# Patient Record
Sex: FEMALE | Race: WHITE | NOT HISPANIC OR LATINO | Employment: FULL TIME | URBAN - METROPOLITAN AREA
[De-identification: names, ages, dates, MRNs, and addresses within clinical notes are randomized per-mention and may not be internally consistent; named-entity substitution may affect disease eponyms.]

---

## 2017-02-01 ENCOUNTER — OFFICE VISIT (OUTPATIENT)
Dept: PSYCHOLOGY | Facility: CLINIC | Age: 37
End: 2017-02-01

## 2017-02-01 ENCOUNTER — ALLIED HEALTH/NURSE VISIT (OUTPATIENT)
Dept: FAMILY MEDICINE | Facility: CLINIC | Age: 37
End: 2017-02-01

## 2017-02-01 DIAGNOSIS — F43.22 ADJUSTMENT DISORDER WITH ANXIETY: Primary | ICD-10-CM

## 2017-02-01 DIAGNOSIS — Z00.00 PREVENTATIVE HEALTH CARE: Primary | ICD-10-CM

## 2017-02-01 ASSESSMENT — ANXIETY QUESTIONNAIRES
7. FEELING AFRAID AS IF SOMETHING AWFUL MIGHT HAPPEN: SEVERAL DAYS
6. BECOMING EASILY ANNOYED OR IRRITABLE: SEVERAL DAYS
3. WORRYING TOO MUCH ABOUT DIFFERENT THINGS: MORE THAN HALF THE DAYS
GAD7 TOTAL SCORE: 10
IF YOU CHECKED OFF ANY PROBLEMS ON THIS QUESTIONNAIRE, HOW DIFFICULT HAVE THESE PROBLEMS MADE IT FOR YOU TO DO YOUR WORK, TAKE CARE OF THINGS AT HOME, OR GET ALONG WITH OTHER PEOPLE: NOT DIFFICULT AT ALL
2. NOT BEING ABLE TO STOP OR CONTROL WORRYING: MORE THAN HALF THE DAYS
1. FEELING NERVOUS, ANXIOUS, OR ON EDGE: MORE THAN HALF THE DAYS
5. BEING SO RESTLESS THAT IT IS HARD TO SIT STILL: SEVERAL DAYS

## 2017-02-01 ASSESSMENT — PATIENT HEALTH QUESTIONNAIRE - PHQ9: 5. POOR APPETITE OR OVEREATING: SEVERAL DAYS

## 2017-02-01 NOTE — NURSING NOTE
Jeanie French      1.  Has the patient received the information for the influenza vaccine? YES    2.  Does the patient have any of the following contraindications?     Allergy to eggs? No     Allergic reaction to previous influenza vaccines? No     Any other problems to previous influenza vaccines? No     Paralyzed by Guillain-Creighton syndrome? No     Currently pregnant? NO     Current moderate or severe illness? No    Vaccination given by Anila Presley CMA.  Recorded by Anila Presley

## 2017-02-01 NOTE — PROGRESS NOTES
"Behavioral Health Progress Note    Client Name: Jeanie French    Service Type:  Individual  Length of Visit: 60 minutes  Attendees: Jeanie presented alone    Identifying Information and Presenting Problem:    The patient is a 36 year old American female who is being seen for problematic symptoms of anxiety.    Treatment Objective(s) Addressed in This Session:  Manage anxiety related to multiple life stressors    Progress on / Status of Treatment Objective(s) / Homework:  Mild exacerbation due to situational stressors  Minimal progress on homework    PHQ-9 SCORE 11/23/2015 10/19/2016 2/1/2017   Total Score - - -   Total Score 7 3 5       JAMAR-7 SCORE 11/26/2014 10/19/2016 2/1/2017   Total Score 3 - -   Total Score - 5 10       Topics Discussed/Interventions Provided:  1.  Follow Up on Homework:  Minimal progress.  Jeanie relates this to being distracted by situational stressors described below.   * Professional goals: reports she did not follow up on buying hard drive as we had planned.  Asks to carry forward this goal.   * Social connection: reports she did not reach out to my friends Tatiana and Morena.  Asks to carry forward this goal.  2.  Updated Treatment Plan:  Jeanie reports mild exacerbation of anxious and depressive symptoms today.  Anxious symptoms continue to be primary.  This appears related to situational stressors at work and at home (see below).  Jeanie denies any safety concerns today.  3.  Family:  Jeanie reports concerns about her father's health since he obtained a TBI approximately one year ago.  \"He's going to die.\"  When asked about any immediate medical crises, Jeanie denied any immediate concerns, but noted that it has been challenging to engage her father as an active participant in his own care.  She reports that he has a poor diet and does not engaged in recommended physical activity.  He would like to resume driving, but she and her mother do not think it is safe for him to do so.  While he can be " "left on his own for periods of time, her mother is reluctant to leave him secondary to worry for his safety.  He has fallen recently at home while trying to carry a printer up stairs and this has exacerbated concern.  Jeanie appear angry at her father for his unwillingness to make changes to his lifestyle that would improve his health and decrease stress on her mother.  We discussed how some of these factors are consistent with his personality prior to the TBI and some behaviors may be exacerbated by the TBI (e.g., decreased impulse control contributing to over-eating sweets, decreased motivation and ability to carry out activity that requires persistence and sustained attention, etc.).  Discussed options to support the family in caring for him over time (as this is likely to be a long term challenge).  Resources discussed included outpatient services via [facility] (where he received inpatient care after the TBI), TBI support groups for the family (to get ideas/support from other families experiencing similar challenges), and possible pursuit of a TBI waiver (a  would likely be helpful).  Jeanie expressed appreciation for these thoughts and ideas and expressed confidence in her ability to follow up on these independently.  We also discussed respecting her own limits and that fact that none of us can \"make\" anyone do anything (even for their own good).  Encouraged focus on self-care and persistent efforts to engage her father and mother in a more successful fashion.  4.  Work:  Jeanie endorses stress related to [employer] project for the [employer].  Challenges have included disorganization on the part of her employers leading to frequent requests for changes and a prolonged period of time working on this project (which she had hoped to finish up by January).  Most recently, when she told her employers that she would be busy in the coming weeks and have difficulty working on the project, she was told " "that they could try to finish on their own.  This made her think they were \"firing\" her, which was worrisome.  We discussed other possible interpretations (e.g., their awareness that the project was extending beyond the initially discussed deadline, etc.).  Discussed her ultimate hopes/goals for the project (e.g., that it could lead to other contract work for the [employer] and recommendations to other possible employers) and what choices were available to her in light of these hopes/goals (e.g., email them that she could find time in her schedule to work on it, discuss the risks of handing the project over to a new person who was not familiar with it, etc.).  Asked Jeanie to reflect on whether she had the resources (e.g., time, energy, desire, etc.) to manage this if the project continued, and she agreed that she did.  Reflected on the challenges inherent in team work and how this had been a struggle for her at various junctures in her work life (colleagues who were late, didn't approach the problem in a thoughtful manner, etc.).  Provided empathy for her frustration in the face of those challenges and the opportunity to develop skills to mitigate the stressful impact of those unavoidable moments in team work.  Agreed we would continue to work on this at future visits.  5.  Politics: Discussed emotional impact of changes in [place] Politics in the past month.  Discussed strategies for managing this ongoing stressor (see pt instructions).    Assessment: The patient appeared to be active and engaged in today's session and was receptive to feedback.     Mental Status: Jeanie French appeared generally alert and oriented. Dress was casual and appropriate to the weather and occasion. Grooming and hygiene were good. Eye contact was good. Speech was of normal volume and rate and was clear, coherent, and relevant. Mood was mildly anxious with congruent affect.  Thought processes were relevant, logical and goal-directed. Thought content " was WNL with no evidence of psychotic or paranoid features. No evidence of SI/HI or self-harm, intent, or plans. Memory appeared grossly intact. Insight and judgment appeared adequate and patient exhibited good impulse control during the appointment.     Does the patient appear to be at imminent risk of harm to self/others at this time? No    The session was necessary to address symptoms of anxiety and interpersonal distress that have been interfering with patient's ability to function in her life.  Ongoing psychotherapy is necessary to enhance coping skills as well as provide support and structure for problem solving.    Diagnosis (DSM-5):    Adjustment Disorders  309.24 (F43.22) With anxiety    Plan:  1. Follow up in 2 months or sooner if needed.  2. Work on goals as noted in patient instructions.  3. Utilize crisis resources as needed.    NOTE: Treatment plan updated today.  Next treatment plan update due on 5/1/17.  Diagnostic assessment update due 10/19/17.

## 2017-02-01 NOTE — MR AVS SNAPSHOT
After Visit Summary   2/1/2017    Jeanie French    MRN: [id number]           Patient Information     Date Of Birth          1980        Visit Information        Provider Department      2/1/2017 9:00 AM Jody Rankin, PHD [facility]        Care Instructions    Treatment Plan    Client's Name: Jeanie French  YOB: 1980    Today's Date: 2/1/17   Date Diagnostic Update Due: 10/19/17    DSM-V Diagnoses:   Adjustment Disorders  309.24 (F43.22) With anxiety    Psychosocial / Contextual Factors: Increased work related stress.  Stress related to Dad's ongoing health concerns.  Stress related to recent changes in national politics.    WHODAS 2.0 TOTAL SCORES 10/19/2016   Total Score 14     PC-PTSD:  0/4  CAGE AID:  0/4  PHQ-9 SCORE 11/23/2015 10/19/2016 2/1/2017   Total Score - - -   Total Score 7 3 5     JAMAR-7 SCORE 11/26/2014 10/19/2016 2/1/2017   Total Score 3 - -   Total Score - 5 10       Collaboration: PCP, Dr. Sohail Granados  Referral:  None  Anticipated treatment duration: unknown  Agreed upon meeting frequency: as needed or within the next two months    Long Term Treatment Goal(s) related to diagnosis / functional impairment(s)  Goal 1: Jeanie will increase her skill and confidence in managing work related stress.    I will know I've met my goal when work related distress if fleeting and quickly resolved.      Steps we will take to achieve your goal:    Jeanie will email [employer] Relations to clarify plans for current project.  Jeanie will schedule routine breaks from her computer throughout the day and either stretch or get up and walk around during those times.  Status: New Goal.    Intervention(s)  Therapist will provide support, psychoeducation and homework assignments as needed.    Goal 2: Jeanie will increase her skill and confidence in managing stress related to the current political climate.    I will know I've met my goal when political related distress if fleeting and quickly  "resolved.      Steps we will take to achieve your goal:    Limit facebook checking to 3 times per day.  Consider limiting how many Trump stories you dive into - just skim the headlines.  Limit reading stories in greater depth to three times during the day.  Replace some of the political investigation to look at design ideas, craft stuff, etc to balance out your experience.  Strive for the \"Sweet spot\" where you are engaged, but not overwhelmed.  Status: New Goal.    Intervention(s)  Therapist will provide support, psychoeducation and homework assignments as needed.    Goal 3: Jeanie will increase her skill and confidence in managing stress related to Dad's health problems    I will know I've met my goal when family related distress if fleeting and quickly resolved.      Steps we will take to achieve your goal:    Consider reaching out to [facility] regarding outpatient resources for your family.  Consider asking about whether Dad would qualify for a TBI waiver - if so he might be able to get a care coordinator to help with access and organizations of services.  Consider looking metro mobility.  Recognize that you can't \"make\" anyone do anything, but you can try different strategies to increase their engagement.    Look into support groups for families who have experienced TBI.  Status: New Goal.    Intervention(s)  Therapist will provide support, psychoeducation and homework assignments as needed.    A reminder of our goals/homework from last time:     Values:  \"It is important to me to...\"    Invest in meaningful relationships.  Self-compassion and self-care.  Grow and develop as a professional.  Grow and learn new things on a personal level.  Be financially responsibility.  Good housekeeping.    One value I will focus on for the next month will be \"growing and developing as a professional.\"  Action step I will take towards this value:  Buy a hard drive.    Another value I will focus on will be \"investing in " "meaningful relationships.\"  Action step I will take towards this value will be to reach out to my friends Tatiana and Morena.      If you need additional support and care during times that your therapist or PCP are not available, here are some additional resources for you:    Crisis Lines:    [facility] Adult Crisis:  [phone]  Crisis Connection:  [phone]    You can also consider going to the [facility] at the following address.  Walk ins are welcome:    [address] [place] [address]   [phone] (for 24 hour crisis consultation)    Monday - Friday 8:00am - 7:00pm  Saturday:  11:00am - 3:00pm  Sunday and Holidays Closed    If you feel at risk of immediate harm, go directly to the Emergency Department.    Client has reviewed and agreed to the above plan.    Jody Rankin  February 1, 2017      ______________________________    ________  Patient Signature       Date    ______________________________    ________  Provider Signature       Date                Follow-ups after your visit        Who to contact     Please call your clinic at [phone] to:    Ask questions about your health    Make or cancel appointments    Discuss your medicines    Learn about your test results    Speak to your doctor   If you have compliments or concerns about an experience at your clinic, or if you wish to file a complaint, please contact [facility] Patient Relations at [phone] or email us at [email or web address]         Additional Information About Your Visit        [employer] Information     [employer] is an electronic gateway that provides easy, online access to your medical records. With [employer], you can request a clinic appointment, read your test results, renew a prescription or communicate with your care team.     To sign up for [employer] visit the website at [email or web address]   You will be asked to enter the access code " listed below, as well as some personal information. Please follow the directions to create your username and password.     Your access code is: [email or web address]  Expires: 2017 10:06 AM     Your access code will  in 90 days. If you need help or a new code, please contact your [facility] or call [phone] for assistance.        Care EveryWhere ID     This is your Care EveryWhere ID. This could be used by other organizations to access your [place] medical records  [id number]         Blood Pressure from Last 3 Encounters:   16 106/69   12/11/15 108/67   11/23/15 104/66    Weight from Last 3 Encounters:   16 171 lb 6.4 oz (77.747 kg)   12/11/15 174 lb 3.2 oz (79.017 kg)   11/23/15 174 lb 14.4 oz (79.334 kg)              Today, you had the following     No orders found for display       Primary Care Provider Office Phone # Fax #    Sohail Se Granados  [phone]       [address] [address]        Thank you!     Thank you for choosing [facility]  for your care. Our goal is always to provide you with excellent care. Hearing back from our patients is one way we can continue to improve our services. Please take a few minutes to complete the written survey that you may receive in the mail after your visit with us. Thank you!             Your Updated Medication List - Protect others around you: Learn how to safely use, store and throw away your medicines at www.disposemymeds.org.          This list is accurate as of: 17 10:06 AM.  Always use your most recent med list.                   Brand Name Dispense Instructions for use    albuterol 108 (90 BASE) MCG/ACT Inhaler    PROAIR HFA/PROVENTIL HFA/VENTOLIN HFA    1 Inhaler    Inhale 2 puffs into the lungs every 6 hours as needed for shortness of breath / dyspnea or wheezing .Use 15 minutes prior to exercise.       busPIRone 15 MG tablet    BUSPAR    180 tablet     Take 2 tablets (30 mg) by mouth 2 times daily       fluticasone 50 MCG/ACT spray    FLONASE    16 g    Spray 1-2 sprays into both nostrils daily       ibuprofen 200 MG tablet    ADVIL/MOTRIN    100 tablet    Take 1 tablet (200 mg) by mouth every 4 hours as needed for mild pain       MULTIVITAMINS PO      Take 1 tablet by mouth daily.       psyllium 58.6 % Powd    KLS NATURAL PSYLLIUM FIBER    1 Bottle    Use as directed       vitamin D 2000 UNITS tablet     100 tablet    Take 2,000 Units by mouth daily

## 2017-02-01 NOTE — PATIENT INSTRUCTIONS
Treatment Plan    Client's Name: Jeanie French  YOB: 1980    Today's Date: 2/1/17   Date Diagnostic Update Due: 10/19/17    DSM-V Diagnoses:   Adjustment Disorders  309.24 (F43.22) With anxiety    Psychosocial / Contextual Factors: Increased work related stress.  Stress related to Dad's ongoing health concerns.  Stress related to recent changes in national politics.    WHODAS 2.0 TOTAL SCORES 10/19/2016   Total Score 14     PC-PTSD:  0/4  CAGE AID:  0/4  PHQ-9 SCORE 11/23/2015 10/19/2016 2/1/2017   Total Score - - -   Total Score 7 3 5     JAAMR-7 SCORE 11/26/2014 10/19/2016 2/1/2017   Total Score 3 - -   Total Score - 5 10       Collaboration: PCP, Dr. Sohail Granados  Referral:  None  Anticipated treatment duration: unknown  Agreed upon meeting frequency: as needed or within the next two months    Long Term Treatment Goal(s) related to diagnosis / functional impairment(s)  Goal 1: Jeanie will increase her skill and confidence in managing work related stress.    I will know I've met my goal when work related distress if fleeting and quickly resolved.      Steps we will take to achieve your goal:    Jeanie will email [employer] Relations to clarify plans for current project.  Jeanie will schedule routine breaks from her computer throughout the day and either stretch or get up and walk around during those times.  Status: New Goal.    Intervention(s)  Therapist will provide support, psychoeducation and homework assignments as needed.    Goal 2: Jeanie will increase her skill and confidence in managing stress related to the current political climate.    I will know I've met my goal when political related distress if fleeting and quickly resolved.      Steps we will take to achieve your goal:    Limit facebook checking to 3 times per day.  Consider limiting how many Trump stories you dive into - just skim the headlines.  Limit reading stories in greater depth to three times during the day.  Replace some of the political  "investigation to look at design ideas, craft stuff, etc to balance out your experience.  Strive for the \"Sweet spot\" where you are engaged, but not overwhelmed.  Status: New Goal.    Intervention(s)  Therapist will provide support, psychoeducation and homework assignments as needed.    Goal 3: Jeanie will increase her skill and confidence in managing stress related to Dad's health problems    I will know I've met my goal when family related distress if fleeting and quickly resolved.      Steps we will take to achieve your goal:    Consider reaching out to [facility] regarding outpatient resources for your family.  Consider asking about whether Dad would qualify for a TBI waiver - if so he might be able to get a care coordinator to help with access and organizations of services.  Consider looking metro mobility.  Recognize that you can't \"make\" anyone do anything, but you can try different strategies to increase their engagement.    Look into support groups for families who have experienced TBI.  Status: New Goal.    Intervention(s)  Therapist will provide support, psychoeducation and homework assignments as needed.    A reminder of our goals/homework from last time:     Values:  \"It is important to me to...\"    Invest in meaningful relationships.  Self-compassion and self-care.  Grow and develop as a professional.  Grow and learn new things on a personal level.  Be financially responsibility.  Good housekeeping.    One value I will focus on for the next month will be \"growing and developing as a professional.\"  Action step I will take towards this value:  Buy a hard drive.    Another value I will focus on will be \"investing in meaningful relationships.\"  Action step I will take towards this value will be to reach out to my friends Tatiana and Morena.      If you need additional support and care during times that your therapist or PCP are not available, here are some additional resources for you:    Crisis " Lines:    [facility] Adult Crisis:  [phone]  Crisis Connection:  [phone]    You can also consider going to the [facility] at the following address.  Walk ins are welcome:    [address] [place] [address]   [phone] (for 24 hour crisis consultation)    Monday - Friday 8:00am - 7:00pm  Saturday:  11:00am - 3:00pm  Sunday and Holidays Closed    If you feel at risk of immediate harm, go directly to the Emergency Department.    Client has reviewed and agreed to the above plan.    Jody Rankin  February 1, 2017      ______________________________    ________  Patient Signature       Date    ______________________________    ________  Provider Signature       Date

## 2017-02-02 ASSESSMENT — PATIENT HEALTH QUESTIONNAIRE - PHQ9: SUM OF ALL RESPONSES TO PHQ QUESTIONS 1-9: 5

## 2017-02-02 ASSESSMENT — ANXIETY QUESTIONNAIRES: GAD7 TOTAL SCORE: 10

## 2017-11-10 ENCOUNTER — TRANSFERRED RECORDS (OUTPATIENT)
Dept: HEALTH INFORMATION MANAGEMENT | Facility: CLINIC | Age: 37
End: 2017-11-10

## 2017-11-14 ENCOUNTER — TRANSFERRED RECORDS (OUTPATIENT)
Dept: HEALTH INFORMATION MANAGEMENT | Facility: CLINIC | Age: 37
End: 2017-11-14

## 2019-05-17 ENCOUNTER — OFFICE VISIT (OUTPATIENT)
Dept: FAMILY MEDICINE | Facility: CLINIC | Age: 39
End: 2019-05-17
Payer: COMMERCIAL

## 2019-05-17 VITALS
DIASTOLIC BLOOD PRESSURE: 77 MMHG | BODY MASS INDEX: 25.45 KG/M2 | TEMPERATURE: 98.3 F | WEIGHT: 177.8 LBS | HEIGHT: 70 IN | RESPIRATION RATE: 16 BRPM | SYSTOLIC BLOOD PRESSURE: 110 MMHG | HEART RATE: 76 BPM

## 2019-05-17 DIAGNOSIS — K20.0 EOSINOPHILIC ESOPHAGITIS: Primary | ICD-10-CM

## 2019-05-17 DIAGNOSIS — K58.0 IRRITABLE BOWEL SYNDROME WITH DIARRHEA: ICD-10-CM

## 2019-05-17 LAB — MAGNESIUM SERPL-MCNC: 1.9 MG/DL (ref 1.8–2.6)

## 2019-05-17 RX ORDER — OMEPRAZOLE 40 MG/1
40 CAPSULE, DELAYED RELEASE ORAL DAILY
Qty: 90 CAPSULE | Refills: 3 | Status: SHIPPED | OUTPATIENT
Start: 2019-05-17 | End: 2019-10-22

## 2019-05-17 ASSESSMENT — MIFFLIN-ST. JEOR: SCORE: 1543.81

## 2019-05-17 NOTE — PROGRESS NOTES
ASSESSMENT AND PLAN      Jeanie was seen today for medication request.    Diagnoses and all orders for this visit:    Eosinophilic esophagitis: Patient has a diagnosis of eosinophilic esophagitis, well controlled with omeprazole.  Will refill patient's omeprazole at this time.  We will follow-up on serum magnesium given chronic use of PPIs.  -     omeprazole (PRILOSEC) 40 MG DR capsule; Take 1 capsule (40 mg) by mouth daily  -     Magnesium ([facility])    Irritable bowel syndrome with diarrhea: Patient with significant chronic history of IBS, diarrheal type.  Patient has tried multiple interventions in the past not noting much improvement with anything except for decreasing fatty foods.  Patient had tried a FODMAP diet in the past, but it was likely not long enough and duration.  Did recommend patient try it for at least 6 weeks if able to tolerate that.  Did recommend small portions with increased frequency of eating throughout the day.  Patient was given information FODMAP diet.  Okay to take Imodium as needed for symptoms.  Encourage patient to create a food diary    AMBROSIO obtained from [facility].    Follow-up as needed.    Xiang Jennings MD PGY3  [facility]                SUBJECTIVE       Jeanie French is a 39 year old  female with a PMH significant for:     Patient Active Problem List   Diagnosis     Cancer of tongue (H)     Health Care Home     Tenosynovitis of the right index finger     Wheezing     Irritable bowel syndrome (IBS)     RUQ abdominal mass     Exercise-induced asthma     Sinusitis, chronic     Allergic rhinitis, unspecified allergic rhinitis type     Hypovitaminosis D     Adjustment disorder with anxiety     She presents with need for prescription refill. Went to [facility] about 1 year ago. She was diagnosed with eosinophilic esophagitis. She has been taking omeprazole. She was on 40mg daily, but continued to have some symptoms when trying to drop down to 20mg.     Typical  "symptoms include heart burn, \"food getting stuck when eating dense foods like potatoes or meat.\" When taking omeprazole, those symptoms do improve.     Patient has had IBS symptoms, including  Bloating, cramping, gurgling, diarrhea, for 20 years. This has been getting worse for about a month. She has tried FODMAP diet in the past, but it did not help. She feels like symptoms are worse with dairy, but \"I feel like there is no rhyme or reason.\"  She does note that she had improvement with decreasing fatty and greasy foods.  Patient also notes that her symptoms are slightly improved with Imodium.  She has had a normal colonoscopy through [facility] in the past 10 years per patient.     PMH, Medications and Allergies were reviewed and updated as needed.          OBJECTIVE     Vitals:    05/17/19 0903   BP: 110/77   Pulse: 76   Resp: 16   Temp: 98.3  F (36.8  C)   Weight: 80.6 kg (177 lb 12.8 oz)   Height: 1.765 m (5' 9.5\")     Body mass index is 25.88 kg/m .    General: Well-appearing, no acute distress, answering questions appropriately.  CV: S1, S2, regular rate and rhythm.  No murmurs noted.  Respiratory: Clear to auscultation bilaterally.  No wheezes or rhonchi.  Abdomen: Soft, nondistended, nontender, bowel sounds positive.  No results found for this or any previous visit (from the past 24 hour(s)).          "

## 2019-05-17 NOTE — PROGRESS NOTES
Preceptor Attestation:   Patient seen, evaluated and discussed with the resident. I have verified the content of the note, which accurately reflects my assessment of the patient and the plan of care.   Supervising Physician:  Misael Melton MD

## 2019-05-17 NOTE — LETTER
May 22, 2019      Jeanie French  [address] [place] [address]        Dear Jeanie,  The results of your magnesium are back and it is within the normal range. There is no need to supplement at this time. If you have any questions, feel free to call the clinic.   Please see below for your test results.    Resulted Orders   Magnesium ([facility])   Result Value Ref Range    Magnesium 1.9 1.8 - 2.6 mg/dL    Narrative    Test performed by:  [facility]  [address] [place] [address]       If you have any questions, please call the clinic to make an appointment.    Sincerely,    Xiang Jennings MD

## 2019-05-17 NOTE — PATIENT INSTRUCTIONS
-Try FODMAP diet out, trial for up to 6 weeks, If this works, then slowly introduce foods back in one by one.  -Try more frequent small meals throughout the day  -OK to take imodium as needed for diarrhea  -Start taking fiber supplements again  -Avoid dairy if possible  -Follow-up for complete physical exam.

## 2019-05-20 NOTE — RESULT ENCOUNTER NOTE
Chad Ware,    The results of your magnesium are back and it is within the normal range. There is no need to supplement at this time. If you have any questions, feel free to call the clinic.    Thanks,    Xiang Jennings MD PGY3  [facility]

## 2019-10-15 ENCOUNTER — OFFICE VISIT (OUTPATIENT)
Dept: FAMILY MEDICINE | Facility: CLINIC | Age: 39
End: 2019-10-15
Payer: COMMERCIAL

## 2019-10-15 DIAGNOSIS — F41.1 GENERALIZED ANXIETY DISORDER: Primary | ICD-10-CM

## 2019-10-15 NOTE — Clinical Note
YONI, as patient had seen you previously.  She doesn't feel like she needs therapy now--just being proactive before things get worse.

## 2019-10-16 VITALS
RESPIRATION RATE: 20 BRPM | DIASTOLIC BLOOD PRESSURE: 79 MMHG | SYSTOLIC BLOOD PRESSURE: 115 MMHG | WEIGHT: 163 LBS | OXYGEN SATURATION: 96 % | BODY MASS INDEX: 23.73 KG/M2 | HEART RATE: 87 BPM | TEMPERATURE: 98.2 F

## 2019-10-16 ASSESSMENT — PATIENT HEALTH QUESTIONNAIRE - PHQ9: SUM OF ALL RESPONSES TO PHQ QUESTIONS 1-9: 6

## 2019-10-16 NOTE — PROGRESS NOTES
Nursing Notes:   Oh Rolon, [facility]  10/16/2019  8:05 AM  Signed  Pt declined flu    SUBJECTIVE  Jeanie French is a 39 year old female with past medical history significant for    Patient Active Problem List   Diagnosis     Cancer of tongue (H)     Health Care Home     Generalized anxiety disorder     Tenosynovitis of the right index finger     Wheezing     Irritable bowel syndrome (IBS)     RUQ abdominal mass     Exercise-induced asthma     Sinusitis, chronic     Allergic rhinitis, unspecified allergic rhinitis type     Hypovitaminosis D     Adjustment disorder with anxiety     Others present at the visit:  None    Presents for   Chief Complaint   Patient presents with     Anxiety       38 yo female, new patient to me presenting with anxiety.  Has been a problem all her life, but better over the last few years.  A couple of weeks ago, things got worse.  She has had a couple of stressors recently--1)  A couple of large expensive house projects that are becoming more necessary to complete, 2)  Challenges with poor management at work--threatened loss of funding, and difficulty moving around tasks and work.  3)  Challenges in a romantic relationship where right now it is unclear with how things stand.  Also has been stressed about planning time to train for a 10mile race--hard to fit in.  Sleep has suffered some, and she is waking up a couple of times during the night. Feels tired during the day.  Not going to bed until after 11:00 often due to texting or watching TV.  She has been able to make some lists for home things, and moved forward on them, which feels good.  Anxiety got better for a little while, but has been worse again.      When she gets anxious she experiences shortness of breath, difficulty taking a deep breath, and feels like she s got a weight on her chest.  She can feel nauseous and sick to her stomach.   This can be worse acutely, but she doesn t feel like she gets panic attacks--more like it s a  chronic feeling that s always present.  Today, she rates her anxiety at about 4/5, with up to 7/8 recently with stressors.  She has seen Dr. Rankin for therapy in the past but its been a while and she s not sure that she needs it or has time for it right now.  Was on Buspar, 30mg BID in the past and this helped.  Was also on Klonopin, 0.5mg BID and this did not help.  Does not recall taking and as needed medication in the past.  She is interested in re-starting medications today.  She is feeling more anxious, but things are not bad yet.  She wants to make sure to stay on top of things because in the past this has spiraled into worsening depression and anxiety and she wants to avoid that.      OBJECTIVE:  Vitals: /79   Pulse 87   Temp 98.2  F (36.8  C) (Oral)   Resp 20   Wt 73.9 kg (163 lb)   LMP 09/21/2019   SpO2 96%   Breastfeeding? No   BMI 23.73 kg/m    BMI= Body mass index is 23.73 kg/m .  Vitals:  Vitals are reviewed and are within the normal range  Gen:  Alert, pleasant, no acute distress  Psyche:  Patient appears anxious, but thought process is clear, logical.      PHQ-9 SCORE 10/19/2016 2/1/2017 10/16/2019   PHQ-9 Total Score - - -   PHQ-9 Total Score 3 5 6     JAMAR-7 SCORE 11/26/2014 10/19/2016 2/1/2017   Total Score 3 - -   Total Score - 5 10       ASSESSMENT AND PLAN:      Jeanie was seen today for anxiety.    Diagnoses and all orders for this visit:    Generalized anxiety disorder  -     busPIRone (BUSPAR) 10 MG tablet; Take 1 tablet (10 mg) by mouth 2 times daily  -     hydrOXYzine (VISTARIL) 25 MG capsule; Take 1 capsule (25 mg) by mouth 3 times daily as needed for anxiety or other (sleep)    39 you Female with hx of anxiety and depression presenting with recurrence of anxiety symptoms.  She has a number of good tools from previous that she is currently using.  Provided reminders of her skills and that she has been using them appropriately.  We will also restart medications:    1) Buspar  10mg BID.  Can titrate up later as needed.   2) Hydroxyzine, 12.5-25mg TID PRN for acute anxiety or sleep.  Discussed potential for sedation.  3) She will work on the following lifestyle change:  Try to get to bed earlier:  a. 9:30 Prep for bed and the next day  b. 10:00 Turn off the TV, and read/relax  c. 10:30 lights out.    4) Make running a fun and not pressure thing  5) Continue to work her way through the home to do list.    6) Let us know if things are worsening      Follow up in 4 weeks.        Anayeli Warner MD

## 2019-10-17 RX ORDER — HYDROXYZINE PAMOATE 25 MG/1
25 CAPSULE ORAL 3 TIMES DAILY PRN
Qty: 60 CAPSULE | Refills: 1 | Status: SHIPPED | OUTPATIENT
Start: 2019-10-17 | End: 2019-11-12

## 2019-10-17 RX ORDER — BUSPIRONE HYDROCHLORIDE 10 MG/1
10 TABLET ORAL 2 TIMES DAILY
Qty: 180 TABLET | Refills: 0 | Status: SHIPPED | OUTPATIENT
Start: 2019-10-17 | End: 2019-11-12

## 2019-11-12 ENCOUNTER — OFFICE VISIT (OUTPATIENT)
Dept: FAMILY MEDICINE | Facility: CLINIC | Age: 39
End: 2019-11-12
Payer: COMMERCIAL

## 2019-11-12 VITALS
RESPIRATION RATE: 16 BRPM | OXYGEN SATURATION: 99 % | DIASTOLIC BLOOD PRESSURE: 77 MMHG | SYSTOLIC BLOOD PRESSURE: 127 MMHG | WEIGHT: 167.2 LBS | BODY MASS INDEX: 24.34 KG/M2 | TEMPERATURE: 98.1 F | HEART RATE: 69 BPM

## 2019-11-12 DIAGNOSIS — Z23 NEED FOR PROPHYLACTIC VACCINATION AND INOCULATION AGAINST INFLUENZA: ICD-10-CM

## 2019-11-12 DIAGNOSIS — F41.1 GENERALIZED ANXIETY DISORDER: Primary | ICD-10-CM

## 2019-11-12 DIAGNOSIS — J45.20 MILD INTERMITTENT ASTHMA WITHOUT COMPLICATION: ICD-10-CM

## 2019-11-12 RX ORDER — ALBUTEROL SULFATE 90 UG/1
2 AEROSOL, METERED RESPIRATORY (INHALATION) EVERY 6 HOURS PRN
Qty: 1 INHALER | Refills: 1 | Status: SHIPPED | OUTPATIENT
Start: 2019-11-12

## 2019-11-12 RX ORDER — BUSPIRONE HYDROCHLORIDE 10 MG/1
TABLET ORAL
Qty: 150 TABLET | Refills: 1 | Status: SHIPPED | OUTPATIENT
Start: 2019-11-12 | End: 2019-12-18

## 2019-11-12 RX ORDER — OMEPRAZOLE 40 MG/1
CAPSULE, DELAYED RELEASE ORAL
Refills: 3 | COMMUNITY
Start: 2019-05-17 | End: 2020-07-07

## 2019-11-12 RX ORDER — ALBUTEROL SULFATE 90 UG/1
2 AEROSOL, METERED RESPIRATORY (INHALATION)
COMMUNITY
Start: 2016-08-29 | End: 2019-11-12

## 2019-11-12 RX ORDER — HYDROXYZINE PAMOATE 25 MG/1
25 CAPSULE ORAL 3 TIMES DAILY PRN
Qty: 60 CAPSULE | Refills: 1 | Status: SHIPPED | OUTPATIENT
Start: 2019-11-12 | End: 2020-02-03

## 2019-11-12 ASSESSMENT — ANXIETY QUESTIONNAIRES
6. BECOMING EASILY ANNOYED OR IRRITABLE: MORE THAN HALF THE DAYS
GAD7 TOTAL SCORE: 16
3. WORRYING TOO MUCH ABOUT DIFFERENT THINGS: NEARLY EVERY DAY
5. BEING SO RESTLESS THAT IT IS HARD TO SIT STILL: NOT AT ALL
7. FEELING AFRAID AS IF SOMETHING AWFUL MIGHT HAPPEN: NEARLY EVERY DAY
1. FEELING NERVOUS, ANXIOUS, OR ON EDGE: NEARLY EVERY DAY
2. NOT BEING ABLE TO STOP OR CONTROL WORRYING: NEARLY EVERY DAY
IF YOU CHECKED OFF ANY PROBLEMS ON THIS QUESTIONNAIRE, HOW DIFFICULT HAVE THESE PROBLEMS MADE IT FOR YOU TO DO YOUR WORK, TAKE CARE OF THINGS AT HOME, OR GET ALONG WITH OTHER PEOPLE: EXTREMELY DIFFICULT

## 2019-11-12 ASSESSMENT — PATIENT HEALTH QUESTIONNAIRE - PHQ9
SUM OF ALL RESPONSES TO PHQ QUESTIONS 1-9: 10
5. POOR APPETITE OR OVEREATING: MORE THAN HALF THE DAYS

## 2019-11-12 NOTE — PATIENT INSTRUCTIONS
We should call about mental health referral to get you schedule.      Increase buspar to 2 tabs 2x per day for 2 week, then 3 tabs 3x per day for 2 weeks.      Follow up in 1 month.        My Asthma Action Plan  Name: Jeanie French  YOB: 1980  Date: 11/12/2019   My doctor: Anayeli Warner   My clinic:   [place] [address]  [phone]    My Asthma Severity: Intermittent / Exercise Induced Avoid your asthma triggers: exercise or sports and cold air      GREEN ZONE   Good Control    I feel good    No cough or wheeze    Can work, sleep and play without asthma symptoms       Take your asthma control medicine every day.  Take the medications listed below daily.    Albuterol    1. If exercise triggers your asthma, take your rescue medication (2 puffs of albuterol, Ventolin/Pro-Air) 15 minutes before exercise or sports, and during exercise if you have asthma symptoms.  2. Spacer to use with inhaler: If you have a spacer, make sure to use it with your inhaler.              YELLOW ZONE Getting Worse  I have ANY of these:    I do not feel good    Cough or wheeze    Chest feels tight    Wake up at night   1. Keep taking your Green Zone medications.  2. Start taking your rescue medicine (1-2 puffs of albuterol - Ventolin/Pro-Air) every 4-6 hours as needed.  3. If symptoms are not controlled with above, can take 2 puffs every 20 minutes for up to 1 hour, then continue every 4 hours if needed.   4. If you do not return to the Green Zone in 12-24 hours or you get worse, call the clinic.         RED ZONE Medical Alert - Get Help  I have ANY of these:    I feel awful    Medicine is not helping    Breathing getting harder    Trouble walking or talking    Nose opens wide to breathe       1. Take your rescue medicine NOW (6-8 puffs of albuterol - Ventolin/Pro-Air) for every 20 minutes for up to 1 hour.  2. Call your doctor NOW.  3. If you are still in the Red Zone after 20 minutes and  you have not reached your doctor:    Take your rescue medicine again (6-8 puffs of albuterol - Ventolin/Pro-Air) and    Call 911 or go to the emergency room right away    See your regular doctor within 1 weeks of an Emergency Room or Urgent Care visit for follow-up treatment.        This Asthma Action Plan provides authorization for the administration of medication described in the AAP.  YES    Electronically signed by: Lupe Mcnamara CMA    Annual Reminders:  Meet with Asthma Educator,  Flu Shot in the Fall, Pneumonia Shot  Pharmacy:    [facility] [place], [place] - [address] [address] [place], [place] - [address] [place] [address] - [address] [facility] [phone]    11/13/19    MENTAL HEALTH REFERRAL    Mental Health referral routed to behavioral health team for recommendations. See Documentation Only encounter for more information.    Marcela Lewis

## 2019-11-13 ENCOUNTER — DOCUMENTATION ONLY (OUTPATIENT)
Dept: PSYCHOLOGY | Facility: CLINIC | Age: 39
End: 2019-11-13

## 2019-11-13 ASSESSMENT — ASTHMA QUESTIONNAIRES: ACT_TOTALSCORE: 25

## 2019-11-13 ASSESSMENT — ANXIETY QUESTIONNAIRES: GAD7 TOTAL SCORE: 16

## 2019-11-13 NOTE — PROGRESS NOTES
Behavioral Health Team,    Patient is being referred for mental health services by their provider, Dr. Warner.  Please advise if we are able to see patient for in house treatment or if a community option would be best.    Thank you,    Marcela Lewis  11/13/2019

## 2019-11-13 NOTE — PROGRESS NOTES
There are no exam notes on file for this visit.    SUBJECTIVE  Jeanie French is a 39 year old female with past medical history significant for    Patient Active Problem List   Diagnosis     Cancer of tongue (H)     Health Care Home     Generalized anxiety disorder     Tenosynovitis of the right index finger     Wheezing     Irritable bowel syndrome (IBS)     RUQ abdominal mass     Exercise-induced asthma     Sinusitis, chronic     Allergic rhinitis, unspecified allergic rhinitis type     Hypovitaminosis D     Adjustment disorder with anxiety     Others present at the visit:  None    Presents for   Chief Complaint   Patient presents with     Recheck Medication     Pt is here to follow up on new depression medications.  She feels like her new medications are not working enough.     Imm/Inj     Pt will take her flu shot today but would like to further discuss her PCV-13.     Medication Reconciliation     Complete.      Imm/Inj     Flu Shot     Patient presents for follow-up on anxiety and depression medications.  She reports that things are somewhat better, but she is hoping for more improvement.  She found the hydroxyzine effective, but was taking it nearly 3 times a day every day for the first couple of weeks.  Does take it occasionally for sleep.  After taking the BuSpar for couple of weeks, this did seem to help more, but she is still feeling somewhat on edge.  More anxious and irritable.  Still having some difficulty with focusing and slowing things down.    She has been able to get some of her house projects done before the winter started.  Still having a lot of instability at work, with new committees being formed, and on sure what the next step would be.  Does not find work very motivating right now.  Has tried to make some sleep changes but this has been somewhat challenging to do consistently.  She is interested in pursuing therapy with Dr. Rankin again, as it was helpful in the past, and she still is not  where she is like to be.    We also discussed her asthma.  She had exercise-induced asthma as a child and now gets episodes of intermittent wheezing for which he is in its an inhaler couple of times a year.  No regular inhaler use.  She does not think she is gotten a new inhaler in a year.  He is to spacer previously, but to get out to clean it and it no longer works the same way.  Asthma action plan was completed with Lupe prior to the visit.    OBJECTIVE:  Vitals: /77 (BP Location: Left arm, Patient Position: Sitting, Cuff Size: Adult Regular)   Pulse 69   Temp 98.1  F (36.7  C) (Oral)   Resp 16   Wt 75.8 kg (167 lb 3.2 oz)   LMP 10/11/2019 (Within Days)   SpO2 99%   Breastfeeding No   BMI 24.34 kg/m    BMI= Body mass index is 24.34 kg/m .  Objective:    Vitals:  Vitals are reviewed and are within the normal range  Gen:  Alert, pleasant, mildly anxious.  Thoughtful, NAD.    Psych: Mood and affect are somewhat anxious.  Thought processes logical.  Good insight.    PHQ-9 SCORE 2/1/2017 10/16/2019 11/12/2019   PHQ-9 Total Score - - -   PHQ-9 Total Score 5 6 10     JAMAR-7 SCORE 10/19/2016 2/1/2017 11/12/2019   Total Score - - -   Total Score 5 10 16     ACT and AAP were completed      ASSESSMENT AND PLAN:      Jeanie was seen today for recheck medication, imm/inj, medication reconciliation and imm/inj.      Diagnoses and all orders for this visit:    Generalized anxiety disorder.  Symptoms may be slightly improved, but minimal.  She had been on a much higher dose of BuSpar in the past with increased effectiveness, so we will titrate up.  We will do 20 mg twice daily for 2 weeks and then 30 mg twice daily for 2 weeks.  This was her previous stable dose.  Refilled the Vistaril as this has been helpful.  Mental health referral placed, she is benefited from this in the past.  She would like to return and see Dr. Rankin if there are openings available.  -     busPIRone (BUSPAR) 10 MG tablet; Take 2 tabs  twice daily for 2 weeks, then 3 tabs twice daily after that.  -     hydrOXYzine (VISTARIL) 25 MG capsule; Take 1 capsule (25 mg) by mouth 3 times daily as needed for anxiety or other (sleep)  -     MENTAL HEALTH REFERRAL  -    Mild intermittent asthma without complication  Need for prophylactic vaccination and inoculation against influenza  History of mild intermittent asthma.  Refilled her albuterol inhaler as she has not gotten a new one in a year as well as a spacer.  Flu shot and pneumococcal vaccine done today.  -     Fluzone quad, multidose 0.5ml, 6+ months [44645]  -     albuterol (PROAIR HFA/PROVENTIL HFA/VENTOLIN HFA) 108 (90 Base) MCG/ACT inhaler; Inhale 2 puffs into the lungs every 6 hours as needed for shortness of breath / dyspnea or wheezing  -     order for DME; Equipment being ordered: aerochamber spacer.  Qty:  1  -     Pneumococcal vaccine 13 valent PCV13 IM (Prevnar) [02919]        Patient Instructions     We should call about mental health referral to get you schedule.      Increase buspar to 2 tabs 2x per day for 2 week, then 3 tabs 3x per day for 2 weeks.      Follow up in 1 month.        My Asthma Action Plan  Name: Jeanie French  YOB: 1980  Date: 11/12/2019   My doctor: Anayeli Warner   My clinic:   [place] [address]  [phone]    My Asthma Severity: Intermittent / Exercise Induced Avoid your asthma triggers: exercise or sports and cold air      GREEN ZONE   Good Control    I feel good    No cough or wheeze    Can work, sleep and play without asthma symptoms       Take your asthma control medicine every day.  Take the medications listed below daily.    Albuterol    1. If exercise triggers your asthma, take your rescue medication (2 puffs of albuterol, Ventolin/Pro-Air) 15 minutes before exercise or sports, and during exercise if you have asthma symptoms.  2. Spacer to use with inhaler: If you have a spacer, make sure to use it with your  inhaler.              YELLOW ZONE Getting Worse  I have ANY of these:    I do not feel good    Cough or wheeze    Chest feels tight    Wake up at night   1. Keep taking your Green Zone medications.  2. Start taking your rescue medicine (1-2 puffs of albuterol - Ventolin/Pro-Air) every 4-6 hours as needed.  3. If symptoms are not controlled with above, can take 2 puffs every 20 minutes for up to 1 hour, then continue every 4 hours if needed.   4. If you do not return to the Green Zone in 12-24 hours or you get worse, call the clinic.         RED ZONE Medical Alert - Get Help  I have ANY of these:    I feel awful    Medicine is not helping    Breathing getting harder    Trouble walking or talking    Nose opens wide to breathe       1. Take your rescue medicine NOW (6-8 puffs of albuterol - Ventolin/Pro-Air) for every 20 minutes for up to 1 hour.  2. Call your doctor NOW.  3. If you are still in the Red Zone after 20 minutes and you have not reached your doctor:    Take your rescue medicine again (6-8 puffs of albuterol - Ventolin/Pro-Air) and    Call 911 or go to the emergency room right away    See your regular doctor within 1 weeks of an Emergency Room or Urgent Care visit for follow-up treatment.        This Asthma Action Plan provides authorization for the administration of medication described in the AAP.  YES    Electronically signed by: Lupe Mcnamara CMA    Annual Reminders:  Meet with Asthma Educator,  Flu Shot in the Fall, Pneumonia Shot  Pharmacy:    [facility] - [place], [place] - [address] [facility] [address] [place] [address] - [address] [facility] [phone]      Follow up in 1 month for recheck anxiety and depression.      Anayeli Warner MD

## 2019-11-14 NOTE — PROGRESS NOTES
Review of Dr. Warner's order and note indicates that this is a referral for psychotherapy to address anxiety.  I have worked with this patient in the past and would be happy to see her back but do not have any openings prior to December at this point in time.  If she is Ok with that wait, can schedule with me at that time.  If this is too long to wait, we can offer visit with Dr. Caba who appears to have some limited openings for MH visits at this time.  Will ask Bekah to reach out to her to discuss these options and arrange follow up based on her preferences.    Let me know if you have questions or would like additional follow up from me.  Thanks!      Jody Rankin, Ph.D.,     Disclaimer  The above treatment recommendations are based on consultation with the patient's primary care provider and a review of relevant information in EPIC.? I have not personally examined the patient.? All recommendations should be implemented with considerations of the patient's relevant prior history and current clinical status.  Please contact me with any questions about the care of this patient.

## 2019-11-14 NOTE — PROGRESS NOTES
SW reached out to patient to discuss MH referral. She is ambivalent about the wait but does indicate that she would like to resume with a therapist she's met with before. SARAH offered patient about with  and she accepted. She is scheduled for Monday, December 9th at 10:00am with  at [facility].     Patient is wondering if there are cancellations if she could be seen sooner. SW indicated that may be a possibility but could not confirm that. Agreed to pass the message along to .     RICHAR Schneider

## 2019-11-19 NOTE — PROGRESS NOTES
Reviewing my schedule this week, I could fit her in tomorrow, Wednesday November 20 at 2:30.  Will ask the  to reach out to her today to offer this slot. If that won't work, I don't see any other options prior to 12/9/19 currently but will keep looking to see if anything opens up.    Jody Rankin, PhD, LP

## 2019-11-20 NOTE — PROGRESS NOTES
Placed outreach call to Jeanie to see if she would be able to meet this afternoon, but unfortunately she has a meeting which conflicted with the time.  Agreed I would keep looking for other opportunities if anything should open up in my schedule sooner than 12/9 and would alert her if this were the case.  She expressed appreciation for that and for the call today.  Denied any acute/urgent needs.    Jody Rankin, PhD, LP

## 2019-12-09 ENCOUNTER — OFFICE VISIT (OUTPATIENT)
Dept: PSYCHOLOGY | Facility: CLINIC | Age: 39
End: 2019-12-09
Payer: COMMERCIAL

## 2019-12-09 DIAGNOSIS — F43.22 ADJUSTMENT DISORDER WITH ANXIETY: Primary | ICD-10-CM

## 2019-12-09 ASSESSMENT — ANXIETY QUESTIONNAIRES
IF YOU CHECKED OFF ANY PROBLEMS ON THIS QUESTIONNAIRE, HOW DIFFICULT HAVE THESE PROBLEMS MADE IT FOR YOU TO DO YOUR WORK, TAKE CARE OF THINGS AT HOME, OR GET ALONG WITH OTHER PEOPLE: SOMEWHAT DIFFICULT
GAD7 TOTAL SCORE: 14
1. FEELING NERVOUS, ANXIOUS, OR ON EDGE: MORE THAN HALF THE DAYS
7. FEELING AFRAID AS IF SOMETHING AWFUL MIGHT HAPPEN: SEVERAL DAYS
5. BEING SO RESTLESS THAT IT IS HARD TO SIT STILL: NOT AT ALL
3. WORRYING TOO MUCH ABOUT DIFFERENT THINGS: NEARLY EVERY DAY
6. BECOMING EASILY ANNOYED OR IRRITABLE: NEARLY EVERY DAY
2. NOT BEING ABLE TO STOP OR CONTROL WORRYING: NEARLY EVERY DAY

## 2019-12-09 ASSESSMENT — PATIENT HEALTH QUESTIONNAIRE - PHQ9
5. POOR APPETITE OR OVEREATING: MORE THAN HALF THE DAYS
SUM OF ALL RESPONSES TO PHQ QUESTIONS 1-9: 6

## 2019-12-09 NOTE — PROGRESS NOTES
Behavioral Health Progress Note    Client Name: Jeanie French    Service Type:  Individual  Length of Visit: 60 minutes  Attendees: Jeanie presented alone    Identifying Information and Presenting Problem:    The patient is a 39 year old American female who is being seen for problematic symptoms of anxiety.  Jeanie is familiar to me from past work together, but she has not been seen since 2/1/17.  Re-establishing care today.    Treatment Objective(s) Addressed in This Session:  Manage anxiety related to multiple life stressors    Progress on / Status of Treatment Objective(s) / Homework:  New goals established today.    PHQ-9 SCORE 10/16/2019 11/12/2019 12/9/2019   PHQ-9 Total Score - - -   PHQ-9 Total Score 6 10 6       JAMAR-7 SCORE 2/1/2017 11/12/2019 12/9/2019   Total Score - - -   Total Score 10 16 14       Topics Discussed/Interventions Provided:    Current predicament:  Jeanie returns to care today after an increase in both anxious and depressive symptoms which started roughly in late summer/early fall 2019.  Jeanie identifies that these symptoms are directly related to an increase in life stressors both at work and in her personal life.  She is hoping to re-establish care to address these concerns.    Work stress:  Jeanie has been at the same company for roughly 6 years.  While she is not entirely satisfied with her work life, it has provided financial stability and she has good relationships with her co-workers.  That being said, the company is facing financial hardship and she is now questioning the stability of her position and future there.  She has started looking at other job possibilities but experiences a good deal of anxiety about the uncertainties of her work future.    Relationship stress:  Jeanie started a relationship in the spring of 2019.  There are a number of complicating factors in this relationship and she is quite anxious about the future of this relationship.  She cares deeply about the person she  "is dating, but is uncertain of his level of commitment.  Jeanie has set a deadline for certain decisions to be made in the relationship by January and is anxious about how this will unfold.  She would like to help to better manage her anxiety about this relationship and thinking through the future of this relationship.     Mood:  Mood symptoms have improved since November which she attributes to medication prescribed by Dr. Warner.  Still some depressed mood and associated fatigue, but less than in the past.  At her worst, was experiencing suicidal ideation without intent or plan in late October/early November, but this has subsided and has not been present for the past month.  No non-suicidal self injury thoughts, intent or action (this has been a concern in the past.  Was impaired at work secondary to mood disturbance and anxiety for about a week at it's worst (enough for co-workers to notice and offer support).    Anxiety:  Anxiety appears to be Jeanie's most persistent concern.  Endorses significant struggles with worry.  Describes worrying all the time about many things (relationship, work, etc.).  Did experience what she thought was a panic attack at the end of October:  Hyperventilating, high distress for a few minutes.  Sought out support from friend, Justa, which was helpful.  Denies any additional episodes of panic since that time.  Thinks medication adjustments made by Dr. Warner has helped.    Family:  At our last encounter, we had been focused on the stress of her father experiencing a TBI and stressors associated with that.  Currently, Dad is stable and Jeanie appears to have reached some level of acceptance with the changes that he has experienced.  Can drive short distances.  Jeanie notes that \"he's not who he was, but he will be 80 soon.\"  Parents moved to [place] where siblings live which has solidified her pre-existing perception of being an outsider in the family.  While interactions with family " "are not always satisfying, she does stay routinely connected to them.  Brother's youngest daughter recently had surgery for scoliosis and she visited her at the hospital.  Other niece in 20s.  Jeanie notes that no one in the family outside her mother ever asks her about what is going on in her life or invites her to join activities which is hurtful to her.  Feeling like \"washing her hands\" of the family at times.  Reflected on how she has been clear and assertive with her romantic partner about her goals for the relationship and what she would like to see there, but does not demonstrate this with her family.  Jeanie accepts this feedback but states that she knows her family would not respond well to this feedback from her or requests for more attention.  This provider suggested this may be another area for us to spend some time reflecting upon in future visits if Jeanie is interested in that.     Assessment: The patient appeared to be active and engaged in today's session and was receptive to feedback.     Mental Status: Jeanie French appeared generally alert and oriented. Dress was casual and appropriate to the weather and occasion. Grooming and hygiene were good. Eye contact was good. Speech was of normal volume and rate and was clear, coherent, and relevant. Mood was anxious with congruent affect.  She was tearful at times.  Thought processes were relevant, logical and goal-directed. Thought content was WNL with no evidence of psychotic or paranoid features. No evidence of SI/HI or self-harm, intent, or plans. Memory appeared grossly intact. Insight and judgment appeared adequate and patient exhibited good impulse control during the appointment.     Does the patient appear to be at imminent risk of harm to self/others at this time? No    The session was necessary to address symptoms of anxiety and interpersonal distress that have been interfering with patient's ability to function in her life.  Ongoing psychotherapy " is necessary to enhance coping skills as well as provide support and structure for problem solving.    Diagnosis (DSM-5):    Adjustment Disorders  309.24 (F43.22) With anxiety    Plan:  1. Scheduled follow up with this provider on 1/8/20 and 1/29/20. Will cancel visit on 1/29/20 if no longer needed at that time.  2. Did let Jeanie know we will need to complete a diagnostic assessment and treatment plan at next visit and she did agree to this.  3. Utilize crisis resources as needed.  4. Follow up with Dr. Warner on 12/18/19.    NOTE: Diagnostic assessment and treatment plan update due at next visit.

## 2019-12-09 NOTE — Clinical Note
FYI - will be following up with you on 12/18/19.  Let me know if you have any questions prior to that time.  Thanks!  Jody

## 2019-12-09 NOTE — PATIENT INSTRUCTIONS
Schedule follow up with Dr. Warner around 12/12/19.    Schedule follow up with Dr. Rankin on 1/8/20 and 1/29/20 on your way out of clinic today.  Feel free to call in sooner if anything urgent arises.    Dr. Rankin will be out of the office from 12/24 - 12/27.

## 2019-12-10 ASSESSMENT — ANXIETY QUESTIONNAIRES: GAD7 TOTAL SCORE: 14

## 2019-12-18 ENCOUNTER — OFFICE VISIT (OUTPATIENT)
Dept: FAMILY MEDICINE | Facility: CLINIC | Age: 39
End: 2019-12-18
Payer: COMMERCIAL

## 2019-12-18 VITALS
DIASTOLIC BLOOD PRESSURE: 76 MMHG | TEMPERATURE: 98 F | SYSTOLIC BLOOD PRESSURE: 118 MMHG | BODY MASS INDEX: 23.96 KG/M2 | RESPIRATION RATE: 16 BRPM | HEIGHT: 70 IN | WEIGHT: 167.4 LBS | HEART RATE: 84 BPM

## 2019-12-18 DIAGNOSIS — F41.1 GENERALIZED ANXIETY DISORDER: ICD-10-CM

## 2019-12-18 RX ORDER — BUSPIRONE HYDROCHLORIDE 30 MG/1
TABLET ORAL
Qty: 180 TABLET | Refills: 1 | Status: SHIPPED | OUTPATIENT
Start: 2019-12-18 | End: 2020-07-06

## 2019-12-18 ASSESSMENT — ANXIETY QUESTIONNAIRES
3. WORRYING TOO MUCH ABOUT DIFFERENT THINGS: NEARLY EVERY DAY
GAD7 TOTAL SCORE: 11
1. FEELING NERVOUS, ANXIOUS, OR ON EDGE: SEVERAL DAYS
6. BECOMING EASILY ANNOYED OR IRRITABLE: NEARLY EVERY DAY
7. FEELING AFRAID AS IF SOMETHING AWFUL MIGHT HAPPEN: SEVERAL DAYS
IF YOU CHECKED OFF ANY PROBLEMS ON THIS QUESTIONNAIRE, HOW DIFFICULT HAVE THESE PROBLEMS MADE IT FOR YOU TO DO YOUR WORK, TAKE CARE OF THINGS AT HOME, OR GET ALONG WITH OTHER PEOPLE: SOMEWHAT DIFFICULT
5. BEING SO RESTLESS THAT IT IS HARD TO SIT STILL: NOT AT ALL
2. NOT BEING ABLE TO STOP OR CONTROL WORRYING: MORE THAN HALF THE DAYS

## 2019-12-18 ASSESSMENT — MIFFLIN-ST. JEOR: SCORE: 1496.63

## 2019-12-18 ASSESSMENT — PATIENT HEALTH QUESTIONNAIRE - PHQ9
5. POOR APPETITE OR OVEREATING: SEVERAL DAYS
SUM OF ALL RESPONSES TO PHQ QUESTIONS 1-9: 8

## 2019-12-18 NOTE — PROGRESS NOTES
There are no exam notes on file for this visit.    SUBJECTIVE  Jeanie French is a 39 year old female with past medical history significant for    Patient Active Problem List   Diagnosis     Cancer of tongue (H)     Health Care Home     Generalized anxiety disorder     Tenosynovitis of the right index finger     Wheezing     Irritable bowel syndrome (IBS)     RUQ abdominal mass     Exercise-induced asthma     Sinusitis, chronic     Allergic rhinitis, unspecified allergic rhinitis type     Hypovitaminosis D     Adjustment disorder with anxiety     Others present at the visit:  None    Presents for   Chief Complaint   Patient presents with     Recheck Medication     Followup on medications.  Didn't get inhaler, too expensice     Patient presents today for follow-up on mental health.  She reports that things are better in general.  She is taking 30 mg of the BuSpar twice a day, and it works fairly well.  She still having some trouble every once in a while and shares that she may have some personal things coming up in the next month that might provide additional stress for her.  She is not been taking any of the hydroxyzine, and would be interested in taking more of that if things are more difficult.    We also discussed trying a more long-term medication for anxiety and depression, such as SSRIs, SNRIs, Remeron, or trazodone.  She explains that she is tried all these previously.  They gave her weird sensations in her nerves and she felt uncomfortable.  Had a lot of side effects.  She is worried about trying a new medication because of these complications in the past.    She has been in to see Dr. Rankin and plans to see her again in January.  She also has an as needed visit scheduled at the end of the month.    She reports her breathing has been good.  She has not picked up the inhaler that we prescribed because it was quite expensive.  It sounds like maybe an alternative would be better covered for her.  She does  "have asthma but it is quite intermittent and is not bothering her currently.    OBJECTIVE:  Vitals: /76   Pulse 84   Temp 98  F (36.7  C)   Resp 16   Ht 1.765 m (5' 9.5\")   Wt 75.9 kg (167 lb 6.4 oz)   BMI 24.37 kg/m    BMI= Body mass index is 24.37 kg/m .  Objective:    Vitals:  Vitals are reviewed and are within the normal range  Gen:  Alert, pleasant, no acute distress  Psych: Mood and affect are mildly anxious.  She is engaged in the conversation  Good thought process is logical and clear    JAMAR-7 SCORE 11/12/2019 12/9/2019 12/18/2019   Total Score - - -   Total Score 16 14 11         ASSESSMENT AND PLAN:      Jeanie was seen today for recheck medication.  Has overall been doing better.  Congratulated her on her ability to identify symptoms and get an early.  We will continue with the 30 mg twice daily of the BuSpar.  She will use hydroxyzine as needed as this is been helpful in the past, and she is not using it now.  We also discussed that a small amount of Ativan would be okay if she hangs up with significant worsening anxiety.  She should call let us know if this is the case.  We will continue with regular psychotherapy with Dr. Rankin.    Diagnoses and all orders for this visit:    Generalized anxiety disorder  -     busPIRone (BUSPAR) 30 MG tablet; Take 2 tabs twice daily for 2 weeks, then 3 tabs twice daily after that.    Patient Instructions   Okay to keep taking the Hydroxyzine  Continue the buspar at 30mg per day,      Follow up in 2-3 months to recheck symptoms and medications.      Anayeli Warner MD      "

## 2019-12-18 NOTE — Clinical Note
Hi.  Patient can't seem to get albuterol inhaler--says she needs generic, but I thought I already sent in generic.  Can you help me look into this?  Thanks!

## 2019-12-19 ASSESSMENT — ANXIETY QUESTIONNAIRES: GAD7 TOTAL SCORE: 11

## 2019-12-20 ENCOUNTER — TELEPHONE (OUTPATIENT)
Dept: FAMILY MEDICINE | Facility: CLINIC | Age: 39
End: 2019-12-20

## 2019-12-20 NOTE — TELEPHONE ENCOUNTER
This is the correct dosage.  Patient should be taking 30mg twice daily.  Please call the pharmacy and let them know.      Anayeli Warner MD    Routed to FRANCE Andrade.

## 2019-12-20 NOTE — TELEPHONE ENCOUNTER
[facility] phone call message- medication clarification/question:    Full Medication Name: busPIRone    Dose: 30 MG    Question: is this the right dosage      Pharmacy confirmed as    [facility] - [place], [place] - [address] [facility] [id number] [place] [facility] - [place], [place] - [address] [place] [address] PHARMACY [place], [place] - [address] [place] [address] [address] [phone]: Yes    OK to leave a message on voice mail? Yes    Primary language: English      needed? No    Call taken on December 20, 2019 at 11:05 AM by Sohail Pearson

## 2019-12-23 NOTE — TELEPHONE ENCOUNTER
Called pharmacy gave them the right dose, they are filling it for 30 BID for 90 days with 1 refill. Any questions please advise.

## 2019-12-27 NOTE — TELEPHONE ENCOUNTER
Asked Pharm D to look into inhaler options.  Will discuss this with patient at her next follow up visit.  Not currently having any asthma symptoms at this time.       Called pharmacy.  Looks like her insurance prefers Ventolin and her out of pocket cost is $50.56. If she gets generic albuterol, it looks like she can use a GoodRx card and get one for $30 (at [facility], which is her pharmacy).     mary Warner MD

## 2020-01-08 ENCOUNTER — OFFICE VISIT (OUTPATIENT)
Dept: PSYCHOLOGY | Facility: CLINIC | Age: 40
End: 2020-01-08
Payer: COMMERCIAL

## 2020-01-08 DIAGNOSIS — F41.1 GENERALIZED ANXIETY DISORDER: Primary | ICD-10-CM

## 2020-01-08 ASSESSMENT — ANXIETY QUESTIONNAIRES
2. NOT BEING ABLE TO STOP OR CONTROL WORRYING: NEARLY EVERY DAY
6. BECOMING EASILY ANNOYED OR IRRITABLE: MORE THAN HALF THE DAYS
5. BEING SO RESTLESS THAT IT IS HARD TO SIT STILL: NOT AT ALL
3. WORRYING TOO MUCH ABOUT DIFFERENT THINGS: NEARLY EVERY DAY
1. FEELING NERVOUS, ANXIOUS, OR ON EDGE: NEARLY EVERY DAY
7. FEELING AFRAID AS IF SOMETHING AWFUL MIGHT HAPPEN: NOT AT ALL
GAD7 TOTAL SCORE: 12
IF YOU CHECKED OFF ANY PROBLEMS ON THIS QUESTIONNAIRE, HOW DIFFICULT HAVE THESE PROBLEMS MADE IT FOR YOU TO DO YOUR WORK, TAKE CARE OF THINGS AT HOME, OR GET ALONG WITH OTHER PEOPLE: SOMEWHAT DIFFICULT

## 2020-01-08 ASSESSMENT — PATIENT HEALTH QUESTIONNAIRE - PHQ9
5. POOR APPETITE OR OVEREATING: SEVERAL DAYS
SUM OF ALL RESPONSES TO PHQ QUESTIONS 1-9: 10

## 2020-01-08 NOTE — PATIENT INSTRUCTIONS
"Schedule visit in the third week of February on your way out today.    Treatment Plan    Client's Name: Jeanie French  YOB: 1980    Today's Date: 1/8/2020   Date Diagnostic Update Due: 1/8/2021    DSM-V Diagnoses:   Generalized Anxiety Disorder    Psychosocial / Contextual Factors:   Jeanie's mental health concerns have been continuing to affect her ability to function in her life and have been causing clinically significant distress.  Stressors in her interpersonal and work life contribute to difficulty.    Functioning:  Hard to stay focused at work.  Anxiety undermines sleep and contributes to fatigue.  This can make it hard to complete tasks at home as well as work.    PC-PTSD: 0/4  CAGE AID: 0/4    PHQ-9 SCORE 12/9/2019 12/18/2019 1/8/2020   PHQ-9 Total Score - - -   PHQ-9 Total Score 6 8 10     JAMAR-7 SCORE 12/9/2019 12/18/2019 1/8/2020   Total Score - - -   Total Score 14 11 12     Collaboration:   Primary care physician, Dr. Anayeli Warner    Referral:  None at this time    Anticipated treatment duration: Unknown  Agreed upon meeting frequency: every 3-4 weeks    Long Term Treatment Goal(s) related to diagnosis / functional impairment(s)    Goal 1: Jeanie will report decreased anxiety and increased confidence in managing life stressors.    Steps we will take to achieve your goal:    Jeanie will continue to take medications as prescribed by Dr. Warner.  Consider practice of \"square breathing technique\" once per day.  Continue to be aware of life choices to increase sense of agency in your life.  Attend psychotherapy as scheduled.    Intervention(s)  Therapist will provide support, psychoeducation and homework assignments as needed.    If you need additional support and care during times that your therapist or PCP are not available, here are some additional resources for you:    Crisis Lines:    [facility]:  [phone]  [facility]: [phone]    Crisis Text " Line: Text [place] to 607168. Free support at your fingertips 24/7  People who text [place] to [phone] will be connected with a counselor. Crisis Text Line is available 24 hours a day, seven days a week.    You can also consider going to the [facility] at the following address.  Walk ins are welcome:    [address] [place] [address], [place]   [phone] (for 24 hour crisis consultation)    Monday - Friday 8:00am - 7:00pm  Saturday:  11:00am - 3:00pm  Sunday and Holidays Closed    If you feel at risk of immediate harm, go directly to the Emergency Department.    Patient has reviewed and agreed to the above plan.    Jody Rankin, PhD      January 8, 2020      ______________________________    ________  Patient Signature       Date    ______________________________    ________  Provider Signature       Date

## 2020-01-08 NOTE — PROGRESS NOTES
Behavioral Health Diagnostic Assessment Update:    Meeting was: scheduled  Others present: none  Meeting lasted: 60 minutes  Client was: on time  Date of Initial Diagnostic Assessment: 7/21/2010    Assessment Summary: Diagnostic assessment update completed today. The patient is a 39 year old American female who was referred for mental health services for help with managing emotions tied to stressful social situation. Based on the patient's report of symptoms, she meets criteria for Generalized Anxiety Disorder.  Jeanie's mental health concerns continue to affect her ability to function in her life and have been causing clinically significant distress. Jeanie denies any concerning drug/alcohol use. She denies any current safety concerns.  Based on the patient's reported symptoms and impact on functioning, the plan is to resume regular psychotherapy at this time on top of medication management with Dr. Warner.    Diagnosis (DSM-5):    Generalized Anxiety Disorder    Recommendations & Plan:   1.  Treatment plan updated today.  Next treatment plan update due on 4/8/2020.  Next diagnostic update due 1/8/2021.  2.  Goals for therapy = decrease anxiety and increase confidence in managing life stressors.    Mental Health Screening Questionnaires:    PHQ-9 SCORE 12/9/2019 12/18/2019 1/8/2020   PHQ-9 Total Score - - -   PHQ-9 Total Score 6 8 10     JAMAR-7 SCORE 12/9/2019 12/18/2019 1/8/2020   Total Score - - -   Total Score 14 11 12     PTSD-PC = 0/4  CAGE AID:  0/4     Current Presenting Problems or Complaints (including patient perception of problem and external factors contributing to current dilemma):  Jeanie returned to care on 12/9/2019 after an increase in both anxious and depressive symptoms which started roughly in late summer/early fall 2019.  Jeanie identifies that these symptoms are directly related to an increase in life stressors both at work and in her personal life.  She continues to experience uncertainty in her  "primary relationship and while she hopes to get some clarity on this within the next week, she suspects that things may be difficult for some time.  The anticipation of these difficulties cause her significant stress which also appears to feed into other anxieties (e.g., money, job, etc.).  While Generalized Anxiety had improved a great deal with past treatment, this appears to have flared back up in the wake of these stressors and Jeanie now reports worry about a number of areas in her life which she has difficulty controlling.  This negatively impacts sleep and leaves her feeling quite fatigued and irritable at times.  She is hopeful that engagement in treatment will allow her a safe space to explore these thoughts and feelings, learn skills to manage them and provide opportunity for problem solving current stressors.    Review of Symptoms:  Depression: Jeanie endorses increased fatigue and sleep disruption (at times having trouble falling asleep, at other times sleeping a lot in the daytime), and poor appetite.  She reports minimal depressed mood and/or anhedonia.  These symptoms appear better explained by current worry/anxiety than a separate mood disorder at this time.  Marcy: none  Psychosis: none  Anxiety: Jeanie describes feeling anxious and on edge most of the time.  She worries about a number of different issues:  Money, career, relationships, \"everything.\"  She has difficulty controlling the worry and can be irritable at times secondary to worry.  She did report one instance of panic back in the fall, but denies any since that time.  Post Traumatic Stress Disorder: Denies any problematic symptoms related to past trauma.    Functioning: Hard to stay focused at work.  Anxiety contributes to fatigue.  This can make it hard to complete tasks at home and work.    Updated Life History/Circumstances: Jeanie continues to live in her own home.  She is in a dating relationship with significant uncertainty associated with " it and this is her primary stressor at this time.  There is also some tension in her family relationships and around the future of her employment.  Jeanie reports she has limited social support, but is able to talk with two of her friends about her relationship concerns and has found them to be supportive.  Worried others will  her.  Discussed tendency to  self and importance of coming to terms with and accepting her own decisions.  Jeanie was receptive to this feedback.    Current Substance Use: Occasional social drinking.  No concerning drug or alcohol use.    Updated Health History/Family Health History: No changes to family health history since last update.  See below for current health concerns.    Patient Active Problem List   Diagnosis     Cancer of tongue (H)     Health Care Home     Generalized anxiety disorder     Tenosynovitis of the right index finger     Wheezing     Irritable bowel syndrome (IBS)     RUQ abdominal mass     Exercise-induced asthma     Sinusitis, chronic     Allergic rhinitis, unspecified allergic rhinitis type     Hypovitaminosis D     Adjustment disorder with anxiety     Current Outpatient Medications   Medication     albuterol (PROAIR HFA/PROVENTIL HFA/VENTOLIN HFA) 108 (90 Base) MCG/ACT inhaler     busPIRone (BUSPAR) 30 MG tablet     hydrOXYzine (VISTARIL) 25 MG capsule     omeprazole (PRILOSEC) 40 MG DR capsule     No current facility-administered medications for this visit.      Cultural Factors: No changes since last update.  Denies any significant Sabianist or cultural practices/beliefs that would impact health care.    Mental Status Exam:    Appearance:  Well groomed and slightly anxious, tearful at times  Behavior/Relationship to Examiner/Demeanor:  Cooperative, Engaged and Pleasant  Build: thin to medium  Gait:  Normal  Psychomotor Activity: within normal limits  Speech rate:  Normal  Speech volume:  Normal  Speech coherence:  Normal  Speech spontaneity:  Normal  Mood  "(subjective report):  \"fine\"  Affect (objective appearance):  Appropriate/mood-congruent  Eye Contact: good  Thought Process (Associations):  Logical, Linear and Goal directed  Thought process (Rate):  Normal  Abnormal Perception:  None  Sensorium:  Alert and appeared fully oriented  Attention/Concentration:  Normal  Insight:  Fair  Judgment:  Fair    Suicide Assessment:    Recent suicidal thoughts: No  Past suicidal thoughts: Yes: did report passive suicidal ideation in the fall of 2019 related to relational stressors, but denied any intent or plan at that time.  Any attempts in the past: No  Any family/friends/loved ones die by suicide: No  Plan or considering various methods: No  Access to guns: No  Protective factors: no h/o suicide attempt, no plan or intent, describes a safety plan, h/o seeking help when needed, future oriented, none to minimal alcohol use  and stable housing  Verbal contract for safety: Yes    Non-Suicidal Self Injurious Behavior: No    Violence/Homicide Risk Assessment:     Problems with anger management: No  History of violence: No  History of significant damage to property: No  Threat made to harm or kill someone: No  Verbal contract for safety: N/A    Safety Plan:   Jeanie was provided with the phone number for emergency mental health services and was encouraged to call these local crisis numbers, 911, or visit a local emergency room if thoughts of suicide or homicide were to arise and/or if she were to be in acute distress. The patient agreed to utilize these services as indicated if the need were to arise. Jeanie denied current suicidal or homicidal ideation, intent, or plan, denied a history of suicide attempts or current self-harming behaviors. Based on these factors, Jeanie is considered to be sustainable as an outpatient at this time.     NOTE: Diagnostic update complete.    Primary Care PTSD Screen  In your life, have you ever had any experience that was so frightening, horrible or " "upsetting that, in the past month, you...    1. Have had nightmares about it or thought about it when you did not want to? No  2. Tried hard not to think about it or went out of your way to avoid situations that remind you of it? No  3. Were constantly on guard, watchful, or easily startled? No  4. Felt numb or detached from others, activities, or your surroundings? No    Current research suggests that the results of the PC-PTSD should be considered \"positive\" if a patient answers \"yes\" to any (3) items.    References    RICH Yates., ELIN Caballero., Kimerling, R., Jonnie RWONG., TENISHA YenS., SYED Sutherland., ANUEL Faust, SULLY Farmer., Frias, J.I. (2004). The primary care PTSD screen (PC-PTSD): development and operating characteristics. Primary Care Psychiatry, 9, 9-14.      "

## 2020-01-09 ASSESSMENT — ANXIETY QUESTIONNAIRES: GAD7 TOTAL SCORE: 12

## 2020-01-29 ENCOUNTER — OFFICE VISIT (OUTPATIENT)
Dept: PSYCHOLOGY | Facility: CLINIC | Age: 40
End: 2020-01-29
Payer: COMMERCIAL

## 2020-01-29 DIAGNOSIS — F41.1 GENERALIZED ANXIETY DISORDER: Primary | ICD-10-CM

## 2020-01-29 NOTE — PATIENT INSTRUCTIONS
"Schedule follow up with Dr. Warner between 2/18 - 3/18    Red flag thoughts:    \"He doesn't really want to be with me.\"  \"He doesn't really want to spend time with me.\"    How I want to think about things:    Give some space for uncertainty right now.  Beware of over-personalization.  Work on healthy relationship with self which will help with everything else.      "

## 2020-01-29 NOTE — PROGRESS NOTES
"Behavioral Health Progress Note    Client Name: Jeanie French    Service Type:  Individual  Length of Visit: 60 minutes  Attendees: Jeanie presented alone    Identifying Information and Presenting Problem:    The patient is a 39 year old American female who is being seen for problematic symptoms of anxiety.      Treatment Objective(s) Addressed in This Session:  Decrease anxiety via learning adaptive coping mechanisms.  Increase confidence in managing life stressors.    Progress on / Status of Treatment Objective(s) / Homework:  Maintaining stability  Gaining insight    PHQ-9 SCORE 12/9/2019 12/18/2019 1/8/2020   PHQ-9 Total Score - - -   PHQ-9 Total Score 6 8 10       JAMAR-7 SCORE 12/9/2019 12/18/2019 1/8/2020   Total Score - - -   Total Score 14 11 12       Topics Discussed/Interventions Provided:    Relational stressors: The bulk of our visit today is spent reflecting on state of relationship and how this is impacting Jeanie's mood and anxiety.  Jeanie had been awaiting an important decision from her partner which had significant implications for their relationship.  While her partner made a choice which improves their ability to be in relationship, Jeanie states she was able to feel relieved about this \"for about 5 minutes\" before being swamped with worry about the relationship.  She endorses difficulty sleeping and excessive rumination about how her partner feels about her.  These thoughts can spiral her anxiety and cause a high level of distress.  Provided empathy for this experience and also reflected on how she might learn from and cope with her response.  Discussed past history in relationships.  Jeanie has had one prior stable relationship which lasted 4 years and noted they spent \"too much time together.\"  In her current relationship, she worries they don't spend enough.  In both relationships, there appears to have been an underlying worry that she is \"bad at relationships\" or that \"something is wrong with me.\"  " "Provided empathy for this and discussed the importance of establishing and maintaining a healthy relationship with self to feel safe and confident in relationship with others.  Jeanie agreed that she has doubts about her innate lovableness or likability which likely tie, at least in part, to family of origin dynamics.  Agreed this will be important for us to work on going forward.  In addition, given that both she and her partner will be experiencing a significant transition period with his recent decision, we agreed it would be helpful to give the relationship a little space, decrease expectations for a short time period and focus on re-establishing self-care for the time being so that they can both come back together in a healthy manner.  Jeanie appeared receptive and somewhat relieved to receive this feedback and will discuss this with her partner.    Anxiety: Today, Jeanie reports her anxiety has been elevated.  She is thinking her medication \"is not working\" and would like to explore other options with Dr. Warner.  She is already at a max dose so this would require a med change or augmentation.  We also discussed the contextual elements contributing to her anxiety and the fact that it makes sense she is experiencing increased anxiety at this time.  Agreed we would see what we could do to address anxiety non-pharmacologically and then follow up with Dr. Warner on med options down the road.  She is due to follow up with Dr. Warner some time between 2/18 and 3/18, but this has not yet been scheduled.  Jeanie agrees to schedule this in the near future.    Sleep:  Jeanie reports she is currently getting 6-6.5 hours of sleep per night on week days.  This is often related to feeling overwhelmed by anxiety and texting with her partner when they are both in bed.  This pattern leaves Jeanie feeling exhausted.  She has multiple alarms on multiple devices but hits snooze multiple times before getting up.  Sleep is also disrupted " and she frequently wakes at 4 or 5 am too.  Generally able to fall back asleep.  Weekends she gets closer to 8-9 hours, but has trouble sleeping in.  Jeanie also reports problematic napping in front of TV and then needs to get up, take care of household responsibilities before she goes to bed which wakes her up.  We discussed a number of behavioral changes which could improve sleep including setting a limit on phones in the bedroom and late night texting.  Jeanie did not think she was ready to remove the phone from her bedroom altogether, but did agree setting a curfew on texting was a good idea.  Jeanie has also decided to hang out with her partner less after work to get home earlier so she can get tasks done rather than running around trying to do this late at night.  She will also try to do all household work of the evening right away rather than waiting until right before down.  Will evaluate how these changes are impacting sleep at our next visit.    Assessment: Jeanie appeared to be active and engaged in today's session and was receptive to feedback.     Mental Status: Jeanie French appeared generally alert and oriented. Dress was casual and appropriate to the weather and occasion. Grooming and hygiene were good. Eye contact was good. Speech was of normal volume and rate and was clear, coherent, and relevant. Mood was anxious with congruent affect.  She was tearful at times.  Thought processes were relevant, logical and goal-directed. Thought content was WNL with no evidence of psychotic or paranoid features. No evidence of SI/HI or self-harm, intent, or plans. Memory appeared grossly intact. Insight and judgment appeared adequate and patient exhibited good impulse control during the appointment.     Does the patient appear to be at imminent risk of harm to self/others at this time? No    The session was necessary to address symptoms of anxiety and interpersonal distress that have been interfering with patient's  ability to function in her life.  Ongoing psychotherapy is necessary to enhance coping skills as well as provide support and structure for problem solving.    Diagnosis (DSM-5):    Generalized Anxiety Disorder    Plan:  1. Follow up with this provider on 2/24/2020.  Did offer an earlier visit today, but Jeanie declined based on scheduling issues and perceived need.  Jeanie is aware she can reach out sooner if needed.  2. See pt instructions for homework and follow up from today.  3.  Schedule follow up with Dr. Warner between 2/18 - 3/18  4. Utilize crisis resources as needed.    Jody Rankin, PhD, LP    NOTE: Treatment plan update due 4/8/2020, Diagnostic Update due 1/8/2021.

## 2020-02-24 ENCOUNTER — OFFICE VISIT (OUTPATIENT)
Dept: PSYCHOLOGY | Facility: CLINIC | Age: 40
End: 2020-02-24
Payer: COMMERCIAL

## 2020-02-24 DIAGNOSIS — F41.1 GENERALIZED ANXIETY DISORDER: Primary | ICD-10-CM

## 2020-02-24 NOTE — PROGRESS NOTES
"Behavioral Health Progress Note    Client Name: Jeanie French    Service Type:  Individual  Length of Visit: 60 minutes  Attendees: Jeanie presented alone    Identifying Information and Presenting Problem:    The patient is a 39 year old American female who is being seen for problematic symptoms of anxiety.      Treatment Objective(s) Addressed in This Session:  Decrease anxiety via learning adaptive coping mechanisms.  Increase confidence in managing life stressors.    Progress on / Status of Treatment Objective(s) / Homework:  Maintaining stability  Gaining insight    PHQ-9 SCORE 12/9/2019 12/18/2019 1/8/2020   PHQ-9 Total Score - - -   PHQ-9 Total Score 6 8 10       JAMAR-7 SCORE 12/9/2019 12/18/2019 1/8/2020   Total Score - - -   Total Score 14 11 12       Topics Discussed/Interventions Provided:    Relational stressors: Not much progress in relationship with can be frustrating and trigger feelings of being overwhelmed and thoughts of giving up.  No control over timeline which is worrisome when no progress is being made.  Encouraged self-reflection over her values and goals.  Still believes benefits/promise of relationship outweighs risk.  Not willing to give up yet.  Discussed healthy role of anxiety and need to decide if current relationship presents actual threat.  If not, how can she down-regulate anxiety in the interim?  Established strategies for this (see pt instructions).  Given uncertaintly, agreed to periodically re-assess to see if assessment of danger/threat changes over time.    Health concern:  Recently experienced mouth sore.  Worried about cancer recurrence.  Assessment got \"messed up\" and didn't get the timely follow up she was hoping for.  Will follow up in 3-4 months.     Lack of motivation:  Reports continued frustration with ability to complete tasks outside of home due to limited time and energy.  Recently added Kamran to track and share progress with partner on tasks/projects.  Hopes this will " "\"gamefy\" things and make it more fun, increase accountability.  Allows her to ask/nag less but still keep track of progress.  We also discussed allowing herself to value activities she has chosen (e.g., celebrating birthday with mom) rather than over-focus on the cost of those choices (what she didn't get done due to this).  Finally, discussed some time savers (e.g., Roomba) to reserve time energy for most important goals/tasks.    Sleep:  Better abuot getting work done right away when she gets home which has been helpful.  Getting to bed at an earlier time.  Cut back a bit on texting but not 100%.      Time pressure to have baby.    Assessment: Jeanie appeared to be active and engaged in today's session and was receptive to feedback.     Mental Status: Jeanie French appeared generally alert and oriented. Dress was casual and appropriate to the weather and occasion. Grooming and hygiene were good. Eye contact was good. Speech was of normal volume and rate and was clear, coherent, and relevant. Mood was anxious with congruent affect.  She was tearful at times.  Thought processes were relevant, logical and goal-directed. Thought content was WNL with no evidence of psychotic or paranoid features. No evidence of SI/HI or self-harm, intent, or plans. Memory appeared grossly intact. Insight and judgment appeared adequate and patient exhibited good impulse control during the appointment.     Does the patient appear to be at imminent risk of harm to self/others at this time? No    The session was necessary to address symptoms of anxiety and interpersonal distress that have been interfering with patient's ability to function in her life.  Ongoing psychotherapy is necessary to enhance coping skills as well as provide support and structure for problem solving.    Diagnosis (DSM-5):    Generalized Anxiety Disorder    Plan:  1. Follow up with this provider on 3/18/2020.  2. See pt instructions for homework and follow up from " today.  3. Jeanie is due for follow up with Dr. Warner between 2/18 - 3/18.  This was not yet scheduled at the time of our visit.  She was encouraged to schedule this recommended follow up on her way out of clinic today.  4. Utilize crisis resources as needed.    Jody Rankin, PhD,     NOTE: Treatment plan update due 4/8/2020, Diagnostic Update due 1/8/2021.

## 2020-02-24 NOTE — PROGRESS NOTES
"Behavioral Health Progress Note    Client Name: Jeanie French    Service Type:  Individual  Length of Visit: 60 minutes  Attendees: Jeanie presented alone    Identifying Information and Presenting Problem:    The patient is a 39 year old American female who is being seen for problematic symptoms of anxiety.      Treatment Objective(s) Addressed in This Session:  Decrease anxiety via learning adaptive coping mechanisms.  Increase confidence in managing life stressors.    Progress on / Status of Treatment Objective(s) / Homework:  Maintaining stability  Gaining insight    PHQ-9 SCORE 12/9/2019 12/18/2019 1/8/2020   PHQ-9 Total Score - - -   PHQ-9 Total Score 6 8 10       JAMAR-7 SCORE 12/9/2019 12/18/2019 1/8/2020   Total Score - - -   Total Score 14 11 12       Topics Discussed/Interventions Provided:    Relational stressors: Not much progress in relationship which is frustrating and can trigger feelings of being overwhelmed and thoughts of giving up.  No control over timeline which is worrisome when no progress is being made.  Starts to question her value to partner and future of relationship.  Secrecy is a burden and decreases social support available.  Provided empathy for this but also encouraged self-reflection over her values and goals.  Still believes benefits/promise of relationship outweighs risk.  Not willing to give up yet.  Discussed healthy role of anxiety and need to decide if current relationship presents actual \"threat\".  She does not believe that it is.  Given this current assessment, we discussed how she can down-regulate anxiety (see pt instructions).  Given uncertainty, agreed to periodically re-assess to see if assessment of danger/threat changes over time.  Also discussed intense emotions related to desire to have a baby and perception that time is slipping away from her due to age (sister in law has started menopause, etc.).  Provided empathy for this and encouraged discussing fertility concerns " "with PCP to get better understanding there.  Also discussed the many options for becoming a parent even if fertility is a barrier in the future.  Will discuss further at a future visit.    Lack of motivation:  Reports continued frustration with ability to complete tasks outside of home due to limited time and energy.  Recently added Kamran to track and share progress with partner on tasks/projects.  Hopes this will \"gamefy\" things and make it more fun, increase accountability.  Allows her to ask/nag less but still keep track of his progress as well as her own.  We also discussed allowing herself to value activities she has chosen (e.g., celebrating birthday with mom) rather than over-focus on the cost of those choices (what she didn't get done due to this).  Finally, discussed some time savers (e.g., Roomba) to reserve time energy for most important goals/tasks.    Sleep:  Better about getting work done right away when she gets home which has been helpful.  Getting to bed at an earlier time.  Cut back a bit on texting but not 100%.  Feels like this is moving in the right direction.    Health concern:  Recently experienced mouth sore.  Worried about cancer recurrence.  Assessment got \"messed up\" and didn't get the timely follow up she was hoping for.  Provided empathy for this.   Friends and family have been supportive.  Will follow up in 3-4 months.     Assessment: Jeanie appeared to be active and engaged in today's session and was receptive to feedback.     Mental Status: Jeanie French appeared generally alert and oriented. Dress was casual and appropriate to the weather and occasion. Grooming and hygiene were good. Eye contact was good. Speech was of normal volume and rate and was clear, coherent, and relevant. Mood was anxious with congruent affect.  She was tearful at times.  Thought processes were relevant, logical and goal-directed. Thought content was WNL with no evidence of psychotic or paranoid features. No " evidence of SI/HI or self-harm, intent, or plans. Memory appeared grossly intact. Insight and judgment appeared adequate and patient exhibited good impulse control during the appointment.     Does the patient appear to be at imminent risk of harm to self/others at this time? No    The session was necessary to address symptoms of anxiety and interpersonal distress that have been interfering with patient's ability to function in her life.  Ongoing psychotherapy is necessary to enhance coping skills as well as provide support and structure for problem solving.    Diagnosis (DSM-5):    Generalized Anxiety Disorder    Plan:  1. Follow up with this provider on 3/18/2020.  2. See pt instructions for homework and follow up from today.  3. Jeanie is due for follow up with Dr. Warner between 2/18 - 3/18.  This was not yet scheduled at the time of our visit.  She was encouraged to schedule this recommended follow up on her way out of clinic today.  4. Utilize crisis resources as needed.    Jody Rankin, PhD, LP    NOTE: Treatment plan update due 4/8/2020, Diagnostic Update due 1/8/2021.

## 2020-02-24 NOTE — PATIENT INSTRUCTIONS
Anxiety is a healthy human emotion designed to alert us to danger.  When it goes off, we need to assess if it is acurately registering a problem or going off unnecessarily.  At this time, you have decided it is not a problem that needs attention so we will work on down-regulating your response when it goes off for at least the next month.  You can reassess after that time.    Down regulation activities would include the following:    Addressing thoughts:  Lack of progress doesn't mean he doesn't care about me.  This will take time.  I have verna in this relationship.    Addressing the body:  Tune into your body and notice if you are carrying stress as muscle tension.  Stretch  Take some deep breaths  Go for a walk

## 2020-03-17 ENCOUNTER — TELEPHONE (OUTPATIENT)
Dept: PSYCHOLOGY | Facility: CLINIC | Age: 40
End: 2020-03-17

## 2020-03-17 NOTE — TELEPHONE ENCOUNTER
Placed call to Jeanie to discuss possibility of converting our scheduled visit for tomorrow to telehealth visit.  Jeanie stated she would prefer to meet in person.  She denies being ill or having any concerning contacts with persons who have been ill or exposed to COVID per her knowledge.  Agreed that we would meet for face to face session tomorrow.    Jeanie also did ask about her visit with Dr. Warner this Friday. Discussed that she would likely be asked to reschedule or consult via phone if she were comfortable with that.  Jeanie noted that she would likely be comfortable just rescheduling that visit as it was not urgent.  She will manage this when she arrives at clinic tomorrow.    Jody Rankin, PhD, LP

## 2020-03-17 NOTE — PROGRESS NOTES
Behavioral Health Progress Note    Client Name: Jeanie French    Service Type:  Individual  Length of Visit: 60 minutes  Attendees: Jeanie presented alone    Identifying Information and Presenting Problem:    The patient is a 39 year old American female who is being seen for problematic symptoms of anxiety.      Treatment Objective(s) Addressed in This Session:  Decrease anxiety via learning adaptive coping mechanisms.  Increase confidence in managing life stressors.    Progress on / Status of Treatment Objective(s) / Homework:  Maintaining stability  Gaining insight    PHQ-9 SCORE 12/9/2019 12/18/2019 1/8/2020   PHQ-9 Total Score - - -   PHQ-9 Total Score 6 8 10       JAMAR-7 SCORE 12/9/2019 12/18/2019 1/8/2020   Total Score - - -   Total Score 14 11 12       Topics Discussed/Interventions Provided:    COVID-19:  Off work.  Receiving pay while at home.  Managing this relatively well.  Actually looking forward to time to work on other goals/projects.    Anxiety: Continues to be frustrated by lack of movement/commitment from her current partner.  Has episodes of panic related to this as she catastrophizes how her life would be if this relationship doesn't work out.  We discussed this in the context of giving away control over her life to another person which puts her in a vulnerable place.  She agreed that this was the case and that this significantly contributed to her anxiety.  Encouraged seeing her panic as her body's way of telling her she needed to take back control and do something different.  Encouraged focusing on what is most important to her and how she can move that forward regardless of his choices/actions.  Discussed the following values/goals:     Having a partner in life   Having a child of her own   Advancing her career (make a film, write a book, etc.) and finances    Reflected on what is and is not in her control in all of those areas.  Explored pros and cons and various options for becoming a mother  "at this stage of her life (e.g., freezing her eggs, becoming a /adopting an older child, etc.).  While she is still undecided about what her next step may be, she reported increased anxiety when she considered the active choices available to her in life.  Explored career vs. Financial goals and how these do not always line up.  Also reflected on her tendency to identify potential barriers to goals and truncate her exploration prematurely.  Reflected on this ability as both a strength and weakness and the importance of balancing this.  Jeanie shared a story of being identified as a \"yes, but\" kind of kid early in life and agreed this has been good and bad in her life.  Encouraged allowing herself to pursue ideas even if there was not guarantee they would yield the \"results\" she was hoping for.  She could still learn from these experiences.  She agreed this would be helpful.    Physical concern:  Jeanie reports continued struggles with abdominal problems.  She wonders if this is caused by IBS or EPI (Endocrine Pancreatic Insufficiency.   She did try strict FODMAP diet but did not find that this impacted her symptoms to a great degree.  We also reflected on the role of stress and anxiety in her symptoms and she agrees this is a large contributor.  Encouraged continued active management of stress but also follow up with Dr. Warner to rule out other medical contributors.    Assessment: Jeanie appeared to be active and engaged in today's session and was receptive to feedback.     Mental Status: Jeanie French appeared generally alert and oriented. Dress was casual and appropriate to the weather and occasion. Grooming and hygiene were good. Eye contact was good. Speech was of normal volume and rate and was clear, coherent, and relevant. Mood was anxious with congruent affect, but she was open to feedback and goal setting today.  Thought processes were relevant, logical and goal-directed. Thought content was WNL " with no evidence of psychotic or paranoid features. No evidence of SI/HI or self-harm, intent, or plans. Memory appeared grossly intact. Insight and judgment appeared adequate and patient exhibited good impulse control during the appointment.     Does the patient appear to be at imminent risk of harm to self/others at this time? No    The session was necessary to address symptoms of anxiety and interpersonal distress that have been interfering with patient's ability to function in her life.  Ongoing psychotherapy is necessary to enhance coping skills as well as provide support and structure for problem solving.    Diagnosis (DSM-5):    Generalized Anxiety Disorder    Plan:  1. Follow up with this provider on 4/15/2020.  Discussed the possibility that this visit may need to be over the phone depending upon the status of COVID-19 and she expressed understanding.  Will make that determination closer to the date of the visit.  2. See pt instructions for homework and follow up from today.  3. Jeanie will reschedule visit with Dr. Warner to a later date as she was less interested in a phone visit and felt that this was not an urgent/acute issue.  4. Utilize crisis resources as needed.    Jody Rankin, PhD,     NOTE: Treatment plan update due 4/8/2020, Diagnostic Update due 1/8/2021.

## 2020-03-17 NOTE — TELEPHONE ENCOUNTER
Message noted.  If patient is feeling well and depression and anxiety are well controlled, I agree that we can just defer this visit to later in the year.  Glad things are going well.      Anayeli Warner MD    Routed to Dr. Rankin and FRANCE Andrade for FYI.

## 2020-03-18 ENCOUNTER — OFFICE VISIT (OUTPATIENT)
Dept: PSYCHOLOGY | Facility: CLINIC | Age: 40
End: 2020-03-18
Payer: COMMERCIAL

## 2020-03-18 DIAGNOSIS — F41.1 GENERALIZED ANXIETY DISORDER: Primary | ICD-10-CM

## 2020-03-18 NOTE — PATIENT INSTRUCTIONS
"Liam Cornelius \"The Power of Vulnerability\" - audiobook    Remember if you experience panic that your body is trying to alert you to a problem.  Remember that you are in charge of your life and your future - no one else.  Your panic may be trying to tell you that you have handed control over to someone else and you need to take that back.    Remember that your greatest strength is also your greatest weakness.  You have a great problem finding brain, just don't let that stop you from fully exploring your ideas, hopes and aspirations.    Remember that there is value in following an idea to the end even if it doesn't ultimately work out.  You will learn lots on that journey.      "

## 2020-04-02 ENCOUNTER — TELEPHONE (OUTPATIENT)
Dept: FAMILY MEDICINE | Facility: CLINIC | Age: 40
End: 2020-04-02

## 2020-04-02 NOTE — TELEPHONE ENCOUNTER
Reached out to patient during COVID19 Clinic outreach. Reassured patient that [facility] is still open and has started implementing phone and video appointments to help patient remain safe at home.     Patient reports the following concerns: None    Per patient request, patient is scheduled for a visit to address their concerns on the following date: 4/15, already scheduled     Offered MyChart. Patient accepted.    Bruno Allan MD

## 2020-04-08 ENCOUNTER — TELEPHONE (OUTPATIENT)
Dept: FAMILY MEDICINE | Facility: CLINIC | Age: 40
End: 2020-04-08

## 2020-04-08 NOTE — TELEPHONE ENCOUNTER
"Spoke with patient about converting her visit with Dr. Rankin to a video visit. Patient is agreeable and have a computer at home to do so. I went over the follow instructions for the video visit:    Patient Instructions to conduct a Video Visit    From your computer:   The Chrome Browser is preferred   Firefox 70+, Safari 13, IE 11+ and IE Edge 44+ can also be used.    1. Open a window on your browser.  2. Check your camera and microphone before the visit.  Copy and past the link below and follow the directions.  You will need to check and allow permissions for both the camera and the microphone separately.    Patient steps to accept/open video on computer:  1. Open email you received  2. Click \"Join\" (New browser page will open to [employer] [facility] website)  3. Type your first name  4. Select \"Patient\"  5. Press \"Join\"      Thank you for considering this alternative visit type.  We are committed to providing the high quality care you have come to expect from us.    Marcela Lewis    "

## 2020-04-15 ENCOUNTER — VIRTUAL VISIT (OUTPATIENT)
Dept: PSYCHOLOGY | Facility: CLINIC | Age: 40
End: 2020-04-15
Payer: COMMERCIAL

## 2020-04-15 DIAGNOSIS — F41.1 GENERALIZED ANXIETY DISORDER: Primary | ICD-10-CM

## 2020-04-15 NOTE — PATIENT INSTRUCTIONS
Remember the importance of taking care of your basic needs.    Get up at the same time each morning.  Eat regularly planned meals at regular intervals.  Drink water.  Get in a little physical activity most days.  Set and keep a regular sleep schedule.    It will be easier to face some of the big challenges/choices if your basic needs are attended to as well.    Our next visit will be Wednesday, 9am on May 6    We were supposed to update your treatment plan below.  In general, I think we are working on the same goals.  Let me know if you have questions or want to make additional changes at our next visit.    Treatment Plan    Client's Name: Jeanie French  YOB: 1980    Today's Date: 1/8/2020   Date Diagnostic Update Due: 1/8/2021    DSM-V Diagnoses:   Generalized Anxiety Disorder    Psychosocial / Contextual Factors:   Rajeshs mental health concerns have been continuing to affect her ability to function in her life and have been causing clinically significant distress.  Stressors in her interpersonal and work life contribute to difficulty.    Functioning:  Hard to stay focused at work.  Anxiety undermines sleep and contributes to fatigue.  This can make it hard to complete tasks at home as well as work.    PC-PTSD: 0/4  CAGE AID: 0/4    PHQ-9 SCORE 12/9/2019 12/18/2019 1/8/2020   PHQ-9 Total Score - - -   PHQ-9 Total Score 6 8 10     JAMAR-7 SCORE 12/9/2019 12/18/2019 1/8/2020   Total Score - - -   Total Score 14 11 12     Collaboration:   Primary care physician, Dr. Anayeli Warner    Referral:  None at this time    Anticipated treatment duration: Unknown  Agreed upon meeting frequency: every 3-4 weeks    Long Term Treatment Goal(s) related to diagnosis / functional impairment(s)    Goal 1: Jeanie will report decreased anxiety and increased confidence in managing life stressors.    Steps we will take to achieve your goal:    Jeanie will continue to take medications as prescribed by Dr. Warner.  Consider  "practice of \"square breathing technique\" once per day.  Continue to be aware of life choices to increase sense of agency in your life.  Attend psychotherapy as scheduled.    Intervention(s)  Therapist will provide support, psychoeducation and homework assignments as needed.    If you need additional support and care during times that your therapist or PCP are not available, here are some additional resources for you:    Crisis Lines:    [facility] Adult Crisis:  [phone]  [facility] Adult Crisis: [phone]    Crisis Text Line: [place] to 614470. Free support at your fingertips 24/7  People who text [place] to [phone] will be connected with a counselor. Crisis Text Line is available 24 hours a day, seven days a week.    You can also consider going to the [facility] at the following address.  Walk ins are welcome:    [address] [place] [address]   [phone] (for 24 hour crisis consultation)    Monday - Friday 8:00am - 7:00pm  Saturday:  11:00am - 3:00pm  Sunday and Holidays Closed    If you feel at risk of immediate harm, go directly to the Emergency Department.    Patient has reviewed and agreed to the above plan.    Jody Rankin, PhD      January 8, 2020      ______________________________    ________  Patient Signature       Date    ______________________________    ________  Provider Signature       Date      "

## 2020-04-15 NOTE — PROGRESS NOTES
Telemedicine Visit: The patient's condition can be safely assessed and treated via synchronous audio and visual telemedicine encounter.      Reason for Telemedicine Visit: COVID-19 related restrictions    Originating Site (Patient Location): Patient's home    Distant Site (Provider Location): [facility]: [facility]    Consent:  The patient/guardian has verbally consented to: the potential risks and benefits of telemedicine (video visit) versus in person care; bill my insurance or make self-payment for services provided; and responsibility for payment of non-covered services.     Mode of Communication:  Video Conference via [employer]    As the provider I attest to compliance with applicable laws and regulations related to telemedicine.    Start of call: 9:01am  End of call: 10:02am    Emergency contact: Mago Kirkpatrick [phone]  Closest Emergency Room: [facility] Progress Note    Client Name: Jeanie French    Service Type:  Individual  Length of Visit: 61 minutes  Attendees: Joined with patient permission by Dr. Danna Cuellar, Family Medicine Resident on rotation    Identifying Information and Presenting Problem:    The patient is a 40 year old American female who is being seen for problematic symptoms of anxiety.      Treatment Objective(s) Addressed in This Session:  Decrease anxiety via learning adaptive coping mechanisms.  Increase confidence in managing life stressors.    Progress on / Status of Treatment Objective(s) / Homework:  Maintaining stability  Gaining insight    PHQ-9 SCORE 12/9/2019 12/18/2019 1/8/2020   PHQ-9 Total Score - - -   PHQ-9 Total Score 6 8 10       JAMAR-7 SCORE 12/9/2019 12/18/2019 1/8/2020   Total Score - - -   Total Score 14 11 12       Topics Discussed/Interventions Provided:    Work related stress:  Very stressed.  Alternating days at work with coworker so going in every other day.  Not too stressed about going out, but boss  "is micromanaging which is not going well.  \"The Boomers\" are not adjusting well to current environment in Jeanie's opinion.  She was so stressed at work last week she had to go home early one day.  All staff meetings on Monday which feel largely \"useless\" and interfere with other task completion.  Feels she has so many meetings she can't get anything done.  Provided empathy for the stress of this transition and reflected that co-workers are likely experiencing this stress well which can contribute to communication difficulties/other disruptions in workplace.  Reflected on what she could do to ease stress at work.  Jeanie states she will make proposal to change how work is structured to give greater control/structure to work life.  Feels relatively confident boss will be receptive to this feedback.  Congratulated her on identifying next step.      Relationship stress:  Partner has not moved forward on their relationship.  Current pandemic is another barrier, but this does not feel like the true hold up to her.  On the days she is not working he comes over to the house for half the day.  Feels guilty about this due to expectations for social distancing, but she really wants to see him.  At the same time, she does seem to always enjoy their visits as she is unhappy with the lack of progress on their relationship.  Also, she is trying to be productive at home and his visits disrupt this.  Felt good about productivity at home initially (e.g., thorough cleaning of fridge, etc.), but hasn't been able to maintain.  When she reflects on uncertainty in their relationship this triggers panic (heart racing, tearfulness, etc.).  Wants someone to tell her what to do.  Also feels like career decisions are tied to relationship as this is her one time to see him and she wonders what might change if they didn't work together any longer.  Provided empathy for distress and redirected Jeanie to reflect on her current values and what was in " "her control.  At this time, she feels that cost of ending relationship would be greater than tolerating uncertainty.  She does worry about opportunity cost (seeing other people) but when pressed on this acknowledges that she would not be seeking other dating relationships at this time regardless.  Provided empathy for difficulty of decision and continued to encourage periodic reflection on cost/benefit status of staying in relationship vs. Ending relationship.  Did reflect that when she talks about relationship, it is largely painful and she does agree that this has been the case for some time.  Will continue to reflect on this.  Let her know it was Ok not to make a decision at this time due to multiple stressors in her life right now.  Will continue to work on this moving forward.    Physical health/self-care:  Does have exercise induced asthma which makes her worry a bit about risk of COVID.  Feeling sick with headache/queasiness on the phone today.  Hasn't eaten yet.  Encouraged her to take advantage of the fact that she is hope and to go get a snack which she did.  This helped her to feel better.  Acknowledges likely role of stress and forgetting basic self care.  Routine is off - sleeping late, eating late, etc.  \"I'm not a regimented person.\"  Sleep is poor - going to bed later, needing to take naps during the day due to fatigue which may be contributing to pattern.  Last week took at least 2 hour naps several days per week.  Waking up in the middle of the night.  Discussed return to regular eating, sleeping patterns to improve overall well-being and increase internal resources for managing life stressors at this time.  Jeanie was receptive to that feedback and voiced confidence in her ability to get back on track with this.    Assessment: Jeanie appeared to be active and engaged in today's session and was receptive to feedback.     Mental Status: Jeanie French appeared generally alert and oriented. Dress was " casual and appropriate to the weather and occasion. Grooming and hygiene were good. Eye contact was good. Speech was of normal volume and rate and was clear, coherent, and relevant. Mood was anxious with congruent affect.  She was frequently tearful.  Jeanie was also open to feedback and goal setting today.  Thought processes were relevant, logical and goal-directed. Thought content was WNL with no evidence of psychotic or paranoid features. No evidence of SI/HI or self-harm, intent, or plans. Memory appeared grossly intact. Insight and judgment appeared adequate and patient exhibited good impulse control during the appointment.     Does the patient appear to be at imminent risk of harm to self/others at this time? No    The session was necessary to address symptoms of anxiety and interpersonal distress that have been interfering with patient's ability to function in her life.  Ongoing psychotherapy is necessary to enhance coping skills as well as provide support and structure for problem solving.    Diagnosis (DSM-5):    Generalized Anxiety Disorder    Plan:  1. Follow up with this provider for video visit on Wednesday, May 6 at 9am.  This appt will need to be created so did route message to  to create this appt today.  2. See pt instructions for homework and follow up from today.    Jody Rankin, PhD, LP    The Treatment Plan dated 1/8/2020 was reviewed with the patient at today s visit.  The Treatment Plan remains current based on the patient s status and progress to date.  Next treatment plan update due 7/15/2020, Diagnostic Update due 1/8/2021.

## 2020-05-06 ENCOUNTER — VIRTUAL VISIT (OUTPATIENT)
Dept: PSYCHOLOGY | Facility: CLINIC | Age: 40
End: 2020-05-06
Payer: COMMERCIAL

## 2020-05-06 DIAGNOSIS — F41.1 GENERALIZED ANXIETY DISORDER: Primary | ICD-10-CM

## 2020-05-06 NOTE — PROGRESS NOTES
Telemedicine Visit: The patient's condition can be safely assessed and treated via synchronous audio and visual telemedicine encounter.      Reason for Telemedicine Visit: COVID-19 related restrictions    Originating Site (Patient Location): Patient's home    Distant Site (Provider Location): [facility]    Consent:  The patient/guardian has verbally consented to: the potential risks and benefits of telemedicine (video visit) versus in person care; bill my insurance or make self-payment for services provided; and responsibility for payment of non-covered services.     Mode of Communication:  Video Conference via [employer]    As the provider I attest to compliance with applicable laws and regulations related to telemedicine.    Start of call: 9:00am  End of call: 10:00am    Emergency contact: Mago Kirkpatrick [phone]  Closest Emergency Room: [facility] Progress Note    Client Name: Jeanie French    Service Type:  Individual  Length of Visit: 60 minutes  Attendees: Jeanie attended alone    Identifying Information and Presenting Problem:    The patient is a 40 year old American female who is being seen for problematic symptoms of anxiety.      Treatment Objective(s) Addressed in This Session:  Decrease anxiety via learning adaptive coping mechanisms.  Increase confidence in managing life stressors.    Progress on / Status of Treatment Objective(s) / Homework:  Maintaining stability  Gaining insight    PHQ-9 SCORE 12/9/2019 12/18/2019 1/8/2020   PHQ-9 Total Score - - -   PHQ-9 Total Score 6 8 10       JAMAR-7 SCORE 12/9/2019 12/18/2019 1/8/2020   Total Score - - -   Total Score 14 11 12       Topics Discussed/Interventions Provided:    Mood: Overall, mood has improved since our last visit. Mood can still be up and down.  Feeling good today but may not last.  Investigated factors in improved mood which included some changes at work, improved nutrition and  "having made some decisions in her relationship (see below for greater detail).  Encouraged savoring good days and recognizing that life will continue to have some ups and downs.  It is valuable to reflect on factors that can contribute to improvements as this can point us towards modifiable factors.    Physical health/self-care:  Initially when asked about self-care, Jeanie stated \"It's about the same.\"  She's a night owl so hard to force herself to go to bed.  Dreads the next day and wants to put this off. Does take anti-anxiety med at bedtime to see if this helps.  Decided not to re-review sleep hygiene as she is aware of what she can modify but is just not motivated to do so at this time.  Agreed we would return to this if she were to be more motivated to work on this in the future.  She does think she has become more mindful of meals and this has been helpful.    Work: Talked with boss about work stress and this has helped.  Boss has given her more responsibility to compensate for underfunctioning colleague ().  While this will increase her work load, she anticipates that things may go more smoothly going forward.  This did not come with more money or title change, but hopes this will come in the future.  This change did decrease her frustration which was helpful.  Has increased sense of control.  She will be in charge of organizing tasks which she likes.  Partnering with creative person she likes and respects.  They compliment each other.  Congratulated her for being assertive about concern which led to this change.  Still continuing with onerous meetings.  Hoping this will decrease in the coming weeks.  Hoping others will be the voice for this change as she is worried about political ramifications of her voicing this worry.  Agreed it was Ok to pick her battles at this time.    Relationship stress: My challenge to her last visit that she sounded unhappy in relationship most of the time made her reflect " "on this more and she has decided that benefits truly due outweigh the negative which has helped her to be more at peace with current choice.  Partner is still not making progress on moving relationship forward.  \"He wants someone to do it for him.\"  She continues to encourage him to take some \"baby steps.\"  She has set timeline for fall to have moved forward or she will walk away from relationship.  Felt good setting limit but also increases anxiety that she might need to follow through and walk away which she doesn't want to do.  Still has periodic worry that she's \"being played.\"  Today, she would like to discuss how to manage this situation going forward.  Not sure if she should continue to nag him vs. Be more hands off.  Worried nagging will be off putting to him vs. afraid if she doesn't bring it up at all nothing will happen.  Encouraged her to discuss with partner so they can come to shared decision about how often to check in on this.  She thought perhaps once per week might be helpful as he has told her it helps him to keep things moving forward when they check in on this.  Jeanie does have compassion for stressors of parenting during pandemic and how this adds to challenge.  She is also feeling bad about role in relationship.  Worries about what this means about her and her future with her partner.  Empathized with mixed feelings about the situation and options for coping (e.g., end relationship, forgive self/partner, set aside for now, etc.).  Acknowledged human imperfection and how this is playing out.  Offered praise for self-honesty that actions may be harmful to others and difficulty reconciling this in self.  Agreed there are no easy answers here and she will need to continue to reassess her values, thoughts and actions as things progress.    Assessment: Jeanie appeared to be active and engaged in today's session and was receptive to feedback.     Mental Status: Jeanie French appeared generally alert " and oriented. Dress was casual and appropriate to the weather and occasion. Grooming and hygiene were good. Eye contact was good. Speech was of normal volume and rate and was clear, coherent, and relevant. Mood was more euthymic at today's visit, but she was periodically anxious and sad.  Tearful at times.  Jeanie was also open to feedback and goal setting today.  Thought processes were relevant, logical and goal-directed. Thought content was WNL with no evidence of psychotic or paranoid features. No evidence of SI/HI or self-harm, intent, or plans. Memory appeared grossly intact. Insight and judgment appeared adequate and patient exhibited good impulse control during the appointment.     Does the patient appear to be at imminent risk of harm to self/others at this time? No    The session was necessary to address symptoms of anxiety and interpersonal distress that have been interfering with patient's ability to function in her life.  Ongoing psychotherapy is necessary to enhance coping skills as well as provide support and structure for problem solving.    Diagnosis (DSM-5):    Generalized Anxiety Disorder    Plan:  1. Follow up with this provider for video visit on May 29.  2. See pt instructions for homework and follow up from today.    Jody Rankin, PhD, LP    Next treatment plan update due 7/15/2020, Diagnostic Update due 1/8/2021.

## 2020-05-06 NOTE — PATIENT INSTRUCTIONS
"Talk with your partner about how often to check in around progress of relationship.  This can be a shared decision.    Consider eliciting your partner's perspective on next steps rather than telling him what to do next to see if you might get a different response.  For example,    \"What do you think the next step should be?\"  \"When do you think you could achieve that?\"  \"What makes this important to you?\"  \"What do you think is getting in the way? Is there anything that could make this easier for you?\"    Also, think about checking out the [employer] Podcast.  They are doing a series on coping with the current pandemic including at least one episode on the impact on relationships.    Looking forward to continuing this conversation on May 29.  Please don't hesitate to reach out sooner if needed!  "

## 2020-05-29 ENCOUNTER — VIRTUAL VISIT (OUTPATIENT)
Dept: PSYCHOLOGY | Facility: CLINIC | Age: 40
End: 2020-05-29
Payer: COMMERCIAL

## 2020-05-29 DIAGNOSIS — F41.1 GENERALIZED ANXIETY DISORDER: Primary | ICD-10-CM
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
## 2020-05-29 NOTE — PROGRESS NOTES
Telemedicine Visit: The patient's condition can be safely assessed and treated via synchronous audio and visual telemedicine encounter.      Reason for Telemedicine Visit: COVID-19 related restrictions    Originating Site (Patient Location): Patient's home    Distant Site (Provider Location): [facility]: [facility]    Consent:  The patient/guardian has verbally consented to: the potential risks and benefits of telemedicine (video visit) versus in person care; bill my insurance or make self-payment for services provided; and responsibility for payment of non-covered services.     Mode of Communication:  Video Conference via [employer].  Lost connection mid way through visit and reconnected via [employer].    As the provider I attest to compliance with applicable laws and regulations related to telemedicine.    Start of call: 9:00am  End of call: 10:00am    Emergency contact: Mago Kirkpatrick [phone]  Closest Emergency Room: [facility] Progress Note    Client Name: Jeanie French    Service Type:  Individual  Length of Visit: 60 minutes  Attendees: Jeanie attended alone    Identifying Information and Presenting Problem:    The patient is a 40 year old American female who is being seen for problematic symptoms of anxiety.      Treatment Objective(s) Addressed in This Session:  Decrease anxiety via learning adaptive coping mechanisms.  Increase confidence in managing life stressors.    Progress on / Status of Treatment Objective(s) / Homework:  Maintaining stability  Gaining insight    PHQ-9 SCORE 12/9/2019 12/18/2019 1/8/2020   PHQ-9 Total Score - - -   PHQ-9 Total Score 6 8 10       JAMAR-7 SCORE 12/9/2019 12/18/2019 1/8/2020   Total Score - - -   Total Score 14 11 12       Topics Discussed/Interventions Provided:    Response to riots:  [place] [address]  Up very late last night with worry about riots, but this hasn't really reached her  "neighborhood.  Normalized and provided empathy for distress.    Relationship concerns:  The bulk of our visit today was devoted to discussion of ongoing relationship distress.  Reports she \"freaked out\" about relationship problems recently.  Initially set deadline of Labor Day for relationship decision but now feels she can't wait this long.  Partner has expressed some ambivalence about making change which increased her level of doubt.  In addition, wondering about how changes at work will impact relationship (see below).  Will likely reduce time they are able to spend together.  Wondering if seeing him less would be helpful.  Would he miss her?  Would this clarify things.  Feels stuck.  Talked about moving forward.  Reflected on options including dialing back intimacy.  She plans to talk with him about this.  Would help her to resolve some of her internal discomfort with the state of their relationship.  Also worried about his ability to talk with her about emotions.  Has encouraged him to seek therapy to learn more skills in this area.  Congratulated her for speaking up and asking for she wants/needs in the relationship.  The decision to change is up to him.    Work stress:  Work is another stressor.  Has been taking over work from another coworker.  \"It's not going that well.\"  Not a lot of support from leadership or long term planning happening.  Used to plan one week at a time, but now making plans daily.  Also struggles working with one of her coworkers with some apparent mental health/cognitive challenges.  Jeanie has taken over more  role but this individual's lack of competence makes this challenging.  Considering talking to boss about how to address this problem.  Thinks boss would be receptive to this conversation.  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 Would consider FMLA but doesn't " really want to do that.  Discussed options of talking with boss about more gradual return to the office and continuing to work from home at times.  Encouraged asking for adapted schedule:  Could also consider move to part time work.    Feeling overwhelmed:  Feels overwhelmed by the number and scope of current problems (pandemic, riot, relationship, work, cat is sick, things are breaking in home, etc.).  Normalized and provided empathy given unusually high number of stressors in the community at this time.    Assessment: Jeanie appeared to be active and engaged in today's session and was receptive to feedback.     Mental Status: Jeanie French appeared generally alert and oriented. Dress was casual and appropriate to the weather and occasion. Grooming and hygiene were good. Eye contact was good. Speech was of normal volume and rate and was clear, coherent, and relevant. Mood was sad and anxious at today's visit.  Tearful at times.  Jeanie was also open to feedback and goal setting today.  Thought processes were relevant, logical and goal-directed. Thought content was WNL with no evidence of psychotic or paranoid features. No evidence of SI/HI or self-harm, intent, or plans. Memory appeared grossly intact. Insight and judgment appeared adequate and patient exhibited good impulse control during the appointment.     Does the patient appear to be at imminent risk of harm to self/others at this time? No    The session was necessary to address symptoms of anxiety and interpersonal distress that have been interfering with patient's ability to function in her life.  Ongoing psychotherapy is necessary to enhance coping skills as well as provide support and structure for problem solving.    Diagnosis (DSM-5):    Generalized Anxiety Disorder    Plan:  1. Follow up with this provider for video visit on Tuesday, June 23 at 8:30am.  Will route message to  to ask them to create this visit.  2. See pt instructions for  homework and follow up from today.  Can access via [employer].    Jody Rankin, PhD, LP    Next treatment plan update due 7/15/2020, Diagnostic Update due 1/8/2021.

## 2020-06-23 ENCOUNTER — VIRTUAL VISIT (OUTPATIENT)
Dept: PSYCHOLOGY | Facility: CLINIC | Age: 40
End: 2020-06-23
Payer: COMMERCIAL

## 2020-06-23 DIAGNOSIS — F41.1 GENERALIZED ANXIETY DISORDER: Primary | ICD-10-CM

## 2020-06-23 NOTE — PROGRESS NOTES
"Telemedicine Visit: The patient's condition can be safely assessed and treated via synchronous audio and visual telemedicine encounter.      Reason for Telemedicine Visit: COVID-19 related restrictions    Originating Site (Patient Location): Patient's home    Distant Site (Provider Location): [facility]: [facility]    Consent:  The patient/guardian has verbally consented to: the potential risks and benefits of telemedicine (video visit) versus in person care; bill my insurance or make self-payment for services provided; and responsibility for payment of non-covered services.     Mode of Communication:  Video Conference via [employer].    As the provider I attest to compliance with applicable laws and regulations related to telemedicine.    Start of call: 8:31am  End of call: 9:31am    Emergency contact: Mago Kirkpatrick [phone]  Closest Emergency Room: [facility] Progress Note    Client Name: Jeanie French    Service Type:  Individual  Length of Visit: 60 minutes  Attendees: Jeanie attended alone    Identifying Information and Presenting Problem:    The patient is a 40 year old American female who is being seen for problematic symptoms of anxiety.      Treatment Objective(s) Addressed in This Session:  Decrease anxiety via learning adaptive coping mechanisms.  Increase confidence in managing life stressors.    Progress on / Status of Treatment Objective(s) / Homework:  Maintaining stability  Gaining insight    PHQ-9 SCORE 12/9/2019 12/18/2019 1/8/2020   PHQ-9 Total Score - - -   PHQ-9 Total Score 6 8 10       JAMAR-7 SCORE 12/9/2019 12/18/2019 1/8/2020   Total Score - - -   Total Score 14 11 12       Topics Discussed/Interventions Provided:    Anxiety:  \"I've been pretty stressed out the past few weeks.\"  Multiple factors are at play (see below).    Work stress:  This is her biggest source of stress right now.  Boss wants her on site at work.  Can't do " "everything she needs to do from home.  This idea is very stressful.  Reflected on what she likes/doesn't like about working from home and how there will be some losses associated with return to the workplace.  Reflected on what is and is not in her control and how to manage response to situation rather than being reactive or focusing on things outside of her control.  Often focuses on the behavior of others and how things \"should\" be.  Provided empathy for this experience and normalized this pattern but also reflected on how this might be unhelpful or feed her stress/anxiety.  Encouraged greater acceptance of things beyond her control and pulling her attention back to what she can do in any given situation to reduce anxiety and ground/empower her to manage it.  We also reflected on pattern of worry that supervisors will be critical of her despite lack of evidence for this and general impression that leadership has \"benign neglect\" style with others that would likely apply to her as well.  Jeanie appeared receptive to this feedback.    Relationship concerns:  Ready to end things more than once since our last visit.  Describes episodes of panic about this.  Recently, however, she has seen things making some progress and this is feeding some hope that things can improve.  Less stressed about this today.      Pandemic:  Being at home so much is starting to wear on her.  Didn't go out all that much before, but even given that this is becoming increasingly difficult.  Takes so much effort to plan outings, etc.  Provided empathy and normalized experience.    Family:  Didn't do anything for Father's Day.  Mom had colonoscopy scheduled for this week so didn't feel up for social planning.  Hasn't seen sister and parents since April.  Hasn't seen brother since February.  Brother in law got laid off a week or so ago.  Not sure what will happen there.  Discussed ways to connect digitally or outside/distance, but it is not clear to " "me that Jeanie is ready to take action steps at this time to increase connection.  She will think about this.    Home life:  Still feels like she is not \"getting enough done.\"  Continues to exhibit pattern of creating long \"to do\" list and getting frustrated with herself that she is not getting through it quickly enough.  Tends to discount action steps she has taken (e.g., calling the  - waiting for call back).  Encouraged breaking down long list into knight priorities and focusing on one at a time.  Give self credit for action steps taken.    Assessment: Jeanie appeared to be active and engaged in today's session and was receptive to feedback.     Mental Status: Jeanie French appeared generally alert and oriented. Dress was casual and appropriate to the weather and occasion. Grooming and hygiene were good. Eye contact was good. Speech was of normal volume and rate and was clear, coherent, and relevant. Mood was anxious at today's visit.  Tearful at times.  Jeanie was also open to feedback and goal setting today.  Thought processes were relevant, logical and goal-directed. Thought content was WNL with no evidence of psychotic or paranoid features. No evidence of SI/HI or self-harm, intent, or plans. Memory appeared grossly intact. Insight and judgment appeared adequate and patient exhibited good impulse control during the appointment.     Does the patient appear to be at imminent risk of harm to self/others at this time? No    The session was necessary to address symptoms of anxiety and interpersonal distress that have been interfering with patient's ability to function in her life.  Ongoing psychotherapy is necessary to enhance coping skills as well as provide support and structure for problem solving.    Diagnosis (DSM-5):    Generalized Anxiety Disorder    Plan:  1. Follow up with this provider for video visit in July.  Submitted request via [employer].  Confirmed with the  that they should receive " message on this and that they would reach out to her to schedule.  2. See pt instructions for homework and follow up from today.  Can access via [employer].    Jody Rankin, PhD, LP    Next treatment plan update due 7/15/2020, Diagnostic Update due 1/8/2021.

## 2020-06-23 NOTE — PATIENT INSTRUCTIONS
Sometimes you take on too much at once and this can feel overwhelming and counter productive.  Break things into smaller chunks and try to manage one at a time starting with what is easiest or what is most important.  For example, stop worrying about all the different house projects and just focus on one.    When overwhelmed, ask yourself whose behavior you are worried about.  If it's not you, this might be a sign to pull it back and focus on what is in your control and what you can do in any given situation.    Schedule two visits in July.

## 2020-07-22 ENCOUNTER — VIRTUAL VISIT (OUTPATIENT)
Dept: PSYCHOLOGY | Facility: CLINIC | Age: 40
End: 2020-07-22
Payer: COMMERCIAL

## 2020-07-22 DIAGNOSIS — F41.1 GENERALIZED ANXIETY DISORDER: Primary | ICD-10-CM

## 2020-07-22 ASSESSMENT — ANXIETY QUESTIONNAIRES
IF YOU CHECKED OFF ANY PROBLEMS ON THIS QUESTIONNAIRE, HOW DIFFICULT HAVE THESE PROBLEMS MADE IT FOR YOU TO DO YOUR WORK, TAKE CARE OF THINGS AT HOME, OR GET ALONG WITH OTHER PEOPLE: VERY DIFFICULT
7. FEELING AFRAID AS IF SOMETHING AWFUL MIGHT HAPPEN: NOT AT ALL
6. BECOMING EASILY ANNOYED OR IRRITABLE: NEARLY EVERY DAY
5. BEING SO RESTLESS THAT IT IS HARD TO SIT STILL: NOT AT ALL
1. FEELING NERVOUS, ANXIOUS, OR ON EDGE: NEARLY EVERY DAY
2. NOT BEING ABLE TO STOP OR CONTROL WORRYING: NEARLY EVERY DAY
GAD7 TOTAL SCORE: 12
3. WORRYING TOO MUCH ABOUT DIFFERENT THINGS: NEARLY EVERY DAY

## 2020-07-22 ASSESSMENT — PATIENT HEALTH QUESTIONNAIRE - PHQ9
5. POOR APPETITE OR OVEREATING: NOT AT ALL
SUM OF ALL RESPONSES TO PHQ QUESTIONS 1-9: 13

## 2020-07-22 NOTE — PATIENT INSTRUCTIONS
"Good to see you today Jeanie.  Let's celebrate how well you are doing given everything that has been going on in your life.  There are still things for us to work on, but there are strengths as well!  Schedule your vacation ASAP!  Don't fill your vacation days with a huge \"to do list.\"  Make time for fun and relaxation.  Think about what you need to feel restored (e.g., good nutrition, time out of doors, connection with others).  Call your sister.  Review treatment plan below and let me know if we need to make any changes.    Treatment Plan    Client's Name: Jeanie French  YOB: 1980    Today's Date: 7/22/2020   Date Diagnostic Update Due: 1/8/2021    DSM-V Diagnoses:   Generalized Anxiety Disorder    Psychosocial / Contextual Factors:   Rajeshs mental health concerns have been continuing to affect her ability to function in her life and have been causing clinically significant distress.  Stressors in her interpersonal and work life contribute to difficulty. The context of the current pandemic and recent social unrest are also sources of stress.  Some additional medical concerns may also be contributing at this time.    Functioning:  Hard to stay focused at work.  Anxiety undermines sleep and contributes to fatigue.  This can make it hard to complete tasks at home as well as work.    PC-PTSD: 0/4  CAGE AID: 0/4    PHQ-9 SCORE 12/18/2019 1/8/2020 7/22/2020   PHQ-9 Total Score - - -   PHQ-9 Total Score 8 10 13     JAMAR-7 SCORE 12/18/2019 1/8/2020 7/22/2020   Total Score - - -   Total Score 11 12 12     Collaboration:   Primary care physician, Dr. Anayeli Warner    Referral:  None at this time    Anticipated treatment duration: Unknown  Agreed upon meeting frequency: every 3-4 weeks    Long Term Treatment Goal(s) related to diagnosis / functional impairment(s)    Goal 1: Jeanie will report decreased anxiety and increased confidence in managing life stressors.    Steps we will take to achieve your " "goal:    Jeanie will continue to take medications as prescribed by Dr. Warner.  Consider practice of \"square breathing technique\" once per day.  Continue to be aware of life choices to increase sense of agency in your life.  Communicate your needs clearly to others.  Attend psychotherapy as scheduled.    Intervention(s)  Therapist will provide support, psychoeducation and homework assignments as needed.    If you need additional support and care during times that your therapist or PCP are not available, here are some additional resources for you:    Crisis Lines:    [facility] Adult Crisis:  [phone]  [facility] Adult Crisis: [phone]    Crisis Text Line: Text [place] to 839940. Free support at your fingertips 24/7  People who text [place] to 793444 will be connected with a counselor. Crisis Text Line is available 24 hours a day, seven days a week.    You can also consider going to the [facility] at the following address.  Walk ins are welcome:    [address] [place] [address]   [phone] (for 24 hour crisis consultation)    Monday - Friday 8:00am - 7:00pm  Saturday:  11:00am - 3:00pm  Sunday and Holidays Closed    If you feel at risk of immediate harm, go directly to the Emergency Department.    Patient has reviewed and agreed to the above plan.    Jody Rankin, PhD      July 22, 2020      Reviewed via video visit with patient  ______________________________    ________  Patient Signature       Date    ______________________________    ________  Provider Signature       Date      "

## 2020-07-22 NOTE — Clinical Note
Jeanie will be seeing you Friday.  Would like to discuss cramping in her legs and the fact that mom was recently diagnosed with breast cancer at your visit.  Has never had a mammogram and is wondering what she should do now that she has this family history.  See my note for additional detail and let me know if you have questions!  Thanks!  Jody

## 2020-07-22 NOTE — PROGRESS NOTES
"Telemedicine Visit: The patient's condition can be safely assessed and treated via synchronous audio and visual telemedicine encounter.      Reason for Telemedicine Visit: COVID-19 related restrictions    Originating Site (Patient Location): Patient's home    Distant Site (Provider Location): [facility]: [facility]    Consent:  The patient/guardian has verbally consented to: the potential risks and benefits of telemedicine (video visit) versus in person care; bill my insurance or make self-payment for services provided; and responsibility for payment of non-covered services.     Mode of Communication:  Video Conference via [employer]    As the provider I attest to compliance with applicable laws and regulations related to telemedicine.    Start of call: 10:01am  End of call: 11:01am    Emergency contact: Mago Kirkpatrick [phone]  Closest Emergency Room: [facility] Progress Note    Client Name: Jeanie French    Service Type:  Individual  Length of Visit: 60 minutes  Attendees: Jeanie attended alone    Identifying Information and Presenting Problem:    The patient is a 40 year old American female who is being seen for problematic symptoms of anxiety.      Treatment Objective(s) Addressed in This Session:  Decrease anxiety via learning adaptive coping mechanisms.  Increase confidence in managing life stressors.    Progress on / Status of Treatment Objective(s) / Homework:  Maintaining stability  Gaining insight    PHQ-9 SCORE 12/18/2019 1/8/2020 7/22/2020   PHQ-9 Total Score - - -   PHQ-9 Total Score 8 10 13       JAMAR-7 SCORE 12/18/2019 1/8/2020 7/22/2020   Total Score - - -   Total Score 11 12 12       Topics Discussed/Interventions Provided:    Updated Treatment Plan:   Reports mood has been \"Ok.\"  \"Mostly managing\" but fluctuates with stressors.  Review of MH measures indicates that mood has worsened a bit in the past 6 months.  This is not entirely surprising " "given number of stressors (e.g., pandemic, mom with breast cancer, relationship and work stress).   Troubles falling asleep, waking up, sleeping too much on weekends.  Jeanie believes that some leg cramping is also impacting sleep, fatigue and overall wellbeing so not entirely clear if this is all attributed to depressed mood.  Will be seeing Dr. Warner for this later this week and will discuss to a greater degree.  Anxiety is relatively stable.  Jeanie believes this was higher earlier in the pandemic but has come back down.  Reflected on her strength and resiliency.    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  Never issued COVID19 plan for work which is concerning.  Not sure what is supposed to happen if anyone gets sick.   Perceives some people as \"milking\" the situation while she is working hard which is frustrating.  At the same time, did think it would be more stressful and exhausting.  \"It's actually been nice to have the routine\" but might be difficult to maintain over time.Trying to work from home on Thursday afternoons to break things up.  Co-worker will be out for 2 weeks which will add to her burden.  This also limits when she can take vacation.  Discussed need for planned time off and encouraged her to think about how she would use this time to restore well-being (see pt instructions).     Relationship concerns:  Still touch and go.  Starts to ruminate on worrisome thoughts about relationship and this can cause panic/desire to end relationship.  This happened twice since our lat visit.  Once she talks it through with partner, anxiety resolves for a time.  Reviewed healthy role of anxiety to prompt us to assess risk.  Hard to know if her assessment of risk is correct which is challenging.   There are some signs of forward progress but some discouraging signs.  Had originally set Labor Day deadline, but not sure he will be able to do that.  She's been waiting for one year and feels she can't wait any " longer. Reflected on difficulty of decision making and provided empathy for dilemma.  Again, focused on setting realistic expectations and focusing on what is in her control.    Family:  Mom diagnosed with breast cancer.  Started chemotherapy.  This is understandably distressing for Jeanie.  While this diagnosis was made shortly after July 4, Jeanie has not yet discussed this concern with her siblings.  Encouraged her to consider reaching out to her sister.   She also has questions about how this may impact her own health risk. Encouraged her to discuss with Dr. Warner when they meet on Friday.  Has not had a mammogram and suspects she should schedule one with this additional history.    Assessment: Jeanie appeared to be active and engaged in today's session and was receptive to feedback.     Mental Status: Jeanie French appeared generally alert and oriented. Dress was casual and appropriate to the weather and occasion. Grooming and hygiene were good. Eye contact was good. Speech was of normal volume and rate and was clear, coherent, and relevant. Mood was at times anxious and sad at today's visit.  She was tearful at times.  Jeanie was also open to feedback and goal setting today.  Thought processes were relevant, logical and goal-directed. Thought content was WNL with no evidence of psychotic or paranoid features. No evidence of SI/HI or self-harm, intent, or plans. Memory appeared grossly intact. Insight and judgment appeared adequate and patient exhibited good impulse control during the appointment.     Does the patient appear to be at imminent risk of harm to self/others at this time? No    The session was necessary to address symptoms of anxiety and interpersonal distress that have been interfering with patient's ability to function in her life.  Ongoing psychotherapy is necessary to enhance coping skills as well as provide support and structure for problem solving.    Diagnosis (DSM-5):    Generalized Anxiety  Disorder    Plan:  1. Follow up with this provider for video visit on August 12.  Did inform Jeanie that I will be out of the office from 8/13 - 8/21.  Will make plans for needs which may arise during my absence at our next visit.  2. See pt instructions for updated treatment plan, homework and follow up from today.  Can access via [employer].  3. Jeanie will follow up with Dr. Warner on 7/24/2020.  Will route note from today to Dr. Warner so she can be aware of context and desire to discuss risk of breast cancer given family history.    Jody Rankin, PhD, LP    Updated Treatment Plan today.  Next treatment plan update due 10/22/2020, Diagnostic Update due 1/8/2021.

## 2020-07-23 ASSESSMENT — ANXIETY QUESTIONNAIRES: GAD7 TOTAL SCORE: 12

## 2020-07-24 ENCOUNTER — OFFICE VISIT (OUTPATIENT)
Dept: FAMILY MEDICINE | Facility: CLINIC | Age: 40
End: 2020-07-24
Payer: COMMERCIAL

## 2020-07-24 VITALS
WEIGHT: 178 LBS | HEIGHT: 70 IN | BODY MASS INDEX: 25.48 KG/M2 | DIASTOLIC BLOOD PRESSURE: 71 MMHG | HEART RATE: 72 BPM | TEMPERATURE: 98.6 F | RESPIRATION RATE: 16 BRPM | SYSTOLIC BLOOD PRESSURE: 121 MMHG | OXYGEN SATURATION: 97 %

## 2020-07-24 DIAGNOSIS — K58.0 IRRITABLE BOWEL SYNDROME WITH DIARRHEA: Primary | ICD-10-CM

## 2020-07-24 DIAGNOSIS — D50.0 IRON DEFICIENCY ANEMIA DUE TO CHRONIC BLOOD LOSS: ICD-10-CM

## 2020-07-24 DIAGNOSIS — G25.81 RESTLESS LEGS SYNDROME: ICD-10-CM

## 2020-07-24 DIAGNOSIS — F41.1 GENERALIZED ANXIETY DISORDER: ICD-10-CM

## 2020-07-24 LAB
% GRANULOCYTES: 56 %G (ref 40–75)
ANION GAP SERPL CALCULATED.3IONS-SCNC: 9 MMOL/L (ref 5–18)
BUN SERPL-MCNC: 13 MG/DL (ref 8–22)
CALCIUM SERPL-MCNC: 9.2 MG/DL (ref 8.5–10.5)
CHLORIDE SERPL-SCNC: 104 MMOL/L (ref 98–107)
CO2 SERPL-SCNC: 26 MMOL/L (ref 22–31)
CREAT SERPL-MCNC: 1.01 MG/DL (ref 0.6–1.1)
FERRITIN SERPL-MCNC: <2 NG/ML (ref 10–130)
GLUCOSE SERPL-MCNC: 103 MG/DL (ref 70–125)
GRANULOCYTES #: 3.1 K/UL (ref 1.6–8.3)
HCT VFR BLD AUTO: 33.1 % (ref 35–47)
HEMOGLOBIN: 10 G/DL (ref 11.7–15.7)
LYMPHOCYTES # BLD AUTO: 1.8 K/UL (ref 0.8–5.3)
LYMPHOCYTES NFR BLD AUTO: 33.6 %L (ref 20–48)
MAGNESIUM SERPL-MCNC: 2 MG/DL (ref 1.8–2.6)
MCH RBC QN AUTO: 25 PG (ref 26.5–35)
MCHC RBC AUTO-ENTMCNC: 30.2 G/DL (ref 32–36)
MCV RBC AUTO: 82.8 FL (ref 78–100)
MID #: 0.6 K/UL (ref 0–2.2)
MID %: 10.4 %M (ref 0–20)
PLATELET # BLD AUTO: 295 K/UL (ref 150–450)
POTASSIUM SERPL-SCNC: 3.8 MMOL/L (ref 3.5–5)
RBC # BLD AUTO: 4 M/UL (ref 3.8–5.2)
SODIUM SERPL-SCNC: 139 MMOL/L (ref 136–145)
TSH SERPL DL<=0.05 MIU/L-ACNC: 0.87 UIU/ML (ref 0.3–5)
WBC # BLD AUTO: 5.5 K/UL (ref 4–11)

## 2020-07-24 RX ORDER — DICYCLOMINE HYDROCHLORIDE 10 MG/1
10 CAPSULE ORAL
Qty: 120 CAPSULE | Refills: 1 | Status: SHIPPED | OUTPATIENT
Start: 2020-07-24 | End: 2020-10-09

## 2020-07-24 ASSESSMENT — MIFFLIN-ST. JEOR: SCORE: 1539.71

## 2020-07-24 NOTE — LETTER
July 28, 2020      Jeanie French  [address] [place] [address]        Dear ,    We are writing to inform you of your test results.    We are still waiting on your Vitamin D results, but your initial tests show that your hemoglobin and iron levels are quite low.  This can cause leg pain and aching, as well as making you feel tired.  I would like you to start on an iron pill daily (even if you are also taking a multi-vitamin with iron).  Take it with some vitamin C (like a cup of orange juice) to help absorb the iron.  I have sent a prescription to the pharmacy for ferrous gluconate, because this form of iron is a little gentler on the stomach.  We should check your iron and hemoglobin levels in 2 months to see if they are improving.  Please call the clinic at [phone] if you have any questions.     Resulted Orders   Thyroid [place] ([facility])   Result Value Ref Range    TSH 0.87 0.30 - 5.00 uIU/mL    Narrative    Test performed by:  [facility]  [address] [place] [address]   Magnesium ([facility])   Result Value Ref Range    Magnesium 2.0 1.8 - 2.6 mg/dL    Narrative    Test performed by:  [facility]  [address] [place] [address]   CBC with Diff Plt ([facility])   Result Value Ref Range    WBC 5.5 4.0 - 11.0 K/uL    Lymphocytes # 1.8 0.8 - 5.3 K/uL    % Lymphocytes 33.6 20.0 - 48.0 %L    Mid # 0.6 0.0 - 2.2 K/uL    Mid % 10.4 0.0 - 20.0 %M    GRANULOCYTES # 3.1 1.6 - 8.3 K/uL    % Granulocytes 56.0 40.0 - 75.0 %G    RBC 4.0 3.8 - 5.2 M/uL    Hemoglobin 10.0 (L) 11.7 - 15.7 g/dL    Hematocrit 33.1 (L) 35.0 - 47.0 %    MCV 82.8 78.0 - 100.0 fL    MCH 25.0 (L) 26.5 - 35.0    MCHC 30.2 (L) 32.0 - 36.0 g/dL    Platelets 295.0 150.0 - 450.0 K/uL   Ferritin ([facility])   Result Value Ref Range    Ferritin <2 (L) 10 - 130 ng/mL    Narrative    Test performed by:  [facility]  [address] [place] [address]   Basic Metabolic Profile ([facility])   Result  Value Ref Range    Sodium 139 136 - 145 mmol/L    Potassium 3.8 3.5 - 5.0 mmol/L    Chloride 104 98 - 107 mmol/L    CO2, Total 26 22 - 31 mmol/L    Anion Gap 9 5 - 18 mmol/L    Glucose 103 70 - 125 mg/dL    Calcium 9.2 8.5 - 10.5 mg/dL    Urea Nitrogen 13 8 - 22 mg/dL    Creatinine 1.01 0.60 - 1.10 mg/dL    GFR Estimate If Black >60 >60 mL/min/1.73m2    GFR Estimate 60 (L) >60 mL/min/1.73m2    Narrative    Test performed by:  [facility]  [address] [place] [address]  Fasting Glucose reference range is 70-99 mg/dL per  American Diabetes Association (ADA) guidelines.       If you have any questions or concerns, please call the clinic at the number listed above.       Sincerely,        Anayeli Warner MD

## 2020-07-24 NOTE — PATIENT INSTRUCTIONS
New medication:  Bentyl.  Take 1 pill 3-4x per day every day.   Let us know if things are helping.

## 2020-07-24 NOTE — PROGRESS NOTES
There are no exam notes on file for this visit.    SUBJECTIVE  Jeanie French is a 40 year old female with past medical history significant for    Patient Active Problem List   Diagnosis     Cancer of tongue (H)     Health Care Home     Generalized anxiety disorder     Tenosynovitis of the right index finger     Wheezing     Irritable bowel syndrome (IBS)     RUQ abdominal mass     Exercise-induced asthma     Sinusitis, chronic     Allergic rhinitis, unspecified allergic rhinitis type     Hypovitaminosis D     Adjustment disorder with anxiety     Others present at the visit:  None    Presents for   Chief Complaint   Patient presents with     Follow Up     follow up IBS, having problem and maybe vitamin deficiency     Screening     She already did PHQ9 and GAD7 with Dr. Rankin on this Wednesy      Patient presents today to discuss IBS symptoms.  She describes difficulty with abdominal pain, cramping, aching discomfort, and diarrhea that is been present for the last 20 years.  Describes having an extensive work-up 20 years ago where she saw GI, had multiple scopes, and many tests.  At that time she was diagnosed with IBS, and over the years has tried multiple interventions.  Has tried multiple different diets, and is found that she is quite sensitive to lactose.  Now only eats a small amount of butter and her cheese, and not any other dairy.  20 years ago other minimal medications, and she is not tried anything in particular for IBS except and Imodium as needed for diarrhea.  Finds her symptoms get worse during her menstrual period, and takes Imodium during that time which is been helpful.  In times of stress, her symptoms get worse, and they have been more bothersome over the last couple of months.    She reports a history as well of eosinophilic esophagitis, and medication for this.  No longer has GERD symptoms except occasionally, so is not on medication regularly.  Has seen some advertisements for pancreatic  "insufficiency and wonders if this could be a cause of her problems.  Weight has been stable.  Patient denies any significant change in symptoms over the last 20 years.     Also checked in today about her anxiety.  Continues to see Dr. Rankin.  Does have some stress at work as they are bringing people back, and she is not sure the plan is safe and appropriate.  Is dealing with this okay.  Feels like medications are helpful.  Wishes her anxiety was a little bit better but is leery of trying new medications currently and is maxed out on her current medications.  Just did a PHQ 9 2 days ago at visit with Dr. Rankin so declines this today.    She also describes difficulty with restless leg symptoms.  Says this runs in her family.  She notices that during the nighttime her legs feel achy, uncomfortable, and have difficulty relaxing when she is trying to sleep at night.  Has also noticed that her nails have become soft and very mobile.  Previously took a gummy multivitamin but has not taken 1 for a while.  Has also noticed that she gets more aching discomfort in her legs when she is trying to run outside.  They have been more bothersome and uncomfortable for her.  She reports a history of vitamin D deficiency.    OBJECTIVE:  Vitals: /71   Pulse 72   Temp 98.6  F (37  C) (Oral)   Resp 16   Ht 1.765 m (5' 9.5\")   Wt 80.7 kg (178 lb)   LMP 06/25/2020 (Approximate)   SpO2 97%   BMI 25.91 kg/m    BMI= Body mass index is 25.91 kg/m .  Vitals:  Vitals are reviewed and are within the normal range.  Weight is stable.  Gen:  Alert, pleasant, no acute distress  Psych: Mood and affect are normal.      ASSESSMENT AND PLAN:      Jeanie was seen today for follow up and screening.    Diagnoses and all orders for this visit:    Irritable bowel syndrome with diarrhea.  Long history of irritable bowel, diarrhea predominant.  Will try Bentyl for this.  Discussed increasing dose in 1 month if it is helpful but not strong enough.  " Discussed that there are other medication options as well.  Continue to closely monitor diet as well.  -     dicyclomine (BENTYL) 10 MG capsule; Take 1 capsule (10 mg) by mouth 4 times daily (before meals and nightly)  -     Basic Metabolic Profile ([facility])    Restless legs syndrome.  Will rule out potential vitamin deficiencies including iron deficiency with her restless leg symptoms.  Encouraged her to restart a multivitamin.  -     Vitamin D 25-Hydroxy ([facility])  -     Cancel: Basic Metabolic Panel ([place])  -     Thyroid [place] ([facility])  -     Magnesium ([facility])  -     CBC with Diff Plt ([facility])  -     Ferritin ([facility])  -     Basic Metabolic Profile ([facility])    Generalized anxiety disorder.  Overall stable.  Following regularly with psychology.  No change in medications today.    Patient Instructions   New medication:  Bentyl.  Take 1 pill 3-4x per day every day.   Let us know if things are helping.        Follow up in 2 months for recheck IBS symptoms.    Anayeli Warner MD

## 2020-07-24 NOTE — LETTER
July 28, 2020      Jeanie YEN Manolo  [address] [place] [address]        Dear ,    We are writing to inform you of your test results.    Your Vitamin D levels are also low.  A daily supplement would help with this.  For most people at your vitamin D level, 2000 Units per day of Vitamin D works well.  Most multi-vitamins have 400-600 Units, which would not be enough.  I hope taking the Vitamin D and Iron will help you feel better.  Please call the clinic at [phone] if you have any questions.       Resulted Orders   Vitamin D 25-Hydroxy ([facility])   Result Value Ref Range    Vitamin D,25-Hydroxy 26.0 (L) 30.0 - 80.0 ng/mL    Narrative    Test performed by:  [facility]  [address] [place] [address]  Deficiency <10.0 ng/mL  Insufficiency 10.0-29.9 ng/mL  Sufficiency 30.0-80.0 ng/mL  Toxicity (possible) >100.0 ng/mL   Thyroid [place] ([facility])   Result Value Ref Range    TSH 0.87 0.30 - 5.00 uIU/mL    Narrative    Test performed by:  [facility]  [address] [place] [address]   Magnesium ([facility])   Result Value Ref Range    Magnesium 2.0 1.8 - 2.6 mg/dL    Narrative    Test performed by:  [facility] [address] [place] [address]   CBC with Diff Plt ([facility])   Result Value Ref Range    WBC 5.5 4.0 - 11.0 K/uL    Lymphocytes # 1.8 0.8 - 5.3 K/uL    % Lymphocytes 33.6 20.0 - 48.0 %L    Mid # 0.6 0.0 - 2.2 K/uL    Mid % 10.4 0.0 - 20.0 %M    GRANULOCYTES # 3.1 1.6 - 8.3 K/uL    % Granulocytes 56.0 40.0 - 75.0 %G    RBC 4.0 3.8 - 5.2 M/uL    Hemoglobin 10.0 (L) 11.7 - 15.7 g/dL    Hematocrit 33.1 (L) 35.0 - 47.0 %    MCV 82.8 78.0 - 100.0 fL    MCH 25.0 (L) 26.5 - 35.0    MCHC 30.2 (L) 32.0 - 36.0 g/dL    Platelets 295.0 150.0 - 450.0 K/uL   Ferritin ([facility])   Result Value Ref Range    Ferritin <2 (L) 10 - 130 ng/mL    Narrative    Test performed by:   [facility]  [address] [place] [address]   Basic Metabolic Profile  ([facility])   Result Value Ref Range    Sodium 139 136 - 145 mmol/L    Potassium 3.8 3.5 - 5.0 mmol/L    Chloride 104 98 - 107 mmol/L    CO2, Total 26 22 - 31 mmol/L    Anion Gap 9 5 - 18 mmol/L    Glucose 103 70 - 125 mg/dL    Calcium 9.2 8.5 - 10.5 mg/dL    Urea Nitrogen 13 8 - 22 mg/dL    Creatinine 1.01 0.60 - 1.10 mg/dL    GFR Estimate If Black >60 >60 mL/min/1.73m2    GFR Estimate 60 (L) >60 mL/min/1.73m2    Narrative    Test performed by:  [facility]  [address] [place] [address]  Fasting Glucose reference range is 70-99 mg/dL per  American Diabetes Association (ADA) guidelines.       If you have any questions or concerns, please call the clinic at the number listed above.       Sincerely,        Anayeli Warner MD

## 2020-07-25 RX ORDER — FERROUS GLUCONATE 324(38)MG
324 TABLET ORAL
Qty: 100 TABLET | Refills: 0 | Status: SHIPPED | OUTPATIENT
Start: 2020-07-25 | End: 2020-09-17

## 2020-07-25 NOTE — RESULT ENCOUNTER NOTE
Jeanie French-    We are still waiting on your Vitamin D results, but your initial tests show that your hemoglobin and iron levels are quite low.  This can cause leg pain and aching, as well as making you feel tired.  I would like you to start on an iron pill daily (even if you are also taking a multi-vitamin with iron).  Take it with some vitamin C (like a cup of orange juice) to help absorb the iron.  I have sent a prescription to the pharmacy for ferrous gluconate, because this form of iron is a little gentler on the stomach.  We should check your iron and hemoglobin levels in 2 months to see if they are improving.  Please call the clinic at [phone] if you have any questions.      Anayeli Warner MD    Please send results to patient.

## 2020-07-25 NOTE — PROGRESS NOTES
Patient's hemoglobin and ferritin levels were low.  Will start ferrous gluconate, 1 pill daily.  Prescription sent.  Recheck in 2 months.      Anayeli Warner MD

## 2020-07-27 LAB — 25(OH)D3 SERPL-MCNC: 26 NG/ML (ref 30–80)

## 2020-07-27 NOTE — RESULT ENCOUNTER NOTE
Chad Ware-    Your Vitamin D levels are also low.  A daily supplement would help with this.  For most people at your vitamin D level, 2000 Units per day of Vitamin D works well.  Most multi-vitamins have 400-600 Units, which would not be enough.  I hope taking the Vitamin D and Iron will help you feel better.  Please call the clinic at [phone] if you have any questions.      Anayeli Warner MD    Please send results to patient.

## 2020-08-12 ENCOUNTER — VIRTUAL VISIT (OUTPATIENT)
Dept: PSYCHOLOGY | Facility: CLINIC | Age: 40
End: 2020-08-12
Payer: COMMERCIAL

## 2020-08-12 DIAGNOSIS — F41.1 GENERALIZED ANXIETY DISORDER: Primary | ICD-10-CM

## 2020-08-12 NOTE — PROGRESS NOTES
Telemedicine Visit: The patient's condition can be safely assessed and treated via synchronous audio and visual telemedicine encounter.      Reason for Telemedicine Visit: COVID-19 related restrictions    Originating Site (Patient Location): Patient's home    Distant Site (Provider Location): [facility]: [facility]    Consent:  The patient/guardian has verbally consented to: the potential risks and benefits of telemedicine (video visit) versus in person care; bill my insurance or make self-payment for services provided; and responsibility for payment of non-covered services.     Mode of Communication:  Video Conference via [employer]    As the provider I attest to compliance with applicable laws and regulations related to telemedicine.    Start of call: 9:03am  End of call: 10:03am    Emergency contact: Mago Kirkpatrick [phone]  Closest Emergency Room: [facility] Progress Note    Client Name: Jeanie French    Service Type:  Individual  Length of Visit: 60 minutes  Attendees: Jeanie attended alone    Identifying Information and Presenting Problem:    The patient is a 40 year old American female who is being seen for problematic symptoms of anxiety.      Treatment Objective(s) Addressed in This Session:  Decrease anxiety via learning adaptive coping mechanisms.  Increase confidence in managing life stressors.    Progress on / Status of Treatment Objective(s) / Homework:  Maintaining stability  Gaining insight    PHQ-9 SCORE 12/18/2019 1/8/2020 7/22/2020   PHQ-9 Total Score - - -   PHQ-9 Total Score 8 10 13       JAMAR-7 SCORE 12/18/2019 1/8/2020 7/22/2020   Total Score - - -   Total Score 11 12 12       Topics Discussed/Interventions Provided:    Medical update:     * Vitamin deficiency:  Recently diagnosed with low iron and vitamin D and started on a supplement.  Today, Jeanie reports she has been taking this supplement for the past month.  Makes a lot more sense why she  "was tired/having difficulty running.  Did run this morning (2 miles) and thinks things may be getting better.  Thinks she is supposed to come back in for labs 2 months after last visit (around 9/24).   * IBS:  Also started IBS med, but is not sure this is helping.  From my read of Dr Warner's note there is room to go up on this medication but Jeanie was not sure how to titrate.  Recommended she reach out for guidance on this to Dr. Warner via [employer].  Jeanie is hopeful that treatment can help with this as symptoms have interfered with work and social activities.    * Breast cancer risk:  Did not discuss concerns about breast cancer risk related to mom's recent diagnosis with Dr. Warner at their last visit.  Does still want to do this and is aware she is due for CPE.  Encouraged scheduling this in the near future.    Mood/anxiety update:  \"Things have been pretty cesario for me that past few days.\"  Things had been going well (with relationship), but now something is happening with partner's son (drug use) which is putting a pause on things.  Will be seeking assessment/counseling for this.  Partner's daughter is getting ready to go back to college in a few weeks so timeline for discussion of relationship with family feels tight and unlikely to occur soon.  Very stressful to still be in limbo again.  Very sad and disappointed.  Feels trapped.  Encouraged making space for emotions and practicing self-compassion.  Discussed recommendation for connection to family therapist to help address these complexities and facilitate these difficult conversations.  Jeanie is not sure partner is ready for this but will consider talking with him about this.  She also feels the stress of needing to make a decision about relationship.  Encouraged her to take this off the table right now as she absorbs this recent turn of events.  Focus on self-care in other areas (see below).  We can return to decision about relationship after the dust has " "settled a bit.    Social connection:  Jeanie is tearful when sharing that she feels she has \"no good things happening\" in her life.  \"Work is terrible, I can't see my mom or family.\"  Perceives partner as her only social contact. Discussed need for social connection and need to consider how to meet this need creatively in the current environment - regardless of status with romantic partner.  Has girlfriends, but doesn't feel that close to them.  \"I think they won't have time anyway.\"  One friend has young kids and is trying to work from home, etc.  Gently challenged this thinking and reflected that Jeanie often finds barriers to ideas before she has fully explored them.  Discussed risks/benefits of this pattern and devised strategy to test her thinking, broaden diversity of social support network (see pt instructions).      Family:  Checked in on how mom is doing.  Starting to feel the effects of chemo.  Needed to shave head and wear wig.  Still has not really talked to siblings about mom's condition.  Gently questioned this.  Not sure what to say or what to ask for.  Wishes family would help mom with grocer shopping or setting up with delivery service.  Mom has denied need for help, but Jeanie acknowledges she is not good at asking for help (family trait) and that she appreciates it when others proactively offer help.  In addition, parents' 50th wedding anniversary is coming up this fall, but can't have party due to COVID.  Not sure how to celebrate/support them at this time:  Maybe a video or photo montage, recreate family photo as adults?  Worried she doesn't have enough time to do something well at this point.  Co-created plan to reach out to siblings.  Discussed how to have this conversation and what to ask for.  Prepare self for imperfect response.  Encouraged preparation to celebrate anniversary in less than perfect way (e.g., \"Don't let the pursuit of perfect get in the way of the good enough.\")    Work related " stress:  Work has been hard as co-worker was out last week and this week.  Increased work load and complexity with co-worker out.  Feeling let down by others who said they would help.  Encouraged reflection on how her strengths (hard worker, responsible, invested in timeliness) can also be a source of vulnerability.  Encouraged her to focus on what is in her control and let go of the rest instead of carrying the emotional weight of the whole project for the entire team.  Jeanie agrees this makes sense and will try to shift her focus.    Vacation/meaningful activity:  Jeanie did follow up from our last conversation by taking time off on 8/21 but no additional time off planned.  Has a lot of guilt/stress about not completing various projects at home and wonders if I have a resource for her on how to get more organized/prioritize.  Reflected on the need to respect the limits of her time and energy.  Also encouraged paring down expectations to more realistic levels.  Will look for additional resource for her on this as well.  Encouraged taking more time off and making plans independent of partner's availability so she is not tying all of her well-being to relationship.      Assessment: Jeanie appeared to be active and engaged in today's session and was receptive to feedback.     Mental Status: Jeanie French appeared generally alert and oriented. Dress was casual and appropriate to the weather and occasion. Grooming and hygiene were good. Eye contact was good. Speech was of normal volume and rate and was clear, coherent, and relevant. Mood was anxious and sad at today's visit.  She was tearful at times.  Jeanie was also consolable, open to feedback and goal setting today.  Thought processes were relevant, logical and goal-directed. Thought content was WNL with no evidence of psychotic or paranoid features. No evidence of SI/HI or self-harm, intent, or plans. Memory appeared grossly intact. Insight and judgment appeared  adequate and patient exhibited good impulse control during the appointment.     Does the patient appear to be at imminent risk of harm to self/others at this time? No    The session was necessary to address symptoms of anxiety and interpersonal distress that have been interfering with patient's ability to function in her life.  Ongoing psychotherapy is necessary to enhance coping skills as well as provide support and structure for problem solving.    Diagnosis (DSM-5):    Generalized Anxiety Disorder    Plan:  1. Follow up with this provider for video visit on September 2.  Did remind Jeanie that I will be out of the office from 8/13 - 8/21.  Encouraged outreach to Dr. Warner or other members of the care team if any urgent needs should arise while I am away.  2. See pt instructions for homework and follow up from today.  Can access via [employer].  3. Workbook on how to get organized and prioritize?    Jody Rankin, PhD, LP    Next treatment plan update due 10/22/2020, Diagnostic Update due 1/8/2021.

## 2020-08-12 NOTE — PATIENT INSTRUCTIONS
"Email Magui Goldstein, Justa and Melly to see if any of them would be interested in a socially distant get together some time soon.    Reach out to mom to offer help with groceries, etc.      Reach out to siblings to see how you might partner to support mom in the coming weeks/months.  Discuss how to celebrate 50th Anniversary.  Make a lot of space for less than perfect responses/behavior from family and in your planning.  Don't let the pursuit of perfect get in the way of good enough.    With any of these efforts, I'd be pleased with a 20% return on investment rate.  Don't shoot for 100% here!    Send Dr. Warner a [employer] message to ask about timing to re-check labs (vitamin D and iron).  Also ask about whether you should go up on med for IBS.    Schedule complete physical in the near future.    Don't make decisions about your relationship right now.  Let the dust settle a bit.  Focus on self-care and investing in other relationships first.  Don't make your self-care activities dependent on your partner.    Take some more time off, but don't try to drink from a firehose.  Don't try to squeeze too much productivity in this time. Pare down your expectations to activities that are important to you and will give back to you in some manner.  Take little sips.      Just a reminder that I will be out of the office from 8/13 - 8/21.  Reach out to Dr. Warner or other members of the care team if any urgent needs should arise while I am away.    I did a brief online search for organizational workbooks and found a couple of ideas, but haven't read them myself so can't make a strong recommendation.  I actually think you might do best checking out a few from the library and browsing through them to see if any are a good fit for you before investing in purchasing one.  Here are a few I found that were recommended by others:    \"The Personal Organizing Workbook\" by Alem Amaro  \"The 7 Habits of Highly Effective People:  Powerful " "Lessons in Personal Change\" by Ramiro Moyer  \"Eat that Frog!  21 great ways to stop procrastinating and get more done in less time\" by Raheel Hsu  \"The 10 Natural Laws of successful time and life management\" by Tony Malik  \"the Willpower Instinct: How self-control works, why it matters, and what you can do to get more of it\" by Tatiana Bradshaw (I am familiar with this author and like some of her other work but have not read this particular book)  \"The Checklist Manifesto\" by Syed Carvajal (probably one of my favorite medical writers, but have also not read this book)  "

## 2020-08-14 DIAGNOSIS — K58.0 IRRITABLE BOWEL SYNDROME WITH DIARRHEA: ICD-10-CM

## 2020-08-14 RX ORDER — DICYCLOMINE HCL 20 MG
20 TABLET ORAL EVERY 6 HOURS
Qty: 120 TABLET | Refills: 1 | Status: SHIPPED | OUTPATIENT
Start: 2020-08-14 | End: 2020-09-08

## 2020-09-02 ENCOUNTER — VIRTUAL VISIT (OUTPATIENT)
Dept: PSYCHOLOGY | Facility: CLINIC | Age: 40
End: 2020-09-02
Payer: COMMERCIAL

## 2020-09-02 DIAGNOSIS — F41.1 GENERALIZED ANXIETY DISORDER: Primary | ICD-10-CM

## 2020-09-02 NOTE — PROGRESS NOTES
Telemedicine Visit: The patient's condition can be safely assessed and treated via synchronous audio and visual telemedicine encounter.      Reason for Telemedicine Visit: COVID-19 related restrictions    Originating Site (Patient Location): Patient's home    Distant Site (Provider Location): [facility]: [facility]    Consent:  The patient/guardian has verbally consented to: the potential risks and benefits of telemedicine (video visit) versus in person care; bill my insurance or make self-payment for services provided; and responsibility for payment of non-covered services.     Mode of Communication:  Video Conference via AmWell    As the provider I attest to compliance with applicable laws and regulations related to telemedicine.    Start of call: 10:02am  End of call: 11:00am    Emergency contact: Mago Kirkpatrick [phone]  Closest Emergency Room: [facility] Progress Note    Client Name: Jeanie French    Service Type:  Individual  Length of Visit: 58 minutes  Attendees: Jeanie attended alone    Identifying Information and Presenting Problem:    The patient is a 40 year old American female who is being seen for problematic symptoms of anxiety.      Treatment Objective(s) Addressed in This Session:  Decrease anxiety via learning adaptive coping mechanisms.  Increase confidence in managing life stressors.    Progress on / Status of Treatment Objective(s) / Homework:  Maintaining stability  Gaining insight    PHQ-9 SCORE 12/18/2019 1/8/2020 7/22/2020   PHQ-9 Total Score - - -   PHQ-9 Total Score 8 10 13       JAMAR-7 SCORE 12/18/2019 1/8/2020 7/22/2020   Total Score - - -   Total Score 11 12 12       Topics Discussed/Interventions Provided:    Family: Did call mom to offer help with groceries and mom accepted this offer.  She and mom visited with sister outside for a bit too.  This was really a positive experience.  Still nervous to be around mom (secondary to COVID  "worry).  Very careful.  Plan to have Labor Day gathering at brother's house.  This was the first family gathering since July 4.  Brother in law is still really struggling to find work which is stressful.  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   Feels stressed when she doesn't hear back from people.  \"I need to get this done!\"  Again, reflected on what is and what is not in her control and strategies for managing this stress.    Social outreach:  At our last visit, set the goal of reaching out to girlfriends in order to build broader social support network.  Today, Jeanie reports she texted her old roommate Kristen.  She was interested in a movie night.  Thought this could be a relatively low risk activity.  Has also been texting with friend Angelita.  Going for daily walks on her own and Jeanie thought it might be nice to join her for this.     Relationship:  Did talk with partner about original Labor Day deadline.  Feeling more accepting of inability to meet this goal.  Daughter has gone [place] to [place].  Not sure how this impacts communication with her but no new timeline identified (possibly before Gary).  Wonders if he is stalling to wait until both kids are out of the house (son is a senior).  He denies this.  Overall, more accepting and less distressed about this delay.  In addition, some of the things she was looking forward to are less available at this time regardless (e.g., family gatherings, etc.).  Also reflected that she had been investing in other social connections (as discussed at our last visit) and this may also decrease pressure on this relationship to meet all her needs for connection.  Congratulated her on these changes.    Work/Time off: \"Work has been a little crazy.\"  There have been lay offs of some staff.  This was not too surprising.  Relieved that she still has her job.  Met with boss to talk about how to divide remaining work given reduced work force.  Hopeful that " "her input was heard.  Morale has improved as these changes got rid of some who were not working very hard and created some validation that her hard work was recognized.      Perception of organization/productivity:  \"My day to day schedule is not working well for me.\"  Gets to work later than what she wants.  Feels overworked/always behind at work.  Can feel like there is not time for even going to the bathroom or basic self care at work.  Opts for walk with partner after work some days but then gets home later than she wants.  Feels guilty about not spending enough time with cats, running, laundry. Had also been doing exercise challenge which feels like a big time commitment.   No dinner until 9pm.  Exhausted.  Fell asleep on the couch.  Stressful because it feels like things are falling through the cracks.  Discussed how expectations for self appear out of whack with what she has time to do.  Focuses on shortfall rather than what she got done.  Reflected on strategies to address her thinking, set clear priorities (with realistic time expectations) and make plans that reflect these priorities.  The goal is to reach greater acceptance of her limits and greater appreciation of what she is able to accomplish.  Jeanie appeared receptive to this feedback.    Did provide some options for organizational workbooks in the last AVS but Jeanie did not see these and I agreed to resend.  Jeanie did write down a list of priorities recently and organized by categories (e.g., work, fun, personal, etc.).   into \"easy\" and \"hard.\"  Also tried to identify priorities. Used Marley for this.  Felt good about this but hasn't started working the list yet.  Feels this maybe stalled out and feeling stressed about this.  Discussed Sunday Meeting approach and scheduling time.    Discussed the fact that she will be taking next week off.  Encouraged by co-worker to do this.  No plans yet.  Elicited that she would like to feel refreshed and " well rested at the end of this time.  Would like to accomplish a few things.  Cannot think of a time when she met these goals.  Encouraged her to set realistic goals for this week (20% improvement in feeling rested/refreshed not 100%).  Reflected on activities that would help her achieve these goals.  Jeanie opted to choose tasks that are weighing her down (e.g., schedule dentist appt. (15-20 minutes), fill holes in trim and sand them (2 hours), no painting, oil change, sew masks (4?  4 hours) - would like to have 14. Currently has 7.  Read book (sequel to Handmaid's Tale - set aside an hour a day).  Encouraged scheduling these activities and also leaving some unscheduled time.  Jeanie appeared receptive to this feedback and goal setting.    Assessment: Jeanie appeared to be active and engaged in today's session and was receptive to feedback.     Mental Status: Jeanie French appeared generally alert and oriented. Dress was casual and appropriate to the weather and occasion. Grooming and hygiene were good. Eye contact was good. Speech was of normal volume and rate and was clear, coherent, and relevant. Mood was less anxious and sad at today's visit.  Jeanie open to feedback and goal setting today.  Thought processes were relevant, logical and goal-directed. Thought content was WNL with no evidence of psychotic or paranoid features. No evidence of SI/HI or self-harm, intent, or plans. Memory appeared grossly intact. Insight and judgment appeared adequate and patient exhibited good impulse control during the appointment.     Does the patient appear to be at imminent risk of harm to self/others at this time? No    The session was necessary to address symptoms of anxiety and interpersonal distress that have been interfering with patient's ability to function in her life.  Ongoing psychotherapy is necessary to enhance coping skills as well as provide support and structure for problem solving.    Diagnosis  (DSM-5):    Generalized Anxiety Disorder    Plan:  1. Jeanie was encouraged to schedule follow up with this provider for video visit in the next few weeks.  2. See pt instructions for homework and follow up from today.  Can access via [email or web address].  3. Schedule CPE with Dr. Timmy HERNÁNDEZ.    Jody Rankin, PhD, LP    Next treatment plan update due 10/22/2020, Diagnostic Update due 1/8/2021.

## 2020-09-02 NOTE — PATIENT INSTRUCTIONS
"Good to talk with you today!  Keep up the great work reaching out to friends and family and staying connected!    Don't forget to call back to schedule follow up with me in the coming weeks.  I know my schedule is getting pretty full for September, so if you have difficulty finding a timely appointment, let me know and I'll reach out to you to troubleshoot.  You can also schedule your complete physical with Dr. Warner when you call.    Here's a few reminders of some of the things we talked about today:    Think about scheduling a \"Sunday Meeting\" with yourself to review your priorities for the week, realistically assess how much time each task will take and find a time to actually put it on your calendar.  Accept that you won't have time to do everything you would like and make peace with that.  Savor the things you can accomplish and give yourself a pat on the back every now and then!    I did a brief online search for organizational workbooks and found a couple of ideas, but haven't read them myself so can't make a strong recommendation.  I actually think you might do best checking out a few from the library and browsing through them to see if any are a good fit for you before investing in purchasing one.  Here are a few I found that were recommended by others:    \"The Personal Organizing Workbook\" by Alem Amaro  \"The 7 Habits of Highly Effective People:  Powerful Lessons in Personal Change\" by Ramiro Moyer  \"Eat that Frog!  21 great ways to stop procrastinating and get more done in less time\" by Raheel Hsu  \"The 10 Natural Laws of successful time and life management\" by Tony Malik  \"the Willpower Instinct: How self-control works, why it matters, and what you can do to get more of it\" by Tatiana Bradshaw (I am familiar with this author and like some of her other work but have not read this particular book)  \"The Checklist Manifesto\" by Syed Carvajal (probably one of my favorite medical writers, but have also " not read this book)

## 2020-10-07 ENCOUNTER — VIRTUAL VISIT (OUTPATIENT)
Dept: PSYCHOLOGY | Facility: CLINIC | Age: 40
End: 2020-10-07
Payer: COMMERCIAL

## 2020-10-07 DIAGNOSIS — F41.1 GENERALIZED ANXIETY DISORDER: Primary | ICD-10-CM

## 2020-10-07 PROCEDURE — 90837 PSYTX W PT 60 MINUTES: CPT | Mod: GT | Performed by: PSYCHOLOGIST

## 2020-10-07 NOTE — PATIENT INSTRUCTIONS
Good talking with you today Jeanie!    Remember, healthy anxiety is there to alert us to danger so we can take steps to protect ourselves.  Healthy anxiety guides us towards action steps.  Less healthy anxiety causes us to ruminate about things beyond our control or makes problems bigger than they need to be.  It's import to evaluate our anxiety so we know which type we are dealing with.  When we identify less healthy anxiety, we want to work towards de-escalation strategies (healthier/more well balanced thoughts, calm our bodies with deep breathing or exercise, distract ourselves with pleasant or soothing activity, etc.).    Remember, your greatest strength is almost always your greatest weakness.  You have a great problem finding/problem solving brain.  This can trip you up at times.  Put your problem solving brain to good use by focusing it on your anxiety at times (rather than external situations and problems) and remembering that you have lots of ways to decrease your anxiety.    Today we identified a few things that you could be doing that would help you feel better:    1.  Take a few concrete steps to get ready to apply for a job should a job become available that interests you.  In the next two weeks, go through your hard drive and clean up past projects so you can decide which clips to use for a future job application.  Think about how to best fit this into your schedule given other demands on your time (evenings vs. Weekend).    2.  Make at least one plan that you can look forward to.  Reach out to friends to set up time to get together.  Consider one or more of the following activities:    [place] [employer]  [place]  [place]  [place]  [facility] [place]    I look forward to talking with you next time!

## 2020-10-07 NOTE — PROGRESS NOTES
"Telemedicine Visit: The patient's condition can be safely assessed and treated via synchronous audio and visual telemedicine encounter.      Reason for Telemedicine Visit: COVID-19 related restrictions    Originating Site (Patient Location): Patient's home    Distant Site (Provider Location): [facility]: [facility]    Consent:  The patient/guardian has verbally consented to: the potential risks and benefits of telemedicine (video visit) versus in person care; bill my insurance or make self-payment for services provided; and responsibility for payment of non-covered services.     Mode of Communication:  Video Conference via JANZZ initially, but switched to FitStar when this was not successful.    As the provider I attest to compliance with applicable laws and regulations related to telemedicine.    Start of call: 9:02am  End of call: 10:02am    Emergency contact: Mago Kirkpatrick [phone]  Closest Emergency Room: [facility] Progress Note    Client Name: Jeanie French    Service Type:  Individual  Length of Visit: 60 minutes  Attendees: Jeanie attended alone    Identifying Information and Presenting Problem:    The patient is a 40 year old American female who is being seen for problematic symptoms of anxiety.      Treatment Objective(s) Addressed in This Session:  Decrease anxiety via learning adaptive coping mechanisms.  Increase confidence in managing life stressors.    Progress on / Status of Treatment Objective(s) / Homework:  Maintaining stability  Gaining insight    PHQ-9 SCORE 12/18/2019 1/8/2020 7/22/2020   PHQ-9 Total Score - - -   PHQ-9 Total Score 8 10 13       JAMAR-7 SCORE 12/18/2019 1/8/2020 7/22/2020   Total Score - - -   Total Score 11 12 12       Topics Discussed/Interventions Provided:    Anxiety update:  Feeling \"very stressed out.\"  Not one specific stressor - multiple contributors but pandemic fatigue is a significant contributor.  \"I can't go " "on like this.\" \"I\"m so lazy.\"  I just don't want to do anything.  Kept up with laundry and dishes but not other cleaning.  Distressed that she has nothing to look forward to.  Spent time exploring this thought, providing psychoeducation on anxiety management and developing coping plan (see pt instructions)    Social update:  Reports vacation didn't go very well.  Had been hoping to see current partner but he couldn't take time off due to crisis with his son.  Did see her parents on Labor Day weekend and another night with her mom.  These were her only human contacts during that week.  Not motivated to do anything.  Didn't feel refreshed at all which was disappointing.    Problems within partner's family continues to escalate, but this led to developments that may actually move relationship forward, so Jeanie has mixed feelings about the situation.  Will wait and see.  Struggling to find time to be together and how to engage in meaningful/enjoyable activity.  Discussed strategies for this.     Has fallen away from reaching out to friends but agrees this would be a good idea to resume.  One friend moved further away so not possible to get together any more.  Discussed options for virtual connection.  Continue to encourage social connection outside of partner and family.  Jeanie agrees this is a good idea.    Family:  Mom has been getting chemo.  Will have lumpectomy when chemo is done and then radiation.  Radiation was a bit of a surprise.  Treatment will be longer than anyone thought which is disappointing.  Mom is having a hard time with concentration and memory.  Discussed this as likely side effect as chemo and Jeanie agrees.  Discussed option of family member joining her for visits (including via video call) and Jeanie is interested in looking into that.  We also discussed challenges associated with how family might celebrate the holidays.  Provided empathy and began discussion of various strategies for this (e.g., short " "outdoor gathering, virtual connection, etc.)    Work:  \"Getting worse.\"  Always feels behind/overworked.  Work feels unfairly distributed.  Tries to set limits when she can.  Hard to let go of what is beyond her control.  Hard not to feel failed projects are personal due to increased risk of job loss if company doesn't succeed.  Discussed options for coping (e.g., limit setting, action steps to address concerns, etc.).  Could look for other jobs, but these are hard to find right now.  It's also intimidating to think about starting a new job.  Could think about freelancing, but didn't really love that before.  Not right now.  Discussed other action steps she could take to decrease anxiety such as building up a cushion in her savings.  Does save $100/pay period and could increase this a bit.  Could increase contribution to [employer] as well.    Coping:  Discussed healthy/less healthy roles of anxiety.  Encouraged self-reflection and assessment of nature/value of anxiety.  Healthy anxiety guides us towards action steps.  Less healthy anxiety leads us towards de-escalation strategies. Jeanie agreed it was helpful to frame anxiety in this manner and was able to identify some action steps based on our conversation today. (e.g., Ask for greater clarity of role at work/boundaries.  Share concerns.  Then let it go.).  Jeanie reflects that co-worker, Thanh, encourages her not to be so stressed about it and let some things go.  She agrees this is good advice and she will try to do that.    Assessment: Jeanie appeared to be active and engaged in today's session and was receptive to feedback.     Mental Status: Jeanie French appeared generally alert and oriented. Dress was casual and appropriate to the weather and occasion. Grooming and hygiene were good. Eye contact was good. Speech was of normal volume and rate and was clear, coherent, and relevant. Mood was initially anxious and distressed, but she was open to feedback and appeared " less and distressed by the end of our visit today.  Thought processes were relevant, logical and goal-directed. Thought content was WNL with no evidence of psychotic or paranoid features. No evidence of SI/HI or self-harm, intent, or plans. Memory appeared grossly intact. Insight and judgment appeared adequate and patient exhibited good impulse control during the appointment.     Does the patient appear to be at imminent risk of harm to self/others at this time? No    The session was necessary to address symptoms of anxiety and interpersonal distress that have been interfering with patient's ability to function in her life.  Ongoing psychotherapy is necessary to enhance coping skills as well as provide support and structure for problem solving.    Diagnosis (DSM-5):    Generalized Anxiety Disorder    Plan:  1. Follow up with this provider on 11/11/2020.  Will plan to update treatment plan at that time.  2. See pt instructions for homework and follow up from today.  Can access via Addictive.  3. Follow up with Dr. Warner on 10/9/2020.    Jody Rankin, PhD, LP    Next treatment plan update due 10/22/2020, Diagnostic Update due 1/8/2021.

## 2020-10-08 DIAGNOSIS — D50.0 IRON DEFICIENCY ANEMIA DUE TO CHRONIC BLOOD LOSS: Primary | ICD-10-CM

## 2020-10-09 ENCOUNTER — OFFICE VISIT (OUTPATIENT)
Dept: FAMILY MEDICINE | Facility: CLINIC | Age: 40
End: 2020-10-09
Payer: COMMERCIAL

## 2020-10-09 VITALS
TEMPERATURE: 98.5 F | RESPIRATION RATE: 16 BRPM | BODY MASS INDEX: 26.46 KG/M2 | SYSTOLIC BLOOD PRESSURE: 120 MMHG | DIASTOLIC BLOOD PRESSURE: 77 MMHG | HEART RATE: 71 BPM | OXYGEN SATURATION: 96 % | WEIGHT: 181.8 LBS

## 2020-10-09 DIAGNOSIS — D50.0 IRON DEFICIENCY ANEMIA DUE TO CHRONIC BLOOD LOSS: ICD-10-CM

## 2020-10-09 DIAGNOSIS — F41.1 GENERALIZED ANXIETY DISORDER: ICD-10-CM

## 2020-10-09 DIAGNOSIS — K58.0 IRRITABLE BOWEL SYNDROME WITH DIARRHEA: Primary | ICD-10-CM

## 2020-10-09 DIAGNOSIS — R06.2 WHEEZING: ICD-10-CM

## 2020-10-09 DIAGNOSIS — N92.0 MENORRHAGIA WITH REGULAR CYCLE: ICD-10-CM

## 2020-10-09 LAB
FERRITIN SERPL-MCNC: 14 NG/ML (ref 10–130)
HEMOGLOBIN: 13 G/DL (ref 11.7–15.7)

## 2020-10-09 PROCEDURE — 36415 COLL VENOUS BLD VENIPUNCTURE: CPT | Performed by: STUDENT IN AN ORGANIZED HEALTH CARE EDUCATION/TRAINING PROGRAM

## 2020-10-09 PROCEDURE — 85018 HEMOGLOBIN: CPT | Performed by: STUDENT IN AN ORGANIZED HEALTH CARE EDUCATION/TRAINING PROGRAM

## 2020-10-09 PROCEDURE — 90732 PPSV23 VACC 2 YRS+ SUBQ/IM: CPT | Performed by: STUDENT IN AN ORGANIZED HEALTH CARE EDUCATION/TRAINING PROGRAM

## 2020-10-09 PROCEDURE — 99214 OFFICE O/P EST MOD 30 MIN: CPT | Mod: 25 | Performed by: STUDENT IN AN ORGANIZED HEALTH CARE EDUCATION/TRAINING PROGRAM

## 2020-10-09 PROCEDURE — 90471 IMMUNIZATION ADMIN: CPT | Performed by: STUDENT IN AN ORGANIZED HEALTH CARE EDUCATION/TRAINING PROGRAM

## 2020-10-09 RX ORDER — HYDROXYZINE PAMOATE 25 MG/1
CAPSULE ORAL
Qty: 180 CAPSULE | Refills: 5 | Status: SHIPPED | OUTPATIENT
Start: 2020-10-09 | End: 2021-04-20

## 2020-10-09 RX ORDER — BUSPIRONE HYDROCHLORIDE 30 MG/1
TABLET ORAL
Qty: 180 TABLET | Refills: 1 | Status: SHIPPED | OUTPATIENT
Start: 2020-10-09 | End: 2021-04-20

## 2020-10-09 ASSESSMENT — ASTHMA QUESTIONNAIRES
QUESTION_3 LAST FOUR WEEKS HOW OFTEN DID YOUR ASTHMA SYMPTOMS (WHEEZING, COUGHING, SHORTNESS OF BREATH, CHEST TIGHTNESS OR PAIN) WAKE YOU UP AT NIGHT OR EARLIER THAN USUAL IN THE MORNING: NOT AT ALL
ACT_TOTALSCORE: 24
QUESTION_2 LAST FOUR WEEKS HOW OFTEN HAVE YOU HAD SHORTNESS OF BREATH: ONCE OR TWICE A WEEK
QUESTION_5 LAST FOUR WEEKS HOW WOULD YOU RATE YOUR ASTHMA CONTROL: COMPLETELY CONTROLLED
QUESTION_1 LAST FOUR WEEKS HOW MUCH OF THE TIME DID YOUR ASTHMA KEEP YOU FROM GETTING AS MUCH DONE AT WORK, SCHOOL OR AT HOME: NONE OF THE TIME
QUESTION_4 LAST FOUR WEEKS HOW OFTEN HAVE YOU USED YOUR RESCUE INHALER OR NEBULIZER MEDICATION (SUCH AS ALBUTEROL): NOT AT ALL

## 2020-10-09 NOTE — PATIENT INSTRUCTIONS
Refill meds:      STOP the iron!    Increase the Hydroxyzine to 25mg 3x per day then 50mg at night.    Keep Buspar the same.     Working on prior authorization for the Eluxadoline.    Consider birthcontrol to help with the periods in the future.    Let us know if the little nodule on your finger is causing more pain and frustration and we can get you in to see someone.

## 2020-10-09 NOTE — RESULT ENCOUNTER NOTE
Discussed results with patient in clinic.  Hemoglobin much improved today.  Still awaiting ferritin level.      Anayeli Warner MD

## 2020-10-09 NOTE — LETTER
October 14, 2020      Jeanie French  [address] [place] [address]        Dear ,    We are writing to inform you of your test results.    Your ferritin is still low.  It's improved, but ideally we would want the level above 50.  Take a little break from the iron for a while, but then we should probably restart it again. Please call the clinic at [phone] if you have any questions.       Resulted Orders   Ferritin ([facility])   Result Value Ref Range    Ferritin 14 10 - 130 ng/mL    Narrative    Test performed by:  [facility]  [address] [place] [address]   Hemoglobin (HGB) ([facility])   Result Value Ref Range    Hemoglobin 13.0 11.7 - 15.7 g/dL       If you have any questions or concerns, please call the clinic at the number listed above.       Sincerely,        Anayeli Warner MD

## 2020-10-09 NOTE — RESULT ENCOUNTER NOTE
Jeanie-    Your ferritin is still low.  It's improved, but ideally we would want the level above 50.  Take a little break from the iron for a while, but then we should probably restart it again. Please call the clinic at [phone] if you have any questions.      Anayeli Warner MD    Please send results to patient.

## 2020-10-09 NOTE — PROGRESS NOTES
There are no exam notes on file for this visit.    SUBJECTIVE  Jeanie French is a 40 year old female with past medical history significant for    Patient Active Problem List   Diagnosis     Cancer of tongue (H)     Health Care Home     Generalized anxiety disorder     Tenosynovitis of the right index finger     Wheezing     Irritable bowel syndrome (IBS)     RUQ abdominal mass     Exercise-induced asthma     Sinusitis, chronic     Allergic rhinitis, unspecified allergic rhinitis type     Hypovitaminosis D     Adjustment disorder with anxiety     Menorrhagia with regular cycle     Others present at the visit:  None    Presents for   Chief Complaint   Patient presents with     Follow Up     Pt is here to follow up on medications.     Imm/Inj     Pt is UTD on Flu but will have her PPSV-23 today.     Medication Reconciliation     Complete.      Patient presents for follow-up on multiple issues.    Has noticed that her restless leg type symptoms have improved.  Has been taking the iron every other day.  Does notice that it makes her IBS symptoms worse and increase her diarrhea.  She has increased cramping and pain.    Continues to have heavy menses.  Last period was quite heavy, with cramping that was severe and lasted for 2 days, and heavy bleeding for the first 3 days.  She used 3 super plus tampons per day and even bled through overnight.  She is been told to take ibuprofen for periods in the past, but finds that it causes her indigestion so does not like the side effects of this.  We did discuss that oral contraception or other hormone treatments might be helpful for her heavy periods, and she has been on birth control before.  Did not have side effects from it.  Also discussed apple, IUD, Nexplanon could be options.  She is still wanting to have children someday, so further treatments like an ablation would not be an option.  She does endorse a history of blood clots in her sister, but this sounds like it was  external after a vein procedure.    Continues have significant anxiety.  Things are hard at work.  Things are hard at home.  Dealing with [place] is hard.  She has been taking hydroxyzine 3 times a day every day, as well as the BuSpar 30 mg twice daily, and still feels pretty anxious all the time.  She does feel more tired and sleepy during the day.  Is sleeping well at night.  Feels like she needs more support.  We agreed to increase the hydroxyzine to 25 mg 3 times a day and 50 mg at night to see if this helps.    Continues to have irritable bowel symptoms.  The Bentyl did not seem to help much at all, but the iron was also making things worse at the same time.  She has diarrhea, with episodes 3-4 times a day of loose stools.  Some rushing to the bathroom, but mostly it is just that they are very loose.  No nausea, no bleeding, no real abdominal pain.  She did notice significant dry mouth with taking the Bentyl.    She is also noticed a small nodule on the ring finger of her left hand.  Has had this before.  Went away on its own.  Is painful, when she hits it, and with grabbing things.  Wonders how invasive the procedure might be to have this removed.      OBJECTIVE:  Vitals: /77 (BP Location: Left arm, Patient Position: Sitting, Cuff Size: Adult Regular)   Pulse 71   Temp 98.5  F (36.9  C) (Oral)   Resp 16   Wt 82.5 kg (181 lb 12.8 oz)   SpO2 96%   BMI 26.46 kg/m    BMI= Body mass index is 26.46 kg/m .  Objective:    Vitals:  Vitals are reviewed and are within the normal range  Gen:  Alert, pleasant, no acute distress, appears moderately anxious.  Psych: Mood and affect are blunted, she does appear anxious and worried today.  Meeting with her therapist regularly and finds this helpful.      PHQ 12/18/2019 1/8/2020 7/22/2020   PHQ-9 Total Score 8 10 13   Q9: Thoughts of better off dead/self-harm past 2 weeks Not at all Not at all Not at all       Results for orders placed or performed in visit on  10/09/20   Hemoglobin (HGB) ([facility])     Status: None   Result Value Ref Range    Hemoglobin 13.0 11.7 - 15.7 g/dL           ASSESSMENT AND PLAN:      Jeanie was seen today for follow up, imm/inj and medication reconciliation.    Diagnoses and all orders for this visit:    Irritable bowel syndrome with diarrhea.  Still having significant symptoms.  We will stop the Bentyl.  Discussed options including cholestyramine, alosetron, and rifaximin.  She elects to go with the the [facility] he, and we will see if this helps.  It does require prior Auth, but I am willing to fill out this paperwork as needed.  -     eluxadoline (VIBERZI) 75 MG tablet; Take 1 tablet (75 mg) by mouth 2 times daily (with meals)    Iron deficiency anemia due to chronic blood loss.  Hemoglobin improving.  Waiting on ferritin.  Okay to stop iron for a while.  Discussed that she will probably need to continue this on and off for many years given the heavy menses she experiences.  -     Ferritin ([facility])  -     Hemoglobin (HGB) ([facility])    Generalized anxiety disorder.  We will increase her hydroxyzine dose at night to help with anxiety.  -     busPIRone HCl (BUSPAR) 30 MG tablet; TAKE 1 TABLET BY MOUTH TWICE A DAY  -     hydrOXYzine (VISTARIL) 25 MG capsule; Take 25mg TID and 50-100mg daily at night.  Qty for 1 month.    Wheezing.  Pneumonia shot given.  ACT was 24.  -     Pneumococcal vaccine 23 valent PPSV23  (Pneumovax) [66601]    Menorrhagia with regular cycle.  Discussed options for heavy cycles.  She would like to consider them going forward, but may be interested in birth control in future.        Patient Instructions   Refill meds:      STOP the iron!    Increase the Hydroxyzine to 25mg 3x per day then 50mg at night.    Keep Buspar the same.     Working on prior authorization for the Eluxadoline.    Consider birthcontrol to help with the periods in the future.    Let us know if the little nodule on your finger is causing more pain and  frustration and we can get you in to see someone.        She will follow-up in 6 weeks.    Anayeli Warner MD

## 2020-10-10 ASSESSMENT — ASTHMA QUESTIONNAIRES: ACT_TOTALSCORE: 24

## 2020-10-13 ENCOUNTER — TELEPHONE (OUTPATIENT)
Dept: FAMILY MEDICINE | Facility: CLINIC | Age: 40
End: 2020-10-13

## 2020-10-13 DIAGNOSIS — K58.0 IRRITABLE BOWEL SYNDROME WITH DIARRHEA: ICD-10-CM

## 2020-10-13 NOTE — TELEPHONE ENCOUNTER
Prior Authorization Retail Medication Request    Medication/Dose: Viberzi 75 mg Tablet  ICD code (if different than what is on RX):  K58.0  Previously Tried and Failed:  Bentyl, Loperimide, Stool softeners, Miralax  Rationale:  Pt has tried and failed several other medications.     Insurance Name:  [employer] OPEN ACCESS  Insurance ID:  [id number]

## 2020-10-14 NOTE — TELEPHONE ENCOUNTER
PA Initiation    Medication: Viberzi 75 MG Tablets  Insurance Company: [employer] - Phone [phone] Fax [phone]  Pharmacy Filling the Rx: [id number] [place], [place] - [address] [place] [address]  Filling Pharmacy Phone: [phone]  Filling Pharmacy Fax: [phone]  Start Date: 10/14/2020

## 2020-10-16 NOTE — TELEPHONE ENCOUNTER
PRIOR AUTHORIZATION DENIED    Medication: Viberzi 75 MG Tablets-DENIED    Denial Date: 10/15/2020    Denial Rational: Patient must have a history of trial & failure to the formulary alternative(s) or have a contraindication or intolerance to the formulary alternatives: amitriptyline          Appeal Information:

## 2020-10-19 RX ORDER — DICYCLOMINE HCL 20 MG
20 TABLET ORAL EVERY 6 HOURS
Qty: 1 TABLET | Refills: 0 | OUTPATIENT
Start: 2020-10-19

## 2020-10-19 RX ORDER — DICYCLOMINE HCL 20 MG
TABLET ORAL
COMMUNITY
Start: 2020-10-05 | End: 2020-12-11

## 2020-10-19 NOTE — TELEPHONE ENCOUNTER
Pt's insurance or pharmacy is requesting a 90 day supply for dicyclomine (BENTYL) 20 MG tablet.  Please send a new prescription to reflect this request.  Lupe Mcnamara, [facility]

## 2020-10-22 ENCOUNTER — MYC MEDICAL ADVICE (OUTPATIENT)
Dept: FAMILY MEDICINE | Facility: CLINIC | Age: 40
End: 2020-10-22

## 2020-10-22 DIAGNOSIS — K58.0 IRRITABLE BOWEL SYNDROME WITH DIARRHEA: Primary | ICD-10-CM

## 2020-10-23 RX ORDER — CHOLESTYRAMINE 4 G/9G
1 POWDER, FOR SUSPENSION ORAL 2 TIMES DAILY WITH MEALS
Qty: 60 PACKET | Refills: 1 | Status: SHIPPED | OUTPATIENT
Start: 2020-10-23 | End: 2020-11-16

## 2020-10-23 NOTE — TELEPHONE ENCOUNTER
Viberzi not covered.  Alternative of Cholestyramine sent.  Consider trying tricyclic per prior authorization team as trial before prescribing Viberzi in the future.     Anayeli Warner MD

## 2020-11-11 ENCOUNTER — VIRTUAL VISIT (OUTPATIENT)
Dept: PSYCHOLOGY | Facility: CLINIC | Age: 40
End: 2020-11-11
Payer: COMMERCIAL

## 2020-11-11 DIAGNOSIS — F41.1 GENERALIZED ANXIETY DISORDER: Primary | ICD-10-CM

## 2020-11-11 PROCEDURE — 90837 PSYTX W PT 60 MINUTES: CPT | Mod: GT | Performed by: PSYCHOLOGIST

## 2020-11-11 NOTE — PROGRESS NOTES
Telemedicine Visit: The patient's condition can be safely assessed and treated via synchronous audio and visual telemedicine encounter.      Reason for Telemedicine Visit: COVID-19 related restrictions    Originating Site (Patient Location): Patient's home    Distant Site (Provider Location): [facility]: [facility]    Consent:  The patient/guardian has verbally consented to: the potential risks and benefits of telemedicine (video visit) versus in person care; bill my insurance or make self-payment for services provided; and responsibility for payment of non-covered services.     Mode of Communication:  Video Conference via [employer]    As the provider I attest to compliance with applicable laws and regulations related to telemedicine.    Start of call: 9:03am  End of call: 10:02am    Emergency contact: Mago Kirkpatrick [phone]  Closest Emergency Room: [facility] Progress Note    Client Name: Jeanie French    Service Type:  Individual  Length of Visit: 59 minutes  Attendees: Jeanie and Dr. Argenis Del Castillo, Family Medicine Resident on rotation with Jeanie's permission    Identifying Information and Presenting Problem:    The patient is a 40 year old American female who is being seen for problematic symptoms of anxiety.      Treatment Objective(s) Addressed in This Session:  Decrease anxiety via learning adaptive coping mechanisms.  Increase confidence in managing life stressors.    Progress on / Status of Treatment Objective(s) / Homework:  Maintaining stability  Gaining insight    PHQ-9 SCORE 12/18/2019 1/8/2020 7/22/2020   PHQ-9 Total Score - - -   PHQ-9 Total Score 8 10 13       JAMAR-7 SCORE 12/18/2019 1/8/2020 7/22/2020   Total Score - - -   Total Score 11 12 12       Topics Discussed/Interventions Provided:    Relationship:  Jeanie reflected on some improvements in the relationship but also continued dissatisfaction.  While partner has made strides in relationship,  there are still logistical barriers to level of intimacy she would like with him.  While Jeanie understands this at some level, this also leaves her feeling isolated and alone at times. Provided empathy and problem solving around this situation including encouragement to attend to other relationships in her life right now.    Friendships:  Did not follow up on assignment at our last visit to reach out to friends.  Explored barriers including self-doubt and the tendency to assume that others are too busy or don't want to spend time with her.  Explored and gently challenged this thinking.  Discussed pros and cons of this pattern.  Reflected that this provides some protection from hurt/rejection but also contributes to sense of loneliness/isolation.  Jeanie reflects that connection to friends is important to her and is willing to set a goal around this today.    Work:  Workload continues to be excessive and is unhappy with decision making of leadership.  Was successful with employment related goals set at our last visit and has submitted application for a new job.  Felt good about this and feels prepared for further job applications even if this one does not turn out as hoped.  Congratulated Jeanie on this success and briefly discussed self-care/setting limits with work related stress/thoughts when not working.    Family/Holidays:  Mom will have last session of chemotherapy tomorrow.  Will start radiation treatment in January/February.  Parents are moving to new unit in their building this weekend and family is coming to help them.  Jeanie is understandably worried about COVID risk and we did spend some time discussing how to mitigate this.  Also discussed uncertainty around the Holidays this year and how Jeanie might approach this in a way to support her well-being.    Health:  Has been having headaches and going to see a chiropractor for this.  Believes headaches may be related to election stress.  In addition, has been having  some increased IBS symptoms.  Discussed the impact of stress on the gut.  Discussed value of practices known to mitigate stress (e.g., deep breathing, meditation, yoga, etc.).  Jeanie is already taking some steps to improve health.  Joined [employer] and has lost 5-6 lbs (had put on weight with pandemic) and feels good about this.  Identified strategies to continue to build on that positive first step.    Assessment: Jeanie appeared to be active and engaged in today's session and was receptive to feedback.     Mental Status: Jeanie French appeared generally alert and oriented. Dress was casual and appropriate to the weather and occasion. Grooming and hygiene were good. Eye contact was good. Speech was of normal volume and rate and was clear, coherent, and relevant. Mood was initially anxious and distressed, but this appeared to diminish over the course of our visit today.  Thought processes were relevant, logical and goal-directed. Thought content was WNL with no evidence of psychotic or paranoid features. No evidence of SI/HI or self-harm, intent, or plans. Memory appeared grossly intact. Insight and judgment appeared intact and patient exhibited good impulse control during the appointment.     Does the patient appear to be at imminent risk of harm to self/others at this time? No    The session was necessary to address symptoms of anxiety and interpersonal distress that have been interfering with patient's ability to function in her life.  Ongoing psychotherapy is necessary to enhance coping skills as well as provide support and structure for problem solving.    Diagnosis (DSM-5):    Generalized Anxiety Disorder    Plan:  1. Follow up with this provider on 12/7/2020.    2. See pt instructions for treatment plan update, homework and follow up from today.  Jeanie can access this via [employer].    Jody Rankin, PhD, LP    Next treatment plan update due 2/11/2021, Diagnostic Update due 1/8/2021.

## 2020-11-11 NOTE — PATIENT INSTRUCTIONS
Great to talk with you today Jeanei!  See below for some of the action steps we discussed during our visit.  I'll look forward to continuing the conversation with you in December!    1.  Reach out to Angelita about making plans.  Remember to be careful about personalizing it if she doesn't have time to get together right now.  You may need to be flexible with how this looks right now.  Be suspicious of thoughts that reflect self doubt and fully evaluate them before you decide they are true.    2.  Talk with your mom about strategies to mitigate COVID risk during move (e.g., masks, 6 feet distance, opening the windows, limiting the number of people in the space at any one time, etc.).    3.  Reflect on what opportunities Thanksgiving or [place] at home may present.  How could you use this time to relax and recharge?  What would be a fun, relaxing or enjoyable way to spend your time?  Some of the ideas we discussed today including arts and crafts projects, movies, and reading.    4.  Think about how to reduce your stress response.  Consider yoga or connecting virtually with the meditation center.    We ran out of time to update your treatment plan today, but I think our goals are largely the same.  See below and let me know if you want to make any changes next time we meet.  We can also update our measures of depression/anxiety at a future visit.  Let me know if you have any questions!    Treatment Plan    Client's Name: Jeanie French  YOB: 1980    Today's Date: 11/11/2020   Date Diagnostic Update Due: 1/8/2021    DSM-V Diagnoses:   Generalized Anxiety Disorder    Psychosocial / Contextual Factors:   Rajeshs mental health concerns have been continuing to affect her ability to function in her life and have been causing clinically significant distress.  Stressors in her interpersonal and work life contribute to difficulty. The context of the current pandemic and recent social unrest are also sources of  stress.      Functioning:  Jeanie reports impairment in memory and concentration which are likely secondary to anxiety/stress.  Jeanie also notes that stress is negatively impacting life satisfaction and physical health (e.g., headaches, GI symptoms, etc.).    PC-PTSD: 0/4  CAGE AID: 0/4    PHQ-9 SCORE 12/18/2019 1/8/2020 7/22/2020   PHQ-9 Total Score - - -   PHQ-9 Total Score 8 10 13     JAMAR-7 SCORE 12/18/2019 1/8/2020 7/22/2020   Total Score - - -   Total Score 11 12 12     Collaboration:   Primary care physician, Dr. Anayeli Warner    Referral:  None at this time    Anticipated treatment duration: Unknown  Agreed upon meeting frequency: 1-2 visits per month    Long Term Treatment Goal(s) related to diagnosis / functional impairment(s)    Goal 1: Jeanie will report decreased anxiety and increased confidence in managing life stressors.    Steps we will take to achieve your goal:    Jeanie will continue to take medications as prescribed by Dr. Warner.  Jeanie will practice stress mitigation strategies such as deep breathing, yoga or meditation.  Continue to be aware of life choices to increase sense of agency in your life.  Communicate your needs clearly to others.  Attend psychotherapy as scheduled.    Intervention(s)  Therapist will provide support, psychoeducation and homework assignments as needed.    If you need additional support and care during times that your therapist or PCP are not available, here are some additional resources for you:    Crisis Lines:    [facility] Adult Crisis:  [phone]  [facility] Adult Crisis: [phone]    Crisis Text Line: Text [place] to 421671. Free support at your fingertips 24/7  People who text [place] to [phone] will be connected with a counselor. Crisis Text Line is available 24 hours a day, seven days a week.    You can also consider going to the [facility] at the following address.  Walk ins are welcome:    [address] [place] [address],  [place]   [phone] (for 24 hour crisis consultation)    Monday - Friday 8:00am - 7:00pm  Saturday:  11:00am - 3:00pm  Sunday and Holidays Closed    If you feel at risk of immediate harm, go directly to the Emergency Department.    Patient has reviewed and agreed to the above plan.    Jody Rankin, PhD, LP      November 11, 2020        ______________________________    ________  Patient Signature       Date    ______________________________    ________  Provider Signature       Date

## 2020-11-19 ENCOUNTER — MYC MEDICAL ADVICE (OUTPATIENT)
Dept: FAMILY MEDICINE | Facility: CLINIC | Age: 40
End: 2020-11-19

## 2020-11-19 DIAGNOSIS — K58.0 IRRITABLE BOWEL SYNDROME WITH DIARRHEA: Primary | ICD-10-CM

## 2020-11-20 RX ORDER — AMITRIPTYLINE HYDROCHLORIDE 10 MG/1
10 TABLET ORAL AT BEDTIME
Qty: 30 TABLET | Refills: 1 | Status: SHIPPED | OUTPATIENT
Start: 2020-11-20 | End: 2020-12-14

## 2020-11-20 NOTE — TELEPHONE ENCOUNTER
Hi-    I'm not sure if my first message went through or not.  I think we should try amitriptyline (a tricyclic) because ultimately you will need to try it before we can go to the Viberzi, which I think is the best option for you.  I've sent a prescription to the pharmacy for a 10mg pill.  Take it for 2 weeks, then increase to 20mg.  We should check in by phone in 1 month.  Please let me know if you develop side effects or if you have further questions.      Dr. Warner

## 2020-12-01 ENCOUNTER — TELEPHONE (OUTPATIENT)
Dept: FAMILY MEDICINE | Facility: CLINIC | Age: 40
End: 2020-12-01

## 2020-12-01 DIAGNOSIS — K58.0 IRRITABLE BOWEL SYNDROME WITH DIARRHEA: ICD-10-CM

## 2020-12-01 NOTE — TELEPHONE ENCOUNTER
Pt's insurance or pharmacy is requesting a 90 day supply for cholestyramine (QUESTRAN) 4 g packet.  Please send a new prescription to reflect this request.  Lupe Mcnamara, [facility]

## 2020-12-06 ENCOUNTER — HEALTH MAINTENANCE LETTER (OUTPATIENT)
Age: 40
End: 2020-12-06

## 2020-12-07 ENCOUNTER — VIRTUAL VISIT (OUTPATIENT)
Dept: PSYCHOLOGY | Facility: CLINIC | Age: 40
End: 2020-12-07
Payer: COMMERCIAL

## 2020-12-07 DIAGNOSIS — F41.1 GENERALIZED ANXIETY DISORDER: Primary | ICD-10-CM

## 2020-12-07 PROCEDURE — 90837 PSYTX W PT 60 MINUTES: CPT | Mod: GT | Performed by: PSYCHOLOGIST

## 2020-12-07 NOTE — TELEPHONE ENCOUNTER
Pt's insurance or pharmacy is requesting a 90 day supply for cholestyramine (QUESTRAN) 4 g packet.  Please send a new prescription to reflect this request.  Lupe Mcnamara, [facility]

## 2020-12-07 NOTE — PATIENT INSTRUCTIONS
Reach out to Angelita about a way to connect virtually.    Reach out to family about finding a way to connect virtually at [place].  Keep thinking about how you want to take care of yourself given that the Holidays are looking so different this year.  Remember that you are not alone in that.  We can keep talking about a self-care plan for the Holidays at our next visit.    Talk about timeline with your partner.  Respect your limits and strive to accept what is and is not in your control.    Remember that my January calendar is now open for scheduling.  It may be a good idea to reach out to the  to schedule visits in January if you will be wanting these.  
room air

## 2020-12-07 NOTE — PROGRESS NOTES
Telemedicine Visit: The patient's condition can be safely assessed and treated via synchronous audio and visual telemedicine encounter.      Reason for Telemedicine Visit: COVID-19 related restrictions    Originating Site (Patient Location): Patient's home    Distant Site (Provider Location): [facility]: [facility]    Consent:  The patient/guardian has verbally consented to: the potential risks and benefits of telemedicine (video visit) versus in person care; bill my insurance or make self-payment for services provided; and responsibility for payment of non-covered services.     Mode of Communication:  Video Conference via [employer]    As the provider I attest to compliance with applicable laws and regulations related to telemedicine.    Start of call: 9:00am  End of call: 10:02am    Emergency contact: Mago Kirkpatrick [phone]  Closest Emergency Room: [facility] Progress Note    Client Name: Jeanie French    Service Type:  Individual  Length of Visit: 62 minutes  Attendees: Jeanie was alone    Identifying Information and Presenting Problem:    The patient is a 40 year old American female who is being seen for problematic symptoms of anxiety.      Treatment Objective(s) Addressed in This Session:  Decrease anxiety via learning adaptive coping mechanisms.  Increase confidence in managing life stressors.    Progress on / Status of Treatment Objective(s) / Homework:  Maintaining stability  Gaining insight    PHQ-9 SCORE 12/18/2019 1/8/2020 7/22/2020   PHQ-9 Total Score - - -   PHQ-9 Total Score 8 10 13       JAMAR-7 SCORE 12/18/2019 1/8/2020 7/22/2020   Total Score - - -   Total Score 11 12 12       Topics Discussed/Interventions Provided:    Occupational goals:  Has a job interview later this morning.  Nervous about this but thinks she has a good shot at it.  New job would potentially meet goals she has set for herself regarding working for an organization with greater  "stability and advancement potential.  Congratulated her on this and wished her well with the interview.  Encouraged her to reflect on the strengths and experience that she would bring to this position.    Relationship goals:  Things have been going OK.  Still struggling with pace of development of relationship.  Was ready to walk away a few weeks ago.  Alternates between confidence in direction of relationship (\"I think we will get  some day.\") and uncertainty about the future of this relationship.  Has been in limbo for the past 18 months.  Partner's priorities and hers do not always line up.  Feels time pressure to resolve this and worries about her parent's health and timeline for sharing this relationship with them.  Was able to articulate this to him and this seemed to help him understand and start to make progress again which resolved acute crisis of confidence from a few weeks ago.  Discussed ways to use assertive communication with partner around her goals and wishes for relationship as well as elicitation of his perspective so she can better understand his priorities/concerns and barriers.    Follow up on Homework from last visit:    Reach out to Angelita about making plans: Did not complete.  Barriers included not being sure what she would propose right now due to COVID.  Did discuss option of setting up video chat/coffee.  Does think this would still be helpful and agrees to reach out on that.    Did email friend Melly (who had sent her info on a job listing).  Encouraged continued efforts to connect with friends so that she is not trying to meet all her relationship needs in relationship with partner.    Talk with your mom about strategies to mitigate COVID risk during move: Parents re-thought moving plans and reached out to family on this to pull back to decrease exposure.  Jeanie did communicate with family but did not participate in move day herself.  Felt good about this change.    Reflect on what " "opportunities Thanksgiving or [place] at home may present: Jeanie reports that she suggested setting up video chat for Gil.  Brother in law insisted on using Face Time when she preferred Zoom.  Deferred to him but this was not well organized so this never happened.  This was disappointing but \"Ok\" as Thanksshaylaving is not her favorite holiday.  Didn't really celebrate in any fashion.  The weekend was Ok as she was able to see her partner.  Was able to relax/watch movies.  Not very productive but Ok with this.  Reflected that this was progress for her as she often sets high expectations for herself with time off and can struggle with perfectionism and disappointment.  This was not the case this year.    Not planning anything with family on [place].  This is hardest on her as others in the family live with others.  She is the only one who lives alone.  No gift exchange this year as sister's family is having financial hardship.  \"That really sucks\" because she really likes Felicia.  May not put up tree as she worries this could make her feel worse.  Hopes they will connect over video.  Likes playing [employer] game/gift exchange with family.  Being together.  Food.  Reflected on pros/cons of not celebrating at all vs. Trying to find a way to salvage the pieces she likes and has some control over.  Another complicating factor is that Mom will be having surgery on 12/22 which adds to stress.  Provided empathy for this.  Discussed mindful self compassion and difference between Self compassion vs. Self-pity.    Encouraged developing coping plan for Holidays given these losses/stresses.  Still debating about days off.  Does need to take at least one day off.  Does plan to coordinate with her partner on this.  May still take 1-2 more days off.  We will be meeting just prior to Felicia and will spend additional time discussing plans at that visit.    Assessment: Jeanie appeared to be active and engaged in today's session " and was receptive to feedback.     Mental Status: Jeanie French appeared generally alert and oriented. Dress was casual and appropriate to the weather and occasion. Grooming and hygiene were good. Eye contact was good. Speech was of normal volume and rate and was clear, coherent, and relevant. Mood was mildly depressed/anxious but less so than at our last visit.  Thought processes were relevant, logical and goal-directed. Thought content was WNL with no evidence of psychotic or paranoid features. No evidence of SI/HI or self-harm, intent, or plans. Memory appeared grossly intact. Insight and judgment appeared intact and patient exhibited good impulse control during the appointment.     Does the patient appear to be at imminent risk of harm to self/others at this time? No    The session was necessary to address symptoms of anxiety and interpersonal distress that have been interfering with patient's ability to function in her life.  Ongoing psychotherapy is necessary to enhance coping skills as well as provide support and structure for problem solving.    Diagnosis (DSM-5):    Generalized Anxiety Disorder    Plan:  1. Follow up with this provider on 12/23/2020.  Did let Jeanie know that my January schedule was open for scheduling at this time if she wanted to schedule two visits that month.  2. See pt instructions for homework and follow up from today.  Jeanie can access this via [employer].    Jody Rankin, PhD, LP    Next treatment plan update due 2/11/2021, Diagnostic Update due 1/8/2021.

## 2020-12-22 ENCOUNTER — VIRTUAL VISIT (OUTPATIENT)
Dept: FAMILY MEDICINE | Facility: CLINIC | Age: 40
End: 2020-12-22
Payer: COMMERCIAL

## 2020-12-22 DIAGNOSIS — K58.0 IRRITABLE BOWEL SYNDROME WITH DIARRHEA: Primary | ICD-10-CM

## 2020-12-22 DIAGNOSIS — N92.0 MENORRHAGIA WITH REGULAR CYCLE: ICD-10-CM

## 2020-12-22 DIAGNOSIS — F41.1 GENERALIZED ANXIETY DISORDER: ICD-10-CM

## 2020-12-22 DIAGNOSIS — D50.0 IRON DEFICIENCY ANEMIA DUE TO CHRONIC BLOOD LOSS: ICD-10-CM

## 2020-12-22 PROCEDURE — 99214 OFFICE O/P EST MOD 30 MIN: CPT | Mod: GT | Performed by: STUDENT IN AN ORGANIZED HEALTH CARE EDUCATION/TRAINING PROGRAM

## 2020-12-22 NOTE — PATIENT INSTRUCTIONS
New medication for IBS 1 pill twice daily called Viberzi.  We are working on the prior Auth right now.    Hold on iron or birth control.  We will see how the new medication works before adding these into the mix.    Dr. Warner will connect with Dr. Rankin to discuss next options for medication and support for anxiety and depression.    12/29/20    MENTAL HEALTH REFERRAL    Mental Health referral routed to behavioral health team for recommendations. See Documentation Only encounter for more information.    Marcela Lewis

## 2020-12-22 NOTE — PROGRESS NOTES
"Family Medicine Video Visit Note  Jeanie is being evaluated via a billable video visit.               Video Visit Consent     Patient was verbally read the following and verbal consent was obtained.  \"Video visits are billed at different rates depending on your insurance coverage. During this emergency period, for some insurers they may be billed the same as an in-person visit.  Please reach out to your insurance provider with any questions.  If during the course of the call the physician/provider feels a video visit is not appropriate, you will not be charged for this service.\"     (Name person giving consent:  Patient   Date verbal consent given:  12/22/2020  Time verbal consent given:  3:36 PM)    Patient would like the video invitation sent by: Text to cell phone: [phone]    Chief Complaint   Patient presents with     Recheck Medication     Pt would like to discuss her medication that she was recently put on.      Medication Reconciliation     Complete.             HPI     Video Start Time: 4:25 PM  Video End Time:  4:50 PM    Patient seen today via video visit for discussion of IBS symptoms, anemia, dry mouth, and anxiety.    Patient started taking amitriptyline for treatment of IBS.  Initial dosage was 10 mg.  Her diarrhea got a little bit better and stools were less loose, but she had to stop taking the medicine a few days ago because she was experiencing extreme dry mouth.  This was especially bad at night.  Even drinking extra fluids did not seem to help.  She is also tried taking cholestyramine.  This was also not helpful.  Actually made her diarrhea real worse at times.  Was very difficult to take the packets and adapt them into her regular work schedule.  Try taking the Bentyl, and this also gave her some dry mouth, but more noticeably just did not seem to help with her symptoms.  We titrated up to 20 mg 4 times a day on that dose since she still did not have any improvement.  Given that patient has " now failed all 3 of these medications, will try moving forward with for Viberzi.    She is also had some difficulty with dental pain.  She has a crown on a tooth in her upper right mouth that gets sensitive when she has some sinus problems.  She developed some pretty significant pain in that over this weekend, but it has been getting better since stopping the amitriptyline.  Pain was horrible.  Some hand histamine seem to help.  Since stopping the amitriptyline it has improved.    We reviewed her anemia symptoms.  She is not having any restless leg symptoms but she is having some lower leg cramps.  Her periods remain quite heavy.  This is bothersome.  Does feel like her sleep is somewhat affected by the leg cramps.  Is considering birth control, just to help with the periods, especially because the iron supplementation makes her abdominal symptoms and her IBS worse.    Anxiety and depression have also both been worse.  The BuSpar still helps but not enough.  She has been taking the hydroxyzine and is taking 2 at night, but tends to forget to take the noon dose.  Does think it helps some when she was taking it, and is now setting an alarm on her phone to try to remember.  We reviewed some of the medication she is tried in the past for her anxiety, and she was on a small dose of Prozac, that may have helped some with her depression.  Also took Klonopin to help with her anxiety and this was helpful.  She titrated off of both of these medications as the depression improved and did the anxiety.  She is noticing increased irritability and panic.  Describes situation going to the grocery store where she was afraid she was going to forget her coupon so she held it in her hand whole time and then forgot to use it when she got to the checkout.  Worrying about a lot of things.  Having difficulty concentrating.  Had applied and interviewed for a new job.  She was the first runner up.  Still struggles with work because it is not  "a great place to be right now.  She does have a meeting with Dr. Rankin tomorrow.  We discussed possibly doing a PCT visit with Dr. Warren to look at other medication options for her anxiety, and she would be open to doing this if Dr. Rankin is in agreement.    Current Outpatient Medications   Medication Sig Dispense Refill     albuterol (PROAIR HFA/PROVENTIL HFA/VENTOLIN HFA) 108 (90 Base) MCG/ACT inhaler Inhale 2 puffs into the lungs every 6 hours as needed for shortness of breath / dyspnea or wheezing 1 Inhaler 1     busPIRone HCl (BUSPAR) 30 MG tablet TAKE 1 TABLET BY MOUTH TWICE A  tablet 1     eluxadoline (VIBERZI) 75 MG tablet Take 1 tablet (75 mg) by mouth 2 times daily (with meals) 60 tablet 1     ferrous gluconate (FERGON) 324 (38 Fe) MG tablet TAKE 1 TABLET (324 MG) BY MOUTH DAILY (WITH BREAKFAST) 60 tablet 1     hydrOXYzine (VISTARIL) 25 MG capsule Take 25mg TID and 50-100mg daily at night.  Qty for 1 month. 180 capsule 5     omeprazole (PRILOSEC) 40 MG DR capsule TAKE 1 CAPSULE BY MOUTH EVERY DAY 90 capsule 3     Allergies   Allergen Reactions     Nkda [No Known Drug Allergies]             Physical Exam:     There were no vitals taken for this visit.  Estimated body mass index is 26.46 kg/m  as calculated from the following:    Height as of 7/24/20: 1.765 m (5' 9.5\").    Weight as of 10/9/20: 82.5 kg (181 lb 12.8 oz).    GENERAL: Healthy, alert and no distress  EYES: Eyes grossly normal to inspection.  No discharge or erythema, or obvious scleral/conjunctival abnormalities.  RESP: No audible wheeze, cough, or visible cyanosis.  No visible retractions or increased work of breathing.    SKIN: Visible skin clear. No significant rash, abnormal pigmentation or lesions.  NEURO: Cranial nerves grossly intact.  Mentation and speech appropriate for age.  PSYCH: Mentation appears normal, affect normal/bright, judgement and insight intact, normal speech and appearance well-groomed.    No new results for " review          Assessment and Plan     Jeanie was seen today for recheck medication and medication reconciliation.  Multiple problems discussed today.    Diagnoses and all orders for this visit:    Irritable bowel syndrome with diarrhea.  We have tried multiple first-line agents including Bentyl, cholestyramine, and amitriptyline for IBS.  She has tried FODMAP diet, and exercise as well.  The diarrhea and bloating seem to be the worst symptom.  Symptoms did get worse with taking iron.  Will complete prior authorization for Viberzi.  Will route this note to Lupe so she is on the look out for that prior authorization.  -     eluxadoline (VIBERZI) 75 MG tablet; Take 1 tablet (75 mg) by mouth 2 times daily (with meals)    Iron deficiency anemia due to chronic blood loss.    Menorrhagia with regular cycle  She is currently not taking iron and not on birth control and still having heavy periods.  Has not yet resumed restless leg symptoms but does have some cramping.  Would like to give the new medication a chance to work without interaction with iron or a new birth control pill.  We will put a hold on this.  She is open to trying birth control in order to avoid having to take the oral iron supplements.    Generalized anxiety disorder.  Symptoms have been worsening.  She is doing BuSpar and hydroxyzine.  Prozac may have been somewhat helpful in the past, but she is struggled with other SSRIs.  Still seeing therapy regularly.  Has been on Klonopin in the past, but I am reluctant to restart this.  Next appropriate step may be a PCT consult with Dr. Warren.  We will discuss this with Dr. Rankin.  No change in medications today.  She will set an alarm to remember to take the hydroxyzine in the middle the day.    Refilled medications that would be required in the next 3 months.     After Visit Information:  Patient chose to view AVS via [employer]      No follow-ups on file.    Video-Visit Details    Type of service:  Video  Visit    Video End Time (time video stopped): 4:50 PM    Originating Location (pt. Location): Home    Distant Location (provider location):  [facility]     Platform used for Video Visit: Alvarado Warner MD

## 2020-12-22 NOTE — Clinical Note
YONI for visit tomorrow.  I'm not feeling great on next steps for anxiety meds, so maybe a visit with Dr. Warren?

## 2020-12-23 ENCOUNTER — VIRTUAL VISIT (OUTPATIENT)
Dept: PSYCHOLOGY | Facility: CLINIC | Age: 40
End: 2020-12-23
Payer: COMMERCIAL

## 2020-12-23 DIAGNOSIS — F41.1 GENERALIZED ANXIETY DISORDER: Primary | ICD-10-CM

## 2020-12-23 PROCEDURE — 90837 PSYTX W PT 60 MINUTES: CPT | Mod: GT | Performed by: PSYCHOLOGIST

## 2020-12-23 NOTE — Clinical Note
Jeanie decided to hold off on scheduling with Dr. Warren at this time.  We agreed to check back in on this when we meet in January.  Will keep you posted!  Jody

## 2020-12-23 NOTE — PATIENT INSTRUCTIONS
Just a reminder that Dr. Rankin will be out of the office from 12/24 - 1/3.  Remember that you can reach out to the clinic or other members of your care team should urgent matters arise while I am away.  While I don't think you will need these, I will provide some crisis resources below just in case urgent concerns should arise while the clinic is closed.    I hope you have very happy holidays however you decide to celebrate this year.  Look forward to talking more with you in the New Year!    Crisis Lines:    [facility] Adult [facility]:  [phone]   COVID-19 UPDATE (3/20/2020): Still open and taking calls 24/7, limiting some in-person visits if patient has symptoms of COVID-19. If phone support alone is not sufficient and patient has symptoms, they will call 911 or call ambulance to go to patient.    [facility] Adult Crisis:  [phone]  COVID-19 UPDATE (3/20/2020): Still open and taking calls 24/7, no face to face assessments; give recommendations by phone. If phone support alone is not sufficient, they will call 911 or call ambulance to go to patient.     Crisis Text Line: Text [place] to [phone]. Free support at your fingertips 24/7  People who text [place] to [phone] will be connected with a counselor. Crisis Text Line is available 24 hours a day, seven days a week.    You can also consider going to the [facility]gent [facility] at the following address.  Walk ins are welcome:    [address] [place] [address]   [phone] (for 24-hour crisis consultation)    Monday - Friday 8:00am - 7:00pm  Saturday:  11:00am - 3:00pm  Sunday and Holidays Closed    COVID-19 UPDATE (3/20/2020): Hours are: Monday - Friday 7:30am - 5:00 pm  Weekends and holidays closed    If you feel at risk of immediate harm, go directly to the Emergency Department.

## 2020-12-23 NOTE — PROGRESS NOTES
Telemedicine Visit: The patient's condition can be safely assessed and treated via synchronous audio and visual telemedicine encounter.      Reason for Telemedicine Visit: COVID-19 related restrictions    Originating Site (Patient Location): Patient's home    Distant Site (Provider Location): [facility]: [facility]    Consent:  The patient/guardian has verbally consented to: the potential risks and benefits of telemedicine (video visit) versus in person care; bill my insurance or make self-payment for services provided; and responsibility for payment of non-covered services.     Mode of Communication:  Video Conference via [employer]    As the provider I attest to compliance with applicable laws and regulations related to telemedicine.    Start of call: 2:33pm  End of call: 3:30pm    Emergency contact: Mago Kirkpatrick [phone]  Closest Emergency Room: [facility] Progress Note    Client Name: Jeanie French    Service Type:  Individual  Length of Visit: 57 minutes  Attendees: Jeanie was alone    Identifying Information and Presenting Problem:    The patient is a 40 year old American female who is being seen for problematic symptoms of anxiety.      Treatment Objective(s) Addressed in This Session:  Decrease anxiety via learning adaptive coping mechanisms.  Increase confidence in managing life stressors.    Progress on / Status of Treatment Objective(s) / Homework:  Maintaining stability  Gaining insight    PHQ-9 SCORE 12/18/2019 1/8/2020 7/22/2020   PHQ-9 Total Score - - -   PHQ-9 Total Score 8 10 13       JAMAR-7 SCORE 12/18/2019 1/8/2020 7/22/2020   Total Score - - -   Total Score 11 12 12       Topics Discussed/Interventions Provided:    Occupational goals:  Jeanie was disappointed to learn that she did not get the job she had recently interviewed for.  She was the first runner up which was some consolation, but it took some time to adjust to this news as she had  "already been anticipating being able to leave her current job.  She continues to worry about the long term stability of her job and worries another round of layoffs could be coming over the summer.  She will continue looking for other opportunities and hopes that her positive interview could lead to future opportunities at the company where she recently interviewed. Provided empathy and reflected that she appeared to have a positive outlook on the situation and a good plan in place.    Relationship goals:  Jeanie experiences continued frustration on the slow pace of her relationship.  Discussed recent struggles in the relationship where she felt her partner did not fully understand or appreciate her needs.  Jeanie has been quite open and assertive about this and perceives that he is slowly starting to work on goals that they have discussed and is gaining an increased appreciation of her perspective.  Congratulated her for assertiveness and clear communication on her needs.    Jeanie also reflects that she has yet to reach out to friends to set up increased social contact despite one of her friends (Melly) reaching out to her to do just this.  It is somewhat unclear to both of us why this may be the case.  We speculate that she tends to focus on tasks that \"need to be done\" vs. Those that she \"wants to do.\"  Reflected on the importance of social engagement and activities that \"fill her up\" rather than deplete her.  She agrees this is important and intends to reach back out to Melly to set something up.    Family:  Mom had surgery this week and this went well which is a relief.  Will not be gathering at the Holidays but will try to set up a virtual meeting for them.  Disappointed she can't yet introduce partner to family and looking forward to a time when this may be possible.    Anxiety:  Feels anxiety has been increased recently.   Increased irritability.  Poor sleep.  Discussed option of consult with Dr. Warren but Jeanie is " "somewhat ambivalent about this as she has not always felt that meds have been helpful.  Discussed the impact of recent situational stressors on anxiety and the possibility that things could start to improve after the holidays when some of these may find greater resolution.  Agreed that Jeanie would schedule follow up with me in January so that we could reassess.  If things are not improving by that time, will consider scheduling with Dr. Warren in late January or early February.    Sleep:  Jeanie notes a pattern of being a \"night owl\" and staying up late to get more personal time.  She realizes in the end that this is counterproductive and is thinking about making some changes to her sleep habits.  Jeanie states that she will start getting in bed earlier (11:30, then 11, then 10:30) to start bumping her sleep schedule in a more manageable direction.  Jeanie has good knowledge of healthy sleep habits but has not felt sufficiently motivated to make these changes until now.  Will check back in on this at our next visit.    Coping:  Looking into meditation options.  Not as compelling to do this virtually, but will keep looking for good options.  Plans to work on an exercise plan after the New Year.  Will check back in with her on these goals at our next visit.    Assessment: Jeanie appeared to be active and engaged in today's session and was receptive to feedback.     Mental Status: Jeanie French appeared generally alert and oriented. Dress was casual and appropriate to the weather and occasion. Grooming and hygiene were good. Eye contact was good. Speech was of normal volume and rate and was clear, coherent, and relevant. Mood was mildly depressed/anxious.  Thought processes were relevant, logical and goal-directed. Thought content was WNL with no evidence of psychotic or paranoid features. No evidence of SI/HI or self-harm, intent, or plans. Memory appeared grossly intact. Insight and judgment appeared intact and patient " exhibited good impulse control during the appointment.     Does the patient appear to be at imminent risk of harm to self/others at this time? No    The session was necessary to address symptoms of anxiety and interpersonal distress that have been interfering with patient's ability to function in her life.  Ongoing psychotherapy is necessary to enhance coping skills as well as provide support and structure for problem solving.    Diagnosis (DSM-5):    Generalized Anxiety Disorder    Plan:  1. There is no follow up scheduled with this provider at this time.  Jeanie did agree to call the  to schedule visits in January at the conclusion of our call.  /did remind her that I will be out on vacation after today until 1/4.  Will plan to do outreach with her when I return from vacation if this is not yet scheduled.  2. See pt instructions for homework and follow up from today.  Jeanie can access this via [employer].  3. Will follow up with Jeanie at our next visit about whether or not a visit with Dr. Warren seems warranted at that time.    Jody Rankin, PhD, LP    Next treatment plan update due 2/11/2021, Diagnostic Update due 1/8/2021.

## 2020-12-28 ENCOUNTER — TELEPHONE (OUTPATIENT)
Dept: FAMILY MEDICINE | Facility: CLINIC | Age: 40
End: 2020-12-28

## 2020-12-29 ENCOUNTER — DOCUMENTATION ONLY (OUTPATIENT)
Dept: PSYCHOLOGY | Facility: CLINIC | Age: 40
End: 2020-12-29

## 2020-12-29 NOTE — PROGRESS NOTES
"Review of Dr. Warner's order and note indicates that this is a referral for in-house psychiatry consult for Generalized Anxiety Disorder. Patient has had several unsuccessful medication trials for anxiety. Per order: Currently on Buspar and Hydroxyzine.  Has tried Prozac in the past--but in general has not tolerated SSRIs.  Lots of hx of medication side effects. PCP is interested in medication recommendations to continue managing anxiety in-house. Patient is currently engaging in psychotherapy with Dr. Rankin. Brief review of records indicated no SI/HI/self harm related concerns at this time. Review of Dr. Warren's schedule indicates that she has availability as early at 1/4/21. Order will be routed to referral coordinator to contact patient for scheduling with Dr. Warren.    PHQ 12/18/2019 1/8/2020 7/22/2020   PHQ-9 Total Score 8 10 13   Q9: Thoughts of better off dead/self-harm past 2 weeks Not at all Not at all Not at all     JAMAR-7 SCORE 12/18/2019 1/8/2020 7/22/2020   Total Score - - -   Total Score 11 12 12     If patient will be seen by in-house  provider, will ask our referral team to screen for telehealth barriers at time of follow up so that we can identify who may benefit from use of telehealth hub at [place].  Help for patients who will be scheduled in the community, but need telehealth support will be managed on a case by case basis.    Do you have access to a computer with a webcam or smartphone?   Do you have access to the internet?   Do you have a safe and private space for this conversation?     If the patient answers \"no\" to any of these questions, referral team will engage patient in conversation about whether patient would like to access services via [facility] telehealth hub. This would ensure access while mitigating risk by limiting time in face to face contact with provider (safter if under 15 minutes of exposure in close space, etc.).  If so, this will need to be coordinated and " documented on [place] calendar.    Let me know if you have questions or would like additional follow up from me.  Thanks!      Jody Rankin, Ph.D.,     Disclaimer  The above treatment recommendations are based on consultation with the patient's primary care provider and a review of relevant information in EPIC.? I have not personally examined the patient.? All recommendations should be implemented with considerations of the patient's relevant prior history and current clinical status.  Please contact me with any questions about the care of this patient.

## 2020-12-29 NOTE — PROGRESS NOTES
Behavioral Health Team,    Patient is being referred for mental health services by their provider, Dr. Warner.  Please advise if we are able to see patient for in house treatment or if a community option would be best.    Thank you,    Marcela Lewis  12/29/2020

## 2020-12-29 NOTE — TELEPHONE ENCOUNTER
Yes.  We would like to go forward with this prior authorization.     Anayeli Warner MD    Routed to FRANCE Andrade

## 2020-12-30 ENCOUNTER — TELEPHONE (OUTPATIENT)
Dept: FAMILY MEDICINE | Facility: CLINIC | Age: 40
End: 2020-12-30

## 2020-12-30 NOTE — TELEPHONE ENCOUNTER
Prior Authorization Retail Medication Request    Medication/Dose:  Viberzi 75 mg Tablet  ICD code (if different than what is on RX):  K 58.0 Irritable Bowel Syndrome with Diarrhea  Previously Tried and Failed:  Amitriptyline, Bentyl, Cholestyramine, Miralax, Loperamide  Rationale:  Pt has not had clinical improvement with any of the above treatments, or has had significant side effects to Bentyl and Amitriptyline.    Insurance Name:  [employer]  Insurance ID:  [id number]

## 2020-12-30 NOTE — TELEPHONE ENCOUNTER
Central Prior Authorization Team   Phone: [phone]      PA Initiation    Medication: Viberzi-Initiated  Insurance Company: [employer] - Phone [phone] Fax [phone]  Pharmacy Filling the Rx: [id number] [place] [address]  Filling Pharmacy Phone: [phone]  Filling Pharmacy Fax:    Start Date: 12/30/2020

## 2020-12-30 NOTE — TELEPHONE ENCOUNTER
Prior Authorization Approval    Authorization Effective Date: 11/30/2020  Authorization Expiration Date: 1/29/2021  Medication: Viberzi-APPROVED  Approved Dose/Quantity:   Reference #:     Insurance Company: [employer] - Phone [phone] Fax [phone]  Expected CoPay:       CoPay Card Available:      Foundation Assistance Needed:    Which Pharmacy is filling the prescription (Not needed for infusion/clinic administered): [facility] [id number] [place] [address]  Pharmacy Notified: Yes  Patient Notified: No    Pharmacy will notify patient when medication is ready.

## 2021-01-07 NOTE — PROGRESS NOTES
01/07/21- I spoke with Jeanie about scheduling with Dr. Warren. She said at this time should would like to wait. She states her and Dr. Rankin spoke about this at their last visit together. She wasn't doing the best job taking one of her meds consistently and is now doing so since the last visit. She would like to continue this right now instead of possible adding another medication. I encouraged Jeanie to call back if she changes her mind.     Marcela Lewis

## 2021-02-10 ENCOUNTER — VIRTUAL VISIT (OUTPATIENT)
Dept: PSYCHOLOGY | Facility: CLINIC | Age: 41
End: 2021-02-10
Payer: COMMERCIAL

## 2021-02-10 DIAGNOSIS — F41.1 GENERALIZED ANXIETY DISORDER: Primary | ICD-10-CM

## 2021-02-10 DIAGNOSIS — F33.1 MAJOR DEPRESSIVE DISORDER, RECURRENT, MODERATE (H): ICD-10-CM

## 2021-02-10 PROCEDURE — 90791 PSYCH DIAGNOSTIC EVALUATION: CPT | Mod: GT | Performed by: PSYCHOLOGIST

## 2021-02-10 ASSESSMENT — ANXIETY QUESTIONNAIRES
5. BEING SO RESTLESS THAT IT IS HARD TO SIT STILL: NOT AT ALL
3. WORRYING TOO MUCH ABOUT DIFFERENT THINGS: NEARLY EVERY DAY
GAD7 TOTAL SCORE: 13
2. NOT BEING ABLE TO STOP OR CONTROL WORRYING: NEARLY EVERY DAY
1. FEELING NERVOUS, ANXIOUS, OR ON EDGE: MORE THAN HALF THE DAYS
IF YOU CHECKED OFF ANY PROBLEMS ON THIS QUESTIONNAIRE, HOW DIFFICULT HAVE THESE PROBLEMS MADE IT FOR YOU TO DO YOUR WORK, TAKE CARE OF THINGS AT HOME, OR GET ALONG WITH OTHER PEOPLE: VERY DIFFICULT
7. FEELING AFRAID AS IF SOMETHING AWFUL MIGHT HAPPEN: SEVERAL DAYS
6. BECOMING EASILY ANNOYED OR IRRITABLE: NEARLY EVERY DAY

## 2021-02-10 ASSESSMENT — PATIENT HEALTH QUESTIONNAIRE - PHQ9
5. POOR APPETITE OR OVEREATING: SEVERAL DAYS
SUM OF ALL RESPONSES TO PHQ QUESTIONS 1-9: 15

## 2021-02-10 NOTE — PROGRESS NOTES
Telemedicine Visit: The patient's condition can be safely assessed and treated via synchronous audio and visual telemedicine encounter.      Reason for Telemedicine Visit: COVID-19 related restrictions    Originating Site (Patient Location): Patient's home    Distant Site (Provider Location): [facility]: [facility]    Consent:  The patient/guardian has verbally consented to: the potential risks and benefits of telemedicine (video visit) versus in person care; bill my insurance or make self-payment for services provided; and responsibility for payment of non-covered services.     Mode of Communication:  First tried Video Conference via [employer], then reached out with [employer]    As the provider I attest to compliance with applicable laws and regulations related to telemedicine.    First call: 9:01am - unable to reach via [employer]  Start of call:  9:04am - able to connect via [employer]  End of call: 10:01am    Emergency contact: aMgo Kirkpatrick [phone]  Closest Emergency Room: [facility] Diagnostic Assessment Update:    Meeting was: scheduled  Others present: none  Meeting lasted: 57 minutes  Client was: on time  Date of Initial Diagnostic Assessment: 7/21/2010    Assessment Summary: Diagnostic assessment update completed today. The patient is a 40 year old American female who was referred for mental health services by Dr. Warner for help with managing emotions tied to stressful social situation. Based on the Ms. French's report of symptoms, she continues to meet criteria for Generalized Anxiety Disorder and I am adding a diagnosis of Major Depressive Disorder, Recurrent, Moderate today.  Jeanie's mental health concerns continue to affect her ability to function in her life and have been causing clinically significant distress. Jeanie denies any concerning drug/alcohol use. She denies any current safety concerns.  Based on the patient's reported symptoms and impact on  "functioning, the plan is to continue regular psychotherapy at this time and schedule additional follow up with Dr. Warner to discuss optimizing medication.  Will also arrange for psychiatric consultation with Dr. Warren in mid-March.    Diagnosis (DSM-5):    Generalized Anxiety Disorder  Major Depressive Disorder, recurrent, moderate    Recommendations & Plan:   1.  Treatment plan updated today.  Shared via [employer].  Next treatment plan update due on 5/10/21.  Next diagnostic update due 2/10/22.  2.  Goals for therapy = decrease depression, anxiety and increase confidence in managing life stressors.    Mental Health Screening Questionnaires:    PHQ-9 SCORE 1/8/2020 7/22/2020 2/10/2021   PHQ-9 Total Score - - -   PHQ-9 Total Score 10 13 15     JAMAR-7 SCORE 1/8/2020 7/22/2020 2/10/2021   Total Score - - -   Total Score 12 12 13     PTSD-PC = 0/4  CAGE AID:  0/4     Current Presenting Problems or Complaints (including patient perception of problem and external factors contributing to current dilemma): Jeanie reports continued difficulty managing anxiety and increased depression in the past month.  She reports she has had some \"pretty low points\" in the past month.  Frustrated with job.  Very draining, no future.  Fearful more layoffs are coming.  Overworked and feels unsupported by leadership.  Feeling burned out.  Co-worker will be out with [place] which will increase her work load further in the next two weeks.  Has been looking for work but not seeing great options.  Can be quite self critical about falling short of occupational goals.  Relationship also feels stalled out which is difficult for her.  Restrictions of the pandemic are wearing on her.  Experiencing long standing symptoms of IBS.  Frustrated this has been going on for 20 years despite persistent effort with treatment. Working to get connected to specialist but not optimistic this will help based on past disappointments.    While depression had been in " remission (last recurrence around 2011-13), depressive symptoms have been gradually increasing and Jeanie does appear to meet criteria for major depressive episode at this time.  Not currently on antidepressant.  Had taken Prozac in the past with some benefit and would be willing to discuss resuming this medication with Dr. Warner.  Had discussed psychiatric consult at [facility] in the past, but Jeanie had wanted to hold off on that.  More open to PCT consult at this time.  Will try to arrange for mid-March.    Sleep:  At our last visit, Jeanie set a goal of getting in bed earlier (11:30, then 11, then 10:30) to start bumping her sleep schedule in a more manageable direction.  Today, she reports she didn't do this.  Doesn't want to go to work in the morning so doesn't want to go to bed.  Midnight to 7-7:30am.  Sets alarm for 6:30am but hits snooze a lot before then.  Usually sleep is relatively solid, but at times can be fragmented.  Hard to get out of bed in the morning.    Nutrition/activity/weight:  Had been following [employer] and doing well with this, but has been overeating recently and less active and this is worrisome to her.  Lost 19-20lbs with [employer] but subscription is expiring so she is wondering if she will be able to maintain progress.  She had also planned to work on an exercise plan after the New Year.  Today, she reports she did list this as a goal for 2021.  Did identify a HIIT work out she would like to do.   Listed SMART goals for this.  Has gotten a treadmill for walking - needs new battery.   Plan is to walk in the evenings once this is fixed.  Plannng running program when weather cooperates.  Congratulated her for planning steps.  Jeanie was initially reluctant to accept this praise and was reminded of the importance of acknowledging and appreciating small successes on the way to her long term goals.    Coping:  At our last visit, Jeanie stated her intention to looking into meditation options.  Today, she  reports she has not done this.  When asked about barriers, Jeanie notes that weekends feel so short and she can't get everything done that she wants to do.  Daily upkeep also takes a lot of time.  Hard to remember things.  Made list of 2021 goals and has been trying to follow up on these.  Overall, this has been helpful as she can see herself making progress on these.    Discussed the value of being intentional with time and at peace with choices.  Thinks she may be better to plan her days out more thoughtfully and we did set a goal around that today.       Review of Symptoms:  Depression: Jeanie endorses chronically depressed mood, anhedonia, sleep difficulty, fatigue, difficulty with memory and concentration and some overeating.  Jeanie had previously achieved sustained remission from depressive symptoms but these have been steadily worsening over the past several months and appear to represent a new major depressive episode.  Marcy: none  Psychosis: none  Anxiety: Jeanie describes feeling anxious and on edge, having difficulty controlling worry, worrying about a number of different issues in her life including money, career, and relationships.  She endorses frequent irritability and some difficulty relaxing.  Post Traumatic Stress Disorder: Denies any problematic symptoms related to past trauma.    Functioning: Jeanie endorses significant difficulty with memory and concentration which impairs task completion which adds to her frustration.  Anxiety likely contributes to fatigue and other medical concerns (e.g., IBS).      Updated Life History/Circumstances: Jeanie continues to live in her own home.  She is in a dating relationship with significant uncertainty associated with it and this is her primary stressor at this time.  She also experiences job related stress and family stress (mom diagnosed with breast cancer this year).      Current Substance Use: Occasional social drinking.  No concerning drug or alcohol  "use.    Updated Health History/Family Health History:  Mom has been diagnosed and treated for breast cancer this year.  No other updates to family or personal health history.  See below for current health concerns.    Patient Active Problem List   Diagnosis     Cancer of tongue (H)     Health Care Home     Generalized anxiety disorder     Tenosynovitis of the right index finger     Wheezing     Irritable bowel syndrome (IBS)     RUQ abdominal mass     Exercise-induced asthma     Sinusitis, chronic     Allergic rhinitis, unspecified allergic rhinitis type     Hypovitaminosis D     Adjustment disorder with anxiety     Menorrhagia with regular cycle     Current Outpatient Medications   Medication     albuterol (PROAIR HFA/PROVENTIL HFA/VENTOLIN HFA) 108 (90 Base) MCG/ACT inhaler     busPIRone HCl (BUSPAR) 30 MG tablet     eluxadoline (VIBERZI) 75 MG tablet     ferrous gluconate (FERGON) 324 (38 Fe) MG tablet     hydrOXYzine (VISTARIL) 25 MG capsule     omeprazole (PRILOSEC) 40 MG DR capsule     No current facility-administered medications for this visit.      Cultural Factors: No changes since last update.  Denies any significant Mormonism or cultural practices/beliefs that would impact health care.    Mental Status Exam:    Appearance:  Well groomed and moderately depressed/anxious  Behavior/Relationship to Examiner/Demeanor:  Cooperative, Engaged and Pleasant  Build: thin to medium  Gait:  Normal  Psychomotor Activity: within normal limits  Speech rate:  Normal  Speech volume:  Normal  Speech coherence:  Normal  Speech spontaneity:  Normal  Mood (subjective report):  \"Ok, not great.\"  Affect (objective appearance):  Appropriate/mood-congruent  Eye Contact: good  Thought Process (Associations):  Logical, Linear and Goal directed  Thought process (Rate):  Normal  Abnormal Perception:  None  Sensorium:  Alert and appeared fully oriented  Attention/Concentration:  Normal  Insight:  Good  Judgment:  Good    Suicide " Assessment:    Recent suicidal thoughts: No  Past suicidal thoughts: Yes: did report passive suicidal ideation in the fall of 2019 related to relational stressors, but denied any intent or plan at that time.  Any attempts in the past: No  Any family/friends/loved ones die by suicide: No  Plan or considering various methods: No  Access to guns: No  Protective factors: no h/o suicide attempt, no plan or intent, describes a safety plan, h/o seeking help when needed, future oriented, none to minimal alcohol use  and stable housing  Verbal contract for safety: Yes    Non-Suicidal Self Injurious Behavior: No    Violence/Homicide Risk Assessment:     Problems with anger management: No  History of violence: No  History of significant damage to property: No  Threat made to harm or kill someone: No  Verbal contract for safety: N/A    Safety Plan:   Jeanie was provided with the phone number for emergency mental health services and was encouraged to call these local crisis numbers, 911, or visit a local emergency room if thoughts of suicide or homicide were to arise and/or if she were to be in acute distress. The patient agreed to utilize these services as indicated if the need were to arise. Jeanie denied current suicidal or homicidal ideation, intent, or plan, denied a history of suicide attempts or current self-harming behaviors. Based on these factors, Jeanie is considered to be sustainable as an outpatient at this time.     NOTE: Diagnostic update complete.    Primary Care PTSD Screen  In your life, have you ever had any experience that was so frightening, horrible or upsetting that, in the past month, you...    1. Have had nightmares about it or thought about it when you did not want to? No  2. Tried hard not to think about it or went out of your way to avoid situations that remind you of it? No  3. Were constantly on guard, watchful, or easily startled? No  4. Felt numb or detached from others, activities, or your  "surroundings? No    Current research suggests that the results of the PC-PTSD should be considered \"positive\" if a patient answers \"yes\" to any (3) items.    References    ANUEL Yates, PATT Caballero, Kimerling, R., CHANCE Cristina, REGAN Yen., LUCRECIA Sutherland, ANUEL Faust, SULLY Farmer., Frias, J.I. (2004). The primary care PTSD screen (PC-PTSD): development and operating characteristics. Primary Care Psychiatry, 9, 9-14.        "

## 2021-02-10 NOTE — Clinical Note
YONI - Jody Medrano - can you reach out to [facility] to set up visit with Dr. Warren some time in March?  Let me know if you have questions.  Thanks!  Jody

## 2021-02-10 NOTE — PATIENT INSTRUCTIONS
Schedule a visit with Dr. Rankin on 2/26 and 1-2 visits with Dr. Rankin in March.  Schedule with Dr. Timmy HERNÁNDEZ to discuss possibly restarting antidepressant medication.  Our referral coordinator will reach out to you about scheduling consultation with our psychiatric consultant, Dr. Piedad Warren.    See below for updated treatment plan today.  Let me know if you would like me to make any changes at our next visit.    Treatment Plan    Client's Name: Jeanie French  YOB: 1980    Today's Date: 2/10/21   Date Diagnostic Update Due: 2/10/22    DSM-V Diagnoses:   Generalized Anxiety Disorder  Major Depressive Disorder, recurrent, moderate    Psychosocial / Contextual Factors: Stressors in her interpersonal and work life contribute to difficulty. The context of the current pandemic is an additional stressor.     Functioning:  Jeanie's mental health concerns have been continuing to affect her ability to function in her life and have been causing clinically significant distress.  Jeanie reports impairment in memory and concentration which are likely secondary to anxiety/stress.  Jeanie also notes that stress is negatively impacting life satisfaction and physical health (e.g., headaches, GI symptoms, etc.).    PC-PTSD: 0/4  CAGE AID: 0/4    PHQ 1/8/2020 7/22/2020 2/10/2021   PHQ-9 Total Score 10 13 15   Q9: Thoughts of better off dead/self-harm past 2 weeks Not at all Not at all Not at all     JAMAR-7 SCORE 1/8/2020 7/22/2020 2/10/2021   Total Score - - -   Total Score 12 12 13     Collaboration:   Primary care physician, Dr. Anayeli Warner  Psychiatry consultant, Dr. Piedad Warren - referral coordinator will reach out to you to set this up ASAP.    Referral:  Psychiatry consultation    Anticipated treatment duration: Unknown  Agreed upon meeting frequency: 1-2 visits per month - recommend increasing frequency of visits if symptoms do not improve    Long Term Treatment Goal(s) related to diagnosis /  functional impairment(s)    Goal 1: Jeanie will report decreased anxiety and increased confidence in managing life stressors.    Steps we will take to achieve your goal:    Jeanie will continue to take medications as prescribed by Dr. Warner.  Jeanie will practice stress mitigation strategies such as deep breathing, yoga or meditation.  Continue to be aware of life choices to increase sense of agency in your life.   Try planning out your weekend time for a few weeks.  Just notice how this goes with out judgement.  Include some unstructured blocks of time in the schedule.   Communicate your needs clearly to others.  Attend psychotherapy as scheduled.    Intervention(s)  Therapist will provide support, psychoeducation and homework assignments as needed.    Goal 2: Jeanie will report decreased depression.    Steps we will take to achieve your goal:    Jeanie will schedule with Dr. Warner to discuss possibly adding antidepressant medication.  Schedule PCT visit with Dr. Warren for mid-March.  Attend psychotherapy as scheduled.    Intervention(s)  Therapist will provide support, psychoeducation and homework assignments as needed.    If you need additional support and care during times that your therapist or PCP are not available, here are some additional resources for you:    Crisis Lines:    [facility] Adult Crisis:  [phone]  [facility] Adult Crisis: [phone]    Crisis Text Line: Text [place] to 801678. Free support at your fingertips 24/7  People who text [place] to 488747 will be connected with a counselor. Crisis Text Line is available 24 hours a day, seven days a week.    You can also consider going to the [facility] at the following address.  Walk ins are welcome:    [address] [place] [address]   [phone] (for 24 hour crisis consultation)    Monday - Friday 8:00am - 7:00pm  Saturday:  11:00am - 3:00pm  Sunday and Holidays Closed    If you feel at risk of immediate  harm, go directly to the Emergency Department.    Patient has reviewed and agreed to the above plan.    Jody Rankin, PhD, LP      February 10, 2021        ______________________________    ________  Patient Signature       Date    ______________________________    ________  Provider Signature       Date

## 2021-02-11 ASSESSMENT — ANXIETY QUESTIONNAIRES: GAD7 TOTAL SCORE: 13

## 2021-02-15 NOTE — PROGRESS NOTES
02/15/21 patient scheduled for Video Visit   Appointment: Monday March 22  Arrival Time: 8:00am  Provider: Dr. Warren

## 2021-02-20 ENCOUNTER — HEALTH MAINTENANCE LETTER (OUTPATIENT)
Age: 41
End: 2021-02-20

## 2021-02-26 ENCOUNTER — VIRTUAL VISIT (OUTPATIENT)
Dept: PSYCHOLOGY | Facility: CLINIC | Age: 41
End: 2021-02-26
Payer: COMMERCIAL

## 2021-02-26 DIAGNOSIS — F33.1 MAJOR DEPRESSIVE DISORDER, RECURRENT, MODERATE (H): ICD-10-CM

## 2021-02-26 DIAGNOSIS — F41.1 GENERALIZED ANXIETY DISORDER: Primary | ICD-10-CM

## 2021-02-26 PROCEDURE — 90837 PSYTX W PT 60 MINUTES: CPT | Mod: GT | Performed by: PSYCHOLOGIST

## 2021-02-26 NOTE — PATIENT INSTRUCTIONS
Good to talk with you today Jeanie.  Sorry things have been so stressful.  See below for a reminder of the things we talked about to help with this.      Talk to Dr. Warner next week about whether adjusting your medications next week may make sense in light of how you have been feeling.      At our last visit, we set the goal of trying to plan out weekend time for a few weeks including some unstructured blocks of time in the schedule.  This was hard to do with everything that has been going on.  This time let's us a concrete strategy to help you remember your goals.  Print out this AVS to make this more visible.  Set alarm on phone to review once per day.      Set realistic expectations for yourself and don't carry an unfair amount of emotional burden for the work.     Don't put your emotional well-being in the hands of others.  If someone is demonstrating that they are unreliable with their behavior, make other plans to take care of yourself.  Don't stay focused on wishing they would act otherwise.    When anxiety gets overwhelming see below for a plan of how to manage:    Red flags:  Tight chest  Tension in neck and shoulders  Feeling sick to my stomach    Assess:  Is there an actual threat here?  If the answer is no, engage in de-escalation strategies.    De-escalate:  Stop multitasking - give yourself permission to do one thing at a time.  Deep breathing    Think about using the following TIPP skills to help you manage intense emotion:    T: Temperature    Cooler temperatures decrease your heart rate (which is usually faster when we are emotionally overwhelmed). You can either splash your face with cold water, take a cold (but not too cold) shower, or if the weather outside is chilly you can go outside for a walk. Another idea is to take an ice cube and hold it in your hand or rub your face with it.    Higher temperatures increase your heart rate (which is usually lower when you feel depressed, sad, or anxious).  You can take a hot bath, nestle up in a blanket, go outside on a hot day, or drinking a warm tea.    Note, be smart about it.  Cold exposure can make your blood pressure drop and heat exposure can raise your blood pressure. If you have a medical condition where this could be a problem, skip this step or consult your physician.    I Intense Exercise  When you have a built-up energy as a result of experiencing overwhelming emotions, it can be a really good idea to spend this energy by doing a cardio work-out. It doesn't have to be anything fancy - you don't need special equipment or expensive membership in a gym. Simply get on your feet and do one of the following: go for a run around the block, do jumping jacks in your room, go outside and walk fast. You can also try jumping rope, dancing or lifting weights (if you already have them). Do this for 10-15 minutes but don't overdo it. When you spend that conserved energy you will feel more tired and your overwhelming emotions will become more balanced.    P: Paced Breathing  In order to reduce the physical manifestation of the overwhelming emotions you feel (for e.g. increased heart rate, flushed face, dry mouth, sweating etc.) it helps to try to control your breathing so that its rate will eventually decrease. Try the following technique: breathe in deeply through your nose (abdominal breathing) for four seconds and then breathe out through your mouth (for six seconds). Do this for 1-2 minutes.    P Progressive Muscle Relaxation  In order to relax the tense muscles in our body while we are experiencing extreme emotions, you can try progressive muscle relaxation. You can do this from a seated position. Start with the top of your body - become aware of your muscles and the upper back and deliberately tighten them for five seconds. Then let go - you should feel the region loosening up. Keep doing this with your arms, your abdominal and back muscles, your bottom muscles,  thighs and upper legs and calves. This is a great way for your body to let go of the excessive energy that has built up with the overwhelming emotions.    For information on the TIPP skill see this Video by [facility] clinical , Lili Schaeffer LCSW (7 minutes)    [email or web address]    Values identification:  Today we talked about the importance of knowing what is most important to you - especially when you are facing tough and/or competing priorities.  Think about what has brought you the most ivone/peace and write this down if this is not clear to you.

## 2021-02-26 NOTE — PROGRESS NOTES
Telemedicine Visit: The patient's condition can be safely assessed and treated via synchronous audio and visual telemedicine encounter.      Reason for Telemedicine Visit: COVID-19 related restrictions    Originating Site (Patient Location): Patient's home    Distant Site (Provider Location): [facility]: [facility]    Consent:  The patient/guardian has verbally consented to: the potential risks and benefits of telemedicine (video visit) versus in person care; bill my insurance or make self-payment for services provided; and responsibility for payment of non-covered services.     Mode of Communication:  Video Conference via AmWell    As the provider I attest to compliance with applicable laws and regulations related to telemedicine.    First call: 8:32am  Start of call:  8:32am  End of call: 9:31am    Emergency contact: Mago Kirkpatrick [phone]  Closest Emergency Room: [facility] Progress Note    Client Name: Jeanie French    Service Type:  Individual  Length of Visit: 59 minutes  Attendees: Jeanie was alone    Identifying Information and Presenting Problem:    The patient is a 40 year old American female who is being seen for problematic symptoms of anxiety.      Treatment Objective(s) Addressed in This Session:  Decrease anxiety via learning adaptive coping mechanisms.  Increase confidence in managing life stressors.  Decrease depression.    Progress on / Status of Treatment Objective(s) / Homework:  Worsening anxiety/depression    PHQ-9 SCORE 1/8/2020 7/22/2020 2/10/2021   PHQ-9 Total Score - - -   PHQ-9 Total Score 10 13 15       JAMAR-7 SCORE 1/8/2020 7/22/2020 2/10/2021   Total Score - - -   Total Score 12 12 13       Topics Discussed/Interventions Provided:    Anxiety/mood:  Having a lot of anxiety.  Most related to relationship and work.  Frustrated with continued stagnation on both fronts.  Anxiety/anger has been so difficult it is hard to work.  Hard to  focus. Memory feels impaired which seems likely related to anxiety.    Work:  Feeling anxious/overwhelmed at work.  Has taken on additional work since round of layoffs and now co-worker is on FMLA which increases her burden.  Frustrated that co-worker who is on leave says he will do something and then doesn't follow through.  This mirrors problem with partner (see below) and adds to her anxiety/frustration.  Has tried new plan to streamline workflow but this is not going well.  Projects are overscheduled and has tried to communicate this to boss without getting support for changes.  Worries clients will get angry with them when they don't follow through with scheduled projects but agrees this is actually unlikely.  Risks are likely low.  No one us upset with her and reflected that she appears to be taking on an unfair amount of emotional burden related to these tasks.  Encouraged awareness of worrisome thoughts, thought checking and de-escalation as needed.    Relationship:  Has empathy for challenges partner is facing and frustrated that more progress on their relationship is not being made.  Not knowing time frame is very anxiety provoking for her.  Discussed concerns about honesty in partner.  Would have made different decisions had she known it would be like this.  If not overtly dishonest, he has shown himself to not be reliable.  Feels reliable in other areas of their relationship but not one important issue so hard to know what to make of this.  Has strong work ethic which she respects.  Somewhat hopeful in that he has agreed to see therapist.  Hopes this will help him to work through things which are troubling him and help him get unstuck.  Hopes it may also improve their communication.     Reflected on what her values are with regard to relationship.  Elicited how she would feel if this relationship ended and Jeanie reflects that this would be highly disappointed/sad.  Continues to feel like the good  outweighs the bad and she wants to stick with this.    Anxiety provoking thought:  It's never going to end.    Strategy to manage that thought:  Remind myself that this thought is not true even if the timeline is unclear.  I know this will come to an end.    Barriers to challenging unhelpful thought:  Jeanie feels strongly that she needs a timeline for this relationship.  Reflected that based on past history she is highly unlikely to get this.  This is out of her control and continuing to focus on this sets her up for indefinite suffering.  Hurts her that he sees how stressed she is and doesn't change.  What does this mean:  He doesn't care?  He does care, but can't change?  He's thinks he can change but is not honest with himself on this?  Jeanie will continue to grapple with whether she can accept this uncertainty going forward.    Discussed healthy/less healthy roles of anxiety, importance of assessing if actual threat is present and if she deems this is not the case, engaging in de-escalation practices (see pt instructions).    Values identification:  Reflected that it can be helpful to identify core values when facing difficult decisions.  Jeanie has some difficulty engaging around this today and agreed to do some additional reflection on this in the weeks ahead (rewarding work, connection to others?).  Did include some ACT tools in the AVS today to assist in this effort.       Follow up on homework:  At our last visit, we set the goal of trying to plan out weekend time for a few weeks including some unstructured blocks of time in the schedule.  Today, Jeanie reports she forgot about that.  Developed strategy to help remember goals set in session today (see pt instructions).  With this plan in place Jeanie rated her confidence as 7/10 in her ability to carry out the plan.    Assessment: Jeanie appeared to be active and engaged in today's session and was receptive to feedback.     Mental Status: Jeanie French appeared  generally alert and oriented. Dress was casual and appropriate to the weather and occasion. Grooming and hygiene were good. Eye contact was good. Speech was of normal volume and rate and was clear, coherent, and relevant. Mood was depressed/anxious.  Thought processes were relevant, logical and goal-directed. Thought content was WNL with no evidence of psychotic or paranoid features. No evidence of SI/HI or self-harm, intent, or plans. Memory appeared grossly intact. Insight and judgment appeared intact and patient exhibited good impulse control during the appointment.     Does the patient appear to be at imminent risk of harm to self/others at this time? No    The session was necessary to address symptoms of anxiety and interpersonal distress that have been interfering with patient's ability to function in her life.  Ongoing psychotherapy is necessary to enhance coping skills as well as provide support and structure for problem solving.    Diagnosis (DSM-5):    Generalized Anxiety Disorder  Major Depressive Disorder, recurrent, moderate    Plan:  1. Follow up with this provider on 3/19/21.  2. Follow up with PCP, Dr. Warner on 3/2/21.  3. See psychiatric consultant, Dr. Warren on 3/22/21.  4. See pt instructions for homework and follow up from today.  Jeanie can access this via [employer].    Jody Rankin, PhD, LP    Next treatment plan update due on 5/10/21.  Next diagnostic update due 2/10/22.

## 2021-03-02 ENCOUNTER — VIRTUAL VISIT (OUTPATIENT)
Dept: FAMILY MEDICINE | Facility: CLINIC | Age: 41
End: 2021-03-02
Payer: COMMERCIAL

## 2021-03-02 DIAGNOSIS — K58.0 IRRITABLE BOWEL SYNDROME WITH DIARRHEA: ICD-10-CM

## 2021-03-02 DIAGNOSIS — M54.50 ACUTE BILATERAL LOW BACK PAIN WITHOUT SCIATICA: ICD-10-CM

## 2021-03-02 DIAGNOSIS — F33.2 SEVERE EPISODE OF RECURRENT MAJOR DEPRESSIVE DISORDER, WITHOUT PSYCHOTIC FEATURES (H): Primary | ICD-10-CM

## 2021-03-02 PROCEDURE — 99214 OFFICE O/P EST MOD 30 MIN: CPT | Mod: TEL | Performed by: STUDENT IN AN ORGANIZED HEALTH CARE EDUCATION/TRAINING PROGRAM

## 2021-03-02 RX ORDER — PREDNISONE 50 MG/1
50 TABLET ORAL DAILY
Qty: 5 TABLET | Refills: 0 | Status: SHIPPED | OUTPATIENT
Start: 2021-03-02 | End: 2021-04-20

## 2021-03-02 RX ORDER — CYCLOBENZAPRINE HCL 10 MG
10 TABLET ORAL 3 TIMES DAILY PRN
Qty: 30 TABLET | Refills: 0 | Status: SHIPPED | OUTPATIENT
Start: 2021-03-02 | End: 2021-04-20

## 2021-03-02 NOTE — PROGRESS NOTES
Jeanie is a 40 year old who is being evaluated via a billable telephone visit.      What phone number would you like to be contacted at? [phone]  How would you like to obtain your AVS? Christopher    Assessment and Plan:    Jeanie was seen today for recheck medication, back injury and medication reconciliation.  Multiple issues were discussed today.    Diagnoses and all orders for this visit:    Severe episode of recurrent major depressive disorder, without psychotic features (H).  Worsening depression.  She is interested in starting Prozac.  No side effects with this in the past even though she is not sure if it was particularly helpful.  Also has a visit scheduled coming up with the psychiatric team for a PCT visit.  -     FLUoxetine (PROZAC) 20 MG capsule; Take 1 capsule (20 mg) by mouth daily    Acute bilateral low back pain without sciatica.  Acute worsening low back pain without trauma or red flag symptoms.  Did well with Flexeril and prednisone in the past, so will try this again.  -     predniSONE (DELTASONE) 50 MG tablet; Take 1 tablet (50 mg) by mouth daily  -     cyclobenzaprine (FLEXERIL) 10 MG tablet; Take 1 tablet (10 mg) by mouth 3 times daily as needed for muscle spasms    Irritable bowel syndrome with diarrhea.   Continued difficulty with irritable bowel.  We have tried multiple medications, including Bentyl, amitriptyline, cholestyramine, and Viberzi without improvement.  She has tried FODMAP diet and increasing exercise as well in the past.  Has had work-up previously but not in the last few years.  Appreciate GI Recs.    -     GASTROENTEROLOGY ADULT REF CONSULT ONLY; Future    Anayeli Warner MD      Subjective   Jeanie is a 40 year old who presents for the following health issues:      Depression/Depression Medications: She has had worsening depression symptoms over the last few weeks.  Dr Rankin and patient discussed seeing the psychiatry resident and get that visit schedule.  Has tried many  medications in the past, including Celexa, Zoloft, venlafaxine, Wellbutrin, and Prozac.  May have had some improvement with starting Prozac, and this was discontinued as symptoms overall are much improved back in 2013.  She is interested in starting this again.   Has appointment with psych resident for 3/22.  Is not confident will be helpful, but is willing to give it a try.    Has had recurrent trouble with low back and SI joint pain..  Original problem started with a injury w/ weeding in the past.  Last episode of significant pain was back in 2019-- was vaccuming--at that time it was so bad that she needed help from her mom to go to urgent care.  They gave her steroids and a muscle relaxer.  This was very effective at improving the pain.    She experienced worsening pain in her back again this weekend, after lifting a chair during the day.  Later in the weekend, she was switching laundry, and pain again worsened.  This is the same as her previous back pain.. Same location, SI joint on the right.  Nothing shooting down the leg.  No weakness, no bowel or bladder symptoms.  No trauma.  Would like to try the steroids and muscle relaxer that she used at her last urgent care visit because it was so helpful.    She continues have difficulty with irritable bowel symptoms, including pain, gas, and diarrhea.  Discussed some recommendations for GI doctors for further evaluation.  She is interested in moving forward with this.    No recent restless leg symptoms.  Fatigue is been stable.  She is not taking iron currently.  Is taking some extra vitamin D, because of the winter weather.        Objective         Vitals:  No vitals were obtained today due to virtual visit.    Physical Exam   healthy, alert and no distress  PSYCH: Alert and oriented times 3; coherent speech, normal   rate and volume, able to articulate logical thoughts, able   to abstract reason, no tangential thoughts, no hallucinations   or delusions  Her affect  is normal  RESP: No cough, no audible wheezing, able to talk in full sentences  Remainder of exam unable to be completed due to telephone visits    PHQ 1/8/2020 7/22/2020 2/10/2021   PHQ-9 Total Score 10 13 15   Q9: Thoughts of better off dead/self-harm past 2 weeks Not at all Not at all Not at all     JAMAR-7 SCORE 1/8/2020 7/22/2020 2/10/2021   Total Score - - -   Total Score 12 12 13       Phone call duration: 16 minutes

## 2021-03-03 NOTE — PATIENT INSTRUCTIONS
03/03/21   GASTROENTEROLOGY REFERRAL  [facility]  Phone [phone]  Fax: [phone]    Online referral placed with [facility] who will contact patient to schedule.     Marcela Lewis

## 2021-03-19 ENCOUNTER — VIRTUAL VISIT (OUTPATIENT)
Dept: PSYCHOLOGY | Facility: CLINIC | Age: 41
End: 2021-03-19
Payer: COMMERCIAL

## 2021-03-19 DIAGNOSIS — F41.1 GENERALIZED ANXIETY DISORDER: Primary | ICD-10-CM

## 2021-03-19 DIAGNOSIS — F33.1 MAJOR DEPRESSIVE DISORDER, RECURRENT, MODERATE (H): ICD-10-CM

## 2021-03-19 PROCEDURE — 90837 PSYTX W PT 60 MINUTES: CPT | Mod: GT | Performed by: PSYCHOLOGIST

## 2021-03-19 NOTE — Clinical Note
Dr. Warren - this patient will be seeing you on Monday.  Let me know if you have any questions!    Dr. Warner - While she did not think the Prozac was helping as of yet, she did appear a bit improved to me today. Thanks for your help in her care.    Jody

## 2021-03-19 NOTE — PROGRESS NOTES
Telemedicine Visit: The patient's condition can be safely assessed and treated via synchronous audio and visual telemedicine encounter.      Reason for Telemedicine Visit: COVID-19 related restrictions    Originating Site (Patient Location): Patient's home    Distant Site (Provider Location): [facility]: [facility]    Consent:  The patient/guardian has verbally consented to: the potential risks and benefits of telemedicine (video visit) versus in person care; bill my insurance or make self-payment for services provided; and responsibility for payment of non-covered services.     Mode of Communication:  Video Conference via AmWell    As the provider I attest to compliance with applicable laws and regulations related to telemedicine.    Start of call:  9:00am  End of call: 9:57am    Emergency contact: Mago Kirkpatrick [phone]  Closest Emergency Room: [facility] Progress Note    Client Name: Jeanie French    Service Type:  Individual  Length of Visit: 57 minutes  Attendees: Jeanie was alone    Identifying Information and Presenting Problem:    The patient is a 41 year old American female who is being seen for problematic symptoms of anxiety.      Treatment Objective(s) Addressed in This Session:  Decrease anxiety via learning adaptive coping mechanisms.  Increase confidence in managing life stressors.  Decrease depression.    Progress on / Status of Treatment Objective(s) / Homework:  Mildly improved anxiety/depression    PHQ-9 SCORE 1/8/2020 7/22/2020 2/10/2021   PHQ-9 Total Score - - -   PHQ-9 Total Score 10 13 15       JAMAR-7 SCORE 1/8/2020 7/22/2020 2/10/2021   Total Score - - -   Total Score 12 12 13       Topics Discussed/Interventions Provided:    Wished Jeanie a Happy [birth year]ated Birthday.  Jeanie reports that she decided not see her family secondary to COVID/weather related concerns, but did make dinner and watched movie with partner which was nice.    Update:   "Feeling a bit better than at our last visit which Jeanie attributes to tying to keep worrisome thoughts out of her mind.  She is more successful doing this when she is not talking with her partner as their conversations can trigger these thoughts when it is clear that their priorities are not lining up  That being said, Jeanie also appears to be moving closer to acceptance on this and deciding that it is worth tolerating this and taking this risk due to the perceived benefit of the relationship.  Congratulated her for this progress.    Follow up on homework:    Assessing thoughts/de-escalating as needed: Has been doing this and making some strides here.  Could not identify specific de-escalation skills but appears to be \"riding the wave\" of anxiety and noting that this does come back down.  Trying to focus more on what she can control and work towards acceptance of those factors outside of her control.  This is still challenging but making strides here.    Values identification: Did not review materials in AVS from last visit. Not a top priority for review today.    Discussed medication options with Dr. Warner: Started Prozac at visit with Dr. Warner on 3/2/21.  No noticeable benefit yet.  Does notice hands have been shaking a bit but notes that she does not necessarily see this as a side effect as this could be due to anxiety.  Looked it up and not a common side effect.  Scheduled for psychiatric consultation with Dr. Warren on 3/22/21.    Work/Sleep:  Reports work is horrible.  Dreads going to work.  Very burned out.  Other jobs out there but not ideal.  Reflected on options for coping while she looks for other work.  Discussed options of increasing or decreasing investment.  Jeanie has been doing more of the latter recently but has not found that this has helped.  She also notes a related decrease in sleep in that she often stays up very late secondary to her dread of going to work the next day and recognizes this " as a problematic pattern.  We developed a plan to try to improve sleep in recognition that poor sleep makes work even more difficult (see pt instructions).    Weight related concerns:  Quit [employer] in February.  Had lost 15 lbs via this program and felt good about this.  Got down around 160lbs but felt a bit burned out on program and needed a break.  Disappointed that she has now gained back 6-7 lbs.  Developed plan to get back on track with good nutrition and physical activity (see pt instructions).    Assessment: Jeanie appeared to be active and engaged in today's session and was receptive to feedback.     Mental Status: Jeanie French appeared generally alert and oriented. Dress was casual and appropriate to the weather and occasion. Grooming and hygiene were good. Eye contact was good. Speech was of normal volume and rate and was clear, coherent, and relevant. Mood was mildly depressed but less overtly sad/anxious than at our last visit.  Thought processes were relevant, logical and goal-directed. Thought content was WNL with no evidence of psychotic or paranoid features. No evidence of SI/HI or self-harm, intent, or plans. Memory appeared grossly intact. Insight and judgment appeared intact and patient exhibited good impulse control during the appointment.     Does the patient appear to be at imminent risk of harm to self/others at this time? No    The session was necessary to address symptoms of anxiety and interpersonal distress that have been interfering with patient's ability to function in her life.  Ongoing psychotherapy is necessary to enhance coping skills as well as provide support and structure for problem solving.    Diagnosis (DSM-5):    Generalized Anxiety Disorder  Major Depressive Disorder, recurrent, moderate    Plan:  1. Follow up with this provider on 4/12/21.  Did make Jeanie aware that my May calendar was open and she reported that she would call to schedule May visit in the near future.  2. See  psychiatric consultant, Dr. Warren on 3/22/21.  3. See pt instructions for homework and follow up from today.  Jeanie can access this via avolution.    Jody Rankin, PhD, LP    Next treatment plan update due on 5/10/21.  Next diagnostic update due 2/10/22.

## 2021-03-19 NOTE — PATIENT INSTRUCTIONS
Good to talk with you today Jeanie.  I know there are still challenges in your life, but glad to hear that things have improved at least somewhat.    Today we talked about a plan to improve your sleep which included the followin.  List all the reasons sleep is important and review this list at scheduled bed time.  For example,    If I don't get enough sleep I will be exhausted at work tomorrow which will make things even harder.  Poor sleep makes my mood worse.  Poor sleep makes it harder to lose weight.    2.Contingency management.  Track the nights you are in bed at a set time (11pm) via habit tracker tyler of some sort.  Allow yourself to buy one book per month if you get 20 nights of getting in bed by 11pm within the month.    We also talked about some goals to get back on track with good nutrition and exercise.  1.  Use MyFitness Pal to track calories and activity.  2.  Pick an activity that feels attainable and that you enjoy as this will make it easier to maintain.  Think about increasing walks to the park.  If you add a little running too that's great, but you will get benefit from walking alone too.  3.  Remember to practice self-compassion and kindness if you don't attain all your goals right away or if you slip up every now and again.  We all do!  We are more likely to get back on the right track when we are kind to ourselves rather than judgmental.    Remember that the May schedule is open and that you are welcome to call clinic to schedule visits in May at any time.

## 2021-03-22 ENCOUNTER — VIRTUAL VISIT (OUTPATIENT)
Dept: PSYCHOLOGY | Facility: CLINIC | Age: 41
End: 2021-03-22
Payer: COMMERCIAL

## 2021-03-22 DIAGNOSIS — F33.1 MAJOR DEPRESSIVE DISORDER, RECURRENT, MODERATE (H): Primary | ICD-10-CM

## 2021-03-22 PROCEDURE — 90792 PSYCH DIAG EVAL W/MED SRVCS: CPT | Mod: HN | Performed by: STUDENT IN AN ORGANIZED HEALTH CARE EDUCATION/TRAINING PROGRAM

## 2021-03-22 NOTE — PROGRESS NOTES
Preceptor attestation:    I discussed the patient with the resident, and I spoke to the patient by video. I have verified the content of the note, which accurately reflects my assessment of the patient and the plan of care.  The patient was also discussed with Dr. Perez of psychiatry.    Supervising physician: Christy Bright MD  [facility]
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
## 2021-04-12 ENCOUNTER — VIRTUAL VISIT (OUTPATIENT)
Dept: PSYCHOLOGY | Facility: CLINIC | Age: 41
End: 2021-04-12
Payer: COMMERCIAL

## 2021-04-12 DIAGNOSIS — F41.1 GENERALIZED ANXIETY DISORDER: Primary | ICD-10-CM

## 2021-04-12 DIAGNOSIS — F33.1 MAJOR DEPRESSIVE DISORDER, RECURRENT, MODERATE (H): ICD-10-CM

## 2021-04-12 PROCEDURE — 90837 PSYTX W PT 60 MINUTES: CPT | Mod: GT | Performed by: PSYCHOLOGIST

## 2021-04-12 NOTE — PATIENT INSTRUCTIONS
Schedule follow up with Dr. Rankin in 3-4 weeks.    Schedule follow up with Dr. Timmy HERNÁNDEZ.    Sleep plan:  Be in bed by 10:30pm. Consider going to bed earlier if you find yourself nodding off on the couch.  Keep tracking this!  Revised contingency management:  Revised goal to 9 nights in bed by 10:30 in the next two weeks.  If successful you can buy a new book!    Fitness plan:  Make it fun - talk to Nitish about couch to 5K virtual run.  Plan to walk 30 minutes per day. This can be all at once or broken up over lunch and after work.    Nutrition plan:  Plan to bring your own lunch 3 times per week.  Reserve your best energy for the things that are most important to you right now like meal prep/planning.  Make a weekly or nightly breakfast plan.  Plan to execute this after your afternoon walk.    Reserve TV time until the other priorities have been met.    If you are interested in the future, see below for DBT referral options:     [facility]  [address]  [place] [address]  [phone]  Does not accept Medicare for DBT    [facility]   [address] [place], [place]    [phone]  **[place], [place], and [place] locations as well  Does not accept Medicare for DBT    [facility]  [address]  [place] [address] ([place] on [address] [place] [address])  [phone]  Does not accept Medicare for DBT    [facility] (Multiple locations available)  Does accept Medicare at some locations. If you have Medicare, make sure to ask about availability of covered care at various locations.   [address]  [address]  [place] [address]  [phone]     [address]   [place] [address]  [phone]    [address]   [place] [address]  [phone]    [address]   [place] [address]  [phone]    [address] [address], [place]  [address]  [phone]    [facility]  [address] [place] [address] [address], [place] [id number] [phone]  Does not accept Medicare for DBT

## 2021-04-12 NOTE — PROGRESS NOTES
Telemedicine Visit: The patient's condition can be safely assessed and treated via synchronous audio and visual telemedicine encounter.      Reason for Telemedicine Visit: COVID-19 related restrictions    Originating Site (Patient Location): Patient's home    Distant Site (Provider Location): [facility]: [facility]    Consent:  The patient/guardian has verbally consented to: the potential risks and benefits of telemedicine (video visit) versus in person care; bill my insurance or make self-payment for services provided; and responsibility for payment of non-covered services.     Mode of Communication:  Video Conference via ESILLAGE    As the provider I attest to compliance with applicable laws and regulations related to telemedicine.    Start of call:  9:00am  End of call: 9:58am    Emergency contact: Mago Kirkpatrick [phone]  Closest Emergency Room: [facility] Progress Note    Client Name: Jeanie French    Service Type:  Individual  Length of Visit: 58 minutes  Attendees: Jeanie was alone    Identifying Information and Presenting Problem:    The patient is a 41 year old American female who is being seen for problematic symptoms of anxiety.    Treatment Objective(s) Addressed in This Session:  Decrease anxiety via learning adaptive coping mechanisms.  Increase confidence in managing life stressors.  Decrease depression.    Progress on / Status of Treatment Objective(s) / Homework:  Mildly improved anxiety/depression    PHQ-9 SCORE 1/8/2020 7/22/2020 2/10/2021   PHQ-9 Total Score - - -   PHQ-9 Total Score 10 13 15       JAMAR-7 SCORE 1/8/2020 7/22/2020 2/10/2021   Total Score - - -   Total Score 12 12 13       Topics Discussed/Interventions Provided:    Update on mood/anxiety:  Reports things have been a bit better with mood and anxiety.  Not 100% sure on why this would be.  Still in waiting mode with relationship but some slow, gradual progress.  Partner's son will  be graduating HS which is a relief/lifts burden.  House is still not listed.  Feels slow but getting closer.  A few minor improvements at work too (see below).  A few moments of panic but these pass more quickly and are less frequent than in the past.  Jeanie reflects that it is possible that Prozac is helping but not 100% sure about that.    Psychiatry consult:  Saw Dr. Warren on 3/22/21.   Didn't find this very helpful.  Didn't know appt would be that long - took two hours.  Provider did read file as she didn't want to have preconceived notions which was frustrating for Jeanie who felt it would have been nice if she had reviewed chart for past medications which had already been tried.      Treatment recommendations from Dr. Warren included the following:   - Increase bedtime hydroxyzine to 75 mg, decrease back to 50 mg if excessively sedated in the morning:  Jeanie reports she did not make this change as she wanted to give Prozac more time to work.  Still taking 50 at bedtime.   - Recommend further fluoxetine titration if tolerated, per patient preference given several past medication trials. If she has side effects or has no response, may cross-titrate to sertraline or escitalopram:  Jeanie states she will make appt with Dr. Warner to see about increasing Prozac.     - Other treatment recommendations included possible referral to DBT for structured help with emotional regulation: Jeanie states she is not opposed to trying this but not sure she wants to commit more time to therapy right now.  Provided options for this in AVS today if she would like to explore this in the future.    Work:  Has improved slightly.  She and coworker proposed changing workflow in a way that has improved workload somewhat.  Working to communicate with boss about workload and technology support (e.g., storage) issues.  Is starting to get more acknowledgement and support for these concerns from leadership.  Still a bit skeptical about how this  "will look going forward, but trying to acknowledge progress.  Congratulated her for being able to see and acknowledge incremental change.    Follow up on homework:    Follow up on homework assigned to improve your sleep:    1.  List all the reasons sleep is important and review this list at scheduled bed time: Did not do this, but was cognizant and motivated to make these changes.   2.Contingency management:  Track the nights you are in bed at a set time (11pm) via habit tracker tyler of some sort.  Allow yourself to buy one book per month if you get 20 nights of getting in bed by 11pm within the month: Had planned to go to bed at 11pm most nights starting on April 1, but has been falling asleep on the couch instead and then waking up and needing to go to bed (12:30am - 2am).  Falling asleep on the couch between 10-11pm.  Fragmented sleep is not as restful.  Discussed options to improve sleep plan including possibility that she may need to go to bed earlier to avoid fragmented sleep (see pt instructions).    Follow up on homework assigned to get back on track with good nutrition and exercise:    1.  Use MyFitness Pal to track calories and activity: Not doing this.  Has thought about it many times but not acted.  Disappointed she has gained back 10lbs in one month.  Feels badly about this.  Bought new jeans that are now too tight.  \"My diet has been terrible.\"  Eating lost of sweets.  Not many vegetables.  Not motivated to cook.  Could buy premade salads but worried she will just let it rot.  Thinks she would benefit from more meal planning.  Lunches out also interfere with eating better. Does better when she brings her own lunch. Developed plan to improve nutrition (see pt instructions).   2.  Pick an activity that feels attainable and that you enjoy as this will make it easier to maintain (e.g., running, walks, etc.): Has gone running twice but it was really hard.  Had not run since last fall.  Hard to fit exercise " into schedule.  Discussed strategies for scheduling this.  Spends time with partner after work - discussed asking partner if he would be willing to partner with her on this but he is not a big fan of walks.  Running is more of a commitment as she needs to change, gets sweaty, etc.  Does get facebook posts about virtual runs which could be fun.  Mom has finished radiation, still doing some infusion treatments for her cancer.  Wanted to run half marathon after this was done and Jeanie is considering approaching her on this in the future.  Developed activity plan (see pt instructions).     Other life goals:  Frustrated that there is no time for fitting in other projects that are important to her.  Reflected that the goals we set today focus on maximizing her health and well being which is the foundation for increasing energy which could better allow her to address other goals.  Encouraged self care as first step towards more diverse array of long term goals in the future.    Assessment: Jeanie appeared to be active and engaged in today's session and was receptive to feedback.     Mental Status: Jeanie French appeared generally alert and oriented. Dress was casual and appropriate to the weather and occasion. Grooming and hygiene were good. Eye contact was good. Speech was of normal volume and rate and was clear, coherent, and relevant. Mood was more euthymic than at our last visit.  Thought processes were relevant, logical and goal-directed. Thought content was WNL with no evidence of psychotic or paranoid features. No evidence of SI/HI or self-harm, intent, or plans. Memory appeared grossly intact. Insight and judgment appeared intact and patient exhibited good impulse control during the appointment.     Does the patient appear to be at imminent risk of harm to self/others at this time? No    The session was necessary to address symptoms of anxiety and interpersonal distress that have been interfering with patient's  ability to function in her life.  Ongoing psychotherapy is necessary to enhance coping skills as well as provide support and structure for problem solving.    Diagnosis (DSM-5):    Generalized Anxiety Disorder  Major Depressive Disorder, recurrent, moderate    Plan:  1. Follow up with this provider on in 3-4 weeks.  Will future task myself to have  reach out on this if not scheduled in 1-2 weeks.  2. See pt instructions for homework and follow up from today.  Jeanie can access this via [employer].  3. Schedule with Dr. Warner after visit today.  Will check on status of this appointment in a few weeks as well.    Jody Rankin, PhD, LP    Next treatment plan update due on 5/10/21.  Next diagnostic update due 2/10/22.

## 2021-04-20 ENCOUNTER — OFFICE VISIT (OUTPATIENT)
Dept: FAMILY MEDICINE | Facility: CLINIC | Age: 41
End: 2021-04-20
Payer: COMMERCIAL

## 2021-04-20 VITALS
SYSTOLIC BLOOD PRESSURE: 122 MMHG | BODY MASS INDEX: 25.62 KG/M2 | DIASTOLIC BLOOD PRESSURE: 77 MMHG | RESPIRATION RATE: 16 BRPM | TEMPERATURE: 97.9 F | HEART RATE: 74 BPM | WEIGHT: 176 LBS | OXYGEN SATURATION: 97 %

## 2021-04-20 DIAGNOSIS — F41.1 GENERALIZED ANXIETY DISORDER: ICD-10-CM

## 2021-04-20 DIAGNOSIS — F33.2 SEVERE EPISODE OF RECURRENT MAJOR DEPRESSIVE DISORDER, WITHOUT PSYCHOTIC FEATURES (H): ICD-10-CM

## 2021-04-20 DIAGNOSIS — D64.9 ANEMIA, UNSPECIFIED TYPE: Primary | ICD-10-CM

## 2021-04-20 LAB
FERRITIN SERPL-MCNC: 5 NG/ML (ref 10–130)
HEMOGLOBIN: 11.6 G/DL (ref 11.7–15.7)

## 2021-04-20 PROCEDURE — 36415 COLL VENOUS BLD VENIPUNCTURE: CPT | Performed by: STUDENT IN AN ORGANIZED HEALTH CARE EDUCATION/TRAINING PROGRAM

## 2021-04-20 PROCEDURE — 99214 OFFICE O/P EST MOD 30 MIN: CPT | Performed by: STUDENT IN AN ORGANIZED HEALTH CARE EDUCATION/TRAINING PROGRAM

## 2021-04-20 PROCEDURE — 85018 HEMOGLOBIN: CPT | Performed by: STUDENT IN AN ORGANIZED HEALTH CARE EDUCATION/TRAINING PROGRAM

## 2021-04-20 RX ORDER — BUSPIRONE HYDROCHLORIDE 30 MG/1
TABLET ORAL
Qty: 180 TABLET | Refills: 1 | Status: SHIPPED | OUTPATIENT
Start: 2021-04-20 | End: 2021-12-30

## 2021-04-20 RX ORDER — HYDROXYZINE PAMOATE 25 MG/1
CAPSULE ORAL
Qty: 180 CAPSULE | Refills: 5 | Status: SHIPPED | OUTPATIENT
Start: 2021-04-20 | End: 2021-07-01

## 2021-04-20 RX ORDER — FLUOXETINE 40 MG/1
40 CAPSULE ORAL DAILY
Qty: 90 CAPSULE | Refills: 0 | Status: SHIPPED | OUTPATIENT
Start: 2021-04-20 | End: 2021-07-12

## 2021-04-20 ASSESSMENT — PATIENT HEALTH QUESTIONNAIRE - PHQ9
5. POOR APPETITE OR OVEREATING: SEVERAL DAYS
SUM OF ALL RESPONSES TO PHQ QUESTIONS 1-9: 11

## 2021-04-20 ASSESSMENT — ANXIETY QUESTIONNAIRES
1. FEELING NERVOUS, ANXIOUS, OR ON EDGE: NEARLY EVERY DAY
6. BECOMING EASILY ANNOYED OR IRRITABLE: NEARLY EVERY DAY
IF YOU CHECKED OFF ANY PROBLEMS ON THIS QUESTIONNAIRE, HOW DIFFICULT HAVE THESE PROBLEMS MADE IT FOR YOU TO DO YOUR WORK, TAKE CARE OF THINGS AT HOME, OR GET ALONG WITH OTHER PEOPLE: VERY DIFFICULT
2. NOT BEING ABLE TO STOP OR CONTROL WORRYING: NEARLY EVERY DAY
3. WORRYING TOO MUCH ABOUT DIFFERENT THINGS: NEARLY EVERY DAY
7. FEELING AFRAID AS IF SOMETHING AWFUL MIGHT HAPPEN: NOT AT ALL
5. BEING SO RESTLESS THAT IT IS HARD TO SIT STILL: NOT AT ALL
GAD7 TOTAL SCORE: 13

## 2021-04-20 NOTE — PROGRESS NOTES
There are no exam notes on file for this visit.    SUBJECTIVE  Jeanie French is a 41 year old female with past medical history significant for    Patient Active Problem List   Diagnosis     Cancer of tongue (H)     Health Care Home     Generalized anxiety disorder     Tenosynovitis of the right index finger     Wheezing     Irritable bowel syndrome (IBS)     Exercise-induced asthma     Sinusitis, chronic     Allergic rhinitis, unspecified allergic rhinitis type     Hypovitaminosis D     Adjustment disorder with anxiety     Menorrhagia with regular cycle     Major depressive disorder with single episode     Others present at the visit:  None    Presents for   Chief Complaint   Patient presents with     Follow Up     Pt is here for follow up.     Medication Reconciliation     Complete.      Patient presents for follow-up today on multiple issues.    Mood.  She continues to have difficulty with anxiety.  Does feel like the depression is somewhat better.  Sleep has been a challenge, and she is sharing that she needs a lot of sleep, but continues to stay up too late in the ninth, because she does not want to have to get up in the morning to go to work.  Has been feeling pretty tired.  She feels like she can live and function with the anxiety, but not the depression.  Feels like the anxiety will get better were when she is feeling depressed she feels more hopeless.  She would like to increase Prozac today.  Is not sure if the medications been helpful but has noticed some improvement in her symptoms, and has not had any side effects.  Is hopeful that this might be helpful.  She shared that she talked with the psych consult team about switching from hydroxyzine to propranolol, but was worried about doing this because of the hydroxyzine has actually been helpful for the anxiety.  We did talk some about adjusting timing of the hydroxyzine to help better with sleep and she is interested in trying this to.    GI symptoms  and diarrhea.  She is scheduled to see the GI specialist on April 30.  She noticed that her symptoms have gotten worse since she changed her diet the end of February.  Prior to this she was doing numerous and was paying a lot of attention to eating, getting more fruits and vegetables and such.  Needed a break, and the diarrhea has been worse since then.  Never really went away.  She has been taking some Imodium and this seems to help.  She also notices it improves when she increases her fiber.  Still very bothersome for her.  She is starting to wonder if there might be more than just IBS, and is hoping to be able to talk about this more with the GI specialist at upcoming appointment.    Anemia.  Her menstrual cycles remain heavy.  Not having restless leg symptoms currently.  Not feeling lightheaded or dizzy.  Has not been taking any iron.  Iron tends to mess up her bowels.  She has been feeling tired, but shares that there are multiple things that could be contributing to this.    Back pain.  This is feeling better.  She still has some stiffness but it is improving.  She is seeing the chiropractor once per month.  She does feel like the medications she received last time including the steroids were helpful.  She did have some increased hand shaking following the course of prednisone.  She shares that her chiropractor thinks she may need further work-up if things are not improving, including testing like an MRI.  She is been noted to have some disc troubles in the past.  She not taking anything for her back right now, but sure she has been taking some Excedrin to help with some tooth and sinus pain.  This overall is stable and improving.  She does not feel like she needs any support in that area right now.    OBJECTIVE:  Vitals: /77 (BP Location: Left arm, Patient Position: Sitting, Cuff Size: Adult Regular)   Pulse 74   Temp 97.9  F (36.6  C) (Oral)   Resp 16   Wt 79.8 kg (176 lb)   LMP 04/08/2021 (Exact  Date)   SpO2 97%   Breastfeeding No   BMI 25.62 kg/m    BMI= Body mass index is 25.62 kg/m .   Vitals:  Vitals are reviewed and are within the normal range  Gen:  Alert, pleasant, no acute distress  Psych: Mood and affect are somewhat flat.  Insight is good.  Thought processes logical.      PHQ 7/22/2020 2/10/2021 4/20/2021   PHQ-9 Total Score 13 15 11   Q9: Thoughts of better off dead/self-harm past 2 weeks Not at all Not at all Not at all     JAMAR-7 SCORE 7/22/2020 2/10/2021 4/20/2021   Total Score - - -   Total Score 12 13 13     Results for orders placed or performed in visit on 04/20/21   Hemoglobin (HGB) ([facility])     Status: Abnormal   Result Value Ref Range    Hemoglobin 11.6 (L) 11.7 - 15.7 g/dL         ASSESSMENT AND PLAN:      Jeanie was seen today for follow up and medication reconciliation.    Diagnoses and all orders for this visit:    Anemia, unspecified type.  Hemoglobin is low, I suspect her ferritin will be as well.  Will avoid iron for now as she is asymptomatic, and this causes worsening GI symptoms.  Discussed follow-up for Pap and further evaluation including discussion of birth control in the future.  -     Hemoglobin (HGB) ([facility])  -     Ferritin ([facility])    Severe episode of recurrent major depressive disorder, without psychotic features (H).    Generalized anxiety disorder  We will increase her Prozac from 20-40.  Continue with the BuSpar and hydroxyzine.  Discussed using hydroxyzine about an hour before she is anticipating or wanting to go to bed to help with sleep.  Continue with psychotherapy.  She is also getting conversations with her psychologist about DBT, and is considering this in the future.  -     FLUoxetine (PROZAC) 40 MG capsule; Take 1 capsule (40 mg) by mouth daily  -     busPIRone HCl (BUSPAR) 30 MG tablet; TAKE 1 TABLET BY MOUTH TWICE A DAY  -     hydrOXYzine (VISTARIL) 25 MG capsule; Take 25mg TID and 50-100mg daily at night.  Qty for 1 month.    Back pain: Overall  improving.  No red flag symptoms.  Continue with chiropractor.  Encouraged core strength to help with stability.    Irritable bowel syndrome.  No changes today.  Has GI consult coming up on April 30.  Will readdress after that visit.    Patient Instructions   1)  Increase the Prozac to 40mg per day  Refill your other medications  Continue with Dr. Rankin    2)  For anemia, will check levels today  Hold off on Iron supplementation for now.      3)  For Stomach  Keep appointment coming up with the GI specialists    4)  For back  Continue with the chiropracter  Work on overall core strength.           Follow up in 1 month for recheck mood and adjust medications as needed.      Anayeli Warner MD

## 2021-04-20 NOTE — LETTER
April 21, 2021      Jeanie French  [address] [place] [address]        Dear ,    We are writing to inform you of your test results.    Here is a copy of your lab results.  Your hemoglobin and ferritin are heading downward again.  The current level is okay, so you do not need to start the iron pills again right now, but we'll have to keep an eye on things.  Please call the clinic at [phone] if you have any questions.      Resulted Orders   Hemoglobin (HGB) ([facility])   Result Value Ref Range    Hemoglobin 11.6 (L) 11.7 - 15.7 g/dL   Ferritin ([facility])   Result Value Ref Range    Ferritin 5 (L) 10 - 130 ng/mL    Narrative    Test performed by:  [facility]  [address] [place] [address]       If you have any questions or concerns, please call the clinic at the number listed above.       Sincerely,      Anayeli Warner MD

## 2021-04-20 NOTE — PATIENT INSTRUCTIONS
1)  Increase the Prozac to 40mg per day  Refill your other medications  Continue with Dr. Rankin    2)  For anemia, will check levels today  Hold off on Iron supplementation for now.      3)  For Stomach  Keep appointment coming up with the GI specialists    4)  For back  Continue with the chiropracter  Work on overall core strength.

## 2021-04-21 ASSESSMENT — ANXIETY QUESTIONNAIRES: GAD7 TOTAL SCORE: 13

## 2021-04-21 ASSESSMENT — ASTHMA QUESTIONNAIRES: ACT_TOTALSCORE: 24

## 2021-04-21 NOTE — RESULT ENCOUNTER NOTE
Jeanie French-    Here is a copy of your lab results.  Your hemoglobin and ferritin are heading downward again.  The current level is okay, so you do not need to start the iron pills again right now, but we'll have to keep an eye on things.  Please call the clinic at [phone] if you have any questions.      Anayeli Warner MD    Please send results to patient.

## 2021-04-30 ENCOUNTER — TRANSFERRED RECORDS (OUTPATIENT)
Dept: HEALTH INFORMATION MANAGEMENT | Facility: CLINIC | Age: 41
End: 2021-04-30

## 2021-05-04 NOTE — PROGRESS NOTES
Telemedicine Visit: The patient's condition can be safely assessed and treated via synchronous audio and visual telemedicine encounter.      Reason for Telemedicine Visit: COVID-19 related restrictions    Originating Site (Patient Location): Patient's home    Distant Site (Provider Location): [facility]: [facility]    Consent:  The patient/guardian has verbally consented to: the potential risks and benefits of telemedicine (video visit) versus in person care; bill my insurance or make self-payment for services provided; and responsibility for payment of non-covered services.     Mode of Communication:  Reached out via Video Conference via PowWowHR but switched to Doximity when this was not working.    As the provider I attest to compliance with applicable laws and regulations related to telemedicine.    Start of call:  9:02am  End of call: 10:03am    Emergency contact: Mago Kirkpatrick [phone]  Closest Emergency Room: [facility] Progress Note    Client Name: Jeanie French    Service Type:  Individual  Length of Visit: 61 minutes  Attendees: Jeanie was alone    Identifying Information and Presenting Problem:  The patient is a 41 year old American female who is being seen for problematic symptoms of anxiety.      Treatment Objective(s) Addressed in This Session:  Decrease anxiety via learning adaptive coping mechanisms.  Increase confidence in managing life stressors.  Decrease depression.    Progress on / Status of Treatment Objective(s) / Homework:  Mildly improved anxiety/depression    PHQ-9 SCORE 7/22/2020 2/10/2021 4/20/2021   PHQ-9 Total Score - - -   PHQ-9 Total Score 13 15 11       JAMAR-7 SCORE 7/22/2020 2/10/2021 4/20/2021   Total Score - - -   Total Score 12 13 13       Topics Discussed/Interventions Provided:    GI concerns:  Saw GI doctor recently who wants to do colonoscopy and endoscopy next Tuesday. Also some blood work tomorrow.  Checking for  "parasites due to travel.  A bit nervous about what they may find.  Worried it could be Celiac.  Reflected on the pros and cons of a clean and tidy diagnosis but there is still much uncertainty.  Discussed managing anxiety by being aware of thoughts and trying to take one step at a time.  If feeling more anxious, ask self \"Am I two steps ahead?\" and try to return to current moment.    Fatigue:  Has been super tired.  Developed sleep plan at the last visit and Jeanie reports she has been going to bed quite a bit earlier than in the past (mostly before 11pm compared to 12-3am previously).  Despite increased sleep, still feeling extremely fatigued and often needs to nap after work.  Perceives that mood and anxiety are also improved so still confused about level of fatigue.  Wondering about potential role of anemia, thyroid and will continue to work with her doctor's to assess.       Fitness plan:  At our last visit, set goal to walk 30 minutes per day and talk with Nitish about signing up for 5K.  Jeanie reports this went poorly.  Walked 20 minutes a few days but not as often as she would have liked.  Nitish doesn't like walking and she did not talk with him about signing up for a run.  Reflected on what could be learned from this experience.  Nitish does like train photography and videography.  Goes out to do these shoots.  Reflected that she could go with him and walk while he shoots.  Also does better with scheduled activities so could consider signing up for a class.  She has also been more successful exercising on the way home as it is hard to go back out again once home.  Set goals based on this discussion (see pt instructions).    Nutrition plan:  Again, Jeanie notes that this has not been going very well.  Did bring lunch to work 2 days per week (our goal was 3) which is improved from the past.  Notes that dinner is more problematic as she is too tired to cook.  Did not do any meal planning as this feels overwhelming.  " "    Importance 7/10:  Wants to lose weight, may contribute to IBS symptoms   Confidence 4/10:  Has cooked in the past, has lots of recipes she would like to try  Factors associated with past success:  No pandemic stress, less stress/anxiety overall, got home earlier.  Reflected that she often spends time with Nitish after work running errands.    Problem solving:   Discussed idea of shared meals with Nitish but doesn't think Nitish could come to her house after work as he lives in [place] with son. She can't go to Nitish's house if his son is there.  Thinks she might be more successful with Meal Kits and will look into this.      Mood:  Jeanie shares feeling \"demoralized\" as she feels she is not getting stuff done in her life.  Further inquiry revealed that she is actually quite productive and did a lot of cleaning and household tasks last weekend in addition to working full time.  Encouraged giving herself credit for what she does accomplish.    Noticing the good:  Cleaning the whole house thoroughly.  Mowed the lawn.  Laundry.  Replacing batteries/fixing things.  Went to work.      While Jeanie acknowledges that this is helpful, she also expresses frustration that general maintenance of life takes up all her energy.  Nothing left for extra projects or goals.  Discussed budgeting time.  Identifying priorities.  Identifying resources which could lessen her load so she would have more time/energy for other things (e.g., robotic vacuum which could save her 2 hours per month, meal kits, etc.)      Relationship development:     Shared living:  Discussing Nitish moving in with her.  Now struggling to think about how to combine their belongings.  This is overwhelming.  Has not talked to him about her worries and I did encourage her to do so.  Would like to start fresh.  Discussed strategies for breaking this down into more manageable chunks.  Has never lived with a partner before.  Reflected that this is a big step and other " intermediate steps which might precede this (see below).   Introducing partner to family:  Nitish is finally open to being introduced to her family, something Jeanie has wanted for a long time.  She is surprised to find herself a bit anxious and reluctant.  Almost introduced him to parents last weekend but got nervous about this and didn't do it.  Now considering talking to her family about him over Mother's Day weekend and could introduce him after that.  Discussed strategies for managing her anxiety and talking with her family about her relationship.    Assessment: Jeanie appeared to be active and engaged in today's session and was receptive to feedback.     Mental Status: Jeanie French appeared generally alert and oriented. Dress was casual and appropriate to the weather and occasion. Grooming and hygiene were good. Eye contact was good. Speech was of normal volume and rate and was clear, coherent, and relevant. Mood continues to be more euthymic than in the past but still anxious.  Thought processes were relevant, logical and goal-directed. Thought content was WNL with no evidence of psychotic or paranoid features. No evidence of SI/HI or self-harm, intent, or plans. Memory appeared grossly intact. Insight and judgment appeared intact and patient exhibited good impulse control during the appointment.     Does the patient appear to be at imminent risk of harm to self/others at this time? No    The session was necessary to address symptoms of anxiety and interpersonal distress that have been interfering with patient's ability to function in her life.  Ongoing psychotherapy is necessary to enhance coping skills as well as provide support and structure for problem solving.    Diagnosis (DSM-5):    Generalized Anxiety Disorder  Major Depressive Disorder, recurrent, moderate    Plan:  1. Follow up with this provider in about one month.  Will future task myself to have  reach out on this if not scheduled in 1-2  weeks.  Will update treatment plan at that time.  2. See pt instructions for homework and follow up from today.  Jeanie can access this via NuLabel.    Jody Rankin, PhD, LP    Next treatment plan update due on 5/10/21.  Next diagnostic update due 2/10/22.

## 2021-05-05 ENCOUNTER — VIRTUAL VISIT (OUTPATIENT)
Dept: PSYCHOLOGY | Facility: CLINIC | Age: 41
End: 2021-05-05
Payer: COMMERCIAL

## 2021-05-05 DIAGNOSIS — F33.1 MAJOR DEPRESSIVE DISORDER, RECURRENT, MODERATE (H): ICD-10-CM

## 2021-05-05 DIAGNOSIS — F41.1 GENERALIZED ANXIETY DISORDER: Primary | ICD-10-CM

## 2021-05-05 PROCEDURE — 90837 PSYTX W PT 60 MINUTES: CPT | Mod: GT | Performed by: PSYCHOLOGIST

## 2021-05-05 NOTE — Clinical Note
"Hi team - I've been having problems connecting to patients for video visits for [facility] recently and wondered if others were having similar problems.  With this visit when I clicked to \"connect\" for video visit or \"start visit\" nothing happened.  This happened with another patient last week.  Any suggestions?  I was selwyn to use Doximity for the visit but would be nice to see if there was a fix for this problem with [facility].  Thanks in advance for your help!  Jody"

## 2021-05-05 NOTE — PATIENT INSTRUCTIONS
Great talking with you today Jeanie!  See below for some of the goals we discussed during our visit today:    1. Look into meal kit options.  2. See if there is an exercise class you want to take.  If you don't find a class you like, think about scheduling activity that you could do before getting home after work.  3. Give yourself credit for all you do.  4. Schedule follow up with Dr. Rankin in about one month.  We will update your treatment plan at that time.  Dr. Rankin will send you some forms to fill out prior to that visit either via TicketFire or in the mail.

## 2021-05-10 ENCOUNTER — TRANSFERRED RECORDS (OUTPATIENT)
Dept: HEALTH INFORMATION MANAGEMENT | Facility: CLINIC | Age: 41
End: 2021-05-10

## 2021-05-11 ENCOUNTER — TRANSFERRED RECORDS (OUTPATIENT)
Dept: HEALTH INFORMATION MANAGEMENT | Facility: CLINIC | Age: 41
End: 2021-05-11

## 2021-05-19 ENCOUNTER — TRANSFERRED RECORDS (OUTPATIENT)
Dept: HEALTH INFORMATION MANAGEMENT | Facility: CLINIC | Age: 41
End: 2021-05-19

## 2021-05-20 ENCOUNTER — TELEPHONE (OUTPATIENT)
Dept: FAMILY MEDICINE | Facility: CLINIC | Age: 41
End: 2021-05-20

## 2021-05-20 DIAGNOSIS — K58.0 IRRITABLE BOWEL SYNDROME WITH DIARRHEA: ICD-10-CM

## 2021-05-20 NOTE — TELEPHONE ENCOUNTER
[facility] phone call message- general phone call:    Reason for call: Pt recently had some procedures done and is feeling fatigued.  She is wondering about doing iron infusions via IV.  Please advise    Return call needed: Yes    OK to leave a message on voice mail? Yes    Primary language: English      needed? No    Call taken on May 20, 2021 at 10:49 AM by Kp Clifford

## 2021-05-20 NOTE — RESULT ENCOUNTER NOTE
Jeanie French-    I received the results from your EGD and colonoscopy via records.  I look forward to hearing more from Dr. Eason about her recommendations and ideas to improve the diarrhea.  Please call the clinic at [phone] if you have any questions.      Anayeli Warner MD    Please send results to patient.

## 2021-05-20 NOTE — LETTER
May 20, 2021      Jeanie FARSHAD French  [address] [place] [address]        Dear MsAnne MarieManolo,    We are writing to inform you of your test results.    I received the results from your EGD and colonoscopy via records.  I look forward to hearing more from Dr. Eason about her recommendations and ideas to improve the diarrhea.  Please call the clinic at [phone] if you have any questions.       No results found from the In Basket message.    If you have any questions or concerns, please call the clinic at the number listed above.       Sincerely,      Anayeli Warner MD

## 2021-05-21 ENCOUNTER — MEDICAL CORRESPONDENCE (OUTPATIENT)
Dept: HEALTH INFORMATION MANAGEMENT | Facility: CLINIC | Age: 41
End: 2021-05-21

## 2021-05-21 ENCOUNTER — RECORDS - HEALTHEAST (OUTPATIENT)
Dept: ADMINISTRATIVE | Facility: OTHER | Age: 41
End: 2021-05-21

## 2021-05-21 PROBLEM — D50.0 IRON DEFICIENCY ANEMIA DUE TO CHRONIC BLOOD LOSS: Status: ACTIVE | Noted: 2021-05-21

## 2021-05-21 NOTE — TELEPHONE ENCOUNTER
Paper orders for IV venofer signed and given to Ysabel to fax in to [facility].    Anayeli Warner MD    Routed to LINN Grady

## 2021-05-21 NOTE — TELEPHONE ENCOUNTER
Jeanie prefers to go to [facility]. Discussed that sometimes insurance does not cover this. She will reach out to me the end of next week if nobody has called her to schedule. ./LR

## 2021-05-26 ENCOUNTER — AMBULATORY - HEALTHEAST (OUTPATIENT)
Dept: PHARMACY | Facility: HOSPITAL | Age: 41
End: 2021-05-26

## 2021-05-26 DIAGNOSIS — D50.0 IRON DEFICIENCY ANEMIA SECONDARY TO BLOOD LOSS (CHRONIC): ICD-10-CM

## 2021-06-06 ENCOUNTER — RECORDS - HEALTHEAST (OUTPATIENT)
Dept: PHARMACY | Facility: HOSPITAL | Age: 41
End: 2021-06-06

## 2021-06-06 DIAGNOSIS — D50.0 IRON DEFICIENCY ANEMIA SECONDARY TO BLOOD LOSS (CHRONIC): ICD-10-CM

## 2021-06-07 ENCOUNTER — TELEPHONE (OUTPATIENT)
Dept: FAMILY MEDICINE | Facility: CLINIC | Age: 41
End: 2021-06-07

## 2021-06-07 NOTE — TELEPHONE ENCOUNTER
[facility] phone call message - order or referral request for patient:     Order or referral being requested: [facility] infusion center needs verbals orders to change the timing of her infusion appts from 3-4 days.     Return call to [phone]      Additional Comments:     OK to leave a message on voice mail? Yes    Primary language: English      needed? No    Call taken on June 7, 2021 at 12:38 PM by Marcela Lewis

## 2021-06-14 ENCOUNTER — INFUSION - HEALTHEAST (OUTPATIENT)
Dept: INFUSION THERAPY | Facility: HOSPITAL | Age: 41
End: 2021-06-14

## 2021-06-14 DIAGNOSIS — D50.0 IRON DEFICIENCY ANEMIA SECONDARY TO BLOOD LOSS (CHRONIC): ICD-10-CM

## 2021-06-14 ASSESSMENT — MIFFLIN-ST. JEOR: SCORE: 1542.65

## 2021-06-16 ENCOUNTER — OFFICE VISIT (OUTPATIENT)
Dept: FAMILY MEDICINE | Facility: CLINIC | Age: 41
End: 2021-06-16
Payer: COMMERCIAL

## 2021-06-16 VITALS
OXYGEN SATURATION: 98 % | RESPIRATION RATE: 16 BRPM | WEIGHT: 183 LBS | TEMPERATURE: 98.5 F | BODY MASS INDEX: 26.64 KG/M2 | HEART RATE: 72 BPM | DIASTOLIC BLOOD PRESSURE: 69 MMHG | SYSTOLIC BLOOD PRESSURE: 111 MMHG

## 2021-06-16 DIAGNOSIS — F33.2 SEVERE EPISODE OF RECURRENT MAJOR DEPRESSIVE DISORDER, WITHOUT PSYCHOTIC FEATURES (H): ICD-10-CM

## 2021-06-16 DIAGNOSIS — K52.831 COLLAGENOUS COLITIS: Primary | ICD-10-CM

## 2021-06-16 DIAGNOSIS — D50.0 IRON DEFICIENCY ANEMIA DUE TO CHRONIC BLOOD LOSS: ICD-10-CM

## 2021-06-16 DIAGNOSIS — K58.0 IRRITABLE BOWEL SYNDROME WITH DIARRHEA: ICD-10-CM

## 2021-06-16 DIAGNOSIS — K20.0 EOSINOPHILIC ESOPHAGITIS: ICD-10-CM

## 2021-06-16 DIAGNOSIS — F41.1 GENERALIZED ANXIETY DISORDER: ICD-10-CM

## 2021-06-16 PROCEDURE — 99214 OFFICE O/P EST MOD 30 MIN: CPT | Performed by: STUDENT IN AN ORGANIZED HEALTH CARE EDUCATION/TRAINING PROGRAM

## 2021-06-16 RX ORDER — BUDESONIDE 3 MG/1
CAPSULE, COATED PELLETS ORAL
COMMUNITY
Start: 2021-05-20

## 2021-06-16 NOTE — PROGRESS NOTES
Assessment & Plan     Collagenous colitis  Following with GI; diagnosed via colonoscopy on 5/18/21. Currently on budesonide taper with 3 mg/day x 2 weeks, 2 mg/day x 2 weeks, and 1 mg/day x 2 weeks - started on 5/22/21, end ~7/3/21. GI symptoms significantly improved on budesonide.  - Continue budesonide taper  - F/u with GI in person in September  - Patient can continue normal diet with dairy  - F/u with PCP in August for complete physical and to evaluate GI symptoms after finishing budesonide    Eosinophilic esophagitis  Following with GI; has history of eosinophilic esophagitis diagnosed at [facility] in ~2018 per chart review. Symptoms have been well-controlled on omeprazole 40 mg daily. Will decrease dose to 20 mg daily after budesonide taper and monitor symptoms.  - Continue current dose and decrease to 20 mg daily after finishing budesonide    Irritable bowel syndrome with diarrhea  Patient does feel that there is some component of irritable bowel.  Discussed that antidepressants like Prozac, help with this.  She will continue to watch diet, although I did encourage her to expand her diet further given this new diagnosis.    Generalized anxiety disorder  Improved control on 40 mg of Prozac.  We will call pharmacist to cancel the 20 mg descriptions.    Iron deficiency anemia due to chronic blood loss  She is starting her iron infusions currently.  We will have her follow-up 2 weeks after infusions are completed to recheck hemoglobin, ferritin and overall iron levels.  Discussed that this should improve levels for now, and that improved obstruction through her gut should help as well.  Okay to hold off starting oral contraception she is still planning hoping to get pregnant at some point in the near future.    F/u in 2 months for complete physical.    Yokasta Sparks, Medical Student Year 4    Preceptor Attestation:  I was present with the medical student who participated in the service and in the documentation of this  note. I have verified the history and personally performed the physical exam and medical decision making. I have verified the content of the note, which accurately reflects my assessment of the patient and the plan of care.   Supervising Physician:  Anayeli Warner MD.        [facility] CHRISTIANO Ware is a 41 year old who presents for the following health issues:    HPI     Mood: Prozac increased from 20 to 40 mg daily at our last visit in April. She feels like this has been really helpful for her mood and anxiety and she is now doing well. No SI/HI. She has a follow up visit scheduled with Dr. Rankin. She has been feeling fatigued and wondering if hydroxyzine is contributing. Is wondering if she could stop taking hydroxyzine regularly and see if that would improve her fatigue.    GI symptoms/diarrhea: She has been seeing GI and underwent workup including an EGD and colonoscopy on 5/18/21 that showed collagenous colitis in her colon. GI called her after and she was started on a budesonide taper on 5/22/21, with 3 mg/day x 2 weeks, 2 mg/day x 2 weeks, and 1 mg/day x 2 weeks. This has really helpful for her diarrhea symptoms - was previously having up to 5 episodes of diarrhea per day and recently has not had any episodes of diarrhea. Abdominal cramping and bloating have also improved. She has a follow up visit scheduled with GI in September.    For her eosinophilic esophagitis and dysphagia, GI told her to take omeprazole 40 mg daily while she's on the budesonide and then decrease her dose to 20 mg daily. She has not had any dysphagia symptoms recently and feels omeprazole is helpful. Was told that her EGD also showed eosinophilic esophagitis has resolved.    She is wondering what her diagnoses and symptoms will look like long term - is worried about her symptoms returning after stopping budesonide and reducing omeprazole dose. She additionally was told she could resume her normal diet  after starting budesonide, so she has been self-introducing dairy back into her diet without any issues so far. Is wondering if she could still continue eating a normal diet.    Anemia: Started receiving IV iron infusions - first infusion 2 days ago, and will have 5 total infusions over the next 2 weeks. She has been tolerating them well - no GI symptoms or other side effects.    Review of Systems   As above in HPI      Objective    /69 (BP Location: Left arm, Patient Position: Sitting, Cuff Size: Adult Regular)   Pulse 72   Temp 98.5  F (36.9  C) (Oral)   Resp 16   Wt 83 kg (183 lb)   LMP 06/10/2021 (Exact Date)   SpO2 98%   Breastfeeding No   BMI 26.64 kg/m    Body mass index is 26.64 kg/m .  Physical Exam   GENERAL: healthy, alert and no distress  RESP: lungs clear to auscultation - no rales, rhonchi or wheezes  CV: regular rate and rhythm, normal S1 S2, no S3 or S4, no murmur, click or rub, no peripheral edema and peripheral pulses strong    ----- Services Performed by a MEDICAL STUDENT in Presence of ATTENDING Physician-------

## 2021-06-16 NOTE — PATIENT INSTRUCTIONS
Okay to go back to regular diet including dairy.  LIsten to your body  Try Citrucel for fiber if needed.    CHeck labs for iron levels about 2 weeks after you finish the iron infusion, and then we'll talk and decide next steps.  Call LINN Grady Dr.'s nurse after you have finished your iron infusions, and she'll get you scheduled for that visit.     Dr. Warner will call Pharmacy about the prozac 20mg tabs to cancel them.  Keep taking the 40mg tabs.    Stop one of your daily during the day hydroxzine and see how you feel for 1 week, then try stopping the second daytime hydroxyzine dose.   Keep appointment with Dr. Rankin    Keep doing the Prozac and the Buspar as you have been doing.  Gradually increase exercise.      Follow up in August

## 2021-06-18 ENCOUNTER — INFUSION - HEALTHEAST (OUTPATIENT)
Dept: INFUSION THERAPY | Facility: HOSPITAL | Age: 41
End: 2021-06-18

## 2021-06-18 DIAGNOSIS — D50.0 IRON DEFICIENCY ANEMIA SECONDARY TO BLOOD LOSS (CHRONIC): ICD-10-CM

## 2021-06-26 NOTE — PROGRESS NOTES
Jeanie arrived A&Ox4 ambulatory and stable, confirm she is here for iron sucrose infusion and that she had no issues after her previous dose. PIV w ease ant L FA, excellent blood return, line easily flushed NS.  Jeanie was given iron sucrose 200mg IVP with NS @ 175mL/hr to rinse in; tolerated w/o sxs adverse Rxn, line flushed with NS and pt monitored. PIV dc'd and site covered with gauze/coban. Included in AVS today was a list of iron rich foods, pt states she is having a hard time finding iron rich food, other than spinach. Her only meat source is chicken and she admits even that is limited.   0950 Jeanie dc'd A&OX4 ambulatory and stable, will return Monday 6/21/21 for her next dose.

## 2021-06-26 NOTE — PROGRESS NOTES
Jeanie arrived ambulatory in a stable condition for 1st dose of  Iron sucrose. She verbalized she is doing okay except she is tired sometime. VSS. PIV placed on the left arm with good blood return. Iron sucrose given with NS running per order and  monitored for 15 minutes after, no ill effect noted. At the end, after flushing the line with NS, line was removed and site covered with guaze and coban. AVS printed and given to her. She was instructed on the side effect of Iron Sucrose and to call her provider with questions. She left ambulatory at about 10:30 in a stable condition and verbalized understanding of therapy plan and return to clinic.

## 2021-06-28 ENCOUNTER — INFUSION - HEALTHEAST (OUTPATIENT)
Dept: INFUSION THERAPY | Facility: HOSPITAL | Age: 41
End: 2021-06-28

## 2021-06-28 DIAGNOSIS — D50.0 IRON DEFICIENCY ANEMIA SECONDARY TO BLOOD LOSS (CHRONIC): ICD-10-CM

## 2021-06-28 ASSESSMENT — MIFFLIN-ST. JEOR: SCORE: 1529.04

## 2021-07-04 NOTE — PATIENT INSTRUCTIONS - HE
Patient Instructions by Kenyetta Velazquez RN at 2021  8:30 AM     Author: Kenyetta Velazquez RN Service: -- Author Type: Registered Nurse    Filed: 2021 10:08 AM Encounter Date: 2021 Status: Signed    : Kenyetta Velazquez RN (Registered Nurse)       Jeanie instructed on the side effect of Iron Venofer and to call her provider with questions.Patient Education     Venofer Injection 20 mg/mL  Uses  For anemia.  Instructions  This is an IV medicine. It is given through a sterile tube directly into the vein by a healthcare provider.  This medicine is given gradually through the IV line.  Read and make sure you understand the instructions for measuring your dose and using the syringe before using this medicine.  Always inspect the medicine before using.  The liquid should be clear and dark brown.  Do not use the medicine if it contains any particles or if it has changed color.  Keep this medicine at room temperature.  Protect medicine from light.  Speak with your nurse or pharmacist about how long the medicine can be stored safely at room temperature or in the refrigerator before it needs to be discarded.  Never use any medicine that has .  Discard any remaining medicine after your dose is given.  If you miss a dose, contact your doctor for instructions.  Please tell your doctor and pharmacist about all the medicines you take. Include both prescription and over-the-counter medicines. Also tell them about any vitamins, herbal medicines, or anything else you take for your health.  It is very important that you follow your doctor's instructions for all blood tests.  Cautions  Tell your doctor and pharmacist if you ever had an allergic reaction to a medicine. Symptoms of an allergic reaction can include trouble breathing, skin rash, itching, swelling, or severe dizziness.  Do not use the medication any more than instructed.  This medicine may cause dizziness or fainting, especially after exercising or in  hot weather. Be very careful when standing or sitting up quickly.  Tell the doctor or pharmacist if you are pregnant, planning to be pregnant, or breastfeeding.  Ask your pharmacist if this medicine can interact with any of your other medicines. Be sure to tell them about all the medicines you take.  Please tell all your doctors and dentists that you are on this medicine before they provide care.  Do not start or stop any other medicines without first speaking to your doctor or pharmacist.  Used needles and syringes should be thrown away properly in a medical waste container. Ask your doctor or pharmacist if you need help.  Do not share this medicine with anyone who has not been prescribed this medicine.  Side Effects  The following is a list of some common side effects from this medicine. Please speak with your doctor about what you should do if you experience these or other side effects.    constipation    diarrhea    dizziness    swelling of the legs, feet, and hands    muscle cramps    nausea    changes in taste or unpleasant taste    vomiting  Call your doctor or get medical help right away if you notice any of these more serious side effects:    chest pain    fainting    severe or persistent headache    fast or irregular heart beats    severe stomach or bowel pain    blurring or changes of vision  A few people may have an allergic reactions to this medicine. Symptoms can include difficulty breathing, skin rash, itching, swelling, or severe dizziness. If you notice any of these symptoms, seek medical help quickly.  Extra  Please speak with your doctor, nurse, or pharmacist if you have any questions about this medicine.  [email or web address]  IMPORTANT NOTE: This document tells you briefly how to take your medicine, but it does not tell you all there is to know about it.Your doctor or pharmacist may give you other documents about your medicine. Please talk to them if you have any  questions.Always follow their advice. There is a more complete description of this medicine available in English.Scan this code on your smartphone or tablet or use the web address below. You can also ask your pharmacist for a printout. If you have any questions, please ask your pharmacist.     2021 [employer].

## 2021-07-04 NOTE — PATIENT INSTRUCTIONS - HE
Patient Instructions by Justa Lyles RN at 6/18/2021  8:30 AM     Author: Justa Lyles RN Service: -- Author Type: Registered Nurse    Filed: 6/18/2021  9:44 AM Encounter Date: 6/18/2021 Status: Addendum    : Justa Lyles RN (Registered Nurse)    Related Notes: Original Note by Justa Lyles RN (Registered Nurse) filed at 6/18/2021  9:44 AM         Patient Education     Iron Supplements  Most people think of iron as a metal used to make pots, frying pans, and soup kettles. This same metal (or mineral) also plays a vital role in the body. Iron helps the blood cells carry oxygen. When you dont get enough iron, you may feel tired and lack energy. Over time, without enough iron, your body makes fewer red blood cells. This can cause a condition called iron-deficiency anemia. Since your body doesnt make iron, you must get it from foods or supplements.     Food sources of iron  Iron is found in a few types of foods. Good sources include:    Red meat, poultry, fish, eggs    Dried fruits (especially raisins, prunes, figs)    Legumes such as dried beans and lentils    Breads and cereals with iron added    Blackstrap molasses    Spinach    Foods cooked in cast-iron pans. This is especially true of acidic foods, such as tomatoes and skyla.   Why use a supplement?  Women often need additional iron because they lose blood during their menstrual period. But even grown men and boys may need a supplement at some point. You may need an iron supplement if any of the following is true for you:    I rarely eat foods that are high in iron (such as red meat, poultry, dried beans, and foods with iron added).    I am a vegetarian and I rarely eat legumes (dried beans, peas, lentils).    I have heavy menstrual periods.    I am pregnant or breastfeeding.    I have been diagnosed with iron-deficiency anemia.  If you take iron  Here are some tips to help you get the most from an iron supplement:    Take it  with vitamin C for better absorption.    Dont take an iron supplement with milk. The calcium in milk limits iron absorption.    Iron supplements can cause constipation. To prevent this, drink plenty of water, eat high-fiber foods, and exercise often. Also try an iron supplement with an added stool softener.    Eat a healthy diet to get all the nutrients your body needs.  Recommended iron intake  The amount of iron each person needs is different, and varies by age. Women who are pregnant or breastfeeding need extra iron. But taking more than the suggested amount is not always healthy. Your healthcare provider can help you choose the right amount of iron for you.   Date Last Reviewed: 6/1/2017 2000-2019 [employer]. [address] [place] [address], [place] [address]. All rights reserved. This information is not intended as a substitute for professional medical care. Always follow your healthcare professional's instructions.

## 2021-07-06 VITALS
RESPIRATION RATE: 18 BRPM | DIASTOLIC BLOOD PRESSURE: 67 MMHG | OXYGEN SATURATION: 100 % | TEMPERATURE: 98.2 F | SYSTOLIC BLOOD PRESSURE: 114 MMHG | HEART RATE: 61 BPM

## 2021-07-06 VITALS
HEIGHT: 70 IN | BODY MASS INDEX: 25.48 KG/M2 | OXYGEN SATURATION: 98 % | TEMPERATURE: 98.4 F | HEART RATE: 59 BPM | DIASTOLIC BLOOD PRESSURE: 65 MMHG | WEIGHT: 178 LBS | SYSTOLIC BLOOD PRESSURE: 113 MMHG

## 2021-07-06 VITALS
OXYGEN SATURATION: 98 % | HEIGHT: 70 IN | TEMPERATURE: 97.8 F | SYSTOLIC BLOOD PRESSURE: 116 MMHG | BODY MASS INDEX: 25.05 KG/M2 | WEIGHT: 175 LBS | HEART RATE: 68 BPM | DIASTOLIC BLOOD PRESSURE: 61 MMHG

## 2021-07-07 NOTE — PROGRESS NOTES
Jeanie arrived at about 08:40  Ambulatory in a stable condition for the last dose of iron sucrose, VSS. PIV placed on the right AC venofer administered with no ill effect noted, PIV removed and site covered with guaze and coban. She has appt to check her  Iron in 2 weeks and review with her primary. She left at about 09:30in a stable condition.

## 2021-07-14 ENCOUNTER — OFFICE VISIT (OUTPATIENT)
Dept: PSYCHOLOGY | Facility: CLINIC | Age: 41
End: 2021-07-14
Payer: COMMERCIAL

## 2021-07-14 ENCOUNTER — LAB (OUTPATIENT)
Dept: LAB | Facility: CLINIC | Age: 41
End: 2021-07-14
Payer: COMMERCIAL

## 2021-07-14 DIAGNOSIS — F33.1 MAJOR DEPRESSIVE DISORDER, RECURRENT, MODERATE (H): ICD-10-CM

## 2021-07-14 DIAGNOSIS — F41.1 GENERALIZED ANXIETY DISORDER: Primary | ICD-10-CM

## 2021-07-14 DIAGNOSIS — R53.83 FATIGUE, UNSPECIFIED TYPE: Primary | ICD-10-CM

## 2021-07-14 DIAGNOSIS — R53.83 FATIGUE, UNSPECIFIED TYPE: ICD-10-CM

## 2021-07-14 LAB
BASOPHILS # BLD AUTO: 0 10E3/UL (ref 0–0.2)
BASOPHILS NFR BLD AUTO: 0 %
EOSINOPHIL # BLD AUTO: 0.2 10E3/UL (ref 0–0.7)
EOSINOPHIL NFR BLD AUTO: 6 %
ERYTHROCYTE [DISTWIDTH] IN BLOOD BY AUTOMATED COUNT: 17 % (ref 10–15)
FERRITIN SERPL-MCNC: 85 NG/ML (ref 10–130)
HCT VFR BLD AUTO: 36.6 % (ref 35–47)
HGB BLD-MCNC: 12.1 G/DL (ref 11.7–15.7)
IMM GRANULOCYTES # BLD: 0 10E3/UL
IMM GRANULOCYTES NFR BLD: 0 %
LYMPHOCYTES # BLD AUTO: 1.5 10E3/UL (ref 0.8–5.3)
LYMPHOCYTES NFR BLD AUTO: 34 %
MCH RBC QN AUTO: 28.9 PG (ref 26.5–33)
MCHC RBC AUTO-ENTMCNC: 33.1 G/DL (ref 31.5–36.5)
MCV RBC AUTO: 87 FL (ref 78–100)
MONOCYTES # BLD AUTO: 0.5 10E3/UL (ref 0–1.3)
MONOCYTES NFR BLD AUTO: 11 %
NEUTROPHILS # BLD AUTO: 2.1 10E3/UL (ref 1.6–8.3)
NEUTROPHILS NFR BLD AUTO: 49 %
PLATELET # BLD AUTO: 263 10E3/UL (ref 150–450)
RBC # BLD AUTO: 4.19 10E6/UL (ref 3.8–5.2)
TSH SERPL DL<=0.005 MIU/L-ACNC: 1.78 UIU/ML (ref 0.3–5)
WBC # BLD AUTO: 4.3 10E3/UL (ref 4–11)

## 2021-07-14 PROCEDURE — 84443 ASSAY THYROID STIM HORMONE: CPT

## 2021-07-14 PROCEDURE — 82728 ASSAY OF FERRITIN: CPT

## 2021-07-14 PROCEDURE — 36415 COLL VENOUS BLD VENIPUNCTURE: CPT

## 2021-07-14 PROCEDURE — 90837 PSYTX W PT 60 MINUTES: CPT | Performed by: PSYCHOLOGIST

## 2021-07-14 PROCEDURE — 85025 COMPLETE CBC W/AUTO DIFF WBC: CPT

## 2021-07-14 ASSESSMENT — PATIENT HEALTH QUESTIONNAIRE - PHQ9
SUM OF ALL RESPONSES TO PHQ QUESTIONS 1-9: 9
5. POOR APPETITE OR OVEREATING: SEVERAL DAYS

## 2021-07-14 ASSESSMENT — ANXIETY QUESTIONNAIRES
1. FEELING NERVOUS, ANXIOUS, OR ON EDGE: MORE THAN HALF THE DAYS
IF YOU CHECKED OFF ANY PROBLEMS ON THIS QUESTIONNAIRE, HOW DIFFICULT HAVE THESE PROBLEMS MADE IT FOR YOU TO DO YOUR WORK, TAKE CARE OF THINGS AT HOME, OR GET ALONG WITH OTHER PEOPLE: SOMEWHAT DIFFICULT
2. NOT BEING ABLE TO STOP OR CONTROL WORRYING: NEARLY EVERY DAY
5. BEING SO RESTLESS THAT IT IS HARD TO SIT STILL: NOT AT ALL
GAD7 TOTAL SCORE: 12
3. WORRYING TOO MUCH ABOUT DIFFERENT THINGS: NEARLY EVERY DAY
6. BECOMING EASILY ANNOYED OR IRRITABLE: NEARLY EVERY DAY
7. FEELING AFRAID AS IF SOMETHING AWFUL MIGHT HAPPEN: NOT AT ALL

## 2021-07-14 NOTE — LETTER
July 16, 2021      Jeanie YEN Manolo  [address] [place] [address]        Dear ,    We are writing to inform you of your test results.    Chad Ware.  Sorry about the confusion about the labs yesterday.  Your lab results came back in and they look excellent!  Your hemoglobin is at 12.1, and your ferritin, a measure of iron levels, is solidly in the normal range.  This is great.  Your thyroid testing is all normal.  I hope your energy is getting better, and if not, we should continue to look at other options.  I hope the stomach continues to improve, and that means the body absorbs nutrients better, which should help as well.  Please call the clinic at [phone] if you have any questions.        Resulted Orders   Ferritin   Result Value Ref Range    Ferritin 85 10 - 130 ng/mL   TSH with free T4 reflex   Result Value Ref Range    TSH 1.78 0.30 - 5.00 uIU/mL   CBC with platelets and differential   Result Value Ref Range    WBC Count 4.3 4.0 - 11.0 10e3/uL    RBC Count 4.19 3.80 - 5.20 10e6/uL    Hemoglobin 12.1 11.7 - 15.7 g/dL    Hematocrit 36.6 35.0 - 47.0 %    MCV 87 78 - 100 fL    MCH 28.9 26.5 - 33.0 pg    MCHC 33.1 31.5 - 36.5 g/dL    RDW 17.0 (H) 10.0 - 15.0 %    Platelet Count 263 150 - 450 10e3/uL    % Neutrophils 49 %    % Lymphocytes 34 %    % Monocytes 11 %    % Eosinophils 6 %    % Basophils 0 %    % Immature Granulocytes 0 %    Absolute Neutrophils 2.1 1.6 - 8.3 10e3/uL    Absolute Lymphocytes 1.5 0.8 - 5.3 10e3/uL    Absolute Monocytes 0.5 0.0 - 1.3 10e3/uL    Absolute Eosinophils 0.2 0.0 - 0.7 10e3/uL    Absolute Basophils 0.0 0.0 - 0.2 10e3/uL    Absolute Immature Granulocytes 0.0 <=0.0 10e3/uL       If you have any questions or concerns, please call the clinic at the number listed above.       Sincerely,      Anayeli Warner MD

## 2021-07-14 NOTE — PROGRESS NOTES
Behavioral Health Progress Note    Client Name: Jeanie French    Service Type:  Individual, In Person  Length of Visit: 55 minutes, started 10 minutes late today  Attendees: Jeanie was alone    Identifying Information and Presenting Problem:  The patient is a 41 year old woman who is being seen for problematic symptoms of anxiety.  Our first meeting was on 7/21/10 and we have met on and off since that time.  Our last visit was on 5/5/21.    Treatment Objective(s) Addressed in This Session:  Decrease anxiety via learning adaptive coping mechanisms.  Increase confidence in managing life stressors.  Decrease depression.    Progress on / Status of Treatment Objective(s) / Homework:  Improved anxiety/depression    PHQ-9 SCORE 2/10/2021 4/20/2021 7/14/2021   PHQ-9 Total Score - - -   PHQ-9 Total Score 15 11 9       JAMAR-7 SCORE 2/10/2021 4/20/2021 7/14/2021   Total Score - - -   Total Score 13 13 12       Topics Discussed/Interventions Provided:    Treatment plan update:  Things have been improving overall.  Things were even better a couple of weeks below when her GI symptoms and fatigue were better controlled (see below).  Hopes that this will resume when these symptoms are again under better control.  Thinks Prozac is helping.  Continuing hydroxyzine but hopes to wean off eventually.  Goals remain unchanged at this time.  See pt instructions for treatment plan update.    Fatigue/anemia/GI concerns:  At our last visit, Jeanie had been wondering about potential role of anemia, thyroid and planned to work with her doctor to assess.  Review of Epic today indicates she was diagnosed with collagenous colitis in her colon by GI and has started receiving iron infusions for anemia secondary to chronic blood loss.  Has been taking steroids which helped a lot.  Symptoms resumed when she completed steroid course about two weeks ago.  Felt terrible in the past week or so.  Has reached out to GI on this.  They advised her to resume  "steroids until their follow up in September.  Just re-started today.  Hopeful for more lasting treatment result with continued treatment.  Jeanie also completed a series of iron infusions since our last visit. Had been missing work due to fatigue related to anemia. Getting iron levels checked today and hopes this is now improved.  She is supposed to follow up with Dr. Warner in August.    Fitness plan:  At our last visit, our plan had been to go with Nitish on photo shoots and walk while he shoots.  She was also considering signing up for a class. Neither of these goals were achieved but today, Jeanie reports she signed up for 10 mile race in October.  Has been running off and on.  Got Run  tyler on her phone.  Still not satisfied with her consistency with this and we did set a goal around this today (see pt instructions).    Nutrition:  \"It's been so bad!\"  At our last visit, Jeanie was thinking of looking into Meal Kits as she has struggled with meal planning on her own.  Did not do this.  Thinks she has gained back all the weight she lost on Noom plus some.  Frustrated by this and feels increased motivation to make changes.  On a more positive note, has been able to tolerate dairy which has been good.  Discussed evidence that diets do not work long term and encouraged focus on making healthy choices rather than exclusively using numbers on the scale to track progress. Developed goals for improved nutrition in pt instructions.    Relationship development:  Jeanie shares a number of encouraging developments including the fact that she was able to introduce Nitish to her parents recently and that this went well.  In addition, Nitish has sold his house and has reached out to a  to facilitate his divorce.  This is going more slowly than Jeanie would like, but it is more progress than we have seen in some time.  Feels time pressure to move relationship forward due to her age and desire to have a baby.  Would also be " open to adopting a child but would like to have the experience of pregnancy and has curiosity about a biological child.  Thinks Nitish is on the same page with her but warrants additional discussion.  Also starting to look for new house as current home feels like it will be too small.  Encouraged taking one step at a time as they have yet to finalize divorce and live together.  Provided empathy for impatience to move to this next chapter in her life.  Encouraged focus on aspects of her life that she has control over and acknowledgment of forward movement.    Work:  Alludes to continued stress and difficulty at work but we did not have time to discuss today due to competing concerns.  Will revisit this at the next visit.    Assessment: Jeanie appeared to be active and engaged in today's session and was receptive to feedback.     Mental Status: Jeanie French appeared generally alert and oriented. Dress was casual and appropriate to the weather and occasion. Grooming and hygiene were good. Eye contact was good. Speech was of normal volume and rate and was clear, coherent, and relevant. Mood continues to be moderately anxious, but more euthymic than in the past.  Thought processes were relevant, logical and goal-directed. Thought content was WNL with no evidence of psychotic or paranoid features. No evidence of SI/HI or self-harm, intent, or plans. Memory appeared grossly intact. Insight and judgment appeared intact and patient exhibited good impulse control during the appointment.     Does the patient appear to be at imminent risk of harm to self/others at this time? No    The session was necessary to address symptoms of anxiety and interpersonal distress that have been interfering with patient's ability to function in her life.  Ongoing psychotherapy is necessary to enhance coping skills as well as provide support and structure for problem solving.    Diagnosis (DSM-5):    Generalized Anxiety Disorder  Major Depressive  Disorder, recurrent, moderate    Plan:  1. Follow up with this provider in 4-6 weeks.  Did not schedule on her way out of clinic today.  Will future task myself to have  reach out on this if not scheduled in 1-2 weeks.    2. See pt instructions for homework and follow up from today.  Jeanie can access this via [employer].    Jody Rankin, PhD, LP    Next treatment plan update due on 10/14/21.  Next diagnostic update due 2/10/22.

## 2021-07-14 NOTE — PATIENT INSTRUCTIONS
Good to see you today Jeanie!    Prior to our next visit, choose one recipe that you can make and freeze in small individual portions for quick and easy access when you are tired on week days.  Keep researching meal kit options to find an affordable one.  Great job starting running again!  Shoot for two runs and one other exercise element per week (e.g. walk, yoga, etc.).  Schedule follow up in 4-6 weeks with Dr. Rankin on your way out of clinic.    Treatment Plan    Client's Name: Jeanie French  YOB: 1980    Today's Date: 7/14/21   Date Diagnostic Update Due: 2/10/22    DSM-V Diagnoses:   Generalized Anxiety Disorder  Major Depressive Disorder, recurrent, moderate    Psychosocial / Contextual Factors: Stressors with her health and in her interpersonal and work life contribute to difficulty, but in general things are moving in a more positive direction recently.    Functioning:  Rajeshs mental health concerns have been continuing to affect her ability to function in her life and have been causing clinically significant distress.      PC-PTSD: 0/4  CAGE AID: 0/4    PHQ 2/10/2021 4/20/2021 7/14/2021   PHQ-9 Total Score 15 11 9   Q9: Thoughts of better off dead/self-harm past 2 weeks Not at all Not at all Not at all     JAMAR-7 SCORE 2/10/2021 4/20/2021 7/14/2021   Total Score - - -   Total Score 13 13 12     Collaboration:   Primary care physician, Dr. Anayeli Warner    Referral: none    Anticipated treatment duration: Unknown  Agreed upon meeting frequency: every 4-6 weeks    Long Term Treatment Goal(s) related to diagnosis / functional impairment(s)    Goal 1: Jeanie will report decreased anxiety and increased confidence in managing life stressors.    Steps we will take to achieve your goal:    Jeanie will continue to take medications as prescribed by Dr. Warner.  Goal of weaning off hydroxyzine in the long run but keeping for prn at this time.  Jeanie will practice stress mitigation strategies such as  deep breathing, yoga or meditation.  Continue to be aware of life choices to increase sense of agency in your life.  Communicate your needs clearly to others.  Attend psychotherapy as scheduled.    Progress:  Making good progress!  Keep up the good work!    Intervention(s)  Therapist will provide support, psychoeducation and homework assignments as needed.    Goal 2: Jeanie will report decreased depression.    Steps we will take to achieve your goal:    Notice the good.  Practice self-compassion  Be mindful of priorities and how you spend your limited time and energy.  Attend psychotherapy as scheduled.     Progress:  Making good progress!  Keep up the good work!    Intervention(s)  Therapist will provide support, psychoeducation and homework assignments as needed.    If you need additional support and care during times that your therapist or PCP are not available, here are some additional resources for you:    Crisis Lines:    [facility] Adult Crisis:  [phone]  [facility] Adult Crisis: [phone]    Crisis Text Line: Text [place] to 519058. Free support at your fingertips 24/7  People who text [place] to 488500 will be connected with a counselor. Crisis Text Line is available 24 hours a day, seven days a week.    You can also consider going to the [facility] at the following address.  Walk ins are welcome:    [address] [place] [address]   [phone] (for 24 hour crisis consultation)    Monday - Friday 8:00am - 7:00pm  Saturday:  11:00am - 3:00pm  Sunday and Holidays Closed    If you feel at risk of immediate harm, go directly to the Emergency Department.    Patient has reviewed and agreed to the above plan.    Jody Rankin, PhD, LP      July 14, 2021        ______________________________    ________  Patient Signature       Date    ______________________________    ________  Provider Signature       Date

## 2021-07-15 ASSESSMENT — ANXIETY QUESTIONNAIRES: GAD7 TOTAL SCORE: 12

## 2021-07-15 NOTE — RESULT ENCOUNTER NOTE
Jeanie Ware.  Sorry about the confusion about the labs yesterday.  Your lab results came back in and they look excellent!  Your hemoglobin is at 12.1, and your ferritin, a measure of iron levels, is solidly in the normal range.  This is great.  Your thyroid testing is all normal.  I hope your energy is getting better, and if not, we should continue to look at other options.  I hope the stomach continues to improve, and that means the body absorbs nutrients better, which should help as well.  Please call the clinic at [phone] if you have any questions.      Anayeli Warner MD    Please send results to patient.

## 2021-08-18 ENCOUNTER — OFFICE VISIT (OUTPATIENT)
Dept: PSYCHOLOGY | Facility: CLINIC | Age: 41
End: 2021-08-18
Payer: COMMERCIAL

## 2021-08-18 DIAGNOSIS — F33.1 MAJOR DEPRESSIVE DISORDER, RECURRENT, MODERATE (H): ICD-10-CM

## 2021-08-18 DIAGNOSIS — F41.1 GENERALIZED ANXIETY DISORDER: Primary | ICD-10-CM

## 2021-08-18 PROCEDURE — 90837 PSYTX W PT 60 MINUTES: CPT | Performed by: PSYCHOLOGIST

## 2021-08-18 NOTE — PROGRESS NOTES
"Behavioral Health Progress Note    Client Name: Jeanie French    Service Type:  Individual, In Person  Length of Visit: 58 minutes  Attendees: Jeanie was alone    Identifying Information and Presenting Problem:  The patient is a 41 year old woman who is being seen for problematic symptoms of anxiety.  Our first meeting was on 7/21/10 and we have met on and off since that time.  Our last visit was on 7/14/21.    Treatment Objective(s) Addressed in This Session:  Decrease anxiety via learning adaptive coping mechanisms.  Increase confidence in managing life stressors.  Decrease depression.    Progress on / Status of Treatment Objective(s) / Homework:  Relatively stable but with continues anxiety in relation to life stress  Gaining insight and using adaptive coping skills  Some progress on physical activity goal    PHQ-9 SCORE 2/10/2021 4/20/2021 7/14/2021   PHQ-9 Total Score - - -   PHQ-9 Total Score 15 11 9       JAMAR-7 SCORE 2/10/2021 4/20/2021 7/14/2021   Total Score - - -   Total Score 13 13 12       Topics Discussed/Interventions Provided:    Work:  Continuing to experience work related stress.  This is exacerbated by co-worker taking time off which increased work load.  High standards for work with limited support often contribute. Notes she often cares about the quality/timeliness of work more than others.  Reflected on the strength/vulnerability inherent in her high standards and how to manage this.  Long term solution is to look for other work, but nothing available that feels like a good fit at the moment.  Considering plan to propose position at [employer].  Discussed other more immediate strategies for managing work related stress (see pt instructions).   Jeanie has taken off this week and is using this time to get caught up with projects at home and to plan some fun outings with family/Nitish.  Has a long \"to do\" list but mindful of setting realistic expectations for how much she can get " done.    Relationship:  Nitish is making slow, steady progress on divorce but there have been many distractions as son graduated high school and is still unclear on college plans.  Jeanie has empathy for this but it can be trying.  No additional introductions to family but there is a family party for her brother's daughter (her niece) who will be leaving for college soon and she hopes he may attend.  He is also driving his daughter back to college in [place] that week so may not work out.  Overall, this area of her life appears more stable and less stressful than in the past.    Pain:  Has been experiencing frequent headaches - tends to be on weekend, then can't get anything done.  Has gone to chiropractor to address neck tension.  Aware of and acknowledges role of stress in pain experience and working to mitigate that.    Follow up on homework from last visit:   Nutrition:  Set two goals at our last visit.  Jeanie was trying to choose one recipe to make and freeze in small individual portions for quick and easy access when tired on week days. Not successful with this.  The second goal was to keep researching meal kit options to find an affordable one. Did do this and making some progress there.  Still deciding which one.     Physical activity:  Jeanie set goal to shoot for two runs and one other exercise element per week (e.g. walk, yoga, etc.). She is currently running three times per week in the past couple of weeks.  Still hopeful to add some additional cross-training.  Set goal of adding one minute plank 5/7 days per week.  Race is October 3 (10 miles).  Focus is on improving time.      Assessment: Jeanie appeared to be active and engaged in today's session and was receptive to feedback.  Appears to be gaining insight and skill in managing stress/anxiety.    Mental Status: Jeanie French appeared generally alert and oriented. Dress was casual and appropriate to the weather and occasion. Grooming and hygiene were good.  Eye contact was good. Speech was of normal volume and rate and was clear, coherent, and relevant. Mood continues to be moderately anxious, but more euthymic than in the past.  Thought processes were relevant, logical and goal-directed. Thought content was WNL with no evidence of psychotic or paranoid features. No evidence of SI/HI or self-harm, intent, or plans. Memory appeared grossly intact. Insight and judgment appeared intact and patient exhibited good impulse control during the appointment.     Does the patient appear to be at imminent risk of harm to self/others at this time? No    The session was necessary to address symptoms of anxiety and interpersonal distress that have been interfering with patient's ability to function in her life.  Ongoing psychotherapy is necessary to enhance coping skills as well as provide support and structure for problem solving.    Diagnosis (DSM-5):    Generalized Anxiety Disorder  Major Depressive Disorder, recurrent, moderate    Plan:  1. Follow up with this provider in roughly 4 weeks.  Did not schedule on her way out of clinic today.  Will future task myself to have  reach out on this if not scheduled in 1-2 weeks.    2. See pt instructions for homework and follow up from today.  Jeanie can access this via [employer].    Jody Rankin, PhD, LP    Next treatment plan update due on 10/14/21.  Next diagnostic update due 2/10/22.

## 2021-08-18 NOTE — PATIENT INSTRUCTIONS
Schedule follow up in one month.  This can be in person or virtual depending on your preference.    Keep up the great job running 3 times per week.  Think about adding a one minute plank 5/7 days per week.    Spend one hour in the next week researching how to propose a new position to an existing organization.    Schedule conscious breaks in your work day every 1-2 hours.  When you get up to get water/use the bathroom, take a conscious lap around the building to catch your breath and clear your mind.

## 2021-09-15 ENCOUNTER — TRANSFERRED RECORDS (OUTPATIENT)
Dept: HEALTH INFORMATION MANAGEMENT | Facility: CLINIC | Age: 41
End: 2021-09-15

## 2021-09-25 ENCOUNTER — HEALTH MAINTENANCE LETTER (OUTPATIENT)
Age: 41
End: 2021-09-25

## 2021-10-06 ENCOUNTER — OFFICE VISIT (OUTPATIENT)
Dept: PSYCHOLOGY | Facility: CLINIC | Age: 41
End: 2021-10-06
Payer: COMMERCIAL

## 2021-10-06 DIAGNOSIS — F41.1 GENERALIZED ANXIETY DISORDER: Primary | ICD-10-CM

## 2021-10-06 DIAGNOSIS — F33.1 MAJOR DEPRESSIVE DISORDER, RECURRENT, MODERATE (H): ICD-10-CM

## 2021-10-06 PROCEDURE — 90837 PSYTX W PT 60 MINUTES: CPT | Performed by: PSYCHOLOGIST

## 2021-10-06 NOTE — Clinical Note
Chad Warner - see my note for update on Jeanie who will be seeing you on 10/20.  She was a bit overwhelmed today.  Let me know if you have questions about my note or if you want more follow up from me.  Thanks!  Jody

## 2021-10-06 NOTE — Clinical Note
I accidentally opened a  referral order for this patient but can't figure out how to cancel it so I can close the encounter.  Any tips?

## 2021-10-06 NOTE — PATIENT INSTRUCTIONS
Sign up for [employer].    Think about getting yourself fully ready for bed before you sit down to watch TV at night.  Then when you start to feel sleepy you can just go straight to bed.  Hopefully this will allow for an earlier bedtime and more sleep.    Look for a headache diary/tyler    Manage your anxiety via use of mindfulness skills, making intentional priorities and communication with your partner.      Be kind to yourself.  Recognize the difference between healthy and less healthy/helpful anxiety.    Follow up with Dr. Rankin on Monday, November 1 at 8am.  Feel free to reach out sooner if needed.

## 2021-10-06 NOTE — PROGRESS NOTES
Behavioral Health Progress Note    Client Name: Jeanie French    Service Type:  Individual, In Person  Length of Visit: 55 minutes  Attendees: Jeanie was alone    Identifying Information and Presenting Problem:  The patient is a 41 year old woman who is being seen for problematic symptoms of anxiety.  Our first meeting was on 7/21/10 and we have met on and off since that time.  Our last visit was on 8/18/21.    Treatment Objective(s) Addressed in This Session:  Decrease anxiety via learning adaptive coping mechanisms.  Increase confidence in managing life stressors.  Decrease depression.    Progress on / Status of Treatment Objective(s) / Homework:  Relatively stable but with continues anxiety in relation to life stress  Gaining insight and using adaptive coping skills  Some progress on physical activity goal    PHQ-9 SCORE 2/10/2021 4/20/2021 7/14/2021   PHQ-9 Total Score - - -   PHQ-9 Total Score 15 11 9       JAMAR-7 SCORE 2/10/2021 4/20/2021 7/14/2021   Total Score - - -   Total Score 13 13 12       Topics Discussed/Interventions Provided:    Health concerns:  Today, Jeanie reports that her health problems are what is most stressful for her right now.      Weight gain:  Recent note from GI indicated 20lb weight gain in the past few months.  Encouraged her to get her thyroid checked by Dr. Warner.  Also wondering about medication contribution to this and will monitor once medication is stopped.  Jeanie also raises this concern in our visit today.  Wondered if the weight gain could be related to steroid she was taking for GI.  GI doc says this is not that common.  Tapering off steroid now regardless.  Had thyroid checked in July and it was normal.  GI symptoms are more well controlled at this time.  Started probiotic.  GI doc recommended more fiber and she is considering how to do this.  Weight tracked on tyler indicates she was 154lbs 6/25/21 and now 188lbs.  Reflected on changes that have occurred during this time.  At  her low point, she had been carefully tracking calories via NO[employer] program.  Weight prior to NOOM was 175lbs.  Upon reflection, does believe she is eating more now and not tracking calories which likely explains why she has regained the weight she lost earlier.  Has started counting calories again with My Fitness Pal and lost a few pounds.  Does eat some chicken for protein.  Discussed other supports for regaining healthy weight including signing up for EveryPlate.    Physical activity:  At our last visit, Jeanie had been training for a race on October 3.  While she did complete this 10 mile race, she did not train for it the way she hoped.  Walked much of the race.  Would often get paralyzed with anxiety about long training runs which interfered with other activities as well.  Encouraged celebrating small wins (finishing the race regardless of her time) and agreed to continue working on anxiety management.    Headaches:  Also getting terrible headaches.  Used to get tension headaches a few times per week but would go to chiropractor and this would help resolve it for a month.  Still going to chiropractor 1-2 per week and this is not helping.  Current headaches don't appear related to her neck.  Now getting very bad headaches 1-2 times per week.  No auras, light sensitivity or throwing up.  Does feel very fatigued.  Some severe throbbing headaches.  Mostly on both sides, but once on one time of her head.  Has needed to stay home from work at times due to headaches.  Takes Tylenol (GI told her not to take Ibuprofen/Aspirin/Aleve), but doesn't seem to help.  Associated factors with start of this problem June/July - stopped taking steroid around the time this started, but then resumed in July.  Also started eating dairy again but not sure this contributes.  Discussed potential value of headache diary to identify possible triggers and Jeanie agreed to look into tyler for this.    Fatigue:  Intense fatigue.  Missed 2.5 days of  work due to fatigue. More around her period.  Wondering about low iron.  Also getting cramps in her legs/feet like she did with low iron in the past.  Had infusion recently which helped but expected this to last longer.  Will follow up with Dr. Warner on this.  Discussed all possible contributors to fatigue including anxiety, lack of sleep, and poor nutrition.  Agreed comprehensive approach to whole person to address all contributors likely makes the most sense.    Sleep:  Working on improving sleep and getting in bed prior to falling asleep on the couch.  Trying to get in bed by 10:30-11:00 - sometimes later if she falls asleep on the couch (then 1-2am), sets alarm for 6:30 but not up until 7:30-45am.  Latest she can sleep and be on time would be 7:30am.  Discussed sleep plan to increase getting to bed at an earlier hour (see pt instructions).    Anxiety:  Jeanie shares the thought that she's not getting anything done.  Challenged that thought given that this is often not an accurate representation of how she spends her time.  Jeanie acknowledges that she is doing some things but the tasks on her list continue to exceed her time/energy when she is not at work (e.g., career related goals/home projects).  Current anxiety appears to be fed by continued uncertainty in relationship and timeline for sharing their relationship with his family, moving in together or buying a house together.  His lease is up at the end of October but he is renewing for another 6 months.  She worries about another house coming up for sale before that time that they may want, getting ready to move or sell her house, etc. Although there is still a great deal of uncertainty in their future plans.  Encouraged Jeanie to slow down and not get ahead of herself.  Discussed strategies for managing anxiety through mindfulness, making intentional priorities with realistic expectations for self given limited time/energy and proactive/clear communication with  partner.  Jeanie was initially skeptical of her ability to do this but agreed to work on identifying whether her anxiety was experienced as helpful/healthy or unhelpful/unhealthy and responding accordingly.    Assessment: Jeanie appeared to be active and engaged in today's session and was receptive to feedback.  Appears to be gaining insight and skill in managing stress/anxiety.    Mental Status: Jeanie French appeared generally alert and oriented. Dress was casual and appropriate to the weather and occasion. Grooming and hygiene were good. Eye contact was good. Speech was of normal volume and rate and was clear, coherent, and relevant. Mood continues to be anxious, affect is mood-congruent.  She is tearful at times in the visit.  Thought processes were relevant, logical and goal-directed. Thought content was WNL with no evidence of psychotic or paranoid features. No evidence of SI/HI or self-harm, intent, or plans. Memory appeared grossly intact. Insight and judgment appeared intact and patient exhibited good impulse control during the appointment.     Does the patient appear to be at imminent risk of harm to self/others at this time? No    The session was necessary to address symptoms of anxiety and interpersonal distress that have been interfering with patient's ability to function in her life.  Ongoing psychotherapy is necessary to enhance coping skills as well as provide support and structure for problem solving.    Diagnosis (DSM-5):    Generalized Anxiety Disorder  Major Depressive Disorder, recurrent, moderate    Plan:  1. Follow up with Dr. Rankin on Monday, November 1 at 8am.  Will update treatment plan at next visit.  2. See pt instructions for homework and follow up from today.  Jeanie can access this via [email or web address].  3.  See Dr. Warner on 10/20/21.      Jody Rankin, PhD, LP    Next treatment plan update due on 10/14/21.  Next diagnostic update due 2/10/22.

## 2021-10-20 ENCOUNTER — OFFICE VISIT (OUTPATIENT)
Dept: FAMILY MEDICINE | Facility: CLINIC | Age: 41
End: 2021-10-20
Payer: COMMERCIAL

## 2021-10-20 VITALS
TEMPERATURE: 98.5 F | HEART RATE: 63 BPM | BODY MASS INDEX: 28.04 KG/M2 | WEIGHT: 195.4 LBS | DIASTOLIC BLOOD PRESSURE: 75 MMHG | RESPIRATION RATE: 16 BRPM | SYSTOLIC BLOOD PRESSURE: 115 MMHG | OXYGEN SATURATION: 97 %

## 2021-10-20 DIAGNOSIS — G44.209 TENSION HEADACHE: ICD-10-CM

## 2021-10-20 DIAGNOSIS — D50.0 IRON DEFICIENCY ANEMIA DUE TO CHRONIC BLOOD LOSS: ICD-10-CM

## 2021-10-20 DIAGNOSIS — N92.0 MENORRHAGIA WITH REGULAR CYCLE: Primary | ICD-10-CM

## 2021-10-20 DIAGNOSIS — R53.83 FATIGUE, UNSPECIFIED TYPE: ICD-10-CM

## 2021-10-20 LAB
ERYTHROCYTE [DISTWIDTH] IN BLOOD BY AUTOMATED COUNT: 13 % (ref 10–15)
FERRITIN SERPL-MCNC: 25 NG/ML (ref 10–130)
FSH SERPL-ACNC: 4.2 MIU/ML
HCT VFR BLD AUTO: 35.6 % (ref 35–47)
HGB BLD-MCNC: 12.3 G/DL (ref 11.7–15.7)
LH SERPL-ACNC: 4.8 MIU/ML
MCH RBC QN AUTO: 31.6 PG (ref 26.5–33)
MCHC RBC AUTO-ENTMCNC: 34.6 G/DL (ref 31.5–36.5)
MCV RBC AUTO: 92 FL (ref 78–100)
PLATELET # BLD AUTO: 244 10E3/UL (ref 150–450)
RBC # BLD AUTO: 3.89 10E6/UL (ref 3.8–5.2)
TSH SERPL DL<=0.005 MIU/L-ACNC: 0.98 UIU/ML (ref 0.3–5)
WBC # BLD AUTO: 6 10E3/UL (ref 4–11)

## 2021-10-20 PROCEDURE — 99214 OFFICE O/P EST MOD 30 MIN: CPT | Performed by: STUDENT IN AN ORGANIZED HEALTH CARE EDUCATION/TRAINING PROGRAM

## 2021-10-20 PROCEDURE — 83001 ASSAY OF GONADOTROPIN (FSH): CPT | Performed by: STUDENT IN AN ORGANIZED HEALTH CARE EDUCATION/TRAINING PROGRAM

## 2021-10-20 PROCEDURE — 84443 ASSAY THYROID STIM HORMONE: CPT | Performed by: STUDENT IN AN ORGANIZED HEALTH CARE EDUCATION/TRAINING PROGRAM

## 2021-10-20 PROCEDURE — 82728 ASSAY OF FERRITIN: CPT | Performed by: STUDENT IN AN ORGANIZED HEALTH CARE EDUCATION/TRAINING PROGRAM

## 2021-10-20 PROCEDURE — 36415 COLL VENOUS BLD VENIPUNCTURE: CPT | Performed by: STUDENT IN AN ORGANIZED HEALTH CARE EDUCATION/TRAINING PROGRAM

## 2021-10-20 PROCEDURE — 85027 COMPLETE CBC AUTOMATED: CPT | Performed by: STUDENT IN AN ORGANIZED HEALTH CARE EDUCATION/TRAINING PROGRAM

## 2021-10-20 PROCEDURE — 83002 ASSAY OF GONADOTROPIN (LH): CPT | Performed by: STUDENT IN AN ORGANIZED HEALTH CARE EDUCATION/TRAINING PROGRAM

## 2021-10-20 ASSESSMENT — PATIENT HEALTH QUESTIONNAIRE - PHQ9: SUM OF ALL RESPONSES TO PHQ QUESTIONS 1-9: 7

## 2021-10-20 NOTE — PATIENT INSTRUCTIONS
Lab work! THryoid, iron, hormones.      Try tylenol with caffiene for headaches.      Start birth control with start of next period.  Take all 84 pills and then hold for 6 days, and restart.      We will call to set up iron if needed.      Follow up in 4-8 weeks for physical and follow up.

## 2021-10-20 NOTE — LETTER
October 26, 2021      Jeanie French  [address] [place] [address]        Dear ,    We are writing to inform you of your test results.    Here is a copy of your lab results.  The good news is, that everything came back normal.  Your hormone levels are normal--it does not show any signs of early menopause.  Your thyroid levels are normal.  Your hemoglobin levels are normal.  Your hemoglobin, ferritin, and iron levels are normal.  This is good news, but doesn't explain your fatigue or weight gain symptoms.  Let's see how things go over the next few weeks, and check in again if you are not feeling better. Please call the clinic at [phone] if you have any questions.         Resulted Orders   Lutropin   Result Value Ref Range    Lutropin 4.8 mIU/mL    Narrative    Male:......................................0.6-12.1 mIU/mL    Female:  Follicular..................................1.8-11.8 mIU/mL  Mid-Cycle...................................7.6-89.1 mIU/mL  Luteal......................................0.6-14.0 mIU/mL  Postmenopausal..............................5.2-62.0 mIU/mL  Male and Female:  Prepubertal.................................1.0-3.5 mIU/mL    Prepubertal ranges from Pediatric Reference  Intervals; Nimesh, Halina Kan and Nimesh;  7th Edition; 2011   Follicle stimulating hormone   Result Value Ref Range    FSH 4.2 mIU/mL      Comment:      Females:  Prepubertal: 0-10 mIU/mL  Follicular: 3-20 mIU/mL  Luteal: 0-12 mIU/mL  Ovulatory: 9-26 mIU/mL  Postmenopausal:  mIU/mL   Ferritin   Result Value Ref Range    Ferritin 25 10 - 130 ng/mL   CBC with platelets   Result Value Ref Range    WBC Count 6.0 4.0 - 11.0 10e3/uL    RBC Count 3.89 3.80 - 5.20 10e6/uL    Hemoglobin 12.3 11.7 - 15.7 g/dL    Hematocrit 35.6 35.0 - 47.0 %    MCV 92 78 - 100 fL    MCH 31.6 26.5 - 33.0 pg    MCHC 34.6 31.5 - 36.5 g/dL    RDW 13.0 10.0 - 15.0 %    Platelet Count 244 150 - 450 10e3/uL   TSH with free T4  reflex   Result Value Ref Range    TSH 0.98 0.30 - 5.00 uIU/mL       If you have any questions or concerns, please call the clinic at the number listed above.       Sincerely,      Anayeli Warner MD

## 2021-10-21 ASSESSMENT — ASTHMA QUESTIONNAIRES: ACT_TOTALSCORE: 22

## 2021-10-21 NOTE — PROGRESS NOTES
There are no exam notes on file for this visit.    ASSESSMENT AND PLAN:      Jeanie was seen today for labs only and headache.    Diagnoses and all orders for this visit:    Fatigue, unspecified type  Iron deficiency anemia due to chronic blood loss  Menorrhagia with regular cycle  GI issues may be contributing as well.  Will check Hgb, Iron, Ferritin, Thyroid, LH, FSH and start oral contraception to decrease bleeding.  May also help some with headaches.  She will schedule physical with pap and pelvic exam in the next few months.  WIll start OCP at the time of her next period.  Due in next few days.    Will set up IV iron infusions if levels are low again.     -     TSH with free T4 reflex; Future  -     CBC with platelets; Future  -     Ferritin; Future  -     norgestrel-ethinyl estradiol (LO/OVRAL) 0.3-30 MG-MCG tablet; Take 1 tablet by mouth daily  -     Follicle stimulating hormone; Future  -     Lutropin; Future  -     Lutropin  -     Follicle stimulating hormone  -     Ferritin  -     CBC with platelets  -     TSH with free T4 reflex      Tension headache.  No red flag symptoms.  Sounds like tension headaches, but more severe and previous treatment not effective.  Will try combining tylenol with caffiene to see if this help (and allow her to avoid ASA.  Continue with good water intake.  Review labs.  Could consider a daily HA medicine if not improving.          Patient Instructions   Lab work! THryoid, iron, hormones.      Try tylenol with caffiene for headaches.      Start birth control with start of next period.  Take all 84 pills and then hold for 6 days, and restart.      We will call to set up iron if needed.      Follow up in 4-8 weeks for physical and follow up.            Anayeli Warner MD    SUBJECTIVE  Jeanie FARSHAD French is a 41 year old female with past medical history significant for    Patient Active Problem List   Diagnosis     Cancer of tongue (H)     Health Care Home     Generalized anxiety  "disorder     Tenosynovitis of the right index finger     Wheezing     Irritable bowel syndrome (IBS)     Exercise-induced asthma     Sinusitis, chronic     Allergic rhinitis, unspecified allergic rhinitis type     Hypovitaminosis D     Adjustment disorder with anxiety     Menorrhagia with regular cycle     Major depressive disorder with single episode     Iron deficiency anemia due to chronic blood loss     Collagenous colitis     Eosinophilic esophagitis     Others present at the visit:  None    Presents for   Chief Complaint   Patient presents with     Labs Only     Pt is here to have labs checked at the advice of her GI specialist.  She states that she has gained a bunch of weight and would like her Thyroid checked and the Iron checked due to returning symptoms.      Headache     Pt would also like to discuss her headaches.      Presenting today for follow-up of a couple of concerns.  Continues to follow with GI for  eosinophilic esophagitis and collagenous colitis.  Remains on treatment with budesonide capsules.  Has had some return of symptoms with decreasing the dose, and is now adding more fiber to her diet per GI instructions.  Her concern is that she continues to gain weight during this time.  GI does not feel the weight gain is likely from steroids, and a recommending further thyroid testing.  She noticed that her weight started going up this past June even before she started the steroids.  She has spent time this fall counting calories, but found this is challenging.  She recently set up \"every plate \"and is using this to eat healthier.  Has still been gaining weight.  Train for a 10 mile run, and really struggles.  Had difficulty even walking the full 10 mile.  Is now working on simply cutting out sweets, and eating overall healthy diet.  She feels tired all the time.  Her chiropractor has been recommending magnesium to help with some of her symptoms.      Also having worsening headaches.  Has a history " of previous headaches but these are worse.  Present bilaterally.  She describes them as tension, and accompanied by pain in her neck.  Previously has seen zigzag in her vision but no vision changes currently, and no nausea or vomiting or systemic symptoms.  She has had good relief in the past with the chiropractor but this is no longer helping.  Previously also did well with Excedrin Migraine, but is avoiding the aspirin due to her GI issues.  Has not found Tylenol to be very successful.  She has a bad headache about once per week.  Sometimes bad enough that she needs to go home from work early.  Has been drinking lots of water.    Also has concerns that her iron levels are low again.  Her menses continue to be heavy.  They were extra heavy this last few cycles.  Instead of having 1 week she has bleeding for only 4 to 5 days but it is much heavier for the first day or 2.  She is passing some clots.  Frequent pad changes.  Has some cramping with this as well.  Was on IV iron this summer in an attempt to increase her levels without irritating her GI issues.  Felt better and had increased energy for a short period of time, but this has since gone away.      OBJECTIVE:  Vitals: /75 (BP Location: Left arm, Patient Position: Sitting, Cuff Size: Adult Large)   Pulse 63   Temp 98.5  F (36.9  C) (Oral)   Resp 16   Wt 88.6 kg (195 lb 6.4 oz)   SpO2 97%   BMI 28.04 kg/m    BMI= Body mass index is 28.04 kg/m .  Objective:    Vitals:  Vitals are reviewed and are within the normal range  Gen:  Alert, pleasant, no acute distress  Skin:  Mild pallor  Psyche:  Mood and affect are down.  Organized thought process.  Good insight.      Results for orders placed or performed in visit on 10/20/21   Lutropin     Status: None   Result Value Ref Range    Lutropin 4.8 mIU/mL    Narrative    Male:......................................0.6-12.1 mIU/mL    Female:  Follicular..................................1.8-11.8  mIU/mL  Mid-Cycle...................................7.6-89.1 mIU/mL  Luteal......................................0.6-14.0 mIU/mL  Postmenopausal..............................5.2-62.0 mIU/mL  Male and Female:  Prepubertal.................................1.0-3.5 mIU/mL    Prepubertal ranges from Pediatric Reference  Intervals; Nimesh, Halina Kan and Nimesh;  7th Edition; 2011   Follicle stimulating hormone     Status: None   Result Value Ref Range    FSH 4.2 mIU/mL   Ferritin     Status: Normal   Result Value Ref Range    Ferritin 25 10 - 130 ng/mL   CBC with platelets     Status: Normal   Result Value Ref Range    WBC Count 6.0 4.0 - 11.0 10e3/uL    RBC Count 3.89 3.80 - 5.20 10e6/uL    Hemoglobin 12.3 11.7 - 15.7 g/dL    Hematocrit 35.6 35.0 - 47.0 %    MCV 92 78 - 100 fL    MCH 31.6 26.5 - 33.0 pg    MCHC 34.6 31.5 - 36.5 g/dL    RDW 13.0 10.0 - 15.0 %    Platelet Count 244 150 - 450 10e3/uL   TSH with free T4 reflex     Status: Normal   Result Value Ref Range    TSH 0.98 0.30 - 5.00 uIU/mL           Patient Instructions   Lab work! THryoid, iron, hormones.      Try tylenol with caffiene for headaches.      Start birth control with start of next period.  Take all 84 pills and then hold for 6 days, and restart.      We will call to set up iron if needed.      Follow up in 4-8 weeks for physical and follow up.            Anayeli Warner MD

## 2021-10-26 NOTE — RESULT ENCOUNTER NOTE
Chad Ware-    Here is a copy of your lab results.  The good news is, that everything came back normal.  Your hormone levels are normal--it does not show any signs of early menopause.  Your thyroid levels are normal.  Your hemoglobin levels are normal.  Your hemoglobin, ferritin, and iron levels are normal.  This is good news, but doesn't explain your fatigue or weight gain symptoms.  Let's see how things go over the next few weeks, and check in again if you are not feeling better. Please call the clinic at [phone] if you have any questions.      Anayeli Warner MD    Please send results to patient.

## 2021-11-01 ENCOUNTER — OFFICE VISIT (OUTPATIENT)
Dept: PSYCHOLOGY | Facility: CLINIC | Age: 41
End: 2021-11-01
Payer: COMMERCIAL

## 2021-11-01 DIAGNOSIS — F33.1 MAJOR DEPRESSIVE DISORDER, RECURRENT, MODERATE (H): ICD-10-CM

## 2021-11-01 DIAGNOSIS — F41.1 GENERALIZED ANXIETY DISORDER: Primary | ICD-10-CM

## 2021-11-01 PROCEDURE — 90837 PSYTX W PT 60 MINUTES: CPT | Performed by: PSYCHOLOGIST

## 2021-11-01 ASSESSMENT — ANXIETY QUESTIONNAIRES
7. FEELING AFRAID AS IF SOMETHING AWFUL MIGHT HAPPEN: SEVERAL DAYS
GAD7 TOTAL SCORE: 14
2. NOT BEING ABLE TO STOP OR CONTROL WORRYING: NEARLY EVERY DAY
3. WORRYING TOO MUCH ABOUT DIFFERENT THINGS: NEARLY EVERY DAY
1. FEELING NERVOUS, ANXIOUS, OR ON EDGE: MORE THAN HALF THE DAYS
IF YOU CHECKED OFF ANY PROBLEMS ON THIS QUESTIONNAIRE, HOW DIFFICULT HAVE THESE PROBLEMS MADE IT FOR YOU TO DO YOUR WORK, TAKE CARE OF THINGS AT HOME, OR GET ALONG WITH OTHER PEOPLE: SOMEWHAT DIFFICULT
5. BEING SO RESTLESS THAT IT IS HARD TO SIT STILL: SEVERAL DAYS
6. BECOMING EASILY ANNOYED OR IRRITABLE: NEARLY EVERY DAY

## 2021-11-01 ASSESSMENT — PATIENT HEALTH QUESTIONNAIRE - PHQ9: 5. POOR APPETITE OR OVEREATING: SEVERAL DAYS

## 2021-11-01 NOTE — PROGRESS NOTES
Behavioral Health Progress Note    Client Name: Jeanie French    Service Type:  Individual, In Person  Start:  8:04am  End: 9:00am  Length of Visit: 56 minutes  Attendees: Jeanie was alone    Identifying Information and Presenting Problem:  The patient is a 41 year old woman who is being seen for problematic symptoms of anxiety.  Our first meeting was on 7/21/10 and we have met on and off since that time.  Our last visit was on 10/6/21.    Treatment Objective(s) Addressed in This Session:  Decrease anxiety via learning adaptive coping mechanisms.  Increase confidence in managing life stressors.  Decrease depression.    Progress on / Status of Treatment Objective(s) / Homework:  Making good progress on reducing depression, but anxiety continues to fluctuate in relation to life stress  Gaining insight and using adaptive coping skills    PHQ-9 SCORE 4/20/2021 7/14/2021 10/20/2021   PHQ-9 Total Score - - -   PHQ-9 Total Score 11 9 7       JAMAR-7 SCORE 2/10/2021 4/20/2021 7/14/2021   Total Score - - -   Total Score 13 13 12       Topics Discussed/Interventions Provided:    Treatment plan update: Making good progress on reducing depression, but anxiety continues to fluctuate in relation to life stress (see below).  No significant changes to treatment plan.  Continue to meet on a monthly basis.    Back pain:  Wrenched back getting into van last Thursday.  Stayed home from work on Friday and rested and felt much better but pain returned over the weekend.  Impacting sleep.  Staying home from work again today.  Discussed self-management including taking tylenol, alternating heat/ice, reaching out to chiropractor and following up with PCP if this does not resolve shortly.  Also discussed managing anxiety/fortune-telling around pain as this can amplify.  Encouraged seeing pain as information about what your body may need to heal.  Try to listen and respond in a neutral fashion.  Avoid fortune-telling.  Jeanie was receptive to this  feedback.    Weight gain:  At our last visit, Jeanie shared concerns about 20lb weight gain in the past few months.  Recently saw Dr. Warner for this and all labs have come back normal (thyroid, iron, etc.).  While at our last visit, Jeanie was inclined to attribute this weight gain to eating more and not tracking calories today she is less certain and more inclined to attribute weight gain to steroid she has been taking for GI issues. Still has two weeks on steroid before coming off.  Taking probiotic and eating more veggies which seems to help with GI issues so hopefully she can get off this entirely.  Also started birth control to lose less iron (labs lower, but still in the normal range at the last visit with Dr. Warner).  Jeanie has also started Every Plate meal plan - 3 meals per week (2 servings per meal).  There is flexibility in this plan and it seems like a good fit.  Decided not to count calories as this is too much work and hard to maintain.  Instead has focused on cutting out sweets and managing portions.   Congratulated Jeanie on making these healthy changes.  Will continue to assess and adjust plan as needed moving forward.    Wt Readings from Last 4 Encounters:   10/20/21 88.6 kg (195 lb 6.4 oz)   06/28/21 79.4 kg (175 lb)   06/25/21 79.4 kg (175 lb)   06/21/21 79.4 kg (175 lb)     Sleep:  Discussed sleep plan to increase getting to bed at an earlier hour at our last visit.  The plan was to think about getting yourself fully ready for bed before you sit down to watch TV at night.  Then when you start to feel sleepy you can just go straight to bed. Today, Jeanie reports she has been having some success with this.  Less falling asleep on the couch.  That said, back pain has been interfering with sleep recently.  Agreed to adjust expectations, continue good practices and treat back pain.  Will re-assess next visit.    Life stress:  Life feels chaotic and like one problem after another (Nitish with hearing loss  in one ear/consult with [facility], Nitish's son dropping out of college/experiencing mental health crisis, unhappiness with work and uncertainty about how to resolve/move forward).  Feels hard to keep up and proactively plan.  Reflected on various stressors and whether or not anxiety was helpful or unhelpful and/or at the right level of intensity.  Reflected that life is full of problems and that we always need to make choices about how we spend our finite time and energy.  Encouraged giving self credit for values based decisions on how she spends her time.  Also discussed strategies for more clearly defining priorities to help with decision making.  Discussed the value of making life choices based on the information she has now rather than waiting to decide because there are uncertainties in the future (this will always be the case).    Anxiety:  Jeanie continues to experience significant anxiety.  At our last visit, Jeanie was encouraged to work on identifying whether her anxiety was experienced as helpful/healthy or unhelpful/unhealthy and responding accordingly.  Used this lens when discussing current stressors today (e.g., managing back pain, work stress, relationship stress, etc.).  Jeanie is receptive to this feedback and able to engage in this during our visits.  It is less clear to me how successful she is in using this strategy between visits.  Will re-assess at our next visit and continue to encourage use of this skill between visits.    Headaches:  At our last visit, Jeanie was experiencing terrible headaches 1-2 times per week.  Was going to chiropractor 1-2 per week and taking Tylenol but this was not resolving the problem.  Discussed potential value of headache diary to identify possible triggers and Jeanie agreed to look into tyler for this.  Today, she reports she continues to have headaches, but may have been less in the past week. Did not explore if she started headache diary due to other competing agenda items.   May return to this in the future if needed.    Assessment: Jeanie appeared to be active and engaged in today's session and was receptive to feedback.  Appears to be gaining insight and skill in managing stress/anxiety.    Mental Status: Jeanie French appeared generally alert and oriented. Dress was casual and appropriate to the weather and occasion. Grooming and hygiene were good. Jeanie was walking with a noticeable limp today and visibly winced on a number of occassions while sitting secondary to her back pain today.  Eye contact was good. Speech was of normal volume and rate and was clear, coherent, and relevant. Mood continues to be anxious, affect is mood-congruent.  Thought processes were relevant, logical and goal-directed. Thought content was WNL with no evidence of psychotic or paranoid features. No evidence of SI/HI or self-harm, intent, or plans. Memory appeared grossly intact. Insight and judgment appeared intact and patient exhibited good impulse control during the appointment.     Does the patient appear to be at imminent risk of harm to self/others at this time? No    The session was necessary to address symptoms of anxiety and interpersonal distress that have been interfering with patient's ability to function in her life.  Ongoing psychotherapy is necessary to enhance coping skills as well as provide support and structure for problem solving.    Diagnosis (DSM-5):    Generalized Anxiety Disorder  Major Depressive Disorder, recurrent, mild to moderate    Plan:  1. Follow up with Dr. Rankin in roughly one month.  Not scheduled at the time of note completion today.  Will future task myself to check on status of scheduling in a couple of weeks and will ask the  to reach out to her on this if I do not see her on my schedule at that point in time.  2. See pt instructions for homework and follow up from today.  Printed AVS and gave to Jeanie at the end of the visit.    Jody Rankin, PhD,  LP    Next treatment plan update due on 2/1/2022.  Next diagnostic update due 2/10/22.

## 2021-11-01 NOTE — PATIENT INSTRUCTIONS
Good to see you today Jeanie!    Take some Tylenol to see if this helps with back pain.  Remember that pain is trying to tell you something about what your body needs.  Try to listen and respond in a neutral fashion.  Avoid fortune-telling.    Schedule follow up with Dr. Rankin in 4 weeks on your way out of clinic.    Write down what is most important to you about your work life.  Put the most important variables at the top, the least at the bottom and see how this may inform your decision making about next steps in your career.    Make a back up plan with alternative options to move you forward with your goals if you have plans with Nitish.  That way there will be some opportunities, not just loss when this happens.    Give yourself credit for values based decisions on how you spend your time. There is opportunity cost, but value what you did decide to invest in.    Treatment Plan    Client's Name: Jeanie French  YOB: 1980    Today's Date: 2/1/2021   Date Diagnostic Update Due: 2/10/22    DSM-V Diagnoses:   Generalized Anxiety Disorder  Major Depressive Disorder, recurrent, mild to moderate    Psychosocial / Contextual Factors: Stressors with her health and in her interpersonal and work life contribute to anxiety and stress related symptoms.    Functioning:  Jeanie's mental health concerns have been continuing to affect her ability to function in her life and have been causing clinically significant distress.      PHQ 4/20/2021 7/14/2021 10/20/2021   PHQ-9 Total Score 11 9 7   Q9: Thoughts of better off dead/self-harm past 2 weeks Not at all Not at all Not at all     JAMAR-7 SCORE 4/20/2021 7/14/2021 11/1/2021   Total Score - - -   Total Score 13 12 14     Collaboration:   Primary care physician, Dr. Anayeli Warner    Referral: none    Anticipated treatment duration: Unknown  Agreed upon meeting frequency: every 4 weeks    Long Term Treatment Goal(s) related to diagnosis / functional  impairment(s)    Goal 1: Jeanie will report decreased anxiety and increased confidence in managing life stressors.    Steps we will take to achieve your goal:    Jeanie will continue to take medications as prescribed by Dr. Warner.    Jeanie will practice stress mitigation strategies such as deep breathing, yoga or meditation.  Continue to be aware of life choices to increase sense of agency in your life.  Communicate your needs clearly to others.  Attend psychotherapy as scheduled.    Progress:  Making good progress!  Keep up the good work!    Intervention(s)  Therapist will provide support, psychoeducation and homework assignments as needed.    Goal 2: Jeanie will report decreased depression.    Steps we will take to achieve your goal:    Notice the good.  Practice self-compassion  Be mindful of priorities and how you spend your limited time and energy.  Attend psychotherapy as scheduled.     Progress:  Making good progress!  Keep up the good work!    Intervention(s)  Therapist will provide support, psychoeducation and homework assignments as needed.    If you need additional support and care during times that your therapist or PCP are not available, here are some additional resources for you:    Crisis Lines:    [facility] Adult Crisis:  [phone]  [facility] Adult Crisis: [phone]    Crisis Text Line: Text [place] to 238358. Free support at your fingertips 24/7  People who text [place] to 680692 will be connected with a counselor. Crisis Text Line is available 24 hours a day, seven days a week.    You can also consider going to the [facility] at the following address.  Walk ins are welcome:    [address] [place] [address]   [phone] (for 24 hour crisis consultation)    Monday - Friday 8:00am - 7:00pm  Saturday:  11:00am - 3:00pm  Sunday and Holidays Closed    If you feel at risk of immediate harm, go directly to the Emergency Department.    Patient has reviewed and  agreed to the above plan.    Jody Rankin, PhD, LP      November 1, 2021      ______________________________    ________  Patient Signature       Date    ______________________________    ________  Provider Signature       Date

## 2021-11-02 ASSESSMENT — ANXIETY QUESTIONNAIRES: GAD7 TOTAL SCORE: 14

## 2021-11-12 ENCOUNTER — MYC MEDICAL ADVICE (OUTPATIENT)
Dept: FAMILY MEDICINE | Facility: CLINIC | Age: 41
End: 2021-11-12
Payer: COMMERCIAL

## 2021-11-12 DIAGNOSIS — N92.0 MENORRHAGIA WITH REGULAR CYCLE: ICD-10-CM

## 2021-12-09 ENCOUNTER — TRANSFERRED RECORDS (OUTPATIENT)
Dept: HEALTH INFORMATION MANAGEMENT | Facility: CLINIC | Age: 41
End: 2021-12-09
Payer: COMMERCIAL

## 2021-12-13 ENCOUNTER — OFFICE VISIT (OUTPATIENT)
Dept: PSYCHOLOGY | Facility: CLINIC | Age: 41
End: 2021-12-13
Payer: COMMERCIAL

## 2021-12-13 DIAGNOSIS — F41.1 GENERALIZED ANXIETY DISORDER: Primary | ICD-10-CM

## 2021-12-13 DIAGNOSIS — F33.1 MAJOR DEPRESSIVE DISORDER, RECURRENT, MODERATE (H): ICD-10-CM

## 2021-12-13 PROCEDURE — 90837 PSYTX W PT 60 MINUTES: CPT | Performed by: PSYCHOLOGIST

## 2021-12-13 NOTE — PATIENT INSTRUCTIONS
Good to see you today Jeanie!    Schedule complete physical with Dr. Warner and discuss plan for back pain.  Schedule follow up with Dr. Rankin in about one month.    Schedule a time on Tuesday to participate in an online yoga class.  Choose something that will allow for adaptations.  It shouldn't hurt.  It can be strenuous, but if it hurts you may be pushing yourself too hard.  Listen to your body.  We will shoot for practicing twice per week initially and re-evaluate as you go.    Keep up the good work with your meal kits.  Think about pausing every other week if this starts to feel too burdensome.    At breakfast, eat a portion you feel good about and then pause at least 20-30 minutes to evaluate if you are still hungry.  If at that point, you are still hungry, think about a piece of fruit or other option that is high volume/nutrition, but low in sugar, salt, and calories.    Resume getting ready for bed earlier in the evening so you can go to bed as soon as you feel sleepy and are less likely to fall asleep on the couch.    Just a reminder that Dr. Rankin will be out of the office from 12/23 - 12/31.  Please reach out to Dr. Warner or other members of the care team at [facility] should any urgent needs arise while I am away.    While I don't think you will need them, I'll include some crisis resources below just in case:    Crisis Lines:    [facility] Adult Crisis:  [phone]   [facility] Multilingual Crisis Line:  [phone]  [facility] Adult Crisis:  [phone]    Crisis Text Line: Text [place] to 345941. Free support at your fingertips 24/7  People who text [place] to 153508 will be connected with a counselor. Crisis Text Line is available 24 hours a day, seven days a week.    You can also consider going to the [facility] at the following address.  Walk ins are welcome:    [address] [place] [address]   [phone] (for 24-hour crisis consultation)    Monday - Friday  8:00am - 7:00pm  Saturday:  11:00am - 3:00pm  Sunday and Holidays Closed    If you feel at risk of immediate harm, go directly to the Emergency Department.

## 2021-12-13 NOTE — PROGRESS NOTES
Behavioral Health Progress Note    Client Name: Jeanie French    Service Type:  Individual, In Person  Start:  9:07am  End: 10:05am  Length of Visit: 58 minutes  Attendees: Jeanie was alone    Identifying Information and Presenting Problem:  The patient is a 41 year old woman who is being seen for problematic symptoms of anxiety.  Our first meeting was on 7/21/10 and we have met on and off since that time.  Our last visit was on 11/1/21.    Treatment Objective(s) Addressed in This Session:  Decrease anxiety via learning adaptive coping mechanisms.  Increase confidence in managing life stressors.  Decrease depression.    Progress on / Status of Treatment Objective(s) / Homework:  Making good progress on reducing depression, but anxiety continues to fluctuate in relation to life stress  Gaining insight and using adaptive coping skills    PHQ-9 SCORE 4/20/2021 7/14/2021 10/20/2021   PHQ-9 Total Score - - -   PHQ-9 Total Score 11 9 7       JAMAR-7 SCORE 4/20/2021 7/14/2021 11/1/2021   Total Score - - -   Total Score 13 12 14       Topics Discussed/Interventions Provided:    Weight concerns:  Stopped taking steroid for GI problems which she hoped with help with concerning weight gain since starting this medication.  GI symptoms returned and she is now treating with peptobismol caplets (9 per day) for the next 1-2 months.  Started at [facility].  The first week was rough (diarrhea cramping that would even wake her at night) but now symptoms seem to be improving.  Currently having daily diarrhea but much less frequent.  Cramping is nearly gone.  No more night time waking and only one trip to the bathroom at work.  Has not gained any more weight which she perceives as confirming her hypothesis that steroid was causing but has not lost weight which is still frustrating.  Discussed strategies for managing this problem (see pt instructions for plan for exercise, sleep and nutrition).    Headaches:  Was having weekly headaches  - started tracking in [facility]ler and believes these are migraines.  They are debilitating.  Had 3 in one week since our last visit.  Headaches also reduced in frequency since stopping steroid (no headaches for a couple of weeks) but did have one yesterday.  Not quite as severe.  Will continue to track and adjust care plan as needed.    Back pain:  Back improved for a bit but now is worsening again.  Still working with chiropractor - wondering if imaging is needed at this point.  Recent shoveling exacerbated pain.  Some continued stiffness today.  Suspects weight/deconditioning, muscle tension related to stress may be contributing.  Discussed strategies to better manage stress.    Stress reduction:  Return to meditation center.  Exercise more - yoga.      Relational updates/stressors:  Two weeks ago Dad went to hospital and they worried he was having a heart attack but learned that he was just severely dehydrated.  This was acutely worrisome, was sure he had  when she got the call, but now feeling relief.  Has been looking for houses with Tonny.  Ambivalent about this as there are a number of complicating factors.  Divorce will likely not be final until after the holidays which is disappointing.  Finally met his son but Tonny introduced her as a friend from work.  Understanding of this as Lisandro (Tonny's son) has a number of stressors/mental health concerns but also ready to be more fully integrated into Tonny's life.  Provided empathy/discussed coping.    Assessment: Jeanie appeared to be active and engaged in today's session and was receptive to feedback.  Appears to be gaining insight and skill in managing stress/anxiety.    Mental Status: Jeanie French appeared generally alert and oriented. Dress was casual and appropriate to the weather and occasion. Grooming and hygiene were good. Jeanie's gait was still a bit stilted but less impaired than at our last visit.  Needed to adjust her sitting position on a number of  occassions secondary to her back pain today.  Eye contact was good. Speech was of normal volume and rate and was clear, coherent, and relevant. Mood continues to be anxious, affect is mood-congruent.  Thought processes were relevant, logical and goal-directed. Thought content was WNL with no evidence of psychotic or paranoid features. No evidence of SI/HI or self-harm, intent, or plans. Memory appeared grossly intact. Insight and judgment appeared intact and patient exhibited good impulse control during the appointment.     Does the patient appear to be at imminent risk of harm to self/others at this time? No    The session was necessary to address symptoms of anxiety and interpersonal distress that have been interfering with patient's ability to function in her life.  Ongoing psychotherapy is necessary to enhance coping skills as well as provide support and structure for problem solving.    Diagnosis (DSM-5):    Generalized Anxiety Disorder  Major Depressive Disorder, recurrent, mild to moderate    Plan:  1. Follow up with Dr. Rankin in roughly one month.  Not scheduled at the time of note completion today.  Will future task myself to check on status of scheduling in a couple of weeks and will ask the  to reach out to her on this if I do not see her on my schedule at that point in time.  Jeanie is aware that I will be out of the office from 12/23 - 12/31 and that she can reach out to Dr. Warner or other members of her care team at [facility] if urgent needs should arise during that time.  2.  Encouraged to schedule CPE with Dr. Warner as she is due for this and she has other concerns she would like to discuss (back pain, etc.).  2. See pt instructions for homework and follow up from today.  Printed AVS and gave to Jeanie at the end of the visit.    Jody Rankin, PhD, LP    Next treatment plan update due on 2/1/2022.  Next diagnostic update due 2/10/22.

## 2022-01-10 ENCOUNTER — HOSPITAL ENCOUNTER (OUTPATIENT)
Dept: CT IMAGING | Facility: HOSPITAL | Age: 42
Discharge: HOME | End: 2022-01-10
Attending: INTERNAL MEDICINE | Admitting: INTERNAL MEDICINE
Payer: COMMERCIAL

## 2022-01-10 DIAGNOSIS — R10.11 RUQ PAIN: ICD-10-CM

## 2022-01-10 PROCEDURE — 250N000011 HC RX IP 250 OP 636: Performed by: INTERNAL MEDICINE

## 2022-01-10 PROCEDURE — 74177 CT ABD & PELVIS W/CONTRAST: CPT

## 2022-01-10 RX ORDER — IOPAMIDOL 755 MG/ML
100 INJECTION, SOLUTION INTRAVASCULAR ONCE
Status: COMPLETED | OUTPATIENT
Start: 2022-01-10 | End: 2022-01-10

## 2022-01-10 RX ADMIN — IOPAMIDOL 100 ML: 755 INJECTION, SOLUTION INTRAVENOUS at 19:52

## 2022-01-11 ENCOUNTER — TRANSFERRED RECORDS (OUTPATIENT)
Dept: HEALTH INFORMATION MANAGEMENT | Facility: CLINIC | Age: 42
End: 2022-01-11
Payer: COMMERCIAL

## 2022-01-15 ENCOUNTER — HEALTH MAINTENANCE LETTER (OUTPATIENT)
Age: 42
End: 2022-01-15

## 2022-01-27 ENCOUNTER — TRANSFERRED RECORDS (OUTPATIENT)
Dept: HEALTH INFORMATION MANAGEMENT | Facility: CLINIC | Age: 42
End: 2022-01-27
Payer: COMMERCIAL

## 2022-02-10 ENCOUNTER — OFFICE VISIT (OUTPATIENT)
Dept: FAMILY MEDICINE | Facility: CLINIC | Age: 42
End: 2022-02-10
Payer: COMMERCIAL

## 2022-02-10 VITALS
DIASTOLIC BLOOD PRESSURE: 77 MMHG | TEMPERATURE: 98.8 F | SYSTOLIC BLOOD PRESSURE: 116 MMHG | HEART RATE: 78 BPM | RESPIRATION RATE: 16 BRPM | BODY MASS INDEX: 29.18 KG/M2 | OXYGEN SATURATION: 95 % | WEIGHT: 203.4 LBS

## 2022-02-10 DIAGNOSIS — J01.90 SUBACUTE SINUSITIS, UNSPECIFIED LOCATION: Primary | ICD-10-CM

## 2022-02-10 DIAGNOSIS — L03.011 CELLULITIS OF FINGER OF RIGHT HAND: ICD-10-CM

## 2022-02-10 PROCEDURE — 99213 OFFICE O/P EST LOW 20 MIN: CPT | Performed by: STUDENT IN AN ORGANIZED HEALTH CARE EDUCATION/TRAINING PROGRAM

## 2022-02-10 RX ORDER — MONTELUKAST SODIUM 4 MG/1
TABLET, CHEWABLE ORAL
COMMUNITY
Start: 2022-01-27 | End: 2024-02-06

## 2022-02-10 NOTE — PATIENT INSTRUCTIONS
Antibiotic:  Augmentin, 1 pill 2x daily for next 10 days.      Do warm soaks 2x per day.     Finger exercises.

## 2022-02-10 NOTE — PROGRESS NOTES
There are no exam notes on file for this visit.    ASSESSMENT AND PLAN:      Jeanie was seen today for sinus problem.    Diagnoses and all orders for this visit:    Subacute sinusitis, unspecified location  Greater than 10 days of sinus symptoms with some facial pressure, dental pain, history of recurrent sinusitis.  Will treat with Augmentin.  This should also cover for her cellulitis.  -     amoxicillin-clavulanate (AUGMENTIN) 875-125 MG tablet; Take 1 tablet by mouth 2 times daily    Cellulitis of finger of right hand.  Evidence of cellulitis in the right finger secondary to previous paper cut.  Area is already draining seems to be improving.  Recommended regular soaks, and the Augmentin should cover well for this.  -     amoxicillin-clavulanate (AUGMENTIN) 875-125 MG tablet; Take 1 tablet by mouth 2 times daily    She will schedule a complete physical at her convenience.      Patient Instructions   Antibiotic:  Augmentin, 1 pill 2x daily for next 10 days.      Do warm soaks 2x per day.     Finger exercises.          Anayeli Warner MD    SUBJECTIVE  Jeanie French is a 41 year old female with past medical history significant for    Patient Active Problem List   Diagnosis     Cancer of tongue (H)     Health Care Home     Generalized anxiety disorder     Tenosynovitis of the right index finger     Wheezing     Irritable bowel syndrome (IBS)     Exercise-induced asthma     Sinusitis, chronic     Allergic rhinitis, unspecified allergic rhinitis type     Hypovitaminosis D     Adjustment disorder with anxiety     Menorrhagia with regular cycle     Major depressive disorder with single episode     Iron deficiency anemia due to chronic blood loss     Collagenous colitis     Eosinophilic esophagitis       Others present at the visit:  None    Presents for   Chief Complaint   Patient presents with     Sinus Problem     Pt is here for a sinus infection.  She states that she has been ill since January having had a cold  and being tested negative for COVID.  She reports tooth pain and cold like symptoms not resolving.      Patient presents for evaluation of sinus pain and tenderness.  She started feeling sick over New Year's.  Developed some cold symptoms, got tested for Covid which was negative, and then felt better.  At the end of the month, around January 26 she became sick again.  Again had a negative Covid test, but symptoms have persisted since then.  Had a coworker who recently did Covid and then then again had a negative test.  She still feeling congested, with pressure in her face and dental pain, which she commonly has with sinus infections.  No significant facial pain, no ear pain, no difficulty swallowing, no fevers or chills, no shortness of breath.  She has a history of recurrent sinus infections as do others in her family.    Also has a area of irritation on her right index finger.  Had a small paper cut in this area, which then became red and swollen.  Was having some difficulty moving the finger.  Developed a little bulge, which she popped yesterday and there was some pussy drainage that came out of the area.  It has since felt better.  The swelling is going down.  She is worried that this was an infection, and is wondering if we could take an antibiotic that would help with this to.    Continues to see the GI doctor to discuss her diarrhea.      OBJECTIVE:  Vitals: /77 (BP Location: Left arm, Patient Position: Sitting, Cuff Size: Adult Large)   Pulse 78   Temp 98.8  F (37.1  C) (Oral)   Resp 16   Wt 92.3 kg (203 lb 6.4 oz)   SpO2 95%   BMI 29.18 kg/m    BMI= Body mass index is 29.18 kg/m .  Objective:    Vitals:  Vitals are reviewed and are within the normal range  Gen:  Alert, pleasant, no acute distress  HEENT: Tympanic membranes are normal on the right, left side has some fluid present behind the TM.  She has some mild facial pain on the left, no TMJ pain, bilateral nasal congestion, with clear  throat with no exudate.  Some left-sided lymphadenopathy on palpation.  Cardiac:  Regular rate and rhythm, no murmurs, rubs or gallops  Respiratory:  Lungs clear to auscultation bilaterally  Extremities: She has some mild erythema and limited range of motion in her right index finger.  There is a small scab with opening on the palmar side along the PIP joint.  No significant fluctuance, and the joint is nontender.  Stiffness but no pain with palpation.    No results found for any visits on 02/10/22.        Patient Instructions   Antibiotic:  Augmentin, 1 pill 2x daily for next 10 days.      Do warm soaks 2x per day.     Finger exercises.          Anayeli Warner MD

## 2022-02-16 ENCOUNTER — OFFICE VISIT (OUTPATIENT)
Dept: PSYCHOLOGY | Facility: CLINIC | Age: 42
End: 2022-02-16
Payer: COMMERCIAL

## 2022-02-16 DIAGNOSIS — F33.1 MAJOR DEPRESSIVE DISORDER, RECURRENT, MODERATE (H): ICD-10-CM

## 2022-02-16 DIAGNOSIS — F41.1 GENERALIZED ANXIETY DISORDER: Primary | ICD-10-CM

## 2022-02-16 PROCEDURE — 90791 PSYCH DIAGNOSTIC EVALUATION: CPT | Performed by: PSYCHOLOGIST

## 2022-02-16 ASSESSMENT — ANXIETY QUESTIONNAIRES
1. FEELING NERVOUS, ANXIOUS, OR ON EDGE: NEARLY EVERY DAY
2. NOT BEING ABLE TO STOP OR CONTROL WORRYING: NEARLY EVERY DAY
7. FEELING AFRAID AS IF SOMETHING AWFUL MIGHT HAPPEN: SEVERAL DAYS
3. WORRYING TOO MUCH ABOUT DIFFERENT THINGS: NEARLY EVERY DAY
IF YOU CHECKED OFF ANY PROBLEMS ON THIS QUESTIONNAIRE, HOW DIFFICULT HAVE THESE PROBLEMS MADE IT FOR YOU TO DO YOUR WORK, TAKE CARE OF THINGS AT HOME, OR GET ALONG WITH OTHER PEOPLE: VERY DIFFICULT
5. BEING SO RESTLESS THAT IT IS HARD TO SIT STILL: NOT AT ALL
GAD7 TOTAL SCORE: 13
6. BECOMING EASILY ANNOYED OR IRRITABLE: MORE THAN HALF THE DAYS

## 2022-02-16 ASSESSMENT — PATIENT HEALTH QUESTIONNAIRE - PHQ9
SUM OF ALL RESPONSES TO PHQ QUESTIONS 1-9: 12
5. POOR APPETITE OR OVEREATING: SEVERAL DAYS

## 2022-02-16 NOTE — PROGRESS NOTES
Behavioral Health Diagnostic Assessment Update:    Client's Legal Name: Jeanie French    Client's Preferred Name: Jeanie  YOB: 1980  Type of Service: in-person, counseling  Length of Service:   Start time: 10:00am    End time: 11:00am  Duration: 60 minutes  Attendees: Jeanie attended alone    Date of Previous Diagnostic Assessment: Initial diagnostic assessment 7/21/10.  Last update 2/10/21.    Assessment Summary:  Diagnostic completed today. Jeanie is a 41 year old female who was referred for mental health services by Primary Care Provider, Dr. Kristen Warner for help with managing emotions tied to a stressful social situation.  Based on Jeanie's report of symptoms, she continues to meet criteria for Generalized Anxiety Disorder and Major Depressive Disorder, Recurrent, Moderate.  Jeanie's mental health concerns continue to affect her ability to function in areas related to work, interacting and relating with others, family relationships, social relationships, intimate partner relationships, maintaining personal health and physical well-being and participating in meaningful activities causing clinically significant distress.  Jeanie denies any concerning drug/alcohol use. She denies any current safety concerns. Jeanie is not significantly impacted by any social determinants of health.  Based on Jeanie's reported symptoms and impact on functioning, the plan is to continue regular psychotherapy at this time utilizing a CBT approach to care.    DSM-V Diagnoses:  Generalized Anxiety Disorder  Major Depressive Disorder, recurrent, mild to moderate    Recommendations & Plan:     Follow up with this provider in mid-March and schedule follow up every other week through April.  Did not stop at the  to attend to scheduling on her way out of clinic so asked  to call to encourage scheduling.    After Visit Summary was printed for Jeanie at the end of the visit today.    See pt instructions for updated  treatment plan.      Next treatment plan update due 5/16/22.  Next diagnostic update due 2/16/23.    Mental Health Screening Questionnaires:  PHQ-9:  May be inflated secondary to medical concerns/illness impacting sleep/energy  PHQ 7/14/2021 10/20/2021 2/16/2022   PHQ-9 Total Score 9 7 12   Q9: Thoughts of better off dead/self-harm past 2 weeks Not at all Not at all Not at all      JAMAR-7:   JAMAR-7 SCORE 7/14/2021 11/1/2021 2/16/2022   Total Score - - -   Total Score 12 14 13     PC-PTSD:   PTSD Screen Score 2/16/2022   Have you ever experienced this kind of event? Yes   PTSD Screen (Score of 3 or more suggests positive screen) 2   Some recent data might be hidden     CAGE-AID:   CAGE-AID Total Score 2/16/2022   Total Score 0       Current Presenting Problems or Complaints (including patient perception of problem and external factors contributing to current dilemma):  Discussed recent illness and changes to life circumstances which are contributing to increased anxiety and continued depressed mood (see below for additional detail).  Medical concerns are likely contributing to increased fatigue and increased need for sleep so mood may be better than it appears via PHQ9 tracking.  Anxiety continues to be primary concern.  Reports significant difficulty managing worries.  Tries to control worries by being more productive/organized but these benefits are short lived and hard to sustain.  Not interested in seeking medication changes to see if this would increase control of symptoms.  Currently at max dose of Buspar, at 40 of Prozac, takes Hydroxyzine (AM and PM dose, plus 2 at night).  Often forgets PM dose.  Does help when she remembers to take it.  Has set alarm on her phone to help with this.  Has seen psychiatric consultants (Chris and Briana) in the past but did not find this very helpful.  Thinks coping strategies may be more helpful than med adjustments and we have agreed to focus on that.    Jeanie endorses frequent  "thoughts of \"I've gottta x, y z.\"  The list is endless and never feels satisfactorily addressed.  Hard to know how to prioritize.  Hard to hold aside time for fun and relaxation.  Encouraged challenging/evaluating these thoughts.  Conceptually Jeanie is on board with this but finds it difficult to make changes.  Agreed more structure/practice between visits would be helpful.  Reviewed Fear Tools tyler for use between visits.      Also discussed how positive changes in relationship status are also increasing stress.  Communication has been a problem as partner often does things without consulting her (e.g., purchases for the house, etc.) or doesn't proactively communicate important changes (e.g., not wanting to have children, etc.).  This is frustrating for Jeanie.  Provided empathy and encouraged setting aside time to discuss communication ground rules so expectations are clear and problems in communication (not just tasks) can be addressed.    Review of Symptoms:  Depression: Sleeping a lot but still very tired almost every day (may be related to health concerns as well as mood), reports overeating almost daily, some depressed mood, poor concentration and feelings of low self worth.  Marcy: none  Psychosis: none  Anxiety: Persistent feelings of anxiety and worry related to a number of areas in her life, difficulty relaxing, irritability and fear of negative life events.  Trauma: Intrusive thoughts about having cancer of the tongue, feels panicked if she gets a sore on her tongue due to this history, has occasional thoughts about other trauma in context of current relationship/sexual relationship    Functioning:  Jeanie's mental health concerns have been continuing to affect her ability to function in her life and have been causing clinically significant distress.  Symptoms can contribute to sense of overwhelm at work and undermine ability to complete tasks at home and generally enjoy life.    Current Substance Use: "   Occasional social drinking.  No concerning drug or alcohol use.    Suicide Assessment:    Recent suicidal thoughts: No  Past suicidal thoughts: Yes: did report passive suicidal ideation in the fall of 2019 related to relational stressors, but denied any intent or plan at that time.  Any attempts in the past: No  Any family/friends/loved ones die by suicide: No  Plan or considering various methods: No  Access to guns: No  Protective factors: no h/o suicide attempt, no plan or intent, describes a safety plan, h/o seeking help when needed, future oriented, none to minimal alcohol use  and stable housing  Verbal contract for safety: Yes    Non-Suicidal Self Injurious Behavior: No    Violence/Homicide Risk Assessment:     Problems with anger management: No  History of violence: No  History of significant damage to property: No  Threat made to harm or kill someone: No  Verbal contract for safety: N/A    Mental Status Exam:  During interaction with the examiner today, Jeanie was cooperative, open, engaged and pleasant. Patient was generally alert and oriented to person, place, time, and situation. They were neatly dressed, appropriately groomed and appeared stated age. Patient's attire was appropriate for the weather and occasion. Eye contact was good. Psychomotor functioning: normal or unremarkable. Speech was normal limits tone, rate, volume; largely coherent and relevant to topic. Mood was anxious; affect was mood-congruent. Thought processes were unremarkable. Thought content was not remarkable with no evidence of psychotic features and no evidence of suicide, homicide, or nonsuicidal self injury related thoughts, intent, urges, planning, behavior/recent attempts. Memory appeared grossly intact without being formally evaluated. Insight: good. Judgment was good. Patient exhibited good impulse control during the appointment.      Safety Plan:   Jeanie was provided with the phone number for emergency mental health services  and was encouraged to call these local crisis numbers, 911, or visit a local emergency room if thoughts of suicide or homicide were to arise and/or if they were to be in acute distress. The patient agreed to utilize these services as indicated if the need were to arise. Jeanie denied current suicidal or homicidal ideation, intent, or plan, and denied a history of suicide attempts/self-harming behaviors. Based on these factors, Jeanie is considered to be sustainable as an outpatient at this time.     Updated Life History/Circumstances:   Partner recently bought a house with the intention of their co-owning this property once Jeanie sells her own home.  Closing by the end of February.  No rush to move in and they are working on making improvements to the property prior to move in date.  This is raising communication concerns (see above).  Has now met partner's sister and son, but many friends, partner's daughter and ex-wife still do not know about her.      Work continues to be stressful and she would like to change jobs.  Limited time to review this today but will explore at greater length next visit.    Social Determinants of Health and Cultural Factors:   Jeanie is impacted by the following social determinants of health: none    Updated Health History/Family Health History:   Ill over the holidays but no COVID.  Co-worker had COVID but Jeanie has tested repeatedly and was never positive for this.  Still feeling cruddy even after being treated for sinus infection.  Breathing a bit better but thinks she may need additional treatment for this which is frustrating.  Jeanie has been treated by GI for eosinophilic esophagitis and collagenous colitis in the past year.  She has also struggled with fatigue associated with excessive bleeding and iron deficiency.      Mother has been treated for breast cancer in the past year.  Father experienced a traumatic brain injury related to a fall in 2015.    Patient Active Problem List    Diagnosis     Cancer of tongue (H)     Health Care Home     Generalized anxiety disorder     Tenosynovitis of the right index finger     Wheezing     Irritable bowel syndrome (IBS)     Exercise-induced asthma     Sinusitis, chronic     Allergic rhinitis, unspecified allergic rhinitis type     Hypovitaminosis D     Adjustment disorder with anxiety     Menorrhagia with regular cycle     Major depressive disorder with single episode     Iron deficiency anemia due to chronic blood loss     Collagenous colitis     Eosinophilic esophagitis     Current Outpatient Medications   Medication     albuterol (PROAIR HFA/PROVENTIL HFA/VENTOLIN HFA) 108 (90 Base) MCG/ACT inhaler     amoxicillin-clavulanate (AUGMENTIN) 875-125 MG tablet     budesonide (ENTOCORT EC) 3 MG EC capsule     busPIRone HCl (BUSPAR) 30 MG tablet     colestipol (COLESTID) 1 g tablet     ferrous gluconate (FERGON) 324 (38 Fe) MG tablet     FLUoxetine (PROZAC) 40 MG capsule     hydrOXYzine (VISTARIL) 25 MG capsule     norgestrel-ethinyl estradiol (LO/OVRAL) 0.3-30 MG-MCG tablet     omeprazole (PRILOSEC) 40 MG DR capsule     No current facility-administered medications for this visit.       NOTE: Diagnostic update complete.    Jody Rankin, PhD, LP

## 2022-02-16 NOTE — PATIENT INSTRUCTIONS
Good to see you today Jeanie!  See below for our updated treatment plan from today and let me know if you want me to make any changes.    Schedule March visits on your way out (shoot for every other week as my schedule allows).      Treatment Plan    Client's Name: Jeanie French  YOB: 1980    Today's Date: 2/16/22   Date Diagnostic Update Due: 2/16/23    DSM-V Diagnoses:   Generalized Anxiety Disorder  Major Depressive Disorder, recurrent, mild to moderate    Psychosocial / Contextual Factors: Stressors with her health and in her interpersonal and work life contribute to difficulty.  Symptoms continue to fluctuate with life stressors.    Functioning:  Jeanie's mental health concerns have been continuing to affect her ability to function in her life and have been causing clinically significant distress.  Symptoms can contribute to sense of overwhelm at work and undermine ability to complete tasks at home and generally enjoy life.    PHQ 7/14/2021 10/20/2021 2/16/2022   PHQ-9 Total Score 9 7 12   Q9: Thoughts of better off dead/self-harm past 2 weeks Not at all Not at all Not at all     JAMAR-7 SCORE 7/14/2021 11/1/2021 2/16/2022   Total Score - - -   Total Score 12 14 13     CAGE-AID Total Score 2/16/2022   Total Score 0       CAGE-AID score  > 1 is a positive screen, suggesting further discussion is needed to determine if evaluation for alcohol or substance abuse is appropriate.  A score > 2 is considered clinically significant, suggesting further evaluation of alcohol or substance-related problems is indicated.    PTSD-PC:  2/4     Collaboration:   Primary care physician, Dr. Anayeli Warner    Referral: none    Anticipated treatment duration: Unknown  Agreed upon meeting frequency: every 6-8 weeks    Long Term Treatment Goal(s) related to diagnosis / functional impairment(s)    Goal 1: Jeanie will report decreased anxiety and increased confidence in managing life stressors.    Steps we will take to  achieve your goal:    Jeanie will continue to take medications as prescribed by Dr. Warner.    Jeanie will practice stress mitigation strategies such as deep breathing, yoga or meditation.  Continue to be aware of life choices to increase sense of agency in your life.  Communicate your needs clearly to others.  Attend psychotherapy as scheduled.    Anxiety CBT    GGOC - Learn to challenge negative thinking    FearTools - provide resources for tracking thought patterns and building skills    Progress:  Making good progress!  Keep up the good work!    Intervention(s)  Therapist will provide support, psychoeducation and homework assignments as needed.    Goal 2: Jeanie will report decreased depression.    Steps we will take to achieve your goal:    Notice the good.  Practice self-compassion  Be mindful of priorities and how you spend your limited time and energy.  Attend psychotherapy as scheduled.     Progress:  Making good progress!  Keep up the good work!    Intervention(s)  Therapist will provide support, psychoeducation and homework assignments as needed.    If you need additional support and care during times that your therapist or PCP are not available, here are some additional resources for you:    Crisis Lines:    [facility] Adult Crisis:  [phone]  [facility] Adult Crisis: [phone]    Crisis Text Line: Text [place] to 365910. Free support at your fingertips 24/7  People who text [place] to 070227 will be connected with a counselor. Crisis Text Line is available 24 hours a day, seven days a week.    You can also consider going to the [facility] at the following address.  Walk ins are welcome:    [address] [place] [address]   [phone] (for 24 hour crisis consultation)    Monday - Friday 8:00am - 7:00pm  Saturday:  11:00am - 3:00pm  Sunday and Holidays Closed    If you feel at risk of immediate harm, go directly to the Emergency Department.    Patient has  reviewed and agreed to the above plan.    Jody Rankin, PhD, LP      February 16, 2022        ______________________________    ________  Patient Signature       Date    ______________________________    ________  Provider Signature       Date

## 2022-02-17 ASSESSMENT — ANXIETY QUESTIONNAIRES: GAD7 TOTAL SCORE: 13

## 2022-02-18 ENCOUNTER — TELEPHONE (OUTPATIENT)
Dept: FAMILY MEDICINE | Facility: CLINIC | Age: 42
End: 2022-02-18
Payer: COMMERCIAL

## 2022-02-18 NOTE — TELEPHONE ENCOUNTER
----- Message from Jody Rankin, PhD sent at 2/16/2022 12:20 PM CST -----  Regarding: Outreach to pt for scheduling  Hi team - can you please call this patient to encourage scheduling follow up with me at my first available appt (likely mid-March) and to schedule follow up every other week through April after that.  Our visits are typically 60 minute, in person visits but she can schedule however works best for her.  Let me know if you have questions or run into problems with this request!  Thanks!  Jody

## 2022-03-11 ENCOUNTER — MYC MEDICAL ADVICE (OUTPATIENT)
Dept: FAMILY MEDICINE | Facility: CLINIC | Age: 42
End: 2022-03-11
Payer: COMMERCIAL

## 2022-03-11 DIAGNOSIS — L03.011 CELLULITIS OF FINGER OF RIGHT HAND: ICD-10-CM

## 2022-03-11 DIAGNOSIS — J01.90 SUBACUTE SINUSITIS, UNSPECIFIED LOCATION: ICD-10-CM

## 2022-03-11 RX ORDER — ECHINACEA PURPUREA EXTRACT 125 MG
TABLET ORAL
Qty: 15 ML | Refills: 1 | Status: SHIPPED | OUTPATIENT
Start: 2022-03-11

## 2022-03-16 ENCOUNTER — OFFICE VISIT (OUTPATIENT)
Dept: PSYCHOLOGY | Facility: CLINIC | Age: 42
End: 2022-03-16
Payer: COMMERCIAL

## 2022-03-16 DIAGNOSIS — F33.1 MAJOR DEPRESSIVE DISORDER, RECURRENT, MODERATE (H): ICD-10-CM

## 2022-03-16 DIAGNOSIS — F41.1 GENERALIZED ANXIETY DISORDER: Primary | ICD-10-CM

## 2022-03-16 PROCEDURE — 90837 PSYTX W PT 60 MINUTES: CPT | Performed by: PSYCHOLOGIST

## 2022-03-16 NOTE — PROGRESS NOTES
Behavioral Health Progress Note    Client's Legal Name: Jeanie French    Client's Preferred Name: Jeanie  YOB: 1980  Type of Service: in-person, counseling  Length of Service:   Start time: 9:04am    End time: 10:03am   Duration: 59 minutes  Attendees: patient    Identifying Information and Presenting Problem:  Jeanie is a 42 year old female who was referred for mental health services by Primary Care Provider, Dr. Kristen Warner for help with managing emotions tied to a stressful social situation.  Our initial diagnostic assessment took place on 7/21/10 and we have worked together on and off since that time as her needs have dictated.  Based on Jeanie's current report of symptoms, she meets criteria for Generalized Anxiety Disorder and Major Depressive Disorder, Recurrent, Moderate.  Jeanie's mental health concerns affect her ability to function in areas related to work, interacting and relating with others, family relationships, social relationships, intimate partner relationships, maintaining personal health and physical well-being and participating in meaningful activities causing clinically significant distress.  Jeanie denies any concerning drug/alcohol use. She denies any current safety concerns. Jeanie is not significantly impacted by any social determinants of health.  Based on Jeanie's reported symptoms and impact on functioning, the plan is to engage in psychotherapy at this time utilizing a CBT approach to care every 2-4 weeks.    Treatment Objective(s) Addressed in This Session:  Decreased anxiety and increased confidence in managing life stressors.  Decreased depression.    Progress on / Status of Treatment Objective(s) / Homework:  Satisfactory progress     Mental Health Screening Questionnaires:  PHQ-9:   PHQ 7/14/2021 10/20/2021 2/16/2022   PHQ-9 Total Score 9 7 12   Q9: Thoughts of better off dead/self-harm past 2 weeks Not at all Not at all Not at all      JAMAR-7:   JAMAR-7 SCORE 7/14/2021 11/1/2021  2/16/2022   Total Score - - -   Total Score 12 14 13     Topics Discussed/Interventions Provided:    Relationship update:  Divorce is finally final which is a relief to Jeanie.  They have been working on cleaning/preparing new house for move in but have not set a date.  Wondering how divorce being finalized impacts their relationship.  Disappointed partner did not make greater efforts to celebrate her recent birthday.  Discussed different love languages and encouraged conversation around this so her needs/partner's needs are clear.  Also discussed how Jeanie has been prioritizing getting house ready despite the fact that her name is not yet on the mortgage, etc. Rather than attending to her own needs (e.g., work related goals noted below, general housekeeping tasks, etc.).  Encouraged greater balance by giving herself permission to carve out time to attend to her own needs rather than feeling she has to set these aside to take care of things with partner.    Work:  Work has been increasingly stressful.  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  Very bored/burnt out with current work.  Would like to look for new job but overwhelmed with where to start.  We discussed time and energy as limited resources and being intentional about how she spends them.  Discussed unproductive worry and amount of time/energy spent on that vs. Picking one activity related to goal and setting aside time to do that.  Today we set the goal of putting aside some time this weekend to work on her resume.  Encouraged Jeanie to give herself permission to do one task at a time (bird by bird) and be at peace with prioritizing one activity over another.      Coping:  Downloaded [employer] after our last visit but hasn't used it yet.  Also considering [employer] kamran for procrastination but there is cost to this.  Discussed pros and cons and Jeanie decided to start with trying to  do one small activity on [employer] tyler most days and just see how this goes.      Assessment:   The patient appeared to be active and engaged in today's session and was receptive to feedback.     Mental Status:   During interaction with the examiner today, Jeanie was cooperative, open, engaged and pleasant. Patient was generally alert and oriented to person, place, time, and situation. They were neatly dressed, appropriately groomed and appeared stated age. Patient's attire was appropriate for the weather and occasion. Eye contact was good. Psychomotor functioning: normal or unremarkable. Speech was normal limits tone, rate, volume; largely coherent and relevant to topic. Mood was mildly anxious; affect was mood-congruent. Thought processes were unremarkable. Thought content was not remarkable with no evidence of psychotic features and no evidence of suicide, homicide, or nonsuicidal self injury related thoughts, intent, urges, planning, behavior/recent attempts. Memory appeared grossly intact without being formally evaluated. Insight: good. Judgment was good. Patient exhibited good impulse control during the appointment.     Does the patient appear to be at imminent risk of harm to self/others at this time? No    DSM-5 Diagnosis:  Generalized Anxiety Disorder  Major Depressive Disorder, recurrent, mild to moderate    Plan:  1. Follow up with this provider on 4/11 and 4/25/22.  These will be face to face visits.  2. Work on goals as noted in patient instructions.  3. Utilize crisis resources as needed.  4. After Visit Summary was printed for patient at the end of the visit.    Jody Rankin, PhD, LP    NOTE: Treatment plan update due 5/16/22.  Diagnostic assessment update due 2/16/23.

## 2022-03-16 NOTE — PATIENT INSTRUCTIONS
Good to talk with you today Jeanie!    Today we set the goal of putting aside some time this weekend to work on your resume.    Give yourself permission to do one task at a time and be at peace with prioritizing one activity over another.      Try to use [employer] tyler to see if this helps with anxiety.  Pick one small activity each day.

## 2022-04-05 NOTE — Clinical Note
Deirdre is calling to ask if Dr. Thomason is able to order a drug screen but rather than a urine test she's looking to have her hair tested. She said she has an upcoming court appearance and her  told her she should get this done. Deirdre would prefer to have this addressed with her current PCP rather than someone she doesn't know.  Please call Deirdre back to discuss how she could go about getting this ordered and completed.    Jeanie stated her intention to schedule CPE with you in the near future.  May need help talking through fertility questions.  Let me know if you have questions or would like additional follow up from me.  Thanks!  Jody

## 2022-04-11 ENCOUNTER — OFFICE VISIT (OUTPATIENT)
Dept: PSYCHOLOGY | Facility: CLINIC | Age: 42
End: 2022-04-11
Payer: COMMERCIAL

## 2022-04-11 DIAGNOSIS — F33.1 MAJOR DEPRESSIVE DISORDER, RECURRENT, MODERATE (H): ICD-10-CM

## 2022-04-11 DIAGNOSIS — F41.1 GENERALIZED ANXIETY DISORDER: Primary | ICD-10-CM

## 2022-04-11 PROCEDURE — 90834 PSYTX W PT 45 MINUTES: CPT | Performed by: PSYCHOLOGIST

## 2022-04-11 NOTE — PROGRESS NOTES
Behavioral Health Progress Note    Client's Legal Name: Jeanie French    Client's Preferred Name: Jeanie  YOB: 1980  Type of Service: in-person, counseling  Length of Service:   Start time: 9:09am    End time: 9:57am   Duration: 48 minutes  Attendees: patient    Identifying Information and Presenting Problem:  Jeanie is a 42 year old female who was referred for mental health services by Primary Care Provider, Dr. Kristen Warner for help with managing emotions tied to a stressful social situation.  Our initial diagnostic assessment took place on 7/21/10 and we have worked together on and off since that time as her needs have dictated.  Based on Jeanie's current report of symptoms, she meets criteria for Generalized Anxiety Disorder and Major Depressive Disorder, Recurrent, Moderate.  Jeanie's mental health concerns affect her ability to function in areas related to work, interacting and relating with others, family relationships, social relationships, intimate partner relationships, maintaining personal health and physical well-being and participating in meaningful activities causing clinically significant distress.  Jeanie denies any concerning drug/alcohol use. She denies any current safety concerns. Jeanie is not significantly impacted by any social determinants of health.  Based on Jeanie's reported symptoms and impact on functioning, the plan is to engage in psychotherapy at this time utilizing a CBT approach to care every 2-4 weeks.    Treatment Objective(s) Addressed in This Session:  Decreased anxiety and increased confidence in managing life stressors.  Decreased depression.    Progress on / Status of Treatment Objective(s) / Homework:  Satisfactory progress     Mental Health Screening Questionnaires:  PHQ-9:   PHQ 7/14/2021 10/20/2021 2/16/2022   PHQ-9 Total Score 9 7 12   Q9: Thoughts of better off dead/self-harm past 2 weeks Not at all Not at all Not at all      JAMAR-7:   JAMAR-7 SCORE 7/14/2021 11/1/2021  2/16/2022   Total Score - - -   Total Score 12 14 13     Topics Discussed/Interventions Provided:    Follow up on homework:  Today we set the goal of putting aside some time this weekend to work on your resume. Done!  Give yourself permission to do one task at a time and be at peace with prioritizing one activity over another.  Done!  Try to use [employer] tyler to see if this helps with anxiety.  Pick one small activity each day.  Did not assess.  Will re-visit next visit.    Today, Jeanie reports she has been feeling Ok overall.  Has made good progress and gotten resume largely done and inbox cleared out.  Work is still not great but trying to focus on what she has control over (looking for a job).  Making some progress accepting that when she focuses on one task other things will not get done.  This is still challenging (especially if others question her choices) but making some progress in challenging those unhelpful thoughts.    Relationship:  Discussed continued uncertainty around timing of moving into new house with Nitish.  Discussed Jeanie's hopes and goals (wants to be seen as more of an equal partner in relationship when it comes to decision making, wants to move into new house together, wants to sell her own house this summer, etc.) and how to approach Nitish to discuss these.  Gently challenged that lack of time has been a primary barrier and encouraged making this a priority.    Social connection:  Wants to re-establish friendships which have slipped away over the course of the pandemic.  Praised this intention and discussed it's importance in context of pandemic and shift in relationship with partner.  Encouraged scheduling time to reach out and plan get together with friends.    Assessment:   Jeanie appeared to be active and engaged in today's session and was receptive to feedback.     Mental Status:   During interaction with the examiner today, Jeanie was cooperative, open, engaged and pleasant. She was  "generally alert and oriented to person, place, time, and situation. Jeanie was neatly dressed, appropriately groomed and appeared stated age. Her attire was appropriate for the weather and occasion. Eye contact was good. Psychomotor functioning: normal or unremarkable. Speech was normal limits tone, rate, volume; largely coherent and relevant to topic. Mood was \"Ok\"; affect was mood-congruent. Thought processes were unremarkable. Thought content was not remarkable with no evidence of psychotic features and no evidence of suicide, homicide, or nonsuicidal self injury related thoughts, intent, urges, planning, behavior/recent attempts. Memory appeared grossly intact without being formally evaluated. Insight: good. Judgment was good. Patient exhibited good impulse control during the appointment.     Does the patient appear to be at imminent risk of harm to self/others at this time? No    DSM-5 Diagnosis:  Generalized Anxiety Disorder  Major Depressive Disorder, recurrent, mild to moderate    Plan:  1. Follow up with this provider on 4/25/22.  Jeanie will call in to schedule May visits in the near future.  2. Ask Nitish to set aside time to discuss plan to move in.  State your desires clearly (wants to move soon so they can set up house jointly, wants him to tell his daughter/work about their relationship, etc.).  3.  Reach out to friends to find a time to meet up/re-establish relationship.  3.  Keep up the good work prioritizing/setting aside time for most important tasks and finding peace that this means letting something else go.  Be aware of self-critical voice and actively work to challenge/defuse from this voice.  4. Utilize crisis resources as needed.  5. After Visit Summary was declined at the end of the visit.    Jody Rankin, PhD, LP    NOTE: Treatment plan update due 5/16/22.  Diagnostic assessment update due 2/16/23.  "

## 2022-04-25 ENCOUNTER — OFFICE VISIT (OUTPATIENT)
Dept: PSYCHOLOGY | Facility: CLINIC | Age: 42
End: 2022-04-25
Payer: COMMERCIAL

## 2022-04-25 DIAGNOSIS — F33.1 MAJOR DEPRESSIVE DISORDER, RECURRENT, MODERATE (H): ICD-10-CM

## 2022-04-25 DIAGNOSIS — F41.1 GENERALIZED ANXIETY DISORDER: Primary | ICD-10-CM

## 2022-04-25 PROCEDURE — 90837 PSYTX W PT 60 MINUTES: CPT | Performed by: PSYCHOLOGIST

## 2022-04-25 NOTE — PATIENT INSTRUCTIONS
Good to see you today Jeanie!    Don't forget to invest in yourself and your own well-being.  That is really important.  Think about small ways to fill up in the coming weeks.

## 2022-04-25 NOTE — PROGRESS NOTES
Behavioral Health Progress Note    Client's Legal Name: Jeanie French    Client's Preferred Name: Jeanie  YOB: 1980  Type of Service: in-person, counseling  Length of Service:   Start time: 9:05am    End time: 10:00am   Duration: 55 minutes  Attendees: patient    Identifying Information and Presenting Problem:  Jeanie is a 42 year old female who was referred for mental health services by Primary Care Provider, Dr. Kristen Warner for help with managing emotions tied to a stressful social situation.  Our initial diagnostic assessment took place on 7/21/10 and we have worked together on and off since that time as her needs have dictated.  Based on Jeanie's current report of symptoms, she meets criteria for Generalized Anxiety Disorder and Major Depressive Disorder, Recurrent, Moderate.  Jeanie's mental health concerns affect her ability to function in areas related to work, interacting and relating with others, family relationships, social relationships, intimate partner relationships, maintaining personal health and physical well-being and participating in meaningful activities causing clinically significant distress.  Jeanie denies any concerning drug/alcohol use. She denies any current safety concerns. Jeanie is not significantly impacted by any social determinants of health.  Based on Jeanie's reported symptoms and impact on functioning, the plan is to engage in psychotherapy at this time utilizing a CBT approach to care every 2-4 weeks.    Treatment Objective(s) Addressed in This Session:  Decreased anxiety and increased confidence in managing life stressors.  Decreased depression.    Progress on / Status of Treatment Objective(s) / Homework:  Satisfactory progress     Mental Health Screening Questionnaires:  PHQ-9:   PHQ 7/14/2021 10/20/2021 2/16/2022   PHQ-9 Total Score 9 7 12   Q9: Thoughts of better off dead/self-harm past 2 weeks Not at all Not at all Not at all      JAMAR-7:   JAMAR-7 SCORE 7/14/2021 11/1/2021  2/16/2022   Total Score - - -   Total Score 12 14 13     Topics Discussed/Interventions Provided:    Follow up on homework:    1.  Ask Nitish to set aside time to discuss plan to move in.  State your desires clearly (wants to move soon so they can set up house jointly, wants him to tell his daughter/work about their relationship, etc.). Done  2.  Reach out to friends to find a time to meet up/re-establish relationship. Not done due to lack of energy/not feeling well.  3.  Keep up the good work prioritizing/setting aside time for most important tasks and finding peace that this means letting something else go.  Be aware of self-critical voice and actively work to challenge/defuse from this voice. Making some progress.    Relationship:  Did have conversation with Nitish about desire to sell house this summer and move in so they can establish household together but disappointed/frustrated by his rather non-committal, evasive response.  At times questions why she is in this relationship.  Annoyed by what she experiences as false choices (asking her opinion but then limiting options to his preferences/priorities).  Praised follow up and provided empathy for this disappointment.  Reflected that this has been a pretty consistent pattern throughout their relationship and was unlikely to change.  Discussed options going forward (e.g., leave relationship, be patient and wait for gradual change/progress, be increasingly assertive and move forward without waiting for Nitish's input).  Discussed additional contextual challenges related to merging households - something Jeanie has never done before.  Reflected on possible benefits of holding off on selling house to focus on this task without additional time/financial pressure and Jeanie did appear to agree this would be a good idea.  Will continue to talk with Nitish about relationship with hope to improve communication going forward.    Health:  Feels health is poor.  Feels very out of  shape (gets winded if she needs to go up and down stairs which was not the case in the past, etc.).  GI problems continue.  Antibiotics taken for sinus infection helped clear up GI symptoms for awhile (2-3 months) but these have since returned.  Has appt with GI via [facility] (Dr. Eason) at the end of May.  Recommending repeat colonoscopy (Jeanie would really rather avoid this).  Trying to lose weight but not being successful with this which is frustrating for her.  Worries there is something wrong which is contributing (thyroid, etc.).  Also experiencing some headaches.  Sleep has been very poor.  Feels exhausted.  Focused on sleep hygiene for a bit and this did help but continues to be a problem.  Acknowledges stress can contribute but this chronic for her and does not appear to explain struggles in the past year.  Has CPE scheduled with Dr. Warner at end of May.    Social connection/self care:  Did not reach out to set up time with friends due to illness/lack of energy.  Went for a walk a few times this week.  This felt good despite the crappy weather.  Encouraged continued investment in self care even if setting up time with friends feels out of reach right now.  Discussed small gestures (e.g., flowers for house, nice candle, wandering garden store even if you don't buy anything, etc.) to support well-being and balance out focus on relationship at expense of focus on personal well-being.      [facility] but this does not work well for her given work schedule/drive and fatigue.  Feels guilty about not wanting to go as parent's health has been poor and she is mindful that there is no guarantee of longevity.  Provided empathy and discussed strategy for resolving ambivalence.  Jeanie decided to stay home but plan another time to connect with her parents at a time that works better for her.      Assessment:   Jeanie appeared to be active and engaged in today's session and was receptive  to feedback.     Mental Status:   During interaction with the examiner today, Jeanie was cooperative, open, engaged and pleasant. She was generally alert and oriented to person, place, time, and situation. Jeanie was neatly dressed, appropriately groomed and appeared stated age. Her attire was appropriate for the weather and occasion. Eye contact was good. Psychomotor functioning: normal or unremarkable. Speech was normal limits tone, rate, volume; largely coherent and relevant to topic. Mood was sad/depressed; affect was mood-congruent. She is tearful at times.  Thought processes were unremarkable. Thought content was not remarkable with no evidence of psychotic features and no evidence of suicide, homicide, or nonsuicidal self injury related thoughts, intent, urges, planning, behavior/recent attempts. Memory appeared grossly intact without being formally evaluated. Insight: good. Judgment was good. Patient exhibited good impulse control during the appointment.     Does the patient appear to be at imminent risk of harm to self/others at this time? No    DSM-5 Diagnosis:  Generalized Anxiety Disorder  Major Depressive Disorder, recurrent, mild to moderate    Plan:  1. Follow up with this provider on 5/11, 6/8 and 6/27/22..  2. Follow up with Dr. Warner 5/25/22.  3. Utilize crisis resources as needed.  4. After Visit Summary was printed for patient at the end of the visit.    Jody Rankin, PhD, LP    NOTE: Treatment plan update due 5/16/22.  Diagnostic assessment update due 2/16/23.

## 2022-05-11 ENCOUNTER — OFFICE VISIT (OUTPATIENT)
Dept: PSYCHOLOGY | Facility: CLINIC | Age: 42
End: 2022-05-11
Payer: COMMERCIAL

## 2022-05-11 DIAGNOSIS — F33.1 MAJOR DEPRESSIVE DISORDER, RECURRENT, MODERATE (H): ICD-10-CM

## 2022-05-11 DIAGNOSIS — F41.1 GENERALIZED ANXIETY DISORDER: Primary | ICD-10-CM

## 2022-05-11 PROCEDURE — 90837 PSYTX W PT 60 MINUTES: CPT | Performed by: PSYCHOLOGIST

## 2022-05-11 NOTE — PROGRESS NOTES
Behavioral Health Progress Note    Client's Legal Name: Jeanie French    Client's Preferred Name: Jeanie  YOB: 1980  Type of Service: in-person, counseling  Length of Service:   Start time: 10:00am    End time: 11:00am   Duration: 60 minutes  Attendees: patient    Identifying Information and Presenting Problem:  Jeanie is a 42 year old female who was referred for mental health services by Primary Care Provider, Dr. Kristen Warner for help with managing emotions tied to a stressful social situation.  Our initial diagnostic assessment took place on 7/21/10 and we have worked together on and off since that time as her needs have dictated.  Based on Jeanie's current report of symptoms, she meets criteria for Generalized Anxiety Disorder and Major Depressive Disorder, Recurrent, Moderate.  Jeanie's mental health concerns affect her ability to function in areas related to work, interacting and relating with others, family relationships, social relationships, intimate partner relationships, maintaining personal health and physical well-being and participating in meaningful activities causing clinically significant distress.  Jeanie denies any concerning drug/alcohol use. She denies any current safety concerns. Jeanie is not significantly impacted by any social determinants of health.  Based on Jeanie's reported symptoms and impact on functioning, the plan is to engage in psychotherapy every 2-4 weeks.    Treatment Objective(s) Addressed in This Session:  Decreased anxiety and increased confidence in managing life stressors.  Decreased depression.    Progress on / Status of Treatment Objective(s) / Homework:  Satisfactory progress     Mental Health Screening Questionnaires:  PHQ-9:   PHQ 7/14/2021 10/20/2021 2/16/2022   PHQ-9 Total Score 9 7 12   Q9: Thoughts of better off dead/self-harm past 2 weeks Not at all Not at all Not at all      JAMAR-7:   JAMAR-7 SCORE 7/14/2021 11/1/2021 2/16/2022   Total Score - - -   Total Score  12 14 13     Topics Discussed/Interventions Provided:    Health:  Feels like health problems are stacking up and making it difficult to function.  Had another migraine yesterday and needed to stay home from work.  Wondering if at least part of her health problems may be related to cumulative work stress.  Getting pills stuck in her throat more often.  Abdominal pain she assumes is related to her gall bladder but scans have been negative.  Has appt with GI via [facility] (Dr. Eason) at the end of May.  Recommending repeat colonoscopy. Has CPE scheduled with Dr. Warner at end of May.  Provided empathy for discomfort/distress related to health.  Encouraged scheduling more frequent follow up with Dr. Warner while sorting through these various problems as this will take time.  Jeanie was open to this support and plan.    Work:  Continues to believe she needs to find a new job to improve health/wellbeing but having a hard time finding time/energy to work on this.  Resume still needs a few hours of work before she could send it out.  Reflected that time/energy are an investment and had her determine top priority for these resources at this time.  Jeanie agrees finding a new job is at the top of the list so encouraged her to plan time for this.  Does not want to take vacation for this as of yet, but thought she could set aside Saturday morning. Discussed possible barriers including getting poor sleep.having a headache on Saturday morning (as these are common).  Developed plan to mitigate that likelihood (Set yourself up for success.  Don't lay down on the couch Friday night and risk falling asleep there if you want to work on your resume on Saturday morning.  Invite Nitish over to your place on Friday rather than going to his house.).    Relationship: Nitish has still not told daughter about their relationship which is disappointing for Jeanie.  Has invited her to join him for family wedding in June however and plans to tell daughter  prior to that date which is promising.  Continues to be frustrated about mixed messages Nitish shares re: timeline for move in.  Nitish recently shared he is not ready to move in together which is surprising and disappointing for Jeanie as he had discussed marriage/moving in together very early on in their relationship.  Really hoped to sell and be out of her house prior to winter.  Still hopeful this could happen.  Encouraged reflection on past experiences with Nitish when it came to making big decisions/changes (6 months or more).  Encouraged setting more realistic timeline for this transition which could take another year or more.  On further reflection, Jeanie agrees this is likely.  Discussed risks of putting her plans/wellbeing in Nitish's hands entirely and benefits of setting more realistic timelines for change based on her experience with him over time.  Discussed what she would do differently if she were to stay in her house for a bit longer and she was able to identify several, concrete goals.  Encouraged focus on what she has control over rather than ruminating/planning for transitions over which she has limited control.  Discussed where Nitish may be at developmentally and how this may contribute to current mismatch in readiness.  Jeanie actually has significant empathy for that and agrees talking through this with Nitish may be helpful.    Self care:  Frustrated she has needed to dip into vacation days for sick days/medical visits as she has used up her sick time.  Reflected that Jeanie does not really use her vacation as she is saving it for better use, but ends up feeling depleted.  Encouraged giving herself permission to use her vacation time in ways that would help her recover/replenish/fill up.  This is a good investment.    Assessment:   Jeanie appeared to be active and engaged in today's session and was receptive to feedback.     Mental Status:   During interaction with the examiner today, Jeanie was cooperative,  open, engaged and pleasant. She was generally alert and oriented to person, place, time, and situation. Jeanie was neatly dressed, appropriately groomed and appeared stated age. Her attire was appropriate for the weather and occasion. Eye contact was good. Psychomotor functioning: normal or unremarkable. Speech was normal limits tone, rate, volume; largely coherent and relevant to topic. Mood was depressed and anxious initially but consolable and more relaxed by the end of the visit; affect was mood-congruent.  Thought processes were unremarkable. Thought content was not remarkable with no evidence of psychotic features and no evidence of suicide, homicide, or nonsuicidal self injury related thoughts, intent, urges, planning, behavior/recent attempts. Memory appeared grossly intact without being formally evaluated. Insight: good. Judgment was good. Patient exhibited good impulse control during the appointment.     Does the patient appear to be at imminent risk of harm to self/others at this time? No    DSM-5 Diagnosis:  Generalized Anxiety Disorder  Major Depressive Disorder, recurrent, mild to moderate    Plan:  1. Follow up with this provider on 6/8 and 6/27/22..  2. Follow up with Dr. Warner 5/25/22.  Discuss plan for more regular follow up on multiple health concerns in the coming months.  3. Utilize crisis resources as needed.  4. After Visit Summary was printed for patient at the end of the visit.    Jody Rankin, PhD, LP    NOTE: Treatment plan update due 5/16/22.  Diagnostic assessment update due 2/16/23.

## 2022-05-11 NOTE — PATIENT INSTRUCTIONS
Don't lay down on the couch Friday night if you want to work on your resume on Saturday morning.  Set yourself up for success.  Invite Nitish over to your place on Friday rather than going to his house.    Schedule visits with Dr. Warner every 2 to 4 weeks for the next several months to get some traction understanding and treating your various concerns.

## 2022-05-25 ENCOUNTER — OFFICE VISIT (OUTPATIENT)
Dept: FAMILY MEDICINE | Facility: CLINIC | Age: 42
End: 2022-05-25
Payer: COMMERCIAL

## 2022-05-25 VITALS
DIASTOLIC BLOOD PRESSURE: 75 MMHG | SYSTOLIC BLOOD PRESSURE: 110 MMHG | WEIGHT: 206.4 LBS | OXYGEN SATURATION: 96 % | HEART RATE: 80 BPM | TEMPERATURE: 98.4 F | RESPIRATION RATE: 16 BRPM | BODY MASS INDEX: 29.62 KG/M2

## 2022-05-25 DIAGNOSIS — Z00.00 PREVENTATIVE HEALTH CARE: ICD-10-CM

## 2022-05-25 DIAGNOSIS — Z00.00 ROUTINE GENERAL MEDICAL EXAMINATION AT A HEALTH CARE FACILITY: ICD-10-CM

## 2022-05-25 DIAGNOSIS — R53.83 FATIGUE, UNSPECIFIED TYPE: ICD-10-CM

## 2022-05-25 DIAGNOSIS — R68.89 COLD INTOLERANCE: ICD-10-CM

## 2022-05-25 DIAGNOSIS — G43.009 MIGRAINE WITHOUT AURA AND WITHOUT STATUS MIGRAINOSUS, NOT INTRACTABLE: Primary | ICD-10-CM

## 2022-05-25 DIAGNOSIS — N89.8 VAGINAL ITCHING: ICD-10-CM

## 2022-05-25 DIAGNOSIS — D50.8 OTHER IRON DEFICIENCY ANEMIA: ICD-10-CM

## 2022-05-25 LAB
ALBUMIN SERPL-MCNC: 3.5 G/DL (ref 3.5–5)
ALP SERPL-CCNC: 70 U/L (ref 45–120)
ALT SERPL W P-5'-P-CCNC: 14 U/L (ref 0–45)
ANION GAP SERPL CALCULATED.3IONS-SCNC: 8 MMOL/L (ref 5–18)
AST SERPL W P-5'-P-CCNC: 17 U/L (ref 0–40)
BASOPHILS # BLD AUTO: 0 10E3/UL (ref 0–0.2)
BASOPHILS NFR BLD AUTO: 0 %
BILIRUB DIRECT SERPL-MCNC: 0.2 MG/DL
BILIRUB SERPL-MCNC: 0.5 MG/DL (ref 0–1)
BUN SERPL-MCNC: 13 MG/DL (ref 8–22)
CALCIUM SERPL-MCNC: 9.5 MG/DL (ref 8.5–10.5)
CHLORIDE BLD-SCNC: 105 MMOL/L (ref 98–107)
CHOLEST SERPL-MCNC: 272 MG/DL
CLUE CELLS: ABNORMAL
CO2 SERPL-SCNC: 26 MMOL/L (ref 22–31)
CREAT SERPL-MCNC: 1.09 MG/DL (ref 0.6–1.1)
EOSINOPHIL # BLD AUTO: 0.3 10E3/UL (ref 0–0.7)
EOSINOPHIL NFR BLD AUTO: 6 %
ERYTHROCYTE [DISTWIDTH] IN BLOOD BY AUTOMATED COUNT: 13.5 % (ref 10–15)
FASTING STATUS PATIENT QL REPORTED: ABNORMAL
FERRITIN SERPL-MCNC: 6 NG/ML (ref 10–130)
GFR SERPL CREATININE-BSD FRML MDRD: 64 ML/MIN/1.73M2
GLUCOSE BLD-MCNC: 88 MG/DL (ref 70–125)
HCT VFR BLD AUTO: 33.8 % (ref 35–47)
HDLC SERPL-MCNC: 57 MG/DL
HGB BLD-MCNC: 11.5 G/DL (ref 11.7–15.7)
IMM GRANULOCYTES # BLD: 0 10E3/UL
IMM GRANULOCYTES NFR BLD: 0 %
IRON SATN MFR SERPL: 16 % (ref 20–50)
IRON SERPL-MCNC: 81 UG/DL (ref 42–175)
IRON SERPL-MCNC: 83 UG/DL (ref 42–175)
LDLC SERPL CALC-MCNC: 188 MG/DL
LYMPHOCYTES # BLD AUTO: 1.5 10E3/UL (ref 0.8–5.3)
LYMPHOCYTES NFR BLD AUTO: 28 %
MCH RBC QN AUTO: 29.2 PG (ref 26.5–33)
MCHC RBC AUTO-ENTMCNC: 34 G/DL (ref 31.5–36.5)
MCV RBC AUTO: 86 FL (ref 78–100)
MONOCYTES # BLD AUTO: 0.5 10E3/UL (ref 0–1.3)
MONOCYTES NFR BLD AUTO: 9 %
NEUTROPHILS # BLD AUTO: 2.9 10E3/UL (ref 1.6–8.3)
NEUTROPHILS NFR BLD AUTO: 57 %
PLATELET # BLD AUTO: 299 10E3/UL (ref 150–450)
POTASSIUM BLD-SCNC: 3.9 MMOL/L (ref 3.5–5)
PROT SERPL-MCNC: 7.4 G/DL (ref 6–8)
RBC # BLD AUTO: 3.94 10E6/UL (ref 3.8–5.2)
SODIUM SERPL-SCNC: 139 MMOL/L (ref 136–145)
TIBC SERPL-MCNC: 529 UG/DL (ref 313–563)
TRANSFERRIN SERPL-MCNC: 423 MG/DL (ref 212–360)
TRICHOMONAS, WET PREP: ABNORMAL
TRIGL SERPL-MCNC: 134 MG/DL
VIT B12 SERPL-MCNC: 256 PG/ML (ref 213–816)
WBC # BLD AUTO: 5.1 10E3/UL (ref 4–11)
WBC'S/HIGH POWER FIELD, WET PREP: ABNORMAL
YEAST, WET PREP: PRESENT

## 2022-05-25 PROCEDURE — 82306 VITAMIN D 25 HYDROXY: CPT | Performed by: STUDENT IN AN ORGANIZED HEALTH CARE EDUCATION/TRAINING PROGRAM

## 2022-05-25 PROCEDURE — 85025 COMPLETE CBC W/AUTO DIFF WBC: CPT | Performed by: STUDENT IN AN ORGANIZED HEALTH CARE EDUCATION/TRAINING PROGRAM

## 2022-05-25 PROCEDURE — 36415 COLL VENOUS BLD VENIPUNCTURE: CPT | Performed by: STUDENT IN AN ORGANIZED HEALTH CARE EDUCATION/TRAINING PROGRAM

## 2022-05-25 PROCEDURE — 99214 OFFICE O/P EST MOD 30 MIN: CPT | Mod: 25 | Performed by: STUDENT IN AN ORGANIZED HEALTH CARE EDUCATION/TRAINING PROGRAM

## 2022-05-25 PROCEDURE — G0123 SCREEN CERV/VAG THIN LAYER: HCPCS | Performed by: STUDENT IN AN ORGANIZED HEALTH CARE EDUCATION/TRAINING PROGRAM

## 2022-05-25 PROCEDURE — 80053 COMPREHEN METABOLIC PANEL: CPT | Performed by: STUDENT IN AN ORGANIZED HEALTH CARE EDUCATION/TRAINING PROGRAM

## 2022-05-25 PROCEDURE — 82728 ASSAY OF FERRITIN: CPT | Performed by: STUDENT IN AN ORGANIZED HEALTH CARE EDUCATION/TRAINING PROGRAM

## 2022-05-25 PROCEDURE — 84466 ASSAY OF TRANSFERRIN: CPT | Performed by: STUDENT IN AN ORGANIZED HEALTH CARE EDUCATION/TRAINING PROGRAM

## 2022-05-25 PROCEDURE — 82248 BILIRUBIN DIRECT: CPT | Performed by: STUDENT IN AN ORGANIZED HEALTH CARE EDUCATION/TRAINING PROGRAM

## 2022-05-25 PROCEDURE — 87210 SMEAR WET MOUNT SALINE/INK: CPT | Performed by: STUDENT IN AN ORGANIZED HEALTH CARE EDUCATION/TRAINING PROGRAM

## 2022-05-25 PROCEDURE — 83540 ASSAY OF IRON: CPT | Performed by: STUDENT IN AN ORGANIZED HEALTH CARE EDUCATION/TRAINING PROGRAM

## 2022-05-25 PROCEDURE — 87624 HPV HI-RISK TYP POOLED RSLT: CPT | Performed by: STUDENT IN AN ORGANIZED HEALTH CARE EDUCATION/TRAINING PROGRAM

## 2022-05-25 PROCEDURE — 82607 VITAMIN B-12: CPT | Performed by: STUDENT IN AN ORGANIZED HEALTH CARE EDUCATION/TRAINING PROGRAM

## 2022-05-25 PROCEDURE — 80061 LIPID PANEL: CPT | Performed by: STUDENT IN AN ORGANIZED HEALTH CARE EDUCATION/TRAINING PROGRAM

## 2022-05-25 PROCEDURE — 99396 PREV VISIT EST AGE 40-64: CPT | Performed by: STUDENT IN AN ORGANIZED HEALTH CARE EDUCATION/TRAINING PROGRAM

## 2022-05-25 RX ORDER — FLUCONAZOLE 150 MG/1
150 TABLET ORAL ONCE
Qty: 1 TABLET | Refills: 0 | Status: SHIPPED | OUTPATIENT
Start: 2022-05-25 | End: 2022-05-25

## 2022-05-25 RX ORDER — SUMATRIPTAN 25 MG/1
25 TABLET, FILM COATED ORAL
Qty: 20 TABLET | Refills: 1 | Status: SHIPPED | OUTPATIENT
Start: 2022-05-25 | End: 2022-10-31

## 2022-05-25 ASSESSMENT — PATIENT HEALTH QUESTIONNAIRE - PHQ9: SUM OF ALL RESPONSES TO PHQ QUESTIONS 1-9: 8

## 2022-05-25 ASSESSMENT — ASTHMA QUESTIONNAIRES: ACT_TOTALSCORE: 22

## 2022-05-25 NOTE — PATIENT INSTRUCTIONS
Stop in lab for blood draw  Pap completed today.  Results should come back in 1 week.  We will call to schedule mammogram.  New migraine medication sent in - sumatriptan.  Take 1 pill with start of symptoms, and second pill if not better in 30 min.  Okay to still take tylenol if needed.   For sleep, set an alarm and try to get into bed, read book and do normal routine.  If waking up during the night, restart your bedtime routine to remind your body to go back to sleep.     Follow up in 2-4 weeks to recheck migraines, review labs, and discuss next steps for fatigue.        Preventive Health Recommendations  Female Ages 40 to 49    Yearly exam:   See your health care provider every year in order to  Review health changes.   Discuss preventive care.    Review your medicines if your doctor prescribed any.    Get a Pap test every three years (unless you have an abnormal result and your provider advises testing more often).    If you get Pap tests with HPV test, you only need to test every 5 years, unless you have an abnormal result. You do not need a Pap test if your uterus was removed (hysterectomy) and you have not had cancer.    You should be tested each year for STDs (sexually transmitted diseases), if you're at risk.   Ask your doctor if you should have a mammogram.    Have a colonoscopy (test for colon cancer) if someone in your family has had colon cancer or polyps before age 50.     Have a cholesterol test every 5 years.     Have a diabetes test (fasting glucose) after age 45. If you are at risk for diabetes, you should have this test every 3 years.    Shots: Get a flu shot each year. Get a tetanus shot every 10 years.     Nutrition:   Eat at least 5 servings of fruits and vegetables each day.  Eat whole-grain bread, whole-wheat pasta and brown rice instead of white grains and rice.  Get adequate Calcium and Vitamin D.      Lifestyle  Exercise at least 150 minutes a week (an average of 30 minutes a day, 5 days a  week). This will help you control your weight and prevent disease.  Limit alcohol to one drink per day.  No smoking.   Wear sunscreen to prevent skin cancer.  See your dentist every six months for an exam and cleaning.

## 2022-05-25 NOTE — PROGRESS NOTES
Female Physical Note    Concerns today: Patient has multiple additional health concerns that she would like to discuss today.  Just has not been feeling well.  She is noticing increased fatigue, difficulty sleeping, brittle and thin nails, increased cold sensitivity, and continues to struggle with her weight.  She gained weight last year about taking budesonide as part of her treatment for eosinophilic esophagitis, and has been watching her diet working on exercising and she cannot seem to lose any of the weight.      The fatigue is been worse lately.  She is tired all the time.  Sleeps through much of the weekend.  Falls asleep frequently on her couch in the evening time.  Has no difficulty falling asleep but will wake up usually between 2 3 4 AM in the morning.  Various things wake her up, but she does have some difficulty getting back to sleep and ends up tossing and turning a lot.  She has had a sleep study in the past that was normal.  Does snore mildly but does not think sleep apnea is an issue.  She does note her sleep is much worse when she falls asleep on the couch and is working to remedy this.  She also notes increased difficulty with exercise.  Gets fatigued and short of breath with just a 2 mile brisk walk or if she was able to jog or run previously.  She wonders if she is having more trouble with her hemoglobin, or if there is a thyroid issue going on as she is increasingly susceptible to cold.    Headaches have also been a problem.  She describes an intense throbbing unilateral headache that started this past fall with the budesonide.  He did improve after stopping the medication but she still having these.  We will have headaches about 1 day/week and actually misses work because of the severity.  She is still taking birth control which is helping with her periods, but when she is missed pills she notices worsening headache.  Sometimes is bad enough that last a day and half.  Typically gets better  with naps but these have been less helpful.  Tylenol does not help.  She is trying to avoid Excedrin to make sure it does not irritate her stomach but this has not been helpful either.  She has had auras in the past but only many years ago.  Does get some mild nausea and increased light sensitivity without sound sensitivity with the headaches.  Headaches are less frequent but still quite bothersome when she gets them.  She has never tried a migraine specific medication.    Periods overall have been less heavy since starting the birth control.  The last 1 was a little bit heavier, but she attributes this to missing 2 doses of her birth control pills.  She has noticed some mild vaginal itching over the last week so would like me to do wet prep today 2.    Additionally she is worried about her gallbladder.  Has been getting intermittent pain in her upper abdominal area.  This happens on and off and can be affected by food.  She had a CT done in January 2022 and was told that her gallbladder was normal then.  She does have upcoming follow-up with her GI doc this month.    We reviewed her family history of breast cancer and she is interested in moving forward with a mammogram.    She is up-to-date on her colonoscopy as she had one last year as part of her GI evaluation.    She is due for a Pap smear today.  Last Pap smear was in November 2015 and was normal.  No history of abnormal Pap.  Periods are regular now since starting the birth control and less painful, with less bleeding.    ROS:  CONSTITUTIONAL: positive for fatigue, difficulty with weigth loss, despite lifestyle changes.   SKIN: no worrisome rashes, no worrisome moles, no worrisome lesions  EYES: no acute vision problems or changes  ENT: no ear problems, no mouth problems, no throat problems, has had difficulties with congestion and recurrent sinus problems.    RESP: no significant cough, no shortness of breath  CV: no chest pain, no palpitations, no new or  worsening peripheral edema  GI: Has chronic GI upset.  See Above, follows with GI.    : no frequency, no dysuria, no hematuria, noting some vaginal itching.  MS: no claudication, no myalgias, no joint aches  NEURO: no weakness, no dizziness, no syncope,positive for headaches  ENDOCRINE: increased cold temperature intolerance, noting brittle nails    Sexually Active: Yes  Sexual concerns: No   Contraception:OCP-see med list   P: 0  Menarche:  Patient's last menstrual period was 2022 (exact date).   STD History: Neg  Last Pap Smear Date: 2015 normal  Abnormal Pap History: None    Patient Active Problem List   Diagnosis     Cancer of tongue (H)     Health Care Home     Generalized anxiety disorder     Tenosynovitis of the right index finger     Wheezing     Irritable bowel syndrome (IBS)     Exercise-induced asthma     Sinusitis, chronic     Allergic rhinitis, unspecified allergic rhinitis type     Hypovitaminosis D     Adjustment disorder with anxiety     Menorrhagia with regular cycle     Major depressive disorder with single episode     Iron deficiency anemia due to chronic blood loss     Collagenous colitis     Eosinophilic esophagitis       Current Outpatient Medications   Medication Sig Dispense Refill     albuterol (PROAIR HFA/PROVENTIL HFA/VENTOLIN HFA) 108 (90 Base) MCG/ACT inhaler Inhale 2 puffs into the lungs every 6 hours as needed for shortness of breath / dyspnea or wheezing 1 Inhaler 1     amoxicillin-clavulanate (AUGMENTIN) 875-125 MG tablet Take 1 tablet by mouth 2 times daily 20 tablet 0     budesonide (ENTOCORT EC) 3 MG EC capsule        busPIRone HCl (BUSPAR) 30 MG tablet TAKE 1 TABLET BY MOUTH TWICE A  tablet 1     colestipol (COLESTID) 1 g tablet TAKE 1-2 TABLETS BY ORAL ROUTE 1-2 TIMES EVERY DAY AS NEEDED       ferrous gluconate (FERGON) 324 (38 Fe) MG tablet TAKE 1 TABLET (324 MG) BY MOUTH DAILY (WITH BREAKFAST) 60 tablet 1     FLUoxetine (PROZAC) 40 MG capsule TAKE 1  CAPSULE BY MOUTH EVERY DAY 90 capsule 1     hydrOXYzine (VISTARIL) 25 MG capsule TAKE 1 CAPSULE 3 TIMES DAILY AND 2-4 CAPSULES DAILY AT NIGHT. 180 capsule 5     norgestrel-ethinyl estradiol (LO/OVRAL) 0.3-30 MG-MCG tablet Take 1 tablet by mouth daily Take 1 pill daily for 63 days, then take placebo pills for 7 days. 63 tablet 5     omeprazole (PRILOSEC) 40 MG DR capsule TAKE 1 CAPSULE BY MOUTH EVERY DAY 90 capsule 3     sodium chloride (OCEAN) 0.65 % nasal spray Use 1 spray in each nostril 4x per day as needed. 15 mL 1     SUMAtriptan (IMITREX) 25 MG tablet Take 1 tablet (25 mg) by mouth at onset of headache for migraine May repeat in 2 hours. Max 8 tablets/24 hours. 20 tablet 1       Past Medical History:   Diagnosis Date     Asthma      Depression, anxiety      Noninfectious ileitis     IBS     Oral cancer (H)     Oral cancer         Family History     Problem (# of Occurrences) Relation (Name,Age of Onset)    Cancer (1) Mother: Chronic leukemia     Coronary Artery Disease (1) Father    Diabetes (3) Maternal Grandmother, Maternal Grandfather, Other       Negative family history of: Anesthesia Reaction, Colon Polyps, Crohn's Disease, Ulcerative Colitis, Colon Cancer, Hypertension, Hyperlipidemia, Cerebrovascular Disease, Breast Cancer, Prostate Cancer, Other Cancer, Depression, Anxiety Disorder, Mental Illness, Substance Abuse, Asthma, Osteoporosis, Genetic Disorder, Thyroid Disease, Obesity, Unknown/Adopted          Problem List Medication List and Allergy List were reviewed and updated.    Patient is an established patient of this clinic..    Social History     Tobacco Use     Smoking status: Never Smoker     Smokeless tobacco: Never Used   Substance Use Topics     Alcohol use: No     In a long term relationship  Children ? no    Has anyone hurt you physically, for example by pushing, hitting, slapping or kicking you or forcing you to have sex? Denies  Do you feel threatened or controlled by a partner, ex-partner  or anyone in your life? Denies    RISK BEHAVIORS AND HEALTHY HABITS:  Tobacco Use/Smoking: None  Illicit Drug Use: None  Diet (5-7 servings of fruits/veg daily): Yes   Exercise (30 min accumulated most days):Yes  Dental Care: Yes     Cholesterol Level (>44 yo or at risk):  Recommended and patient accepted testing., Pap/HPV cotest every 5 years for women 30-65   Recommended and patient accepted testing. and HIV screening:  Previously completed and normal.   Colon CA Screening (>50-75 ):  Date done 2021  Result(s) abnormal, per GI  and Breast CA Screening (>41 yo or 10 y before 1st degree relative diagnosis): Recommended and patient accepted testing.  CV Risk based on Pooled Cohort Risk:1%.  .  No treatment recommended   Pooled Cohort Risk Calculator    Immunization History   Administered Date(s) Administered     COVID-19,PF,Pfizer (12+ Yrs) 04/17/2021, 05/10/2021     COVID-19,PF,Pfizer 12+ Yrs (2022 and After) 01/27/2022     HEPA 02/15/1999, 08/13/1999     HepA-Adult 02/15/1999, 08/13/1999     HepB 07/30/1996, 09/11/1996, 02/07/1997     HepB-Adult 07/30/1996, 09/11/1996, 02/07/1997     Influenza (IIV3) PF 11/01/2008, 11/16/2012     Influenza Vaccine IM > 6 months Valent IIV4 (Alfuria,Fluzone) 09/11/2020     Influenza Vaccine, 6+MO IM (QUADRIVALENT W/PRESERVATIVES) 11/26/2014, 11/17/2015, 02/01/2017, 11/12/2019     Influenza,INJ,MDCK,PF,Quad >4yrs 02/11/2018     Mantoux Tuberculin Skin Test 06/19/2012     Meningococcal (Menactra ) 05/19/2009     Meningococcal (Menomune ) 05/19/2009     OPV, trivalent, live 11/25/1999     Pneumo Conj 13-V (2010&after) 11/12/2019     Pneumococcal 23 valent 10/09/2020     Poliovirus, inactivated (IPV) 11/25/1999     Rabavert 01/25/1999, 02/01/1999, 02/15/1999     Rabies - IM Diploid Cell Culture 01/25/1999, 02/01/1999, 02/15/1999     Rabies-Intradermal 01/25/1999, 02/01/1999, 02/15/1999     TD (ADULT, 7+) 06/19/1999     TDAP Vaccine (Adacel) 07/17/2016     Td (Adult), Adsorbed  06/19/1999     Tdap (Adacel,Boostrix) 05/19/2009     Typhoid IM 05/19/2009     Typhoid, Unspecified Formulation 05/19/2009     Yellow Fever 01/25/1999, 05/19/2009    Reviewed Immunization Record Today    EXAMINATION:   /75 (BP Location: Left arm, Patient Position: Sitting, Cuff Size: Adult Large)   Pulse 80   Temp 98.4  F (36.9  C) (Oral)   Resp 16   Wt 93.6 kg (206 lb 6.4 oz)   LMP 05/16/2022 (Exact Date)   SpO2 96%   Breastfeeding No   BMI 29.62 kg/m    GENERAL: healthy, alert and no distress  ABDOMEN: soft, no tenderness, no  hepatosplenomegaly, no masses, normal bowel sounds  - female: cervix- normal, adnexae- normal; uterus- normal, no masses, no discharge  Modified exam only completed today.  Normal  exam, belly is soft and nontender vitals normal.    Results for orders placed or performed in visit on 05/25/22   Basic metabolic panel     Status: Normal   Result Value Ref Range    Sodium 139 136 - 145 mmol/L    Potassium 3.9 3.5 - 5.0 mmol/L    Chloride 105 98 - 107 mmol/L    Carbon Dioxide (CO2) 26 22 - 31 mmol/L    Anion Gap 8 5 - 18 mmol/L    Urea Nitrogen 13 8 - 22 mg/dL    Creatinine 1.09 0.60 - 1.10 mg/dL    Calcium 9.5 8.5 - 10.5 mg/dL    Glucose 88 70 - 125 mg/dL    GFR Estimate 64 >60 mL/min/1.73m2   Lipid Profile     Status: Abnormal   Result Value Ref Range    Cholesterol 272 (H) <=199 mg/dL    Triglycerides 134 <=149 mg/dL    Direct Measure HDL 57 >=50 mg/dL    LDL Cholesterol Calculated 188 (H) <=129 mg/dL    Patient Fasting > 8hrs? Unknown    Vitamin B12     Status: Normal   Result Value Ref Range    Vitamin B12 256 213 - 816 pg/mL   Hepatic function panel     Status: Normal   Result Value Ref Range    Bilirubin Total 0.5 0.0 - 1.0 mg/dL    Bilirubin Direct 0.2 <=0.5 mg/dL    Protein Total 7.4 6.0 - 8.0 g/dL    Albumin 3.5 3.5 - 5.0 g/dL    Alkaline Phosphatase 70 45 - 120 U/L    AST 17 0 - 40 U/L    ALT 14 0 - 45 U/L   Iron     Status: Normal   Result Value Ref Range    Iron 81  42 - 175 ug/dL   Iron binding panel     Status: Abnormal   Result Value Ref Range    Iron 83 42 - 175 ug/dL    Transferrin 423 (H) 212 - 360 mg/dL    Transferrin Saturation, Calculated 16 (L) 20 - 50 %    Transferrin IBC, Calculated 529 313 - 563 ug/dL   Ferritin     Status: Abnormal   Result Value Ref Range    Ferritin 6 (L) 10 - 130 ng/mL   CBC with platelets and differential     Status: Abnormal   Result Value Ref Range    WBC Count 5.1 4.0 - 11.0 10e3/uL    RBC Count 3.94 3.80 - 5.20 10e6/uL    Hemoglobin 11.5 (L) 11.7 - 15.7 g/dL    Hematocrit 33.8 (L) 35.0 - 47.0 %    MCV 86 78 - 100 fL    MCH 29.2 26.5 - 33.0 pg    MCHC 34.0 31.5 - 36.5 g/dL    RDW 13.5 10.0 - 15.0 %    Platelet Count 299 150 - 450 10e3/uL    % Neutrophils 57 %    % Lymphocytes 28 %    % Monocytes 9 %    % Eosinophils 6 %    % Basophils 0 %    % Immature Granulocytes 0 %    Absolute Neutrophils 2.9 1.6 - 8.3 10e3/uL    Absolute Lymphocytes 1.5 0.8 - 5.3 10e3/uL    Absolute Monocytes 0.5 0.0 - 1.3 10e3/uL    Absolute Eosinophils 0.3 0.0 - 0.7 10e3/uL    Absolute Basophils 0.0 0.0 - 0.2 10e3/uL    Absolute Immature Granulocytes 0.0 <=0.4 10e3/uL   Wet preparation     Status: Abnormal    Specimen: Vagina; Swab   Result Value Ref Range    Trichomonas Absent Absent    Yeast Present (A) Absent    Clue Cells Absent Absent    WBCs/high power field 2+ (A) None    Narrative    Moderate bacteria; negative odor   CBC with Platelets & Differential     Status: Abnormal    Narrative    The following orders were created for panel order CBC with Platelets & Differential.  Procedure                               Abnormality         Status                     ---------                               -----------         ------                     CBC with platelets and d...[[id number]]  Abnormal            Final result                 Please view results for these tests on the individual orders.       Jeanie was seen today for physical.  Multiple issues discussed  today.    Preventative recommendations:  -- Pap completed today.  Next Pap in 5 years if normal.  -- Mammogram ordered today.  Should have close surveillance given family history of breast cancer.  -- Colon cancer screening.  She is doing this through GI and is up-to-date.  -- Immunizations.  She is up-to-date on these.  -- Hyperlipidemia.  , near the cutoff but not needing treatment.  She has no other risk factors for cardiac disease so we will continue to monitor this closely.    Diagnoses and all orders for this visit:    Migraine without aura and without status migrainosus, not intractable.  Discussed treatments for migraine.  We will start with sumatriptan.  Instructed patient how to take this appropriately, and we discussed Topamax as an option for daily prophylaxis if she is having continued difficulties.  Medication may also help with weight loss and mood.  -     SUMAtriptan (IMITREX) 25 MG tablet; Take 1 tablet (25 mg) by mouth at onset of headache for migraine May repeat in 2 hours. Max 8 tablets/24 hours.    Fatigue, unspecified type.  We will recheck hemoglobin, TSH, iron, B12 to evaluate potential cause for fatigue.  She was on budesonide for a while so we will also check cortisol in case there is an aspect of adrenal insufficiency here.  Will supplement as appropriate.  Discussed sleep hygiene, some strategies to help improve sleep, and she will work on these at home as well.  Continue to follow with Dr. Rankin to help with sleep and fatigue as well.  -     TSH with free T4 reflex; Future  -     Cortisol; Future  -     CBC with Platelets & Differential; Future  -     Ferritin; Future  -     Iron binding panel; Future  -     Iron; Future  -     Hepatic function panel; Future  -     Vitamin D Deficiency; Future  -     Vitamin B12; Future  -     Lipid Profile; Future  -     Basic metabolic panel; Future  -     Basic metabolic panel  -     Lipid Profile  -     Vitamin B12  -     Vitamin D  Deficiency  -     Hepatic function panel  -     Iron  -     Iron binding panel  -     Ferritin  -     CBC with Platelets & Differential  -     Cortisol  -     TSH with free T4 reflex    Other iron deficiency anemia  -     TSH with free T4 reflex; Future  -     Cortisol; Future  -     CBC with Platelets & Differential; Future  -     Ferritin; Future  -     Iron binding panel; Future  -     Iron; Future  -     Vitamin D Deficiency; Future  -     Vitamin B12; Future  -     Vitamin B12  -     Vitamin D Deficiency  -     Iron  -     Iron binding panel  -     Ferritin  -     CBC with Platelets & Differential  -     Cortisol  -     TSH with free T4 reflex    Cold intolerance  -     TSH with free T4 reflex; Future  -     Cortisol; Future  -     CBC with Platelets & Differential; Future  -     Ferritin; Future  -     Iron binding panel; Future  -     Iron binding panel  -     Ferritin  -     CBC with Platelets & Differential  -     Cortisol  -     TSH with free T4 reflex    Preventative health care  -     Hepatic function panel; Future  -     Lipid Profile; Future  -     Basic metabolic panel; Future  -     Gynecologic Cytology (PAP); Future  -     MA SCREENING DIGITAL BILAT; Future  -     Gynecologic Cytology (PAP)  -     Basic metabolic panel  -     Lipid Profile  -     Hepatic function panel  -     HPV High Risk Types DNA Cervical    Vaginal itching  Wet prep positive for yeast.  Discussed this with patient over the phone and sent in a prescription for Diflucan.  -     Wet preparation  -     fluconazole (DIFLUCAN) 150 MG tablet; Take 1 tablet (150 mg) by mouth once for 1 dose    Follow-up in 2 to 4 weeks to discuss fatigue, lab results, and migraines.    Anayeli Warner MD

## 2022-05-25 NOTE — LETTER
June 6, 2022      Jeanie M Manolo  [address] [place] [address]        Dear ,    We are writing to inform you of your test results.    Your pap smear and HPV testing is normal.  This is good news.  We should repeat the test again in 5 years.  Please call the clinic at [phone] if you have any questions.       Resulted Orders   Wet preparation   Result Value Ref Range    Trichomonas Absent Absent    Yeast Present (A) Absent    Clue Cells Absent Absent    WBCs/high power field 2+ (A) None    Narrative    Moderate bacteria; negative odor   Gynecologic Cytology (PAP)   Result Value Ref Range    Interpretation        Negative for Intraepithelial Lesion or Malignancy (NILM)    Comment         Papanicolaou Test Limitations:  Cervical cytology is a screening test with limited sensitivity, and regular screening is critical for cancer prevention.  Pap tests are primarily effective for the diagnosis/prevention of squamous cell carcinoma, not adenocarcinoma or other cancers.        Specimen Adequacy       Satisfactory for evaluation, endocervical/transformation zone component present    Clinical Information       irregular bleeding      LMP/Menopause Date       5/16/2022      Reflex Testing Yes regardless of result     Previous Abnormal?       No      Performing Labs       The technical component of this testing was completed at [facility]     Basic metabolic panel   Result Value Ref Range    Sodium 139 136 - 145 mmol/L    Potassium 3.9 3.5 - 5.0 mmol/L    Chloride 105 98 - 107 mmol/L    Carbon Dioxide (CO2) 26 22 - 31 mmol/L    Anion Gap 8 5 - 18 mmol/L    Urea Nitrogen 13 8 - 22 mg/dL    Creatinine 1.09 0.60 - 1.10 mg/dL    Calcium 9.5 8.5 - 10.5 mg/dL    Glucose 88 70 - 125 mg/dL    GFR Estimate 64 >60 mL/min/1.73m2      Comment:      Effective December 21, 2021 eGFRcr in adults is calculated using the 2021 CKD-EPI creatinine equation which includes age and gender (Margaret  et al., [facility], DOI: 10.1056/SGXZzu0847262)   Lipid Profile   Result Value Ref Range    Cholesterol 272 (H) <=199 mg/dL    Triglycerides 134 <=149 mg/dL    Direct Measure HDL 57 >=50 mg/dL      Comment:      HDL Cholesterol Reference Range:     0-2 years:   No reference ranges established for patients under 2 years old  at [facility] for lipid analytes.    2-8 years:  Greater than 45 mg/dL     18 years and older:   Female: Greater than or equal to 50 mg/dL   Male:   Greater than or equal to 40 mg/dL    LDL Cholesterol Calculated 188 (H) <=129 mg/dL    Patient Fasting > 8hrs? Unknown    Vitamin B12   Result Value Ref Range    Vitamin B12 256 213 - 816 pg/mL   Vitamin D Deficiency   Result Value Ref Range    Vitamin D, Total (25-Hydroxy) 52 20 - 75 ug/L    Narrative    Season, race, dietary intake, and treatment affect the concentration of 25-hydroxy-Vitamin D. Values may decrease during winter months and increase during summer months. Values 20-29 ug/L may indicate Vitamin D insufficiency and values <20 ug/L may indicate Vitamin D deficiency.    Vitamin D determination is routinely performed by an immunoassay specific for 25 hydroxyvitamin D3.  If an individual is on vitamin D2(ergocalciferol) supplementation, please specify 25 OH vitamin D2 and D3 level determination by LCMSMS test [id number].     Hepatic function panel   Result Value Ref Range    Bilirubin Total 0.5 0.0 - 1.0 mg/dL    Bilirubin Direct 0.2 <=0.5 mg/dL    Protein Total 7.4 6.0 - 8.0 g/dL    Albumin 3.5 3.5 - 5.0 g/dL    Alkaline Phosphatase 70 45 - 120 U/L    AST 17 0 - 40 U/L    ALT 14 0 - 45 U/L   Iron   Result Value Ref Range    Iron 81 42 - 175 ug/dL   Iron binding panel   Result Value Ref Range    Iron 83 42 - 175 ug/dL    Transferrin 423 (H) 212 - 360 mg/dL    Transferrin Saturation, Calculated 16 (L) 20 - 50 %    Transferrin IBC, Calculated 529 313 - 563 ug/dL   Ferritin   Result Value Ref Range    Ferritin 6 (L) 10 - 130 ng/mL   CBC  with platelets and differential   Result Value Ref Range    WBC Count 5.1 4.0 - 11.0 10e3/uL    RBC Count 3.94 3.80 - 5.20 10e6/uL    Hemoglobin 11.5 (L) 11.7 - 15.7 g/dL    Hematocrit 33.8 (L) 35.0 - 47.0 %    MCV 86 78 - 100 fL    MCH 29.2 26.5 - 33.0 pg    MCHC 34.0 31.5 - 36.5 g/dL    RDW 13.5 10.0 - 15.0 %    Platelet Count 299 150 - 450 10e3/uL    % Neutrophils 57 %    % Lymphocytes 28 %    % Monocytes 9 %    % Eosinophils 6 %    % Basophils 0 %    % Immature Granulocytes 0 %    Absolute Neutrophils 2.9 1.6 - 8.3 10e3/uL    Absolute Lymphocytes 1.5 0.8 - 5.3 10e3/uL    Absolute Monocytes 0.5 0.0 - 1.3 10e3/uL    Absolute Eosinophils 0.3 0.0 - 0.7 10e3/uL    Absolute Basophils 0.0 0.0 - 0.2 10e3/uL    Absolute Immature Granulocytes 0.0 <=0.4 10e3/uL   HPV High Risk Types DNA Cervical   Result Value Ref Range    Other HR HPV Negative Negative    HPV16 DNA Negative Negative    HPV18 DNA Negative Negative    FINAL DIAGNOSIS       This patient's sample is negative for HPV DNA.        This test was developed and its performance characteristics determined by the [facility]. It has not been cleared or approved by the FDA. The laboratory is regulated under CLIA as qualified to perform high-complexity testing. This test is used for clinical purposes. It should not be regarded as investigational or for research.    METHODOLOGY: The Roche Susan 4800 system uses automated extraction, simultaneous amplification of HPV (L1 region) and beta-globin, followed by real time detection of fluorescent labeled HPV and beta globin using specific oligonucleotide probes. The test specifically identified types HPV 16 DNA and HPV 18 DNA while concurrently detecting the rest of the high risk types (31, 33, 35, 39, 45, 51, 52, 56, 58, 59, 66 or 68).    COMMENTS: This test is not intended for use as a screening device for woman under age 30 with normal cervical cytology. Results  should be correlated with cytologic and histologic findings. Close clinical followup is recommended.           If you have any questions or concerns, please call the clinic at the number listed above.       Sincerely,      Anayeli Warner MD

## 2022-05-25 NOTE — RESULT ENCOUNTER NOTE
Called and discussed positive wet prep for yeast.  Prescription for Diflucan x1 sent to the pharmacy.     Anayeli Warner MD

## 2022-05-26 LAB — DEPRECATED CALCIDIOL+CALCIFEROL SERPL-MC: 52 UG/L (ref 20–75)

## 2022-05-27 ENCOUNTER — TRANSFERRED RECORDS (OUTPATIENT)
Dept: HEALTH INFORMATION MANAGEMENT | Facility: CLINIC | Age: 42
End: 2022-05-27
Payer: COMMERCIAL

## 2022-05-27 LAB
HUMAN PAPILLOMA VIRUS 16 DNA: NEGATIVE
HUMAN PAPILLOMA VIRUS 18 DNA: NEGATIVE
HUMAN PAPILLOMA VIRUS FINAL DIAGNOSIS: NORMAL
HUMAN PAPILLOMA VIRUS OTHER HR: NEGATIVE

## 2022-05-27 NOTE — RESULT ENCOUNTER NOTE
Called patient to discuss results.  Will send in orders for venofer infusions and plan to do IM B12 injections in clinic.  Still waiting on thyroid and cortisol.     Anayeli Warner MD

## 2022-05-31 ENCOUNTER — TRANSFERRED RECORDS (OUTPATIENT)
Dept: HEALTH INFORMATION MANAGEMENT | Facility: CLINIC | Age: 42
End: 2022-05-31
Payer: COMMERCIAL

## 2022-06-03 ENCOUNTER — MYC MEDICAL ADVICE (OUTPATIENT)
Dept: FAMILY MEDICINE | Facility: CLINIC | Age: 42
End: 2022-06-03
Payer: COMMERCIAL

## 2022-06-03 DIAGNOSIS — R53.83 FATIGUE, UNSPECIFIED TYPE: Primary | ICD-10-CM

## 2022-06-03 DIAGNOSIS — R68.89 COLD INTOLERANCE: ICD-10-CM

## 2022-06-03 DIAGNOSIS — D50.8 OTHER IRON DEFICIENCY ANEMIA: ICD-10-CM

## 2022-06-03 NOTE — TELEPHONE ENCOUNTER
Reviewed chart and TSH and cortisol were not collected.    Discussed with Mynor in lab and labs were released but then missed when blood was collected.  New orders will need to be placed and pt will need to come back for redraw.    Called pt and gave her info.  She states that she will come on Monday, 6/6/22 at 8:30 AM.    Routed note to Dr Warner./DISHA

## 2022-06-06 ENCOUNTER — LAB (OUTPATIENT)
Dept: LAB | Facility: CLINIC | Age: 42
End: 2022-06-06
Payer: COMMERCIAL

## 2022-06-06 DIAGNOSIS — R68.89 COLD INTOLERANCE: ICD-10-CM

## 2022-06-06 DIAGNOSIS — E53.8 VITAMIN B12 DEFICIENCY (NON ANEMIC): Primary | ICD-10-CM

## 2022-06-06 DIAGNOSIS — R53.83 FATIGUE, UNSPECIFIED TYPE: ICD-10-CM

## 2022-06-06 LAB
BKR LAB AP GYN ADEQUACY: NORMAL
BKR LAB AP GYN INTERPRETATION: NORMAL
BKR LAB AP HPV REFLEX: NORMAL
BKR LAB AP LMP: NORMAL
BKR LAB AP PREVIOUS ABNORMAL: NORMAL
CORTIS SERPL-MCNC: 12.9 UG/DL
PATH REPORT.COMMENTS IMP SPEC: NORMAL
PATH REPORT.COMMENTS IMP SPEC: NORMAL
PATH REPORT.RELEVANT HX SPEC: NORMAL
TSH SERPL DL<=0.005 MIU/L-ACNC: 2.74 UIU/ML (ref 0.3–5)

## 2022-06-06 PROCEDURE — 36415 COLL VENOUS BLD VENIPUNCTURE: CPT

## 2022-06-06 PROCEDURE — 82533 TOTAL CORTISOL: CPT

## 2022-06-06 PROCEDURE — 84443 ASSAY THYROID STIM HORMONE: CPT

## 2022-06-06 RX ORDER — CYANOCOBALAMIN 1000 UG/ML
1000 INJECTION, SOLUTION INTRAMUSCULAR; SUBCUTANEOUS
Status: ACTIVE | OUTPATIENT
Start: 2022-06-07 | End: 2023-06-02

## 2022-06-06 NOTE — RESULT ENCOUNTER NOTE
Your pap smear and HPV testing is normal.  This is good news.  We should repeat the test again in 5 years.  Please call the clinic at [phone] if you have any questions.      Anayeli Warner MD

## 2022-06-06 NOTE — RESULT ENCOUNTER NOTE
Jeanie French-    Your cortisol and thyroid levels are normal.  I'm still working on getting things set up for the Iron infusions, but we should be able to do you B12 shot tomorrow when you are in to see Dr. Rankin.  I'm sending her a message to remind her about it as well.  Please call the clinic at [phone] if you have any questions.      Anayeli Warner MD

## 2022-06-06 NOTE — LETTER
June 6, 2022      Jeanie French  [address] [place] [address]        Dear ,    We are writing to inform you of your test results.    Your cortisol and thyroid levels are normal.  I'm still working on getting things set up for the Iron infusions, but we should be able to do you B12 shot tomorrow when you are in to see Dr. Rankin.  I'm sending her a message to remind her about it as well.  Please call the clinic at [phone] if you have any questions.         Resulted Orders   Cortisol   Result Value Ref Range    Cortisol 12.9 ug/dL      Comment:      Reference Range:  a.m.: 7-25 ug/dL  p.m.: 2-13 ug/dL   TSH with free T4 reflex   Result Value Ref Range    TSH 2.74 0.30 - 5.00 uIU/mL       If you have any questions or concerns, please call the clinic at the number listed above.       Sincerely,      Anayeli Warner MD

## 2022-06-06 NOTE — PROGRESS NOTES
Patient is set to see Dr. Rankin on the 8th.  That is Wednesday.  Would you like me to put her on the PCS schedule near or around her time she sees Dr. Rankin.  We cannot administer injections on a mental health appointment.  Se would need a separate appt for that.  You stated you would like to start them tomorrow, but she sees Dr. Rankin on Wednesday.  Are you okay waiting until then? If so, I'll put her on the PCS schedule so we can administer the B12 and document it.  Lupe Mcnamara, [facility]

## 2022-06-08 ENCOUNTER — VIRTUAL VISIT (OUTPATIENT)
Dept: PSYCHOLOGY | Facility: CLINIC | Age: 42
End: 2022-06-08

## 2022-06-08 DIAGNOSIS — F41.1 GENERALIZED ANXIETY DISORDER: Primary | ICD-10-CM

## 2022-06-08 DIAGNOSIS — F33.1 MAJOR DEPRESSIVE DISORDER, RECURRENT, MODERATE (H): ICD-10-CM

## 2022-06-08 PROCEDURE — 90837 PSYTX W PT 60 MINUTES: CPT | Mod: GT | Performed by: PSYCHOLOGIST

## 2022-06-08 NOTE — PATIENT INSTRUCTIONS
Good to talk with you today Jeanie!    Just a reminder that Dr. Rankin will be out of the office from 6/13 - 6/21/22.  Please reach out to Dr. Warner or other members of the care team at [facility] should any urgent needs arise while I am away.  Think about scheduling a couple of visits in July in the near future if you are wanting these as my schedule is starting to fill for that month.    See below for current treatment plan and let me know if you would like any changes:    Treatment Plan     Client's Name: Jeanie French                  YOB: 1980     Today's Date: 6/8/22                                      Date Diagnostic Update Due: 2/16/23     DSM-V Diagnoses:   Generalized Anxiety Disorder  Major Depressive Disorder, recurrent, mild to moderate     Psychosocial / Contextual Factors: Stressors with her health and in her interpersonal and work life contribute to difficulty.  Symptoms continue to fluctuate with life stressors.     Functioning:  Rajeshs mental health concerns have been continuing to affect her ability to function in her life and have been causing clinically significant distress.  Symptoms can contribute to sense of overwhelm at work and undermine ability to complete tasks at home and generally enjoy life.    PHQ 10/20/2021 2/16/2022 5/25/2022   PHQ-9 Total Score 7 12 8   Q9: Thoughts of better off dead/self-harm past 2 weeks Not at all Not at all Not at all     JAMAR-7 SCORE 7/14/2021 11/1/2021 2/16/2022   Total Score - - -   Total Score 12 14 13       CAGE-AID Total Score 2/16/2022   Total Score 0      PTSD-PC:  2/4      Collaboration:   Primary care physician, Dr. Anayeli Warner     Referral: none     Anticipated treatment duration: Unknown  Agreed upon meeting frequency: every 2-3 weeks     Long Term Treatment Goal(s) related to diagnosis / functional impairment(s)     Goal 1: Jeanie will report decreased anxiety and increased confidence in managing life stressors.     Steps we  will take to achieve your goal:                  Jeanie will continue to take medications as prescribed by Dr. Warner.    Jeanie will practice stress mitigation strategies such as deep breathing, yoga or meditation.  Continue to be aware of life choices to increase sense of agency in your life.  Communicate your needs clearly to others.  Reflect on whether anxious thoughts are of the helpful or less helpful variety so you don't invest too much energy in non-productive worry.  Think about voicing your worries aloud to get a bit more distance from them as this may help you to evaluate them better.  Attend psychotherapy as scheduled.     Anxiety CBT  GGOC - Learn to challenge negative thinking  Fear[employer] - provide resources for tracking thought patterns and building skills     Progress:  Making good progress!  Keep up the good work!     Intervention(s)  Therapist will provide support, psychoeducation and homework assignments as needed.     Goal 2: Jeanie will report decreased depression.     Steps we will take to achieve your goal:                  Notice the good.  Practice self-compassion  Be mindful of priorities and how you spend your limited time and energy.  Attend psychotherapy as scheduled.                 Progress:  Making good progress!  Keep up the good work!     Intervention(s)  Therapist will provide support, psychoeducation and homework assignments as needed.     If you need additional support and care during times that your therapist or PCP are not available, here are some additional resources for you:     Crisis Lines:     [facility] Adult Crisis:  [phone]  [facility] Adult Crisis: [phone]     Crisis Text Line: Text [place] to 918062. Free support at your fingertips 24/7  People who text [place] to [phone] will be connected with a counselor. Crisis Text Line is available 24 hours a day, seven days a week.     You can also consider going to the [facility] at the  following address.  Walk ins are welcome:     [address] [place] [address]   [phone] (for 24 hour crisis consultation)     Monday - Friday 8:00am - 7:00pm  Saturday:  11:00am - 3:00pm  Sunday and Holidays Closed     If you feel at risk of immediate harm, go directly to the Emergency Department.     Patient has reviewed and agreed to the above plan.     Jody Rankin, PhD, LP                                                                 June 8, 2022

## 2022-06-08 NOTE — PROGRESS NOTES
Telemedicine Visit: The patient's condition can be safely assessed and treated via synchronous audio and visual telemedicine encounter.      Reason for Telemedicine Visit: Services only offered telehealth    Originating Site (Patient Location): Patient's home    Distant Site (Provider Location): Provider Remote Setting- Home Office    Consent:  The patient/guardian has verbally consented to: the potential risks and benefits of telemedicine (video visit) versus in person care; bill my insurance or make self-payment for services provided; and responsibility for payment of non-covered services.     Mode of Communication:  Video Conference via [employer]    As the provider I attest to compliance with applicable laws and regulations related to telemedicine.      Behavioral Health Progress Note    Client's Legal Name: Jeanie French    Client's Preferred Name: Jeanie   YOB: 1980  Type of Service: video, counseling  Length of Service:   Start time: 9:07am - sent invitation to join video call via [employer] at 9:00am but had a little difficulty connecting initially, able to connect with sound but no video today  End time: 10:00am   Duration: 53 minutes  Attendees: patient    Identifying Information and Presenting Problem:  Jeanie is a 42 year old female who was referred for mental health services by Primary Care Provider, Dr. Kristen Warner for help with managing emotions tied to a stressful social situation.  Our initial diagnostic assessment took place on 7/21/10 and we have worked together on and off since that time as her needs have dictated.  Based on Jeanie's current report of symptoms, she meets criteria for Generalized Anxiety Disorder and Major Depressive Disorder, Recurrent, Moderate.  Jeanie's mental health concerns affect her ability to function in areas related to work, interacting and relating with others, family relationships, social relationships, intimate partner relationships, maintaining personal health  and physical well-being and participating in meaningful activities causing clinically significant distress.  Jeanie denies any concerning drug/alcohol use. She denies any current safety concerns. Jeanie is not significantly impacted by any social determinants of health.  Based on Jeanie's reported symptoms and impact on functioning, the plan is to engage in psychotherapy every 2-3 weeks.    Treatment Objective(s) Addressed in This Session:  Decreased anxiety and increased confidence in managing life stressors.  Decreased depression.    Progress on / Status of Treatment Objective(s) / Homework:  Satisfactory progress     Mental Health Screening Questionnaires:  PHQ-9:   PHQ 10/20/2021 2/16/2022 5/25/2022   PHQ-9 Total Score 7 12 8   Q9: Thoughts of better off dead/self-harm past 2 weeks Not at all Not at all Not at all      JAMAR-7:   JAMAR-7 SCORE 7/14/2021 11/1/2021 2/16/2022   Total Score - - -   Total Score 12 14 13     Topics Discussed/Interventions Provided:    Treatment plan update:  Deferred updating measurement today as we were meeting virtually and having some technical difficulties.  Review of recent screens suggest depressive symptoms have been improving.  Jeanie also demonstrates good awareness of factors which feed non-productive worry and is working on using this awareness to address unhelpful thinking between visits.  See pt instructions for current treatment plan.    Health: Ntiish tested positive for COVID yesterday so she has been exposed.  Has a sore throat so thinks she should be tested as she suspects she will likely also be positive for COVID.  Provided empathy for this situation.  Had appt with GI via [facility] (Dr. Eason) at the end of May and was thankful that she was willing to postpone colonoscopy while they see if other medications are effective in addressing concerns.  Thus far medication has been helpful which is a relief.  Had CPE scheduled with Dr. Warner at end of May and they ran many tests. Iron  is low so will need some infusions.  Was supposed to get B12 shot today but this will be delayed as we needed to meet virtually today.    Weight:  Thyroid was normal which was disappointing as it might have helped explain some weight gain.  Still suspicious of GI medication for this.  Weight gain has slowed/stopped but has been difficult to lose weight.  Knows she would benefit from increased exercise and changes to her diet but this has been challenging.  Has cut back on sweets, eating smaller portions when she eats out.  Does think this helped to stop weight gain.  Jeanie would like to exercise more but this is difficult given low energy.  Working to not sleep on couch and going to bed at a decent time but still tired.  This is frustrating.  Reflected on other factors which may contribute to fatigue (such as anxiety).  Also reflected that energy is a finite resource and engaged in reflection about how she is currently spending her energy and if this is in line with current priorities.  In general, Jeanie is satisfied with how she has been prioritizing her time/energy. Encouraged focus on overall health and health habits with weight as peripheral goal as she does not have direct control over this variable.    Anxiety:  Feeling anxious in neighborhood due to increased crime/violence posted on [employer].  Often engages in anxiety/problem solving for future situations which have not yet occurred.  Reflected on how this drains energy in a non-productive manner and options to manage.  Can hear this problem when she speaks to others which gives her some distance on her thoughts.  Discussed strategies to tap into this awareness inbetween our visits either via writing or saying her thoughts out loud.  Jeanie feels the latter is a better strategy for her and will try this to see if it helps.    Success stories:  Re instituted use of to do list tyler which has been helpful.  Able to clean her house and felt so happy about this.   "Used to overload task list which was counter productive as it contributed to feelings of failure/defeat.  Trying to set more realistic goals which has been helpful.  Trying not to worry about the things she doesn't get done.  Trying to say \"oh well\" if she doesn't get to everything she would like to. Congratulated her on this progress.    Recently set up Balance meditation tyler - free for one year right now.  Was using this more frequently initially and found this helpful but has been using less recently.  Would like to dedicate more time to this going forward.  Using about 5 minutes per day right now.  Congratulated her on this self care action.    At our last visit developed plan to set a side Saturday morning to work on resume. Discussed possible barriers including getting poor sleep.having a headache on Saturday morning (as these are common).  Developed plan to mitigate that likelihood (Set yourself up for success.  Don't lay down on the couch Friday night and risk falling asleep there if you want to work on your resume on Saturday morning.  Invite Nitish over to your place on Friday rather than going to his house.). Today, Jeanie reports she was not successful with this plan but did make some progress on her resume regardless (formatting).  Feeling in a better place to look for a new job at this point.  Thinks getting her information updated on Linked In would likely be helpful to connect to recruiters.  Congratulated her on this progress.    Relationship: Nitish did finally tell his daughter about Jeanie which is a relief.  Apparently his daughter appeared accepting of this news and shared that his ex-wife is also going on some dates.  Family wedding with Nitish is this weekend but this is likely not feasible due to his COVID.  Also will be missing niece's graduation party this weekend if she has COVID which is disappointing.      Did have a conversation with Nitish about plan to move in together and Nitish is still " noncommittal on this.  She is worried there will be a recession and would prefer to sell her house sooner rather than later, but Nitish is still not ready for this.  Son's mental health is still a factor (more for Nitish than for her).  She is still planning to move some of her stuff over to Nitish's place despite his ambivalence (mostly just long term storage).  Discussed pros and cons of this decision and Jeanie will reflect further on this between now and our next visit.    Assessment:   Jeanie appeared to be active and engaged in today's session and was receptive to feedback.     Mental Status:   During interaction with the examiner today, Jeanie was cooperative, open, engaged and pleasant. She was generally alert and oriented to person, place, time, and situation.  Speech was normal limits tone, rate, volume; largely coherent and relevant to topic. Mood was a bit stressed and anxious; affect was mood-congruent.  Thought processes were unremarkable. Thought content was not remarkable with no evidence of psychotic features and no evidence of suicide, homicide, or nonsuicidal self injury related thoughts, intent, urges, planning, behavior/recent attempts. Memory appeared grossly intact without being formally evaluated. Insight: good. Judgment was good. Patient exhibited good impulse control during the appointment.     Does the patient appear to be at imminent risk of harm to self/others at this time? No    DSM-5 Diagnosis:  Generalized Anxiety Disorder  Major Depressive Disorder, recurrent, mild to moderate    Plan:  1. Follow up with this provider on 6/27/22.  Encouraged scheduling visits in July if she will be wanting these.    2. Follow up with Dr. Warner 6/22/22.    3. Utilize crisis resources as needed.  4. After Visit Summary will be reviewed in Christopher Rankin, PhD, LP    NOTE: Treatment plan update due 9/8/22.  Diagnostic assessment update due 2/16/23.

## 2022-06-14 DIAGNOSIS — N92.0 MENORRHAGIA WITH REGULAR CYCLE: ICD-10-CM

## 2022-06-14 DIAGNOSIS — D50.0 IRON DEFICIENCY ANEMIA DUE TO CHRONIC BLOOD LOSS: ICD-10-CM

## 2022-06-14 DIAGNOSIS — K20.0 EOSINOPHILIC ESOPHAGITIS: Primary | ICD-10-CM

## 2022-06-14 RX ORDER — DIPHENHYDRAMINE HYDROCHLORIDE 50 MG/ML
50 INJECTION INTRAMUSCULAR; INTRAVENOUS
Status: CANCELLED
Start: 2022-06-20

## 2022-06-14 RX ORDER — MEPERIDINE HYDROCHLORIDE 25 MG/ML
25 INJECTION INTRAMUSCULAR; INTRAVENOUS; SUBCUTANEOUS EVERY 30 MIN PRN
Status: CANCELLED | OUTPATIENT
Start: 2022-06-20

## 2022-06-14 RX ORDER — METHYLPREDNISOLONE SODIUM SUCCINATE 125 MG/2ML
125 INJECTION, POWDER, LYOPHILIZED, FOR SOLUTION INTRAMUSCULAR; INTRAVENOUS
Status: CANCELLED
Start: 2022-06-20

## 2022-06-14 RX ORDER — ALBUTEROL SULFATE 0.83 MG/ML
2.5 SOLUTION RESPIRATORY (INHALATION)
Status: CANCELLED | OUTPATIENT
Start: 2022-06-20

## 2022-06-14 RX ORDER — NALOXONE HYDROCHLORIDE 0.4 MG/ML
0.2 INJECTION, SOLUTION INTRAMUSCULAR; INTRAVENOUS; SUBCUTANEOUS
Status: CANCELLED | OUTPATIENT
Start: 2022-06-20

## 2022-06-14 RX ORDER — ALBUTEROL SULFATE 90 UG/1
1-2 AEROSOL, METERED RESPIRATORY (INHALATION)
Status: CANCELLED
Start: 2022-06-20

## 2022-06-14 RX ORDER — EPINEPHRINE 1 MG/ML
0.3 INJECTION, SOLUTION, CONCENTRATE INTRAVENOUS EVERY 5 MIN PRN
Status: CANCELLED | OUTPATIENT
Start: 2022-06-20

## 2022-06-14 RX ORDER — HEPARIN SODIUM (PORCINE) LOCK FLUSH IV SOLN 100 UNIT/ML 100 UNIT/ML
5 SOLUTION INTRAVENOUS
Status: CANCELLED | OUTPATIENT
Start: 2022-06-20

## 2022-06-14 RX ORDER — HEPARIN SODIUM,PORCINE 10 UNIT/ML
5 VIAL (ML) INTRAVENOUS
Status: CANCELLED | OUTPATIENT
Start: 2022-06-20

## 2022-06-20 ENCOUNTER — ANCILLARY PROCEDURE (OUTPATIENT)
Dept: MAMMOGRAPHY | Facility: CLINIC | Age: 42
End: 2022-06-20
Attending: STUDENT IN AN ORGANIZED HEALTH CARE EDUCATION/TRAINING PROGRAM
Payer: COMMERCIAL

## 2022-06-20 DIAGNOSIS — Z00.00 PREVENTATIVE HEALTH CARE: ICD-10-CM

## 2022-06-20 PROCEDURE — 77067 SCR MAMMO BI INCL CAD: CPT | Mod: TC | Performed by: RADIOLOGY

## 2022-06-21 ENCOUNTER — TRANSFERRED RECORDS (OUTPATIENT)
Dept: HEALTH INFORMATION MANAGEMENT | Facility: CLINIC | Age: 42
End: 2022-06-21

## 2022-06-22 ENCOUNTER — OFFICE VISIT (OUTPATIENT)
Dept: FAMILY MEDICINE | Facility: CLINIC | Age: 42
End: 2022-06-22
Payer: COMMERCIAL

## 2022-06-22 DIAGNOSIS — E53.8 VITAMIN B12 DEFICIENCY (NON ANEMIC): Primary | ICD-10-CM

## 2022-06-22 DIAGNOSIS — F41.1 GENERALIZED ANXIETY DISORDER: ICD-10-CM

## 2022-06-22 PROCEDURE — 99214 OFFICE O/P EST MOD 30 MIN: CPT | Mod: 25 | Performed by: STUDENT IN AN ORGANIZED HEALTH CARE EDUCATION/TRAINING PROGRAM

## 2022-06-22 PROCEDURE — 96372 THER/PROPH/DIAG INJ SC/IM: CPT | Performed by: STUDENT IN AN ORGANIZED HEALTH CARE EDUCATION/TRAINING PROGRAM

## 2022-06-22 RX ORDER — CYANOCOBALAMIN 1000 UG/ML
1000 INJECTION, SOLUTION INTRAMUSCULAR; SUBCUTANEOUS
Status: ACTIVE | OUTPATIENT
Start: 2022-06-22 | End: 2023-06-17

## 2022-06-22 RX ADMIN — CYANOCOBALAMIN 1000 MCG: 1000 INJECTION, SOLUTION INTRAMUSCULAR; SUBCUTANEOUS at 08:47

## 2022-06-22 NOTE — PATIENT INSTRUCTIONS
Decrease the prozac to 20mg per day.     Talk with Dr. Eason about potential  nutritional things.      Transition to an oral plan once we have figured out the stomach stuff.

## 2022-06-22 NOTE — Clinical Note
Hello!  I tried to put in orders for iron infusions, but I don't think I did them correctly.  Do you know who I should talk to to make sure the orders are in correctly and that someone will connect with the patient to schedule?  Thank you!

## 2022-06-22 NOTE — PROGRESS NOTES
Assessment & Plan     Vitamin B12 deficiency (non anemic)  - cyanocobalamin injection 1,000 mcg    Migraine without aura and without status migrainosus, not intractable.   Patient has been on sumatriptan and feels that this has greatly improved her migraines.  Patient has also made great progress with her sleep and overall stress level.  This seems to have also helped with her migraines.  We are going to continue with her current regimen.  -     SUMAtriptan (IMITREX) 25 MG tablet; Take 1 tablet (25 mg) by mouth at onset of headache for migraine May repeat in 2 hours. Max 8 tablets/24 hours.     Fatigue, unspecified type.  Lab results were largely normal making an underlying medical etiology for her fatigue unlikely.  Specifically, cortisol, TSH, iron and vitamin B-12 were all normal.  Of note she did have a low transferrin level (see below ).  Patient has worked to sleep in her bed more often and not on her couch.  She feels that this has improved some of her energy.  Patient is also making improvements on her diet, such as decreasing her carbohydrates and eating more fresh fruits and vegetables.  This should help her maintain a steady energy level throughout the day and may decrease the sensations of fatigue.  Patient has been working with Dr. Rankin to help with sleep and fatigue.  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  Patient unable to take oral iron due to gastrointestinal concerns.  -Will set up iron transfusions    Cold intolerance  TSH and cortisol levels were both normal.  Did not discuss too much of this today although patient believes that she might simply run a little cold.    Sexual dysfunction  Patient describes decreased arousability and anorgasmia as well as some vaginal dryness.   She denies any pain beyond general discomfort from dryness.  This is most likely related to her Prozac.  She feels that her mental health is in an appropriate spot and that she is making strides in other areas specifically her sleep and diet.  We are going to try to decrease her dose from 40 mg to 20 mg and follow-up in a month.  -Decrease Prozac from 40 mg to 20 mg and follow-up in a month    Ongoing colitis and right upper quadrant pain  Patient has ongoing right upper quadrant pain that is not worsened or improved.  Her her imaging did not show any findings concerning for gallbladder pathology.  Patient is up-to-date on her colonoscopy.  Patient was previously taking medications for IBS which were not helpful.  Patient is currently on budesonide for her colitis.  Patient is scheduling a HIDA scan to further assess gallbladder function.    [facility] health care  -     MA SCREENING DIGITAL BILAT; -completed, would like to repeat  -     Gynecologic Cytology (PAP) -completed normal  -     Basic metabolic panel -completed  -     Lipid Profile -completed  -     Hepatic function panel - completed        -     HPV High Risk Types DNA Cervical -completed    Plan for next visit  Discuss HIDA scan results, further explore colitis vs IBS picture  Follow up on Mental Health and sexual function following decrease of selective serotonin reuptake inhibitor  Follow up on sleep and diet changes              Christophe Gonzalez  Medical Student     Subjective   Jeanie is a 42 year old, presenting for the following health issues:   Migraine (Pt states the migraines are better with the medication. ), Fatigue (Pt states her fatigue is also better. ), and Recheck Medication (Pt would like to follow up on her medications as well. )    Patient would also like to discuss iron infusions, whether or not she can continue with her Prozac, as well as her diet.    Patient is here to follow up on chronic health conditions and lab results. Patient  has made great progress towards sleeping in her bed rather than the couch and she feels that this has helped with some of the fatigue and migraines. Patient has been taking her sumatriptan which helps if she is able to get it in ahead of her migraine. Occasionally she has a migraine in the middle of the night where it is too late to use an abortive medication.  Regarding her left upper quadrant abdominal pain, patient states that she had a scan that was normal.  Liver labs are within normal limits.  She found this reassuring.  She states that her pain has neither worsened nor improved and is stable.  This is not bothering her too much.  Regarding her disease screening, patient recently had a mammogram with recommendation to repeat the screening and she is all right with this.    Regarding her Prozac medication, patient states that she has noticed a decrease in sex drive for the past 4 to 6 months.  She feels that this is a problem with arousability as well as anorgasmia.  She does not feel like this is related to low energy.  She also has noticed some dryness and associated discomfort although she denies explicit pain with intercourse.    Regarding her iron transfusions, patient has tried oral iron supplementation in the past but due to her colitis she tends to have diarrhea.  The plan for her was to start an iron infusion although she has not heard from anyone to schedule this.      HPI     Anxiety Follow-Up    How are you doing with your anxiety since your last visit? Improved    Are you having other symptoms that might be associated with anxiety? No    Have you had a significant life event? No     Are you feeling depressed? No    Do you have any concerns with your use of alcohol or other drugs? No    Social History     Tobacco Use     Smoking status: Never Smoker     Smokeless tobacco: Never Used   Substance Use Topics     Alcohol use: No     Drug use: No     JAMAR-7 SCORE 7/14/2021 11/1/2021 2/16/2022   Total  Score - - -   Total Score 12 14 13     PHQ 10/20/2021 2/16/2022 5/25/2022   PHQ-9 Total Score 7 12 8   Q9: Thoughts of better off dead/self-harm past 2 weeks Not at all Not at all Not at all       Migraine     Since your last clinic visit, how have your headaches changed?  Improved    How often are you getting headaches or migraines? 1 per week, able to abort with sumatriptan. Occasionally wakes up with migraine and it is too late to use abortive therapy. No Increased ICP red flag symptoms.      Are you able to do normal daily activities when you have a migraine? Yes    Are you taking rescue/relief medications? (Select all that apply) sumatriptan (Imitrex)    How helpful is your rescue/relief medication?  I get total relief    Are you taking any medications to prevent migraines? (Select all that apply)  No    In the past 4 weeks, how often have you gone to urgent care or the emergency room because of your headaches?  0    ROS:  CONSTITUTIONAL: No fever or chills. No weakness. Endorses on going fatigue.   HEENT: Endorses headaches as described above. Patient describes some eye pain during headaches c/w migraines.   SKIN: No rashes, bruises, or lesions. Endorses some hair loss on arms.  RESPIRATORY: No cough, wheezes, or shortness of breath.  CARDIOVASCULAR: No chest pain, chest tightness, heart palpitations, presyncope, syncope, or peripheraledema.  GASTROINTESTINAL: Endorses right upper quadrant pain that has been stable. It comes and goes. Occasionally occurs with food. No other  abdominal pain, abdominal cramping, nausea, vomiting. Endorses on going diarrhea in the setting of colitis.   GENITOURINARY: No dysuria, hematuria, or urinary frequency.  MUSCULOSKELETAL: No muscle pain or weakness. No joint pain, stiffness, or swelling.  NEUROLOGIC: No numbness, motor weakness, tingling, tremors, shaking, or dizziness. No seizures, fainting, or loss of consciousness.  LYMPHATIC: No peripheral edema or swollen or painful  nodes.        Objective    LMP 05/16/2022 (Exact Date)   There is no height or weight on file to calculate BMI.  Physical Exam   GENERAL: healthy, alert and no distress  EYES: Eyes grossly normal to inspection, extraocular movements grossly intact and conjunctivae and sclerae normal  NECK: no adenopathy, no asymmetry, masses, or scars and thyroid normal to palpation  RESP: lungs clear to auscultation - no rales, rhonchi or wheezes  CV: regular rate and rhythm, normal S1 S2, no S3 or S4, no murmur, click or rub, no peripheral edema and peripheral pulses strong  ABDOMEN: soft, nontender, no hepatosplenomegaly, no masses and bowel sounds normal  MS: no gross musculoskeletal defects noted, no edema  SKIN: no suspicious lesions or rashes  NEURO: Normal strength and tone, mentation intact and speech normal  PSYCH: mentation appears normal, affect normal/bright           CBC RESULTS: Recent Labs   Lab Test 05/25/22  0934   WBC 5.1   RBC 3.94   HGB 11.5*   HCT 33.8*   MCV 86   MCH 29.2   MCHC 34.0   RDW 13.5        Lab Results   Component Value Date    AST 17 05/25/2022    AST 33.0 08/28/2012     Lab Results   Component Value Date    ALT 14 05/25/2022    ALT 24.0 08/28/2012     Ferritin   Date Value Ref Range Status   05/25/2022 6 (L) 10 - 130 ng/mL Final   04/20/2021 5 (L) 10 - 130 ng/mL Final     Iron   Date Value Ref Range Status   05/25/2022 81 42 - 175 ug/dL Final   05/25/2022 83 42 - 175 ug/dL Final       Cholesterol   Date Value Ref Range Status   05/25/2022 272 (H) <=199 mg/dL Final     B12 256 WNL    Vitamin D Deficiency Screening Results:  Lab Results   Component Value Date    VITDT 52 05/25/2022               .  ..

## 2022-06-23 NOTE — RESULT ENCOUNTER NOTE
Mammogram results discussed with patient in clinic and she is aware of next steps.     Anayeli Warner MD

## 2022-06-24 NOTE — PROGRESS NOTES
Preceptor Attestation:   I was present with the medical student who participated in the service and in the documentation of this note. I have verified the history and personally performed the physical exam and medical decision making. I have verified the content of the note, which accurately reflects my assessment of the patient and the plan of care.   Supervising Physician:  Anayeli Warner MD.

## 2022-06-26 NOTE — PATIENT INSTRUCTIONS
Good to see you today Jeanie!    Don't hesitate to reach out sooner if needed prior to our next appointment.

## 2022-06-26 NOTE — PROGRESS NOTES
Behavioral Health Progress Note    Client's Legal Name: Jeanie French    Client's Preferred Name: Jeanie   YOB: 1980  Type of Service: in-person, counseling  Length of Service:   Start time: 9:08am  End time: 10:04am   Duration: 56 minutes  Attendees: patient    Identifying Information and Presenting Problem:  Jeanie is a 42 year old female who was referred for mental health services by Primary Care Provider, Dr. Kristen Warner for help with managing emotions tied to a stressful social situation.  Our initial diagnostic assessment took place on 7/21/10 and we have worked together on and off since that time as her needs have dictated.  Based on Jeanie's current report of symptoms, she meets criteria for Generalized Anxiety Disorder and Major Depressive Disorder, Recurrent, Moderate.  Jeanie's mental health concerns affect her ability to function in areas related to work, interacting and relating with others, family relationships, social relationships, intimate partner relationships, maintaining personal health and physical well-being and participating in meaningful activities causing clinically significant distress.  Jeanie denies any concerning drug/alcohol use. She denies any current safety concerns. Jeanie is not significantly impacted by any social determinants of health.  Based on Jeanie's reported symptoms and impact on functioning, the plan is to engage in psychotherapy every 2-3 weeks.    Treatment Objective(s) Addressed in This Session:  Decreased anxiety and increased confidence in managing life stressors.  Decreased depression.    Progress on / Status of Treatment Objective(s) / Homework:  Satisfactory progress     Mental Health Screening Questionnaires:  PHQ-9:   PHQ 2/16/2022 5/25/2022 6/27/2022   PHQ-9 Total Score 12 8 6   Q9: Thoughts of better off dead/self-harm past 2 weeks Not at all Not at all Not at all      JAMAR-7:   JAMAR-7 SCORE 11/1/2021 2/16/2022 6/27/2022   Total Score - - -   Total Score 14  13 9     Topics Discussed/Interventions Provided:    Measurement based care:  Updated PHQ9 and GAD7 today.  Depression and anxiety scores have dropped in the past several months.  Depression is moving towards remission.  Anxiety decreased significantly.  Attributes this improvement to learning to let go of things on her to do list that she cannot realistically get to.  Praised efforts in this area and encouraged continued work on this.      Health: Jeanie had experienced a COVID exposure and was having some symptoms at our last visit.  Today she reports she never tested positive after testing several times.  Attributes symptoms to allergies.  Able to attend nieces grad party but not wedding as partner was quarantining during that time.  Provided empathy for lost opportunity.    Family:  Attended service for Dad's brother who  recently.  Not very close to him.   Traveled to service with family and shared recent life stressors (work, relationship, decision about selling house, etc.).  Experienced family input as criticism which was difficult.  Explored thoughts/reactions and what she would have liked to receive from them (e.g., listening rather than problem solving).  Discussed possible merits of providing this feedback/asking for a different response in the future.  Also reflected on how their approach reminded her of prior self talk/old habits which she has been working to change and how her own thinking has evolved.  Reflected on this personal progress and how to manage this when family dynamics threaten to pull her back into old habits.    Work stress:  Received text from co-worker, Thanh, that he was not coming in to work today after being out on vacation last week. This is frustrating/stressful for Jeanie who was hoping for more help today.  Provided empathy and discussed possible coping responses (e.g., reviewing priorities, not taking too much ownership, sharing the struggle/asking for help, letting things  fail rather than take on too much burden, etc.).  Most troubled by perceived inequity in work load between self and others in organization.  Leadership has not been helpful/responsive to feedback.  Needs to discuss equity with co-worker, Thanh to see if greater balance can be achieved but also nervous about this as she wants to maintain good working relationship.  Provided empathy around this concern and discussed ways of structuring conversation to optimize success and minimize risk.  Will also keep looking for a different job.  At our last visit, planned to get her information updated on Linked In to better connect to recruiters. Has not yet been able to do this.      Relationship:  Continued frustration that partner is not ready to move in together.  Discussed complicating factor in that young adult son appears to be experiencing another psychiatric crisis.  Jeanie does not want this situation to delay merging their household but past history suggests it likely will.  Provided empathy for sadness, grief, anger and frustration around this while also encouraging recognition that we cannot control the thoughts/behaviors of others.  Encouraged open communication with partner and acceptance of the situation.  Jeanie is considering taking a break from the relationship if this cannot be resolved which is also emotionally painful for her.  Will continue to provide support and a safe place to talk through thoughts, reactions and action steps.    Medication:  Decided to decrease Prozac at visit with Dr. Warner on 6/22/22 to see if this helped with fatigue and sexual dysfunction.  Did not have time to explore today.  Will inquire about response at next visit.    Assessment:   Jeanie appeared to be active and engaged in today's session and was receptive to feedback.     Mental Status:   During interaction with the examiner today, Jeanie was cooperative, open, engaged and pleasant. She was generally alert and oriented to person,  place, time, and situation.  Speech was normal limits tone, rate, volume; largely coherent and relevant to topic. Mood was frustrated/anxious and sad at times; affect was mood-congruent.  Thought processes were unremarkable. Thought content was not remarkable with no evidence of psychotic features and no evidence of suicide, homicide, or nonsuicidal self injury related thoughts, intent, urges, planning, behavior/recent attempts. Memory appeared grossly intact without being formally evaluated. Insight: good. Judgment was good. Patient exhibited good impulse control during the appointment.     Does the patient appear to be at imminent risk of harm to self/others at this time? No    DSM-5 Diagnosis:  Generalized Anxiety Disorder  Major Depressive Disorder, recurrent, mild     Plan:  1. Follow up with this provider on 7/18, 8/10 and 8/31/22.   2. Follow up with Dr. Warner 8/2/22.    3. Utilize crisis resources as needed.  4. After Visit Summary will be reviewed in Christopher Rankin, PhD, LP    NOTE: Treatment plan update due 9/8/22.  Diagnostic assessment update due 2/16/23.

## 2022-06-27 ENCOUNTER — OFFICE VISIT (OUTPATIENT)
Dept: PSYCHOLOGY | Facility: CLINIC | Age: 42
End: 2022-06-27
Payer: COMMERCIAL

## 2022-06-27 DIAGNOSIS — F41.1 GENERALIZED ANXIETY DISORDER: Primary | ICD-10-CM

## 2022-06-27 DIAGNOSIS — F33.1 MAJOR DEPRESSIVE DISORDER, RECURRENT, MODERATE (H): ICD-10-CM

## 2022-06-27 PROCEDURE — 90837 PSYTX W PT 60 MINUTES: CPT | Performed by: PSYCHOLOGIST

## 2022-06-27 ASSESSMENT — ANXIETY QUESTIONNAIRES
GAD7 TOTAL SCORE: 9
7. FEELING AFRAID AS IF SOMETHING AWFUL MIGHT HAPPEN: NOT AT ALL
5. BEING SO RESTLESS THAT IT IS HARD TO SIT STILL: NOT AT ALL
IF YOU CHECKED OFF ANY PROBLEMS ON THIS QUESTIONNAIRE, HOW DIFFICULT HAVE THESE PROBLEMS MADE IT FOR YOU TO DO YOUR WORK, TAKE CARE OF THINGS AT HOME, OR GET ALONG WITH OTHER PEOPLE: SOMEWHAT DIFFICULT
1. FEELING NERVOUS, ANXIOUS, OR ON EDGE: MORE THAN HALF THE DAYS
GAD7 TOTAL SCORE: 9
6. BECOMING EASILY ANNOYED OR IRRITABLE: MORE THAN HALF THE DAYS
2. NOT BEING ABLE TO STOP OR CONTROL WORRYING: MORE THAN HALF THE DAYS
3. WORRYING TOO MUCH ABOUT DIFFERENT THINGS: MORE THAN HALF THE DAYS

## 2022-06-27 ASSESSMENT — PATIENT HEALTH QUESTIONNAIRE - PHQ9
SUM OF ALL RESPONSES TO PHQ QUESTIONS 1-9: 6
5. POOR APPETITE OR OVEREATING: SEVERAL DAYS

## 2022-07-01 ENCOUNTER — INFUSION THERAPY VISIT (OUTPATIENT)
Dept: INFUSION THERAPY | Facility: CLINIC | Age: 42
End: 2022-07-01
Attending: STUDENT IN AN ORGANIZED HEALTH CARE EDUCATION/TRAINING PROGRAM
Payer: COMMERCIAL

## 2022-07-01 VITALS
DIASTOLIC BLOOD PRESSURE: 69 MMHG | HEART RATE: 75 BPM | RESPIRATION RATE: 16 BRPM | OXYGEN SATURATION: 98 % | SYSTOLIC BLOOD PRESSURE: 118 MMHG | TEMPERATURE: 98.2 F

## 2022-07-01 DIAGNOSIS — N92.0 MENORRHAGIA WITH REGULAR CYCLE: ICD-10-CM

## 2022-07-01 DIAGNOSIS — D50.0 IRON DEFICIENCY ANEMIA DUE TO CHRONIC BLOOD LOSS: Primary | ICD-10-CM

## 2022-07-01 DIAGNOSIS — K20.0 EOSINOPHILIC ESOPHAGITIS: ICD-10-CM

## 2022-07-01 PROCEDURE — 96366 THER/PROPH/DIAG IV INF ADDON: CPT

## 2022-07-01 PROCEDURE — 258N000003 HC RX IP 258 OP 636: Performed by: STUDENT IN AN ORGANIZED HEALTH CARE EDUCATION/TRAINING PROGRAM

## 2022-07-01 PROCEDURE — 96365 THER/PROPH/DIAG IV INF INIT: CPT

## 2022-07-01 PROCEDURE — 250N000011 HC RX IP 250 OP 636: Performed by: STUDENT IN AN ORGANIZED HEALTH CARE EDUCATION/TRAINING PROGRAM

## 2022-07-01 RX ORDER — EPINEPHRINE 1 MG/ML
0.3 INJECTION, SOLUTION, CONCENTRATE INTRAVENOUS EVERY 5 MIN PRN
Status: DISCONTINUED | OUTPATIENT
Start: 2022-07-01 | End: 2022-07-01 | Stop reason: HOSPADM

## 2022-07-01 RX ORDER — METHYLPREDNISOLONE SODIUM SUCCINATE 125 MG/2ML
125 INJECTION, POWDER, LYOPHILIZED, FOR SOLUTION INTRAMUSCULAR; INTRAVENOUS
Status: CANCELLED
Start: 2022-07-03

## 2022-07-01 RX ORDER — MEPERIDINE HYDROCHLORIDE 50 MG/ML
25 INJECTION INTRAMUSCULAR; INTRAVENOUS; SUBCUTANEOUS EVERY 30 MIN PRN
Status: CANCELLED | OUTPATIENT
Start: 2022-07-03

## 2022-07-01 RX ORDER — ALBUTEROL SULFATE 90 UG/1
1-2 AEROSOL, METERED RESPIRATORY (INHALATION)
Status: DISCONTINUED | OUTPATIENT
Start: 2022-07-01 | End: 2022-07-01 | Stop reason: HOSPADM

## 2022-07-01 RX ORDER — HEPARIN SODIUM (PORCINE) LOCK FLUSH IV SOLN 100 UNIT/ML 100 UNIT/ML
5 SOLUTION INTRAVENOUS
Status: CANCELLED | OUTPATIENT
Start: 2022-07-03

## 2022-07-01 RX ORDER — DIPHENHYDRAMINE HYDROCHLORIDE 50 MG/ML
50 INJECTION INTRAMUSCULAR; INTRAVENOUS
Status: CANCELLED
Start: 2022-07-03

## 2022-07-01 RX ORDER — ALBUTEROL SULFATE 90 UG/1
1-2 AEROSOL, METERED RESPIRATORY (INHALATION)
Status: CANCELLED
Start: 2022-07-03

## 2022-07-01 RX ORDER — METHYLPREDNISOLONE SODIUM SUCCINATE 125 MG/2ML
125 INJECTION, POWDER, LYOPHILIZED, FOR SOLUTION INTRAMUSCULAR; INTRAVENOUS
Status: DISCONTINUED | OUTPATIENT
Start: 2022-07-01 | End: 2022-07-01 | Stop reason: HOSPADM

## 2022-07-01 RX ORDER — ALBUTEROL SULFATE 0.83 MG/ML
2.5 SOLUTION RESPIRATORY (INHALATION)
Status: DISCONTINUED | OUTPATIENT
Start: 2022-07-01 | End: 2022-07-01 | Stop reason: HOSPADM

## 2022-07-01 RX ORDER — HEPARIN SODIUM,PORCINE 10 UNIT/ML
5 VIAL (ML) INTRAVENOUS
Status: CANCELLED | OUTPATIENT
Start: 2022-07-03

## 2022-07-01 RX ORDER — DIPHENHYDRAMINE HYDROCHLORIDE 50 MG/ML
50 INJECTION INTRAMUSCULAR; INTRAVENOUS
Status: DISCONTINUED | OUTPATIENT
Start: 2022-07-01 | End: 2022-07-01 | Stop reason: HOSPADM

## 2022-07-01 RX ORDER — HEPARIN SODIUM (PORCINE) LOCK FLUSH IV SOLN 100 UNIT/ML 100 UNIT/ML
5 SOLUTION INTRAVENOUS
Status: DISCONTINUED | OUTPATIENT
Start: 2022-07-01 | End: 2022-07-01 | Stop reason: HOSPADM

## 2022-07-01 RX ORDER — NALOXONE HYDROCHLORIDE 0.4 MG/ML
0.2 INJECTION, SOLUTION INTRAMUSCULAR; INTRAVENOUS; SUBCUTANEOUS
Status: DISCONTINUED | OUTPATIENT
Start: 2022-07-01 | End: 2022-07-01 | Stop reason: HOSPADM

## 2022-07-01 RX ORDER — EPINEPHRINE 1 MG/ML
0.3 INJECTION, SOLUTION, CONCENTRATE INTRAVENOUS EVERY 5 MIN PRN
Status: CANCELLED | OUTPATIENT
Start: 2022-07-03

## 2022-07-01 RX ORDER — ALBUTEROL SULFATE 0.83 MG/ML
2.5 SOLUTION RESPIRATORY (INHALATION)
Status: CANCELLED | OUTPATIENT
Start: 2022-07-03

## 2022-07-01 RX ORDER — MEPERIDINE HYDROCHLORIDE 50 MG/ML
25 INJECTION INTRAMUSCULAR; INTRAVENOUS; SUBCUTANEOUS EVERY 30 MIN PRN
Status: DISCONTINUED | OUTPATIENT
Start: 2022-07-01 | End: 2022-07-01 | Stop reason: HOSPADM

## 2022-07-01 RX ORDER — NALOXONE HYDROCHLORIDE 0.4 MG/ML
0.2 INJECTION, SOLUTION INTRAMUSCULAR; INTRAVENOUS; SUBCUTANEOUS
Status: CANCELLED | OUTPATIENT
Start: 2022-07-03

## 2022-07-01 RX ORDER — HEPARIN SODIUM,PORCINE 10 UNIT/ML
5 VIAL (ML) INTRAVENOUS
Status: DISCONTINUED | OUTPATIENT
Start: 2022-07-01 | End: 2022-07-01 | Stop reason: HOSPADM

## 2022-07-01 RX ADMIN — IRON SUCROSE 300 MG: 20 INJECTION, SOLUTION INTRAVENOUS at 08:01

## 2022-07-01 NOTE — PROGRESS NOTES
Infusion Nursing Note:  Jeanie French presents today for iron infusion.    Patient seen by provider today: No   present during visit today: Not Applicable.    Note: /69 (BP Location: Left arm, Patient Position: Sitting, Cuff Size: Adult Regular)   Pulse 75   Temp 98.2  F (36.8  C) (Oral)   Resp 16   LMP 05/16/2022 (Exact Date)   SpO2 98% .    Intravenous Access:  Peripheral IV placed.    Treatment Conditions:  Not Applicable.    Post Infusion Assessment:  Venofer 300 infused. Patient tolerated infusion without incident.  Blood return noted pre and post infusion.  Site patent and intact, free from redness, edema or discomfort.  No evidence of extravasations.  Access discontinued per protocol.     Discharge Plan:   Patient and/or family verbalized understanding of discharge instructions and all questions answered.  Patient discharged in stable condition accompanied by: self.  Departure Mode: Ambulatory.      Deana Schmidt RN

## 2022-07-07 ENCOUNTER — INFUSION THERAPY VISIT (OUTPATIENT)
Dept: INFUSION THERAPY | Facility: CLINIC | Age: 42
End: 2022-07-07
Attending: STUDENT IN AN ORGANIZED HEALTH CARE EDUCATION/TRAINING PROGRAM
Payer: COMMERCIAL

## 2022-07-07 VITALS
OXYGEN SATURATION: 97 % | HEART RATE: 78 BPM | RESPIRATION RATE: 18 BRPM | DIASTOLIC BLOOD PRESSURE: 77 MMHG | SYSTOLIC BLOOD PRESSURE: 133 MMHG

## 2022-07-07 DIAGNOSIS — N92.0 MENORRHAGIA WITH REGULAR CYCLE: ICD-10-CM

## 2022-07-07 DIAGNOSIS — K20.0 EOSINOPHILIC ESOPHAGITIS: Primary | ICD-10-CM

## 2022-07-07 DIAGNOSIS — D50.0 IRON DEFICIENCY ANEMIA DUE TO CHRONIC BLOOD LOSS: ICD-10-CM

## 2022-07-07 PROCEDURE — 258N000003 HC RX IP 258 OP 636: Performed by: STUDENT IN AN ORGANIZED HEALTH CARE EDUCATION/TRAINING PROGRAM

## 2022-07-07 PROCEDURE — 250N000011 HC RX IP 250 OP 636: Performed by: STUDENT IN AN ORGANIZED HEALTH CARE EDUCATION/TRAINING PROGRAM

## 2022-07-07 PROCEDURE — 96365 THER/PROPH/DIAG IV INF INIT: CPT

## 2022-07-07 PROCEDURE — 96366 THER/PROPH/DIAG IV INF ADDON: CPT

## 2022-07-07 RX ORDER — HEPARIN SODIUM,PORCINE 10 UNIT/ML
5 VIAL (ML) INTRAVENOUS
Status: CANCELLED | OUTPATIENT
Start: 2022-07-09

## 2022-07-07 RX ORDER — EPINEPHRINE 1 MG/ML
0.3 INJECTION, SOLUTION, CONCENTRATE INTRAVENOUS EVERY 5 MIN PRN
Status: CANCELLED | OUTPATIENT
Start: 2022-07-09

## 2022-07-07 RX ORDER — HEPARIN SODIUM (PORCINE) LOCK FLUSH IV SOLN 100 UNIT/ML 100 UNIT/ML
5 SOLUTION INTRAVENOUS
Status: CANCELLED | OUTPATIENT
Start: 2022-07-09

## 2022-07-07 RX ORDER — ALBUTEROL SULFATE 90 UG/1
1-2 AEROSOL, METERED RESPIRATORY (INHALATION)
Status: DISCONTINUED | OUTPATIENT
Start: 2022-07-07 | End: 2022-07-07 | Stop reason: HOSPADM

## 2022-07-07 RX ORDER — EPINEPHRINE 1 MG/ML
0.3 INJECTION, SOLUTION, CONCENTRATE INTRAVENOUS EVERY 5 MIN PRN
Status: DISCONTINUED | OUTPATIENT
Start: 2022-07-07 | End: 2022-07-07 | Stop reason: HOSPADM

## 2022-07-07 RX ORDER — DIPHENHYDRAMINE HYDROCHLORIDE 50 MG/ML
50 INJECTION INTRAMUSCULAR; INTRAVENOUS
Status: CANCELLED
Start: 2022-07-09

## 2022-07-07 RX ORDER — ALBUTEROL SULFATE 0.83 MG/ML
2.5 SOLUTION RESPIRATORY (INHALATION)
Status: CANCELLED | OUTPATIENT
Start: 2022-07-09

## 2022-07-07 RX ORDER — NALOXONE HYDROCHLORIDE 0.4 MG/ML
0.2 INJECTION, SOLUTION INTRAMUSCULAR; INTRAVENOUS; SUBCUTANEOUS
Status: CANCELLED | OUTPATIENT
Start: 2022-07-09

## 2022-07-07 RX ORDER — METHYLPREDNISOLONE SODIUM SUCCINATE 125 MG/2ML
125 INJECTION, POWDER, LYOPHILIZED, FOR SOLUTION INTRAMUSCULAR; INTRAVENOUS
Status: CANCELLED
Start: 2022-07-09

## 2022-07-07 RX ORDER — HEPARIN SODIUM,PORCINE 10 UNIT/ML
5 VIAL (ML) INTRAVENOUS
Status: DISCONTINUED | OUTPATIENT
Start: 2022-07-07 | End: 2022-07-07 | Stop reason: HOSPADM

## 2022-07-07 RX ORDER — DIPHENHYDRAMINE HYDROCHLORIDE 50 MG/ML
50 INJECTION INTRAMUSCULAR; INTRAVENOUS
Status: DISCONTINUED | OUTPATIENT
Start: 2022-07-07 | End: 2022-07-07 | Stop reason: HOSPADM

## 2022-07-07 RX ORDER — MEPERIDINE HYDROCHLORIDE 50 MG/ML
25 INJECTION INTRAMUSCULAR; INTRAVENOUS; SUBCUTANEOUS EVERY 30 MIN PRN
Status: CANCELLED | OUTPATIENT
Start: 2022-07-09

## 2022-07-07 RX ORDER — HEPARIN SODIUM (PORCINE) LOCK FLUSH IV SOLN 100 UNIT/ML 100 UNIT/ML
5 SOLUTION INTRAVENOUS
Status: DISCONTINUED | OUTPATIENT
Start: 2022-07-07 | End: 2022-07-07 | Stop reason: HOSPADM

## 2022-07-07 RX ORDER — METHYLPREDNISOLONE SODIUM SUCCINATE 125 MG/2ML
125 INJECTION, POWDER, LYOPHILIZED, FOR SOLUTION INTRAMUSCULAR; INTRAVENOUS
Status: DISCONTINUED | OUTPATIENT
Start: 2022-07-07 | End: 2022-07-07 | Stop reason: HOSPADM

## 2022-07-07 RX ORDER — ALBUTEROL SULFATE 0.83 MG/ML
2.5 SOLUTION RESPIRATORY (INHALATION)
Status: DISCONTINUED | OUTPATIENT
Start: 2022-07-07 | End: 2022-07-07 | Stop reason: HOSPADM

## 2022-07-07 RX ORDER — ALBUTEROL SULFATE 90 UG/1
1-2 AEROSOL, METERED RESPIRATORY (INHALATION)
Status: CANCELLED
Start: 2022-07-09

## 2022-07-07 RX ORDER — NALOXONE HYDROCHLORIDE 0.4 MG/ML
0.2 INJECTION, SOLUTION INTRAMUSCULAR; INTRAVENOUS; SUBCUTANEOUS
Status: DISCONTINUED | OUTPATIENT
Start: 2022-07-07 | End: 2022-07-07 | Stop reason: HOSPADM

## 2022-07-07 RX ORDER — MEPERIDINE HYDROCHLORIDE 50 MG/ML
25 INJECTION INTRAMUSCULAR; INTRAVENOUS; SUBCUTANEOUS EVERY 30 MIN PRN
Status: DISCONTINUED | OUTPATIENT
Start: 2022-07-07 | End: 2022-07-07 | Stop reason: HOSPADM

## 2022-07-07 RX ADMIN — IRON SUCROSE 300 MG: 20 INJECTION, SOLUTION INTRAVENOUS at 08:40

## 2022-07-07 NOTE — PROGRESS NOTES
Infusion Nursing Note:  Jeanie French presents today for iron infusion.    Patient seen by provider today: No   present during visit today: Not Applicable.    Note: /77 (BP Location: Right arm, Patient Position: Sitting)   Pulse 78   Resp 18   LMP 05/16/2022 (Exact Date)   SpO2 97% .    Intravenous Access:  Peripheral IV placed.    Treatment Conditions:  Not Applicable.    Post Infusion Assessment:  Venofer 300 infused. Patient tolerated infusion without incident.  Blood return noted pre and post infusion.  Site patent and intact, free from redness, edema or discomfort.  No evidence of extravasations.  Access discontinued per protocol.     Discharge Plan:   Patient and/or family verbalized understanding of discharge instructions and all questions answered.  Patient discharged in stable condition accompanied by: self.  Departure Mode: Ambulatory.      Deana Schmidt RN

## 2022-07-11 ENCOUNTER — TRANSFERRED RECORDS (OUTPATIENT)
Dept: HEALTH INFORMATION MANAGEMENT | Facility: CLINIC | Age: 42
End: 2022-07-11

## 2022-07-12 ENCOUNTER — ANCILLARY PROCEDURE (OUTPATIENT)
Dept: MAMMOGRAPHY | Facility: CLINIC | Age: 42
End: 2022-07-12
Attending: STUDENT IN AN ORGANIZED HEALTH CARE EDUCATION/TRAINING PROGRAM
Payer: COMMERCIAL

## 2022-07-12 ENCOUNTER — INFUSION THERAPY VISIT (OUTPATIENT)
Dept: INFUSION THERAPY | Facility: CLINIC | Age: 42
End: 2022-07-12
Attending: STUDENT IN AN ORGANIZED HEALTH CARE EDUCATION/TRAINING PROGRAM
Payer: COMMERCIAL

## 2022-07-12 VITALS
TEMPERATURE: 98.2 F | SYSTOLIC BLOOD PRESSURE: 126 MMHG | HEART RATE: 85 BPM | DIASTOLIC BLOOD PRESSURE: 73 MMHG | RESPIRATION RATE: 18 BRPM | OXYGEN SATURATION: 97 %

## 2022-07-12 DIAGNOSIS — N64.89 BREAST ASYMMETRY: ICD-10-CM

## 2022-07-12 DIAGNOSIS — N92.0 MENORRHAGIA WITH REGULAR CYCLE: ICD-10-CM

## 2022-07-12 DIAGNOSIS — K20.0 EOSINOPHILIC ESOPHAGITIS: ICD-10-CM

## 2022-07-12 DIAGNOSIS — D50.0 IRON DEFICIENCY ANEMIA DUE TO CHRONIC BLOOD LOSS: Primary | ICD-10-CM

## 2022-07-12 PROCEDURE — 96365 THER/PROPH/DIAG IV INF INIT: CPT

## 2022-07-12 PROCEDURE — 258N000003 HC RX IP 258 OP 636: Performed by: STUDENT IN AN ORGANIZED HEALTH CARE EDUCATION/TRAINING PROGRAM

## 2022-07-12 PROCEDURE — 76642 ULTRASOUND BREAST LIMITED: CPT | Mod: LT

## 2022-07-12 PROCEDURE — 250N000011 HC RX IP 250 OP 636: Performed by: STUDENT IN AN ORGANIZED HEALTH CARE EDUCATION/TRAINING PROGRAM

## 2022-07-12 PROCEDURE — 96366 THER/PROPH/DIAG IV INF ADDON: CPT

## 2022-07-12 PROCEDURE — 77061 BREAST TOMOSYNTHESIS UNI: CPT | Mod: LT

## 2022-07-12 RX ORDER — ALBUTEROL SULFATE 90 UG/1
1-2 AEROSOL, METERED RESPIRATORY (INHALATION)
Status: CANCELLED
Start: 2022-07-13

## 2022-07-12 RX ORDER — NALOXONE HYDROCHLORIDE 0.4 MG/ML
0.2 INJECTION, SOLUTION INTRAMUSCULAR; INTRAVENOUS; SUBCUTANEOUS
Status: CANCELLED | OUTPATIENT
Start: 2022-07-13

## 2022-07-12 RX ORDER — ALBUTEROL SULFATE 0.83 MG/ML
2.5 SOLUTION RESPIRATORY (INHALATION)
Status: CANCELLED | OUTPATIENT
Start: 2022-07-13

## 2022-07-12 RX ORDER — EPINEPHRINE 1 MG/ML
0.3 INJECTION, SOLUTION, CONCENTRATE INTRAVENOUS EVERY 5 MIN PRN
Status: DISCONTINUED | OUTPATIENT
Start: 2022-07-12 | End: 2022-07-12 | Stop reason: HOSPADM

## 2022-07-12 RX ORDER — MEPERIDINE HYDROCHLORIDE 50 MG/ML
25 INJECTION INTRAMUSCULAR; INTRAVENOUS; SUBCUTANEOUS EVERY 30 MIN PRN
Status: CANCELLED | OUTPATIENT
Start: 2022-07-13

## 2022-07-12 RX ORDER — HEPARIN SODIUM (PORCINE) LOCK FLUSH IV SOLN 100 UNIT/ML 100 UNIT/ML
5 SOLUTION INTRAVENOUS
Status: CANCELLED | OUTPATIENT
Start: 2022-07-13

## 2022-07-12 RX ORDER — ALBUTEROL SULFATE 90 UG/1
1-2 AEROSOL, METERED RESPIRATORY (INHALATION)
Status: DISCONTINUED | OUTPATIENT
Start: 2022-07-12 | End: 2022-07-12 | Stop reason: HOSPADM

## 2022-07-12 RX ORDER — DIPHENHYDRAMINE HYDROCHLORIDE 50 MG/ML
50 INJECTION INTRAMUSCULAR; INTRAVENOUS
Status: DISCONTINUED | OUTPATIENT
Start: 2022-07-12 | End: 2022-07-12 | Stop reason: HOSPADM

## 2022-07-12 RX ORDER — METHYLPREDNISOLONE SODIUM SUCCINATE 125 MG/2ML
125 INJECTION, POWDER, LYOPHILIZED, FOR SOLUTION INTRAMUSCULAR; INTRAVENOUS
Status: DISCONTINUED | OUTPATIENT
Start: 2022-07-12 | End: 2022-07-12 | Stop reason: HOSPADM

## 2022-07-12 RX ORDER — ALBUTEROL SULFATE 0.83 MG/ML
2.5 SOLUTION RESPIRATORY (INHALATION)
Status: DISCONTINUED | OUTPATIENT
Start: 2022-07-12 | End: 2022-07-12 | Stop reason: HOSPADM

## 2022-07-12 RX ORDER — EPINEPHRINE 1 MG/ML
0.3 INJECTION, SOLUTION, CONCENTRATE INTRAVENOUS EVERY 5 MIN PRN
Status: CANCELLED | OUTPATIENT
Start: 2022-07-13

## 2022-07-12 RX ORDER — HEPARIN SODIUM,PORCINE 10 UNIT/ML
5 VIAL (ML) INTRAVENOUS
Status: CANCELLED | OUTPATIENT
Start: 2022-07-13

## 2022-07-12 RX ORDER — METHYLPREDNISOLONE SODIUM SUCCINATE 125 MG/2ML
125 INJECTION, POWDER, LYOPHILIZED, FOR SOLUTION INTRAMUSCULAR; INTRAVENOUS
Status: CANCELLED
Start: 2022-07-13

## 2022-07-12 RX ORDER — MEPERIDINE HYDROCHLORIDE 50 MG/ML
25 INJECTION INTRAMUSCULAR; INTRAVENOUS; SUBCUTANEOUS EVERY 30 MIN PRN
Status: DISCONTINUED | OUTPATIENT
Start: 2022-07-12 | End: 2022-07-12 | Stop reason: HOSPADM

## 2022-07-12 RX ORDER — DIPHENHYDRAMINE HYDROCHLORIDE 50 MG/ML
50 INJECTION INTRAMUSCULAR; INTRAVENOUS
Status: CANCELLED
Start: 2022-07-13

## 2022-07-12 RX ORDER — NALOXONE HYDROCHLORIDE 0.4 MG/ML
0.2 INJECTION, SOLUTION INTRAMUSCULAR; INTRAVENOUS; SUBCUTANEOUS
Status: DISCONTINUED | OUTPATIENT
Start: 2022-07-12 | End: 2022-07-12 | Stop reason: HOSPADM

## 2022-07-12 RX ADMIN — IRON SUCROSE 300 MG: 20 INJECTION, SOLUTION INTRAVENOUS at 11:30

## 2022-07-12 RX ADMIN — SODIUM CHLORIDE 250 ML: 9 INJECTION, SOLUTION INTRAVENOUS at 11:30

## 2022-07-12 NOTE — PROGRESS NOTES
.uInfusion Nursing Note:  Jeanie French presents today for venofer 300mg infusion   Patient seen by provider today: No   present during visit today: Not Applicable.    Note: 3/3 iron infusions done.    Intravenous Access:  Peripheral IV placed.    Treatment Conditions:  Not Applicable.    Post Infusion Assessment:  Patient tolerated infusion without incident.  Blood return noted pre and post infusion.  Site patent and intact, free from redness, edema or discomfort.     Discharge Plan:   Patient and/or family verbalized understanding of discharge instructions and all questions answered.      Daniela Antoine RN

## 2022-07-14 ENCOUNTER — HOSPITAL ENCOUNTER (OUTPATIENT)
Dept: NUCLEAR MEDICINE | Facility: HOSPITAL | Age: 42
Discharge: HOME | End: 2022-07-14
Attending: INTERNAL MEDICINE | Admitting: INTERNAL MEDICINE
Payer: COMMERCIAL

## 2022-07-14 DIAGNOSIS — F41.1 GENERALIZED ANXIETY DISORDER: ICD-10-CM

## 2022-07-14 DIAGNOSIS — R10.11 ABDOMINAL PAIN, RIGHT UPPER QUADRANT: ICD-10-CM

## 2022-07-14 PROCEDURE — A9537 TC99M MEBROFENIN: HCPCS | Performed by: INTERNAL MEDICINE

## 2022-07-14 PROCEDURE — 343N000001 HC RX 343: Performed by: INTERNAL MEDICINE

## 2022-07-14 PROCEDURE — 250N000011 HC RX IP 250 OP 636: Performed by: INTERNAL MEDICINE

## 2022-07-14 PROCEDURE — 78227 HEPATOBIL SYST IMAGE W/DRUG: CPT

## 2022-07-14 RX ORDER — KIT FOR THE PREPARATION OF TECHNETIUM TC 99M MEBROFENIN 45 MG/10ML
7-10 INJECTION, POWDER, LYOPHILIZED, FOR SOLUTION INTRAVENOUS ONCE
Status: COMPLETED | OUTPATIENT
Start: 2022-07-14 | End: 2022-07-14

## 2022-07-14 RX ADMIN — MEBROFENIN 8.57 MILLICURIE: 45 INJECTION, POWDER, LYOPHILIZED, FOR SOLUTION INTRAVENOUS at 08:57

## 2022-07-14 RX ADMIN — SINCALIDE 1.9 MCG: 5 INJECTION, POWDER, LYOPHILIZED, FOR SOLUTION INTRAVENOUS at 10:02

## 2022-07-18 ENCOUNTER — OFFICE VISIT (OUTPATIENT)
Dept: PSYCHOLOGY | Facility: CLINIC | Age: 42
End: 2022-07-18
Payer: COMMERCIAL

## 2022-07-18 DIAGNOSIS — F33.1 MAJOR DEPRESSIVE DISORDER, RECURRENT, MODERATE (H): ICD-10-CM

## 2022-07-18 DIAGNOSIS — F41.1 GENERALIZED ANXIETY DISORDER: Primary | ICD-10-CM

## 2022-07-18 PROCEDURE — 90837 PSYTX W PT 60 MINUTES: CPT | Performed by: PSYCHOLOGIST
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
## 2022-07-18 NOTE — PATIENT INSTRUCTIONS
Good to see you today Jeanie!  Don't hesitate to reach out sooner if needed.    Give yourself permission to take a step back this week while you are waiting for your biopsy.    Don't wait for your life to begin.  Ask yourself if you are Ok or happy with where things are now.  Does the good outweigh the bad and are you still Ok with your choices.

## 2022-07-22 ENCOUNTER — TRANSFERRED RECORDS (OUTPATIENT)
Dept: HEALTH INFORMATION MANAGEMENT | Facility: CLINIC | Age: 42
End: 2022-07-22

## 2022-07-26 ENCOUNTER — ANCILLARY PROCEDURE (OUTPATIENT)
Dept: MAMMOGRAPHY | Facility: CLINIC | Age: 42
End: 2022-07-26
Attending: STUDENT IN AN ORGANIZED HEALTH CARE EDUCATION/TRAINING PROGRAM
Payer: COMMERCIAL

## 2022-07-26 DIAGNOSIS — N64.89 BREAST ASYMMETRY: ICD-10-CM

## 2022-07-26 DIAGNOSIS — N63.20 LEFT BREAST MASS: ICD-10-CM

## 2022-07-26 PROCEDURE — 250N000009 HC RX 250: Performed by: STUDENT IN AN ORGANIZED HEALTH CARE EDUCATION/TRAINING PROGRAM

## 2022-07-26 PROCEDURE — 88305 TISSUE EXAM BY PATHOLOGIST: CPT | Mod: TC | Performed by: STUDENT IN AN ORGANIZED HEALTH CARE EDUCATION/TRAINING PROGRAM

## 2022-07-26 PROCEDURE — 272N000717 US BREAST BIOPSY CORE NEEDLE LEFT

## 2022-07-26 PROCEDURE — 999N000065 MA POST PROCEDURE LEFT

## 2022-07-26 PROCEDURE — 88305 TISSUE EXAM BY PATHOLOGIST: CPT | Mod: 26 | Performed by: PATHOLOGY

## 2022-07-26 RX ADMIN — LIDOCAINE HYDROCHLORIDE 10 ML: 10 INJECTION, SOLUTION EPIDURAL; INFILTRATION; INTRACAUDAL; PERINEURAL at 15:12

## 2022-07-27 ENCOUNTER — TELEPHONE (OUTPATIENT)
Dept: MAMMOGRAPHY | Facility: CLINIC | Age: 42
End: 2022-07-27

## 2022-07-27 LAB
PATH REPORT.COMMENTS IMP SPEC: NORMAL
PATH REPORT.FINAL DX SPEC: NORMAL
PATH REPORT.GROSS SPEC: NORMAL
PATH REPORT.MICROSCOPIC SPEC OTHER STN: NORMAL
PATH REPORT.RELEVANT HX SPEC: NORMAL
PHOTO IMAGE: NORMAL

## 2022-07-27 NOTE — TELEPHONE ENCOUNTER
At the request of the Breast Center Radiologist,  I phoned patient and discussed the benign breast biopsy results from 7/26/22.      Per Dr. Dominguez, I Advised patient that she may return to her routine breast screening schedule and she was given the phone number to call and schedule a consult with the high risk breast screening NP, due to her mother recently having breast cancer.      Patient denies any concerns at her biopsy site. Questions were answered and my phone number given if she has further questions or concerns.  Ordering provider was informed of these results.  Patient verbalized understanding and agrees with the plan of care.         Angelita Yap, RN, BSN, PHN, CBCN  Breast Center Imaging Nurse Coordinator   [facility]  [address]  [place] [address]  [phone]

## 2022-08-02 ENCOUNTER — OFFICE VISIT (OUTPATIENT)
Dept: FAMILY MEDICINE | Facility: CLINIC | Age: 42
End: 2022-08-02
Payer: COMMERCIAL

## 2022-08-02 VITALS
SYSTOLIC BLOOD PRESSURE: 110 MMHG | BODY MASS INDEX: 29.53 KG/M2 | WEIGHT: 205.8 LBS | OXYGEN SATURATION: 97 % | RESPIRATION RATE: 16 BRPM | HEART RATE: 72 BPM | TEMPERATURE: 98.5 F | DIASTOLIC BLOOD PRESSURE: 74 MMHG

## 2022-08-02 DIAGNOSIS — D50.0 IRON DEFICIENCY ANEMIA DUE TO CHRONIC BLOOD LOSS: Primary | ICD-10-CM

## 2022-08-02 DIAGNOSIS — F41.1 GENERALIZED ANXIETY DISORDER: ICD-10-CM

## 2022-08-02 DIAGNOSIS — E53.8 VITAMIN B12 DEFICIENCY (NON ANEMIC): ICD-10-CM

## 2022-08-02 DIAGNOSIS — N89.8 VAGINAL DRYNESS: ICD-10-CM

## 2022-08-02 LAB
BASOPHILS # BLD AUTO: 0 10E3/UL (ref 0–0.2)
BASOPHILS NFR BLD AUTO: 0 %
EOSINOPHIL # BLD AUTO: 0.3 10E3/UL (ref 0–0.7)
EOSINOPHIL NFR BLD AUTO: 6 %
ERYTHROCYTE [DISTWIDTH] IN BLOOD BY AUTOMATED COUNT: 14.8 % (ref 10–15)
FERRITIN SERPL-MCNC: 212 NG/ML (ref 6–175)
HCT VFR BLD AUTO: 37.5 % (ref 35–47)
HGB BLD-MCNC: 12.5 G/DL (ref 11.7–15.7)
IMM GRANULOCYTES # BLD: 0 10E3/UL
IMM GRANULOCYTES NFR BLD: 0 %
LYMPHOCYTES # BLD AUTO: 2 10E3/UL (ref 0.8–5.3)
LYMPHOCYTES NFR BLD AUTO: 38 %
MCH RBC QN AUTO: 28.7 PG (ref 26.5–33)
MCHC RBC AUTO-ENTMCNC: 33.3 G/DL (ref 31.5–36.5)
MCV RBC AUTO: 86 FL (ref 78–100)
MONOCYTES # BLD AUTO: 0.5 10E3/UL (ref 0–1.3)
MONOCYTES NFR BLD AUTO: 10 %
NEUTROPHILS # BLD AUTO: 2.4 10E3/UL (ref 1.6–8.3)
NEUTROPHILS NFR BLD AUTO: 46 %
PLATELET # BLD AUTO: 232 10E3/UL (ref 150–450)
RBC # BLD AUTO: 4.35 10E6/UL (ref 3.8–5.2)
WBC # BLD AUTO: 5.1 10E3/UL (ref 4–11)

## 2022-08-02 PROCEDURE — 99214 OFFICE O/P EST MOD 30 MIN: CPT | Mod: 25 | Performed by: STUDENT IN AN ORGANIZED HEALTH CARE EDUCATION/TRAINING PROGRAM

## 2022-08-02 PROCEDURE — 85025 COMPLETE CBC W/AUTO DIFF WBC: CPT | Performed by: STUDENT IN AN ORGANIZED HEALTH CARE EDUCATION/TRAINING PROGRAM

## 2022-08-02 PROCEDURE — 82728 ASSAY OF FERRITIN: CPT | Performed by: STUDENT IN AN ORGANIZED HEALTH CARE EDUCATION/TRAINING PROGRAM

## 2022-08-02 PROCEDURE — 36415 COLL VENOUS BLD VENIPUNCTURE: CPT | Performed by: STUDENT IN AN ORGANIZED HEALTH CARE EDUCATION/TRAINING PROGRAM

## 2022-08-02 PROCEDURE — 82607 VITAMIN B-12: CPT | Performed by: STUDENT IN AN ORGANIZED HEALTH CARE EDUCATION/TRAINING PROGRAM

## 2022-08-02 PROCEDURE — 96372 THER/PROPH/DIAG INJ SC/IM: CPT | Performed by: STUDENT IN AN ORGANIZED HEALTH CARE EDUCATION/TRAINING PROGRAM

## 2022-08-02 RX ORDER — FLUOXETINE 10 MG/1
10 CAPSULE ORAL DAILY
Qty: 14 CAPSULE | Refills: 0 | Status: SHIPPED | OUTPATIENT
Start: 2022-08-02 | End: 2023-03-01

## 2022-08-02 RX ORDER — ESTRADIOL 0.1 MG/G
2 CREAM VAGINAL
Qty: 42.5 G | Refills: 1 | Status: SHIPPED | OUTPATIENT
Start: 2022-08-04 | End: 2024-02-06

## 2022-08-02 RX ORDER — CYANOCOBALAMIN 1000 UG/ML
1000 INJECTION, SOLUTION INTRAMUSCULAR; SUBCUTANEOUS
Status: ACTIVE | OUTPATIENT
Start: 2022-08-02 | End: 2023-07-28

## 2022-08-02 RX ADMIN — CYANOCOBALAMIN 1000 MCG: 1000 INJECTION, SOLUTION INTRAMUSCULAR; SUBCUTANEOUS at 08:43

## 2022-08-02 NOTE — LETTER
August 8, 2022      Jeanie French  [address] [place] [address]        Dear ,    We are writing to inform you of your test results.    Your levels for B12 and Ferritin look good, as well as your hemoglobin.  This is good news.  I'm going to sit down with one of my partners this week to talk about your breast cancer risk and I'll send a message to you after that.         Resulted Orders   Ferritin   Result Value Ref Range    Ferritin 212 (H) 6 - 175 ng/mL   Vitamin B12   Result Value Ref Range    Vitamin B12 >4,000 (H) 232 - 1,245 pg/mL   CBC with platelets and differential   Result Value Ref Range    WBC Count 5.1 4.0 - 11.0 10e3/uL    RBC Count 4.35 3.80 - 5.20 10e6/uL    Hemoglobin 12.5 11.7 - 15.7 g/dL    Hematocrit 37.5 35.0 - 47.0 %    MCV 86 78 - 100 fL    MCH 28.7 26.5 - 33.0 pg    MCHC 33.3 31.5 - 36.5 g/dL    RDW 14.8 10.0 - 15.0 %    Platelet Count 232 150 - 450 10e3/uL    % Neutrophils 46 %    % Lymphocytes 38 %    % Monocytes 10 %    % Eosinophils 6 %    % Basophils 0 %    % Immature Granulocytes 0 %    Absolute Neutrophils 2.4 1.6 - 8.3 10e3/uL    Absolute Lymphocytes 2.0 0.8 - 5.3 10e3/uL    Absolute Monocytes 0.5 0.0 - 1.3 10e3/uL    Absolute Eosinophils 0.3 0.0 - 0.7 10e3/uL    Absolute Basophils 0.0 0.0 - 0.2 10e3/uL    Absolute Immature Granulocytes 0.0 <=0.4 10e3/uL       If you have any questions or concerns, please call the clinic at the number listed above.       Sincerely,      Anayeli Warner MD

## 2022-08-02 NOTE — PROGRESS NOTES
There are no exam notes on file for this visit.    ASSESSMENT AND PLAN:      Jeanie was seen today for migraine, recheck medication and gastrointestinal problem.    Diagnoses and all orders for this visit:    Iron deficiency anemia due to chronic blood loss  Vitamin B12 deficiency (non anemic)  We will recheck levels.  B12 shot given today.  Still no improvement in fatigue but this does appear to be situational due to multiple stressors in her life at this time.  -     Vitamin B12; Future  -     Ferritin; Future  -     CBC with Platelets & Differential; Future  -     CBC with Platelets & Differential  -     Ferritin  -     Vitamin B12  -     cyanocobalamin injection 1,000 mcg      Generalized anxiety disorder  Vaginal dryness  Symptoms overall stable.  She will continue with the BuSpar and the hydroxyzine.  We will taper down off the Prozac and see how symptoms respond.  We will do 10 mg for 2 weeks and then stop.  We will start an estrogen cream for her vaginal dryness and see if this improves libido and sex life.  -     FLUoxetine (PROZAC) 10 MG capsule; Take 1 capsule (10 mg) by mouth daily After 2 weeks, then stop medication    Headaches.  These have improved with using the triptan.  Continue to use sumatriptan as needed.    Collagenous colitis and chronic diarrhea.  She will continue to work the plan from GI and we will look for opportunities for a second opinion if symptoms or not improving.  I will reach out to some contacts for other options.    Benign breast lump.  Family history of breast cancer, but this lump was benign and she would like to avoid additional medical visits if not necessary.  She will call the breast center and talk to risks and benefits with them, and I will check with our local mammogram expert at the clinic for her advice as well.    Patient Instructions   Breast Stuff:    -Ask the folks at the clinic about the accuracy of the placement of the clip  -Dr Warner will check with the  documentation and with Dr. Bright    Mood:    -Decrease Prozac to 10mg for 2 weeks and then stop.   -After 1 month, start the estrogen cream    Colon:    -Dr. Warner will  out other collagenous specialist  -Trial the plan from Dr. Eason  -If you want [facility] referral, we should move quickly on that.      Headaches:    -Keep using the medication.     Fatigue:      -Continue doing the B12 shots when you come in.     Check labs today.        Anayeli Warner MD    SUBJECTIVE  Jeanie French is a 42 year old female with past medical history significant for    Patient Active Problem List   Diagnosis     Cancer of tongue (H)     Health Care Home     Generalized anxiety disorder     Tenosynovitis of the right index finger     Wheezing     Irritable bowel syndrome (IBS)     Exercise-induced asthma     Sinusitis, chronic     Allergic rhinitis, unspecified allergic rhinitis type     Hypovitaminosis D     Adjustment disorder with anxiety     Menorrhagia with regular cycle     Major depressive disorder with single episode     Iron deficiency anemia due to chronic blood loss     Collagenous colitis     Eosinophilic esophagitis     Others present at the visit:  None    Presents for   Chief Complaint   Patient presents with     Migraine     Pt is here to follow up on her migraines.  She states they are better.      Recheck Medication     Pt states her prozac was dropped and she wanted to round back in on it.  She states she feels the same.  She states she felt it was hindering Libido and sex drive issues and there is no change after the decrease.      Gastrointestinal Problem     Pt would also like to discuss the progress of her GI stuff and what the next steps are. Pt states this issue is not improving.      Patient presents for follow-up on multiple chronic medical problems.    She has completed work-up for her abnormal mammogram through the breast center.  She had a mammogram, ultrasound, and biopsy, final results from  the pathology were negative.  They have for the option to follow-up in the breast clinic due to her family history and being high risk.  She is not sure what benefits this might bring.  Her mom had breast cancer but she had genetic testing which was negative.    We reviewed her headaches.  The migraine medication, sumatriptan has been helping.  She tries to take it right away if she is developing migraine symptoms.  Is very careful about this so she takes it before the symptoms gets bad, but has not had a significant headache in a couple of weeks.  They have come in spurts in the past but she is happy for this break.    At last visit we decreased her Prozac.  She is not noticed any changes at all.  No change in her mental health, no improvement in her libido or sex drive, and she continues to have symptoms of vaginal dryness.  She is taking birth control to help control her heavy menses.  She does endorse significant stressors going on in her life and multiple areas, including work and at home, but feels she is actually dealing with the stressors quite well and that the change in Prozac has not made any difference in her symptoms.  The BuSpar and hydroxyzine still seem to help some and she would like to continue with these.    We reviewed her GI symptoms.  She has been in to see the specialist to discuss a budesonide taper versus taking 1 mg for a longer period of time.  She had a recent colonoscopy.  This shows continued collagenous colitis.  She does notice some improvement with the symptoms, but then discussed with her GI specialist if she should consider changing over to Imodium instead of budesonide.  Is unsure about what the best plan might be.  Continued symptoms are quite bothersome for her.    She is also frustrated with work and considering other opportunities which are challenging when her medical needs are not under good control.    She still feeling very tired and fatigued.  Has been under a lot of  stress.  Completed iron infusions, has been getting B12 injections, and is due for follow-up on these today.      OBJECTIVE:  Vitals: /74 (BP Location: Left arm, Patient Position: Sitting, Cuff Size: Adult Large)   Pulse 72   Temp 98.5  F (36.9  C) (Oral)   Resp 16   Wt 93.4 kg (205 lb 12.8 oz)   SpO2 97%   BMI 29.53 kg/m    BMI= Body mass index is 29.53 kg/m .  Objective:    Vitals:  Vitals are reviewed and are within the normal range  Gen:  Alert, pleasant, no acute distress  Psych: Mood and affect are nervous, slightly anxious, but appropriate for situation.  Thought processes logical.  Good insight.    PHQ 2/16/2022 5/25/2022 6/27/2022   PHQ-9 Total Score 12 8 6   Q9: Thoughts of better off dead/self-harm past 2 weeks Not at all Not at all Not at all     JAMAR-7 SCORE 11/1/2021 2/16/2022 6/27/2022   Total Score - - -   Total Score 14 13 9           Results for orders placed or performed in visit on 08/02/22   Ferritin     Status: Abnormal   Result Value Ref Range    Ferritin 212 (H) 6 - 175 ng/mL   Vitamin B12     Status: Abnormal   Result Value Ref Range    Vitamin B12 >4,000 (H) 232 - 1,245 pg/mL   CBC with platelets and differential     Status: None   Result Value Ref Range    WBC Count 5.1 4.0 - 11.0 10e3/uL    RBC Count 4.35 3.80 - 5.20 10e6/uL    Hemoglobin 12.5 11.7 - 15.7 g/dL    Hematocrit 37.5 35.0 - 47.0 %    MCV 86 78 - 100 fL    MCH 28.7 26.5 - 33.0 pg    MCHC 33.3 31.5 - 36.5 g/dL    RDW 14.8 10.0 - 15.0 %    Platelet Count 232 150 - 450 10e3/uL    % Neutrophils 46 %    % Lymphocytes 38 %    % Monocytes 10 %    % Eosinophils 6 %    % Basophils 0 %    % Immature Granulocytes 0 %    Absolute Neutrophils 2.4 1.6 - 8.3 10e3/uL    Absolute Lymphocytes 2.0 0.8 - 5.3 10e3/uL    Absolute Monocytes 0.5 0.0 - 1.3 10e3/uL    Absolute Eosinophils 0.3 0.0 - 0.7 10e3/uL    Absolute Basophils 0.0 0.0 - 0.2 10e3/uL    Absolute Immature Granulocytes 0.0 <=0.4 10e3/uL   CBC with Platelets & Differential      Status: None    Narrative    The following orders were created for panel order CBC with Platelets & Differential.  Procedure                               Abnormality         Status                     ---------                               -----------         ------                     CBC with platelets and d...[[id number]]                      Final result                 Please view results for these tests on the individual orders.           Patient Instructions   Breast Stuff:    -Ask the folks at the clinic about the accuracy of the placement of the clip  -Dr Warner will check with the documentation and with Dr. Bright    Mood:    -Decrease Prozac to 10mg for 2 weeks and then stop.   -After 1 month, start the estrogen cream    Colon:    -Dr. Warner will  out other collagenous specialist  -Trial the plan from Dr. Eason  -If you want N referral, we should move quickly on that.      Headaches:    -Keep using the medication.     Fatigue:      -Continue doing the B12 shots when you come in.     Check labs today.        Anayeli Warner MD

## 2022-08-02 NOTE — PATIENT INSTRUCTIONS
Breast Stuff:    -Ask the folks at the clinic about the accuracy of the placement of the clip  -Dr Warner will check with the documentation and with Dr. Bright    Mood:    -Decrease Prozac to 10mg for 2 weeks and then stop.   -After 1 month, start the estrogen cream    Colon:    -Dr. Warner will  out other collagenous specialist  -Trial the plan from Dr. Eason  -If you want N referral, we should move quickly on that.      Headaches:    -Keep using the medication.     Fatigue:      -Continue doing the B12 shots when you come in.     Check labs today.

## 2022-08-03 LAB — VIT B12 SERPL-MCNC: >4000 PG/ML (ref 232–1245)

## 2022-08-08 NOTE — RESULT ENCOUNTER NOTE
Jeanie French-    Your levels for B12 and Ferritin look good, as well as your hemoglobin.  This is good news.  I'm going to sit down with one of my partners this week to talk about your breast cancer risk and I'll send a message to you after that.      Take care,    Dr. Warner

## 2022-08-10 ENCOUNTER — OFFICE VISIT (OUTPATIENT)
Dept: PSYCHOLOGY | Facility: CLINIC | Age: 42
End: 2022-08-10
Payer: COMMERCIAL

## 2022-08-10 DIAGNOSIS — F41.1 GENERALIZED ANXIETY DISORDER: Primary | ICD-10-CM

## 2022-08-10 DIAGNOSIS — F33.1 MAJOR DEPRESSIVE DISORDER, RECURRENT, MODERATE (H): ICD-10-CM

## 2022-08-10 PROCEDURE — 90837 PSYTX W PT 60 MINUTES: CPT | Performed by: PSYCHOLOGIST

## 2022-08-10 NOTE — PATIENT INSTRUCTIONS
Good to see you today Jeanie!    We talked about working on ways to care about things without getting so upset.  Think about checking in with your body and using some simple grounding/mindfulness practices to settle yourself.    Think about noticing where you are holding tension and gently invite the body to relax.  Breathe deeply.  Consider tapping on your chest to activate the vagus nerve.  Notice how your body responds.    We also want to work on checking your thought processes when you notice problematic things happening in the world.  Ask yourself the following questions:    Is this something I can fix?  If not, what will be the result of me fixating on this (for the world/myself)?  What would be the result of accepting my limits here (for the world/myself)?    Just a reminder that Dr. Rankin will be out of the office from August 22 - August 26.  Please reach out to Dr. Warner or other members of the care team at [place] should any urgent needs arise while I am away.    While I don't think you will need them, I'll include some crisis resources below just in case:    Crisis Lines:    [facility] Adult Crisis:  [phone]   [facility] Multilingual Crisis Line:  [phone]  [facility] Adult Crisis:  [phone]    Crisis Text Line: Text [place] to 435018. Free support at your fingertips 24/7  People who text [place] to [phone] will be connected with a counselor. Crisis Text Line is available 24 hours a day, seven days a week.    You can also consider going to the [facility] at the following address.  Walk ins are welcome:    [address] [place] [address]   [phone] (for 24-hour crisis consultation)    Monday - Friday 8:00am - 7:00pm  Saturday:  11:00am - 3:00pm  Sunday and Holidays Closed    If you feel at risk of immediate harm, go directly to the Emergency Department.

## 2022-08-10 NOTE — PROGRESS NOTES
Behavioral Health Progress Note    Client's Legal Name: Jeanie French    Client's Preferred Name: Jeanie   YOB: 1980  Type of Service: in-person, counseling  Length of Service:   Start time: 9:00am  End time: 10:00am   Duration: 60 minutes  Attendees: patient    Identifying Information and Presenting Problem:  Jeanie is a 42 year old female who was referred for mental health services by Primary Care Provider, Dr. Kristen Warner for help with managing emotions tied to a stressful social situation.  Our initial diagnostic assessment took place on 7/21/10 and we have worked together on and off since that time as her needs have dictated.  Based on Jeanie's current report of symptoms, she meets criteria for Generalized Anxiety Disorder and Major Depressive Disorder, Recurrent, Moderate.  Jeanie's mental health concerns affect her ability to function in areas related to work, interacting and relating with others, family relationships, social relationships, intimate partner relationships, maintaining personal health and physical well-being and participating in meaningful activities causing clinically significant distress.  Jeanie denies any concerning drug/alcohol use. She denies any current safety concerns. Jeanie is not significantly impacted by any social determinants of health.  Based on Jeanie's reported symptoms and impact on functioning, the plan is to engage in psychotherapy every 2-3 weeks.    Treatment Objective(s) Addressed in This Session:  Decreased anxiety and increased confidence in managing life stressors.  Decreased depression.    Progress on / Status of Treatment Objective(s) / Homework:  Stable     Mental Health Screening Questionnaires:  PHQ-9:   PHQ 2/16/2022 5/25/2022 6/27/2022   PHQ-9 Total Score 12 8 6   Q9: Thoughts of better off dead/self-harm past 2 weeks Not at all Not at all Not at all      JAMRA-7:   JAMAR-7 SCORE 11/1/2021 2/16/2022 6/27/2022   Total Score - - -   Total Score 14 13 9     Topics  Discussed/Interventions Provided:    Work:  Continues to feel burned out at work.  Took this week off to get caught up on other things.  Discussed coping options including continued job search and letting things fail at work when workload is unrealistic/unhealthy rather than taking this on (this is very challenging for Jeanie given high level of personal responsibility and expectations for quality of work).  Jeanie would like to focus on job search at this time.    Health concerns:   Breast biopsy: Per review of Epic today, this was benign which is a relief.     Weight:  Frustrated by weight gain over the pandemic.  Did lose about 5 lbs on low carb diet but has gained 3 lbs back due to comfort eating when stressed.  Did start running using ytler for beginners and has gone running twice this week.  Praised effort.  Discussed value of approaching challenge with mindful self compassion.  Focus on factors within your control (nutrition, activity, healthy stress management).  Discussed possible value of protein/strength training.     Sexual function:  Concerned about low libido and vaginal dryness. Discussed possible relationship between body dissatisfaction, stress and sexual concerns.  Agreed that focus on healthy lifestyle and stress management may help. Will continue discussing at future visits.    Interpersonal concerns:   Lisandro's mental health:  He was discharged a few days after commitment.     Relationship with Nitish:  Moving towards acceptance that she won't be selling house/moving before this winter.  This is disappointing but there is some freedom in accepting this and moving forward with other priorities (fixing things around her house, etc.).  Praised this shift in thinking/managing stress of relationship.    Coping:   Was using mediation tyler and found this helpful but got busy and hard to fold this in. Discussed benefits of folding mindfulness into smaller moments to decrease friction.    We talked about working  on ways to care about things without getting so upset.  Think about checking in with your body and using some simple grounding/mindfulness practices to settle yourself.  Think about noticing where you are holding tension and gently invite the body to relax.  Breathe deeply.  Consider tapping on your chest to activate the vagus nerve.  Notice how your body responds.    We also want to work on checking your thought processes when you notice problematic things happening in the world.  Ask yourself the following questions:    1. Is this something I can fix?  2. If not, what will be the result of me fixating on this (for the world/myself)?  3. What would be the result of accepting my limits here (for the world/myself)?    We also discussed setting realistic expectations for how much she can get done with time off work.  Has tyler to help her keep track of prioritize tasks on a daily basis.  Discussed possible benefits of listing fun activities too and folding these into prioritization (e.g. 1 fun activity for every 3 tasks, etc.).    Assessment:   Jeanie appeared to be active and engaged in today's session and was receptive to feedback.     Mental Status:   During interaction with the examiner today, Jeanie was cooperative, open, engaged and pleasant. She was generally alert and oriented to person, place, time, and situation.  Speech was normal limits tone, rate, volume; largely coherent and relevant to topic. Mood was mildly anxious at times but less distressed than at our last visit; affect was mood-congruent.  Thought processes were unremarkable. Thought content was not remarkable with no evidence of psychotic features and no evidence of suicide, homicide, or nonsuicidal self injury related thoughts, intent, urges, planning, behavior/recent attempts. Memory appeared grossly intact without being formally evaluated. Insight: good. Judgment was good. Patient exhibited good impulse control during the appointment.     Does the  patient appear to be at imminent risk of harm to self/others at this time? No    DSM-5 Diagnosis:  Generalized Anxiety Disorder  Major Depressive Disorder, recurrent, mild     Plan:  1. Follow up with this provider on 8/31/22.  Reminded Jeanie that I will be out of the office from 8/22 - 8/26.  Encouraged reaching out to Dr. Warner or other members of the care team should any urgent matters arise while I am away.  Encouraged scheduling visits in September if she is wanting these.  2. Follow up with Dr. Warner as recommended.  3. Utilize crisis resources as needed.  4. After Visit Summary will be reviewed in [facility] Rankin, PhD, [facility]    NOTE: Treatment plan update due 9/8/22.  Diagnostic assessment update due 2/16/23.

## 2022-08-12 ENCOUNTER — TELEPHONE (OUTPATIENT)
Dept: FAMILY MEDICINE | Facility: CLINIC | Age: 42
End: 2022-08-12

## 2022-08-12 NOTE — TELEPHONE ENCOUNTER
Called patient to discuss recent request from pharmacy for refill on Prozac.  Patient had not requested this refill, and is still on board with the plan to discontinue the medication.  Will deny that refill request.    Also shared my conversation with Dr. Gonzalez about specialty breast care going forward.  Given her risk factors, she may qualify and benefit from more sensitive screening such as 3D mammogram or MRI.  This would be more readily and easily available through the breast center, and has potential to lead to better pictures and decreased unnecessary interventions as well as identification of potential cancer.  Jeanie will consider this, and plans to reach out to the breast specialty center with questions.    I am still awaiting recommendations for a collagenous colitis specialist as diarrhea continues to be an issue.  She is taking Imodium 5 pills/day, and is frustrating because she has to take each 1 individually out of the blister pack.  It has not improved her diarrhea either.    I will reach out to her after I heard more information for potential second opinion on her collagenous colitis.    Anayeli Warner MD

## 2022-08-31 ENCOUNTER — OFFICE VISIT (OUTPATIENT)
Dept: PSYCHOLOGY | Facility: CLINIC | Age: 42
End: 2022-08-31
Payer: COMMERCIAL

## 2022-08-31 DIAGNOSIS — F41.1 GENERALIZED ANXIETY DISORDER: Primary | ICD-10-CM

## 2022-08-31 DIAGNOSIS — F33.1 MAJOR DEPRESSIVE DISORDER, RECURRENT, MODERATE (H): ICD-10-CM

## 2022-08-31 PROCEDURE — 90837 PSYTX W PT 60 MINUTES: CPT | Performed by: PSYCHOLOGIST
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
## 2022-08-31 NOTE — PATIENT INSTRUCTIONS
"Think about making meal planning easier - some premade stables (frozen brown rice, frozen veggies, etc.).  Think about committing to one fresh made meal per week.    Use \"no\" judiciously.  Saying no isn't rude, it's honest.    "

## 2022-09-13 ENCOUNTER — TRANSFERRED RECORDS (OUTPATIENT)
Dept: HEALTH INFORMATION MANAGEMENT | Facility: CLINIC | Age: 42
End: 2022-09-13

## 2022-09-20 ENCOUNTER — OFFICE VISIT (OUTPATIENT)
Dept: FAMILY MEDICINE | Facility: CLINIC | Age: 42
End: 2022-09-20
Payer: COMMERCIAL

## 2022-09-20 VITALS
TEMPERATURE: 98.2 F | WEIGHT: 207.6 LBS | BODY MASS INDEX: 29.79 KG/M2 | OXYGEN SATURATION: 95 % | RESPIRATION RATE: 16 BRPM | SYSTOLIC BLOOD PRESSURE: 124 MMHG | HEART RATE: 75 BPM | DIASTOLIC BLOOD PRESSURE: 78 MMHG

## 2022-09-20 DIAGNOSIS — K20.0 EOSINOPHILIC ESOPHAGITIS: ICD-10-CM

## 2022-09-20 DIAGNOSIS — D50.0 IRON DEFICIENCY ANEMIA DUE TO CHRONIC BLOOD LOSS: ICD-10-CM

## 2022-09-20 DIAGNOSIS — K52.831 COLLAGENOUS COLITIS: ICD-10-CM

## 2022-09-20 DIAGNOSIS — Z76.89 ENCOUNTER FOR WEIGHT MANAGEMENT: Primary | ICD-10-CM

## 2022-09-20 DIAGNOSIS — R53.83 FATIGUE, UNSPECIFIED TYPE: ICD-10-CM

## 2022-09-20 PROCEDURE — 99214 OFFICE O/P EST MOD 30 MIN: CPT | Performed by: STUDENT IN AN ORGANIZED HEALTH CARE EDUCATION/TRAINING PROGRAM

## 2022-09-20 NOTE — PATIENT INSTRUCTIONS
Dr. Warner will look into high protein options, and various lactose and stool infection testing.   She will let you know via [employer].

## 2022-09-20 NOTE — PROGRESS NOTES
There are no exam notes on file for this visit.    ASSESSMENT AND PLAN:      Jeanie was seen today for gastrointestinal problem and weight loss.    Diagnoses and all orders for this visit:    Encounter for weight management  Fatigue, unspecified type  Iron deficiency anemia due to chronic blood loss  Eosinophilic esophagitis  Collagenous colitis    Patient presenting for discussion of chronic abdominal bloating and discomfort in the context of known collagenous colitis, eosinophilic esophagitis, following with GI specialist.  She is also working hard on weight management, and healthy eating, has great questions about potential other contributing factors such as a potential infection versus dairy or lactose intolerance.    We discussed selecting a diet that is sustainable in the long-term.  Discussed incorporating increased healthy proteins such as beans, nuts and other ways to supplement her diet.  Reaffirmed that this is a long journey and a lifestyle change, and finding the right fit may take some time.    Materials on lactose deficiency, high-protein diet sent to patient via [employer].    Patient Instructions   Dr. Warner will look into high protein options, and various lactose and stool infection testing.   She will let you know via [employer].            Anayeli Warner MD    SUBJECTIVE  Jeanie French is a 42 year old female with past medical history significant for    Patient Active Problem List   Diagnosis     Cancer of tongue (H)     Health Care Home     Generalized anxiety disorder     Tenosynovitis of the right index finger     Wheezing     Irritable bowel syndrome (IBS)     Exercise-induced asthma     Sinusitis, chronic     Allergic rhinitis, unspecified allergic rhinitis type     Hypovitaminosis D     Adjustment disorder with anxiety     Menorrhagia with regular cycle     Major depressive disorder with single episode     Iron deficiency anemia due to chronic blood loss     Collagenous colitis      Eosinophilic esophagitis     Others present at the visit:  None    Presents for   Chief Complaint   Patient presents with     Gastrointestinal Problem     Pt is here to follow up on her digestion.      Weight Loss     Pt would also like to discuss her weight loss, or lack thereof.      Patient presents today to discuss continued difficulties with bowel movements and digestion, as well as continued support for weight loss.    Her overall digestive system still is not working normally.  She is currently taking 2 tabs of 3 mg of but desonide and 6 tabs of Imodium daily.  She is doing this for 8 weeks and then they are doing a gradual taper down on the budesonide.  She is working with the GI specialist on this.  She not sure how much the Imodium really helps.  She has noticed significant improvement in her abdominal pain and cramps, but is still having the loose stools, gas, and frequent stools.  This has been a challenge for her, and its frustrating that is still not resolved, even though it is better.  There was no improvement in her digestive systems with stopping the Prozac.  She also shares that when she was on antibiotics for sinusitis this past winter, the symptoms got better for about a month.  She had talked with the specialist about doing some stool testing for infections after that, but these were never completed.    She is also wondering if she might also have some food sensitivity issues.  Has done a FODMAP diet in the past, and it did not really help.  She does feel like dairy may be makes her symptoms worse.  She notices a scratchy weird feeling in her throat when she eats ice cream.  Likes dairy, and does not eat a lot of meat, so it is a good source of protein for her and she would like to be able to eat and if its not causing trouble.    She shares that her sister has been doing a diet and health plan that includes a regular blood sugar monitoring test that looks at spiking blood sugar levels, which  have affected her mood.  Wonders if this could be a situation or concern for Jeanie as well.    She has tried some different weight loss options.  Has had some success losing 3 to 5 pounds, when she is carefully monitoring her diet, either doing a lower carb like 100 g carb diet or other close monitoring and counting type diets.  She does not eat a lot of meat, but does eat some chicken and dairy.  Has been wanting to work more on exercising.  Finds the careful carb counting diets challenging to stick with  for long periods of time.    She also continues to look into new jobs and opportunities for work, would like to make sure she is doing everything possible for her health before she might have a change in insurance status.    OBJECTIVE:  Vitals: /78 (BP Location: Left arm, Patient Position: Sitting, Cuff Size: Adult Regular)   Pulse 75   Temp 98.2  F (36.8  C) (Oral)   Resp 16   Wt 94.2 kg (207 lb 9.6 oz)   LMP  (LMP Unknown)   SpO2 95%   BMI 29.79 kg/m    BMI= Body mass index is 29.79 kg/m .  Objective:    Vitals:  Vitals are reviewed and are within the normal range.  Weight has been stable for the last 6 months.  She is up a pound and a half from her visit in August.  Gen:  Alert, pleasant, no acute distress  Psych: Mildly anxious.  Somewhat worried about overall health and next steps, thoughtful, good insight.    PHQ 5/25/2022 6/27/2022 9/28/2022   PHQ-9 Total Score 8 6 11   Q9: Thoughts of better off dead/self-harm past 2 weeks Not at all Not at all Several days         No results found for any visits on 09/20/22.        Patient Instructions   Dr. Warner will look into high protein options, and various lactose and stool infection testing.   She will let you know via [employer].            Anayeli Warner MD

## 2022-09-28 ENCOUNTER — OFFICE VISIT (OUTPATIENT)
Dept: PSYCHOLOGY | Facility: CLINIC | Age: 42
End: 2022-09-28
Payer: COMMERCIAL

## 2022-09-28 DIAGNOSIS — F33.1 MAJOR DEPRESSIVE DISORDER, RECURRENT, MODERATE (H): ICD-10-CM

## 2022-09-28 DIAGNOSIS — F41.1 GENERALIZED ANXIETY DISORDER: Primary | ICD-10-CM

## 2022-09-28 PROCEDURE — 90837 PSYTX W PT 60 MINUTES: CPT | Performed by: PSYCHOLOGIST

## 2022-09-28 ASSESSMENT — PATIENT HEALTH QUESTIONNAIRE - PHQ9
SUM OF ALL RESPONSES TO PHQ QUESTIONS 1-9: 11
5. POOR APPETITE OR OVEREATING: NEARLY EVERY DAY

## 2022-09-28 ASSESSMENT — ANXIETY QUESTIONNAIRES
6. BECOMING EASILY ANNOYED OR IRRITABLE: MORE THAN HALF THE DAYS
3. WORRYING TOO MUCH ABOUT DIFFERENT THINGS: NEARLY EVERY DAY
IF YOU CHECKED OFF ANY PROBLEMS ON THIS QUESTIONNAIRE, HOW DIFFICULT HAVE THESE PROBLEMS MADE IT FOR YOU TO DO YOUR WORK, TAKE CARE OF THINGS AT HOME, OR GET ALONG WITH OTHER PEOPLE: VERY DIFFICULT
GAD7 TOTAL SCORE: 15
GAD7 TOTAL SCORE: 15
2. NOT BEING ABLE TO STOP OR CONTROL WORRYING: NEARLY EVERY DAY
1. FEELING NERVOUS, ANXIOUS, OR ON EDGE: NEARLY EVERY DAY
7. FEELING AFRAID AS IF SOMETHING AWFUL MIGHT HAPPEN: SEVERAL DAYS
5. BEING SO RESTLESS THAT IT IS HARD TO SIT STILL: NOT AT ALL

## 2022-09-28 NOTE — PATIENT INSTRUCTIONS
Good to talk with you today Jeanie!    See below for current treatment plan and let me know if you would like any changes:    Treatment Plan     Client's Name: Jeanie French                  YOB: 1980     Today's Date: 9/28/22                                    Date Diagnostic Update Due: 2/16/23     DSM-V Diagnoses:   Generalized Anxiety Disorder  Major Depressive Disorder, recurrent, moderate     Psychosocial / Contextual Factors: Stressors with her health and in her interpersonal and work life contribute to difficulty.  Symptoms continue to fluctuate with life stressors.     Functioning:  Jeanie's mental health concerns have been continuing to affect her ability to function in her life and have been causing clinically significant distress.  Symptoms can contribute to sense of overwhelm at work and undermine ability to complete tasks at home, engage in healthy habits and generally enjoy life.    PHQ 5/25/2022 6/27/2022 9/28/2022   PHQ-9 Total Score 8 6 11   Q9: Thoughts of better off dead/self-harm past 2 weeks Not at all Not at all Several days     JAMAR-7 SCORE 2/16/2022 6/27/2022 9/28/2022   Total Score - - -   Total Score 13 9 15       CAGE-AID Total Score 2/16/2022 9/28/2022   Total Score 0 0      PTSD-PC:  3/4      Collaboration:   Primary care physician, Dr. Anayeli Warner     Referral: none     Anticipated treatment duration: Unknown  Agreed upon meeting frequency: once per month     Long Term Treatment Goal(s) related to diagnosis / functional impairment(s)     Goal 1: Jeanie will report decreased anxiety and increased confidence in managing life stressors.     Steps we will take to achieve your goal:                  Jeanie will continue to take medications as prescribed by Dr. Warner.    Jeanie will practice stress mitigation strategies such as deep breathing, yoga or meditation.  Continue to be aware of life choices to increase sense of agency in your life.  Communicate your needs clearly to  others.  Say No when you need to.  This is not rude, it is honest.  Reflect on whether anxious thoughts are of the helpful or less helpful variety so you don't invest too much energy in non-productive worry.  Think about voicing your worries aloud to get a bit more distance from them as this may help you to evaluate them better.  Attend psychotherapy as scheduled.     Anxiety CBT  GGOC - Learn to challenge negative thinking  [employer] - provide resources for tracking thought patterns and building skills     Progress:  Making good progress!  Keep up the good work!     Intervention(s)  Therapist will provide support, psychoeducation and homework assignments as needed.     Goal 2: Jeanie will report decreased depression.     Steps we will take to achieve your goal:                  Notice the good.  Practice self-compassion  Be mindful of priorities and how you spend your limited time and energy.  Attend psychotherapy as scheduled.                 Progress:  Making good progress!  Keep up the good work!     Intervention(s)  Therapist will provide support, psychoeducation and homework assignments as needed.     If you need additional support and care during times that your therapist or PCP are not available, here are some additional resources for you:     Crisis Lines:     [facility] Adult Crisis:  [phone]  [facility] Adult Crisis: [phone]     Crisis Text Line: Text [place] to 827399. Free support at your fingertips 24/7  People who text [place] to [phone] will be connected with a counselor. Crisis Text Line is available 24 hours a day, seven days a week.    708 - National Crisis Line     You can also consider going to the [facility] at the following address.  Walk ins are welcome:     [address] [place] [address]   [phone] (for 24 hour crisis consultation)     Monday - Friday 8:00am - 7:00pm  Saturday:  11:00am - 3:00pm  Sunday and Holidays Closed     If you feel at  risk of immediate harm, go directly to the Emergency Department.     Patient has reviewed and agreed to the above plan.     Jody Rankin, PhD, LP                                                                 September 28, 2022
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
## 2022-10-26 ENCOUNTER — OFFICE VISIT (OUTPATIENT)
Dept: PSYCHOLOGY | Facility: CLINIC | Age: 42
End: 2022-10-26
Payer: COMMERCIAL

## 2022-10-26 DIAGNOSIS — F33.1 MAJOR DEPRESSIVE DISORDER, RECURRENT, MODERATE (H): ICD-10-CM

## 2022-10-26 DIAGNOSIS — F41.1 GENERALIZED ANXIETY DISORDER: Primary | ICD-10-CM

## 2022-10-26 PROCEDURE — 90837 PSYTX W PT 60 MINUTES: CPT | Performed by: PSYCHOLOGIST
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
## 2022-10-26 NOTE — PATIENT INSTRUCTIONS
Good to see you today Jeanie!  Glad to hear things are improving!  Keep up the good work!    Schedule follow up with Dr. Rankin around 11/26.    Get up by 6:30 or 7:00 (depending on whether you need to shower or not) so that you will have 30 minutes to invest in exercise most work days.  Our current goal is to be active 3/5 work days.    Why is this important to me:  My health  Prepares for a new job  Better sleep schedule    Plan to get in bed by 10:30pm at the latest to ensure you get enough sleep to make this a realistic plan.

## 2022-12-02 ENCOUNTER — TRANSFERRED RECORDS (OUTPATIENT)
Dept: HEALTH INFORMATION MANAGEMENT | Facility: CLINIC | Age: 42
End: 2022-12-02

## 2022-12-27 ENCOUNTER — TRANSFERRED RECORDS (OUTPATIENT)
Dept: HEALTH INFORMATION MANAGEMENT | Facility: CLINIC | Age: 42
End: 2022-12-27
Payer: COMMERCIAL

## 2023-03-01 ENCOUNTER — OFFICE VISIT (OUTPATIENT)
Dept: FAMILY MEDICINE | Facility: CLINIC | Age: 43
End: 2023-03-01
Payer: COMMERCIAL

## 2023-03-01 VITALS
SYSTOLIC BLOOD PRESSURE: 137 MMHG | HEART RATE: 75 BPM | OXYGEN SATURATION: 94 % | TEMPERATURE: 97.9 F | WEIGHT: 208.6 LBS | DIASTOLIC BLOOD PRESSURE: 84 MMHG | RESPIRATION RATE: 20 BRPM | HEIGHT: 71 IN | BODY MASS INDEX: 29.2 KG/M2

## 2023-03-01 DIAGNOSIS — F43.22 ADJUSTMENT DISORDER WITH ANXIETY: ICD-10-CM

## 2023-03-01 DIAGNOSIS — G43.009 MIGRAINE WITHOUT AURA AND WITHOUT STATUS MIGRAINOSUS, NOT INTRACTABLE: Primary | ICD-10-CM

## 2023-03-01 DIAGNOSIS — R25.2 LEG CRAMPS: ICD-10-CM

## 2023-03-01 DIAGNOSIS — H53.2 DOUBLE VISION: ICD-10-CM

## 2023-03-01 PROCEDURE — 99214 OFFICE O/P EST MOD 30 MIN: CPT | Performed by: STUDENT IN AN ORGANIZED HEALTH CARE EDUCATION/TRAINING PROGRAM

## 2023-03-01 RX ORDER — TOPIRAMATE 25 MG/1
TABLET, FILM COATED ORAL
Qty: 84 TABLET | Refills: 1 | Status: SHIPPED | OUTPATIENT
Start: 2023-03-01 | End: 2023-03-21

## 2023-03-01 ASSESSMENT — ANXIETY QUESTIONNAIRES
GAD7 TOTAL SCORE: 12
3. WORRYING TOO MUCH ABOUT DIFFERENT THINGS: NEARLY EVERY DAY
5. BEING SO RESTLESS THAT IT IS HARD TO SIT STILL: NOT AT ALL
2. NOT BEING ABLE TO STOP OR CONTROL WORRYING: NEARLY EVERY DAY
1. FEELING NERVOUS, ANXIOUS, OR ON EDGE: NEARLY EVERY DAY
6. BECOMING EASILY ANNOYED OR IRRITABLE: NEARLY EVERY DAY
GAD7 TOTAL SCORE: 12
IF YOU CHECKED OFF ANY PROBLEMS ON THIS QUESTIONNAIRE, HOW DIFFICULT HAVE THESE PROBLEMS MADE IT FOR YOU TO DO YOUR WORK, TAKE CARE OF THINGS AT HOME, OR GET ALONG WITH OTHER PEOPLE: VERY DIFFICULT
7. FEELING AFRAID AS IF SOMETHING AWFUL MIGHT HAPPEN: NOT AT ALL

## 2023-03-01 ASSESSMENT — ASTHMA QUESTIONNAIRES: ACT_TOTALSCORE: 23

## 2023-03-01 ASSESSMENT — PATIENT HEALTH QUESTIONNAIRE - PHQ9
5. POOR APPETITE OR OVEREATING: NOT AT ALL
SUM OF ALL RESPONSES TO PHQ QUESTIONS 1-9: 10

## 2023-03-01 NOTE — PATIENT INSTRUCTIONS
Drink lots of water!  New headache medication:  --Topamax 25mg , 1 pill in AM and 1 pill in PM for 2 weeks  --Then 2 pills in AM and 2 pill in PM for 2 weeks.      Try using magnesium at night for the leg cramps.    Let the pharmacy know if you need refills.     Follow up in 6 weeks for recheck of headaches.  We have other good options.

## 2023-03-01 NOTE — PROGRESS NOTES
Nursing Notes:   OMID JACKSON  3/1/2023  8:19 AM  Signed  Decline shots, c19 vaccine, and flu.    Omid Jackson MA        ASSESSMENT AND PLAN:      Jeanie was seen today for other.    Diagnoses and all orders for this visit:    Migraine without aura and without status migrainosus, not intractable  Tension headache  Double vision  Worsening frequency of headaches both tension and migraine.  Discussed ideally preventative medication and potential options including, tricyclics, Effexor, gabapentin, propranolol, metoprolol, Topamax, Cymbalta, and we elected to start Topamax.  We will do 1 pill morning and night for 2 weeks and then 2 pills twice daily for 2 weeks and reassess at that time.  Reaffirmed that she is doing a nice job of using the sumatriptan appropriately and will continue to do this.  Using Tylenol for breakthrough at a rate that should not cause rebound headaches.  Encouraged increased fluid intake and more regular visits with a chiropractor as this has been helpful for muscle relaxation for her.  The double vision is concerning, and we will have her schedule an appointment with the ophthalmologist for this.  Exam was normal today.  -     topiramate (TOPAMAX) 25 MG tablet; Take 1 tablet (25 mg) by mouth 2 times daily for 14 days, THEN 2 tablets (50 mg) 2 times daily for 14 days.  -    Sumatriptan as needed with headaches  -    Tylenol for tension headaches as needed no more than 3-4 times per week  -   Increase frequency of chiropractic visits to twice monthly  -   Increase fluid intake  -   Patient will schedule an appointment with the eye doctor for further evaluation of vision.    Adjustment disorder with anxiety  Multiple current life stressors.  This could be contributing to headaches as well.  She has an upcoming appoint with Dr. Rankin.  Has been off of the Prozac, but does not think her symptoms are really worse with this.  I am okay doing a trial off the medication and see how things  go.  -Continue BuSpar 30 mg twice daily  -Continue hydroxyzine 25 mg 3 times daily  -Continue psychotherapy.  -Consider restarting an SSRI in the future.  -We did discuss both Cymbalta and Effexor as options, but she thinks she has side effects on these previously so we will hold off.    Leg cramps  Increase fluid intake and try adding a nighttime dose of magnesium.            Patient Instructions   Drink lots of water!  New headache medication:  --Topamax 25mg , 1 pill in AM and 1 pill in PM for 2 weeks  --Then 2 pills in AM and 2 pill in PM for 2 weeks.      Try using magnesium at night for the leg cramps.    Let the pharmacy know if you need refills.     Follow up in 6 weeks for recheck of headaches.  We have other good options.         Anayeli Warner MD    SUBJECTIVE  Jeanie French is a 42 year old female with past medical history significant for    Patient Active Problem List   Diagnosis     Cancer of tongue (H)     Health Care Home     Generalized anxiety disorder     Tenosynovitis of the right index finger     Wheezing     Irritable bowel syndrome (IBS)     Exercise-induced asthma     Sinusitis, chronic     Allergic rhinitis, unspecified allergic rhinitis type     Hypovitaminosis D     Adjustment disorder with anxiety     Menorrhagia with regular cycle     Major depressive disorder with single episode     Iron deficiency anemia due to chronic blood loss     Collagenous colitis     Eosinophilic esophagitis     Others present at the visit:  None    Presents for   Chief Complaint   Patient presents with     Other     Migraines headaches, got worse     Patient presents for evaluation of worsening headaches.  She has a history of previous migraines, as well as regular tension headaches, but they have been getting worse over the last few months.  She noticed a change in late December early January.  Her headaches have been more frequent.  She is having more difficulty taking the sumatriptan right away,  because she is not always sure if it is a regular headache versus a migraine, and sometimes she wakes up with a migraine already present.  She does find the sumatriptan helpful as long she takes it early enough.  Sometimes she takes 2 pills, and this also helps.  She uses Tylenol to help with the tension headaches and this is also mildly helpful.  She is using this about 2-3 times per week.  Has had rebound headaches in the past and is careful about how much medication she takes.  She has been going to a chiropractor for many years and this has been helpful.  Has not been going as much lately because of stress.  She shares that work is a chronic daily stressor for her.  This was particularly bad in January.  Also had an episode where her cat was in the hospital and nearly .  This was both emotionally and economically draining.  Has been trying to eat healthy and drink lots of water but notes that she has been more thirsty lately, and may not be drinking as much fluids.    She is no longer taking Prozac.  She was not able to refill it and has been off the medication for 3 weeks.  Still feels like she has a lot of anxiety but the depression symptoms okay and she would like to stay off of the medicine for now and see how she feels.  She is also cut down on her hydroxyzine from 4 pills/day to 3 pills/day.  She is still taking the BuSpar and is taking this regularly.  She follows with Dr. Rankin for psychotherapy and has appointment scheduled the end of the month.    Also notes new symptoms of vision changes with headaches.  She does not get auras or changes in vision typically.  She describes this as double vision with vertical double vision.  This happened a handful of times, usually for 1 to 2 minutes, but had 1 longer episode that lasted between 10 and 15 minutes.  Very hard to focus.  She has not been to the eye doctor for 4 to 5 years, has had Lasix done previously and does not notice any changes in her vision  "otherwise.  Abdominal and stomach symptoms have been stable.    She is also noticing some increased frequency of leg cramps.  Most common at night.  Symptoms in the feet but also extend up to the calf and even up to her leg.  She is her that tonic water can help with this.  Is wondering if I have other suggestions.      OBJECTIVE:  Vitals: /84   Pulse 75   Temp 97.9  F (36.6  C) (Oral)   Resp 20   Ht 1.796 m (5' 10.7\")   Wt 94.6 kg (208 lb 9.6 oz)   SpO2 94%   BMI 29.34 kg/m    BMI= Body mass index is 29.34 kg/m .  Objective:    Vitals:  Vitals are reviewed and are within the normal range  Gen:  Alert, pleasant, no acute distress  HEENT: Cranial nerves are intact.  Hearing is intact.  Pupillary reflex is normal and symmetric.  Eye movements are normal.  Ophthalmoscope exam is normal with no changes in blood vessels.  Psych: Mood and affect are mildly anxious but appropriate.    PHQ 6/27/2022 9/28/2022 3/1/2023   PHQ-9 Total Score 6 11 10   Q9: Thoughts of better off dead/self-harm past 2 weeks Not at all Several days Not at all     JAMAR-7 SCORE 6/27/2022 9/28/2022 3/1/2023   Total Score - - -   Total Score 9 15 12       No results found for any visits on 03/01/23.        Patient Instructions   Drink lots of water!  New headache medication:  --Topamax 25mg , 1 pill in AM and 1 pill in PM for 2 weeks  --Then 2 pills in AM and 2 pill in PM for 2 weeks.      Try using magnesium at night for the leg cramps.    Let the pharmacy know if you need refills.     Follow up in 6 weeks for recheck of headaches.  We have other good options.         Anayeli Warner MD    "

## 2023-03-20 ENCOUNTER — TRANSFERRED RECORDS (OUTPATIENT)
Dept: HEALTH INFORMATION MANAGEMENT | Facility: CLINIC | Age: 43
End: 2023-03-20
Payer: COMMERCIAL

## 2023-03-26 NOTE — PROGRESS NOTES
Behavioral Health Diagnostic Assessment Update:    Client's Legal Name: Jeanie French    Client's Preferred Name: Jeanie  YOB: 1980  Type of Service: in-person, counseling  Length of Service:   Start time: 9:09am - had trouble finding parking today so starting a bit late  End time:  10:05am  Duration: 56 minutes  Attendees: Jeanie attended alone    Date of Previous Diagnostic Assessment: Initial diagnostic assessment 7/21/10.  Last update 2/16/22.    Assessment Summary:  Diagnostic update completed today.  Jeanie is a 43 year old female who was referred for mental health services by Primary Care Provider, Dr. Kristen Warner for help with managing emotions tied to a stressful social situation.  Our initial diagnostic assessment took place on 7/21/10 and we have worked together on and off since that time as her needs have dictated.  Based on Jeanie's current report of symptoms, she continues to meet criteria for Generalized Anxiety Disorder and Major Depressive Disorder, Recurrent, Moderate.  Jeanie's mental health concerns affect her ability to function in areas related to work, interacting and relating with others, family relationships, social relationships, intimate partner relationships, maintaining personal health and physical well-being and participating in meaningful activities causing clinically significant distress.  Jeanie denies any concerning drug/alcohol use. She denies any current safety concerns. Jeanie is not significantly impacted by any social determinants of health.  Based on Jeanie's reported symptoms and impact on functioning, the plan is to engage in psychotherapy roughly once per month.    DSM-V Diagnoses:  Generalized Anxiety Disorder  Major Depressive Disorder, recurrent, moderate    Recommendations & Plan:     Follow up with this provider Wed 4/26 at 9:00am    See pt instructions for updated treatment plan.  Will access via [employer].    Routing to Dr. Warner so she can be aware of diagnostic  update and plan to resume care.    Next treatment plan update due 6/27/23.  Next diagnostic update due 3/27/24.    Mental Health Screening Questionnaires:  PHQ-9: May be inflated a bit secondary to medication impacting fatigue  PHQ 9/28/2022 3/1/2023 3/27/2023   PHQ-9 Total Score 11 10 15   Q9: Thoughts of better off dead/self-harm past 2 weeks Several days Not at all Not at all      JAMAR-7:   JAMAR-7 SCORE 9/28/2022 3/1/2023 3/27/2023   Total Score - - -   Total Score 15 12 19     PC-PTSD:   PTSD Screen Score 3/27/2023   Have you ever experienced this kind of event? Yes   PTSD Screen (Score of 3 or more suggests positive screen) 0   Some recent data might be hidden     CAGE-AID:   CAGE-AID Total Score 2/16/2022 9/28/2022 3/27/2023   Total Score 0 0 0       Current Presenting Problems or Complaints (including patient perception of problem and external factors contributing to current dilemma):  Rajeshs depressive and anxious symptoms are persistent and continue to fluctuate with life stressors.  Current work stressors and relational stressors are a significant contributor (see updated life circumstances below for additional detail).  Making some progress on getting some distance from her thoughts and evaluating if they are helpful or not.  Trying not to get pulled into work stressors that are beyond her realm of control. This is easier said than done.  Fatigued by persistent worries/stressors.  Had a hard time prioritizing time for self care/therapy in the past few months due to business/hectic feel of life in the past several months but believes this is important for her self care and would like to resume meeting on a regular basis.    Review of Symptoms:  Depression: Depressed mood, anhedonia, increased need for sleep and fatigue most days, periodic fluctuations in appetite, negative thoughts about self, difficulty concentrating and restlessness.  Increased fatigue may be side effect of medication she is taking for  migraines so will monitor this as this may subside.  No suicidal ideation, intent or plan.    Marcy: none  Psychosis: none  Anxiety:  Persistent feelings of anxiety and worry related to a number of areas in her life, difficulty relaxing, irritability and fear of negative life events.  Trauma: none    Functioning:  Rajeshs mental health concerns have been continuing to affect her ability to function in her life and have been causing clinically significant distress.  Symptoms can contribute to sense of overwhelm at work and undermine ability to complete tasks at home and generally enjoy life.    Current Substance Use:   Occasional social drinking.  No concerning drug or alcohol use.    Suicide Assessment:  Recent suicidal thoughts: No  Past suicidal thoughts: Yes: did report passive suicidal ideation in the fall of 2019 related to relational stressors, but denied any intent or plan at that time.  Any attempts in the past: No  Any family/friends/loved ones die by suicide: No  Plan or considering various methods: No  Access to guns: No  Protective factors: no h/o suicide attempt, no plan or intent, describes a safety plan, h/o seeking help when needed, future oriented, none to minimal alcohol use  and stable housing  Verbal contract for safety: Yes    Non-Suicidal Self Injurious Behavior: No    Violence/Homicide Risk Assessment:     Problems with anger management: No  History of violence: No  History of significant damage to property: No  Threat made to harm or kill someone: No  Verbal contract for safety: N/A    Mental Status Exam:  During interaction with the examiner today, Jeanie was cooperative, open, engaged and pleasant. Patient was generally alert and oriented to person, place, time, and situation. They were neatly dressed, appropriately groomed and appeared stated age. Patient's attire was appropriate for the weather and occasion. Eye contact was good. Psychomotor functioning: normal or unremarkable. Speech was  normal limits tone, rate, volume; largely coherent and relevant to topic. Mood was anxious and sad; affect was mood-congruent. Tearful at times.  Thought processes were unremarkable. Thought content was not remarkable with no evidence of psychotic features and no evidence of suicide, homicide, or nonsuicidal self injury related thoughts, intent, urges, planning, behavior/recent attempts. Memory appeared grossly intact without being formally evaluated. Insight: good. Judgment was good. Patient exhibited good impulse control during the appointment.      Safety Plan:   Jeanie was provided with the phone number for emergency mental health services and was encouraged to call these local crisis numbers, 911, or visit a local emergency room if thoughts of suicide or homicide were to arise and/or if they were to be in acute distress. The patient agreed to utilize these services as indicated if the need were to arise. Jeanie denied current suicidal or homicidal ideation, intent, or plan, and denied a history of suicide attempts/self-harming behaviors. Based on these factors, Jeanie is considered to be sustainable as an outpatient at this time.     Updated Life History/Circumstances:   Caught up on a number of life events since our last visit in October 2022.    Changes in co-workers 5-6 months ago.  Tolerating but not going great.  Has talked to boss who agrees with conceptualization of the problem but offers no solutions.   was let go recently so feels there is no advocate in workplace.  Will be getting new boss and meeting went well.  This person appears to support her career development but still a lot of uncertainty going forward and would like to find new job.    Has applied for a number of jobs but nothing has panned out yet.  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  Updated resume and Linked in profile.  Would prefer in person or hybrid work vs fully remote.  Too isolating.  Has  an interview tomorrow at the [employer].  Not thrilled about going back to [employer] as she feels this would be a sign she has not moved forward in her career over the past decade or so.  Also not thrilled that this job would not help her with desire to cut back on commute.  Processed these feelings and gently challenged some underlying assumptions that seemed to fuel sadness/anxiety.    With regard to relationships/family.  Perceived the Holidays as very hectic.  Didn't enjoy [place] because of this.  In January, Nitish's son, Lisandro, had MH episode with paranoia/breaks from reality that led to stay of commitment being revoked and he was sent back to the hospital ([facility] for 3-4 weeks).  This hospitalization appeared to go better than the last time around.  More open to visitors/participating in treatment.  Dx with Bipolar disorder.  Using marijuana which likely contributed to flare in symptoms.  Tried to get him into Dual Dx program but couldn't get access so sent to [facility] treatment facility instead.  He's back home now and working.  Doing Ok.  This delayed move in but Nitish has talked to Lisandro about the plan for Jeanie to move in.  Lisandro talked to his mom/sister so they are prepared that this is coming.  No push back that she is aware of.  Has moved a few boxes there but not in an organized fashion.  Provided empathy for recent relational stressors.  Noted that even positive changes (moving forward in relationship with Nitish) can be stressful.      Cat almost  in January which was very concerning for her.  He has since recovered which is a relief.          Social Determinants of Health and Cultural Factors:   Jeanie is impacted by the following social determinants of health: potential job instability    Updated Health History/Family Health History:   Migraines have been a problem recently.  Increased fatigue may be related to medication for these.  Feeling a bit better in the past couple of days and hopeful she is  rounding a corner with this side effect.  Collagenous colitis more well managed at this time.      Mother has been treated for breast cancer.  Father experienced a traumatic brain injury related to a fall in 2015.    Patient Active Problem List   Diagnosis     Cancer of tongue (H)     Health Care Home     Generalized anxiety disorder     Tenosynovitis of the right index finger     Wheezing     Irritable bowel syndrome (IBS)     Exercise-induced asthma     Sinusitis, chronic     Allergic rhinitis, unspecified allergic rhinitis type     Hypovitaminosis D     Adjustment disorder with anxiety     Menorrhagia with regular cycle     Major depressive disorder with single episode     Iron deficiency anemia due to chronic blood loss     Collagenous colitis     Eosinophilic esophagitis     Current Outpatient Medications   Medication     albuterol (PROAIR HFA/PROVENTIL HFA/VENTOLIN HFA) 108 (90 Base) MCG/ACT inhaler     budesonide (ENTOCORT EC) 3 MG EC capsule     busPIRone HCl (BUSPAR) 30 MG tablet     colestipol (COLESTID) 1 g tablet     estradiol (ESTRACE) 0.1 MG/GM vaginal cream     ferrous gluconate (FERGON) 324 (38 Fe) MG tablet     hydrOXYzine (VISTARIL) 25 MG capsule     LOW-OGESTREL 0.3-30 MG-MCG tablet     omeprazole (PRILOSEC) 40 MG DR capsule     sodium chloride (OCEAN) 0.65 % nasal spray     SUMAtriptan (IMITREX) 25 MG tablet     topiramate (TOPAMAX) 50 MG tablet     Current Facility-Administered Medications   Medication     cyanocobalamin injection 1,000 mcg     cyanocobalamin injection 1,000 mcg     cyanocobalamin injection 1,000 mcg       NOTE: Diagnostic update complete.    Jody Rankin, PhD, LP

## 2023-03-26 NOTE — PATIENT INSTRUCTIONS
Good to talk with you today Jeanie.    Just a reminder that Dr. Rankin will be out of the office from April 3 - April 7.  Please reach out to Dr. Warner or other members of the care team at [facility] should any urgent needs arise while I am away.    See below for current treatment plan and let me know if you would like any changes:    Treatment Plan     Client's Name: Jeanie French                  YOB: 1980     Today's Date: 10/27/23                                  Date Diagnostic Update Due: 3/27/24     DSM-V Diagnoses:   Generalized Anxiety Disorder  Major Depressive Disorder, recurrent, moderate     Psychosocial / Contextual Factors: Stressors with her health and in her interpersonal and work life contribute to difficulty.  Symptoms continue to fluctuate with life stressors.     Functioning:  Jeanie's mental health concerns have been continuing to affect her ability to function in her life and have been causing clinically significant distress.  Symptoms can contribute to sense of overwhelm at work and undermine ability to complete tasks at home, engage in healthy habits and generally enjoy life.    PHQ 9/28/2022 3/1/2023 3/27/2023   PHQ-9 Total Score 11 10 15   Q9: Thoughts of better off dead/self-harm past 2 weeks Several days Not at all Not at all     JAMAR-7 SCORE 9/28/2022 3/1/2023 3/27/2023   Total Score - - -   Total Score 15 12 19       CAGE-AID Total Score 2/16/2022 9/28/2022 3/27/2023   Total Score 0 0 0      PTSD-PC:  0/5    Collaboration:   Primary care physician, Dr. Anayeli Warner     Referral: none     Anticipated treatment duration: Unknown  Agreed upon meeting frequency: once per month     Long Term Treatment Goal(s) related to diagnosis / functional impairment(s)     Goal 1: Jeanie will report decreased anxiety and increased confidence in managing life stressors.     Steps we will take to achieve your goal:                  Jeanie will continue to take medications as prescribed by   Timmy.    Jeanie will practice stress mitigation strategies such as deep breathing, yoga or meditation.  Continue to be aware of life choices to increase sense of agency in your life.  Communicate your needs clearly to others.  Say No when you need to.  This is not rude, it is honest.  Reflect on whether anxious thoughts are of the helpful or less helpful variety so you don't invest too much energy in non-productive worry.  Think about voicing your worries aloud to get a bit more distance from them as this may help you to evaluate them better.  Attend psychotherapy as scheduled.     Anxiety CBT  GGOC - Learn to challenge negative thinking  [employer] - provide resources for tracking thought patterns and building skills     Progress:     Demonstrating increased awareness of role of thoughts in feeding anxiety and benefits of getting distance from thoughts/not taking ownership of things beyond her control.  Demonstrating increased cognitive flexibility in the face of unexpected challenges.     Intervention(s)  Therapist will provide support, psychoeducation and homework assignments as needed.     Goal 2: Jeanie will report decreased depression.     Steps we will take to achieve your goal:                  Notice the good.  Practice self-compassion  Be mindful of priorities and how you spend your limited time and energy.  Attend psychotherapy as scheduled.                 Progress:  Some increased depression related to recent stressors/medical concerns/medication changes.  Re-establishing care to address.     Intervention(s)  Therapist will provide support, psychoeducation and homework assignments as needed.     If you need additional support and care during times that your therapist or PCP are not available, here are some additional resources for you:     Crisis Lines:     [facility] Adult Crisis:  [phone]  [facility] Adult Crisis: [phone]     Crisis Text Line: Text [place] to 524330. Free support at your  fingertips 24/7  People who text [place] to 774254 will be connected with a counselor. Crisis Text Line is available 24 hours a day, seven days a week.    988 - National Crisis Line     You can also consider going to the [facility] at the following address.  Walk ins are welcome:     [address] [place] [address]   [phone] (for 24 hour crisis consultation)     Monday - Friday 8:00am - 7:00pm  Saturday:  11:00am - 3:00pm  Sunday and Holidays Closed     If you feel at risk of immediate harm, go directly to the Emergency Department.     Patient has reviewed and agreed to the above plan.     Jody Rankin, PhD, LP                                                                 March 27, 2023

## 2023-03-27 ENCOUNTER — OFFICE VISIT (OUTPATIENT)
Dept: PSYCHOLOGY | Facility: CLINIC | Age: 43
End: 2023-03-27
Payer: COMMERCIAL

## 2023-03-27 DIAGNOSIS — F41.1 GENERALIZED ANXIETY DISORDER: Primary | ICD-10-CM

## 2023-03-27 DIAGNOSIS — F33.1 MAJOR DEPRESSIVE DISORDER, RECURRENT, MODERATE (H): ICD-10-CM

## 2023-03-27 PROCEDURE — 90791 PSYCH DIAGNOSTIC EVALUATION: CPT | Performed by: PSYCHOLOGIST

## 2023-03-27 ASSESSMENT — ANXIETY QUESTIONNAIRES
1. FEELING NERVOUS, ANXIOUS, OR ON EDGE: NEARLY EVERY DAY
3. WORRYING TOO MUCH ABOUT DIFFERENT THINGS: NEARLY EVERY DAY
7. FEELING AFRAID AS IF SOMETHING AWFUL MIGHT HAPPEN: MORE THAN HALF THE DAYS
5. BEING SO RESTLESS THAT IT IS HARD TO SIT STILL: MORE THAN HALF THE DAYS
IF YOU CHECKED OFF ANY PROBLEMS ON THIS QUESTIONNAIRE, HOW DIFFICULT HAVE THESE PROBLEMS MADE IT FOR YOU TO DO YOUR WORK, TAKE CARE OF THINGS AT HOME, OR GET ALONG WITH OTHER PEOPLE: VERY DIFFICULT
2. NOT BEING ABLE TO STOP OR CONTROL WORRYING: NEARLY EVERY DAY
GAD7 TOTAL SCORE: 19
6. BECOMING EASILY ANNOYED OR IRRITABLE: NEARLY EVERY DAY
GAD7 TOTAL SCORE: 19

## 2023-03-27 ASSESSMENT — PATIENT HEALTH QUESTIONNAIRE - PHQ9
5. POOR APPETITE OR OVEREATING: NEARLY EVERY DAY
SUM OF ALL RESPONSES TO PHQ QUESTIONS 1-9: 15

## 2023-04-15 DIAGNOSIS — N92.0 MENORRHAGIA WITH REGULAR CYCLE: ICD-10-CM

## 2023-04-15 DIAGNOSIS — R53.83 OTHER FATIGUE: Primary | ICD-10-CM

## 2023-04-15 DIAGNOSIS — D50.0 IRON DEFICIENCY ANEMIA DUE TO CHRONIC BLOOD LOSS: ICD-10-CM

## 2023-04-18 ENCOUNTER — OFFICE VISIT (OUTPATIENT)
Dept: FAMILY MEDICINE | Facility: CLINIC | Age: 43
End: 2023-04-18
Payer: COMMERCIAL

## 2023-04-18 VITALS
DIASTOLIC BLOOD PRESSURE: 85 MMHG | WEIGHT: 208.4 LBS | SYSTOLIC BLOOD PRESSURE: 136 MMHG | RESPIRATION RATE: 24 BRPM | HEART RATE: 77 BPM | OXYGEN SATURATION: 97 % | BODY MASS INDEX: 29.84 KG/M2 | TEMPERATURE: 99.2 F | HEIGHT: 70 IN

## 2023-04-18 DIAGNOSIS — N92.0 MENORRHAGIA WITH REGULAR CYCLE: ICD-10-CM

## 2023-04-18 DIAGNOSIS — G43.009 MIGRAINE WITHOUT AURA AND WITHOUT STATUS MIGRAINOSUS, NOT INTRACTABLE: Primary | ICD-10-CM

## 2023-04-18 DIAGNOSIS — D50.0 IRON DEFICIENCY ANEMIA DUE TO CHRONIC BLOOD LOSS: ICD-10-CM

## 2023-04-18 DIAGNOSIS — F43.22 ADJUSTMENT DISORDER WITH ANXIETY: ICD-10-CM

## 2023-04-18 DIAGNOSIS — R53.83 OTHER FATIGUE: ICD-10-CM

## 2023-04-18 LAB
ERYTHROCYTE [DISTWIDTH] IN BLOOD BY AUTOMATED COUNT: 13.3 % (ref 10–15)
FERRITIN SERPL-MCNC: 63 NG/ML (ref 6–175)
HCT VFR BLD AUTO: 37.9 % (ref 35–47)
HGB BLD-MCNC: 13 G/DL (ref 11.7–15.7)
MCH RBC QN AUTO: 30.4 PG (ref 26.5–33)
MCHC RBC AUTO-ENTMCNC: 34.3 G/DL (ref 31.5–36.5)
MCV RBC AUTO: 89 FL (ref 78–100)
PLATELET # BLD AUTO: 249 10E3/UL (ref 150–450)
RBC # BLD AUTO: 4.28 10E6/UL (ref 3.8–5.2)
WBC # BLD AUTO: 7.4 10E3/UL (ref 4–11)

## 2023-04-18 PROCEDURE — 99214 OFFICE O/P EST MOD 30 MIN: CPT | Performed by: STUDENT IN AN ORGANIZED HEALTH CARE EDUCATION/TRAINING PROGRAM

## 2023-04-18 PROCEDURE — 85027 COMPLETE CBC AUTOMATED: CPT | Performed by: STUDENT IN AN ORGANIZED HEALTH CARE EDUCATION/TRAINING PROGRAM

## 2023-04-18 PROCEDURE — 82728 ASSAY OF FERRITIN: CPT | Performed by: STUDENT IN AN ORGANIZED HEALTH CARE EDUCATION/TRAINING PROGRAM

## 2023-04-18 PROCEDURE — 36415 COLL VENOUS BLD VENIPUNCTURE: CPT | Performed by: STUDENT IN AN ORGANIZED HEALTH CARE EDUCATION/TRAINING PROGRAM

## 2023-04-18 RX ORDER — PROPRANOLOL HYDROCHLORIDE 10 MG/1
10 TABLET ORAL 3 TIMES DAILY
Qty: 90 TABLET | Refills: 2 | Status: SHIPPED | OUTPATIENT
Start: 2023-04-18 | End: 2023-05-11

## 2023-04-18 RX ORDER — TOPIRAMATE 25 MG/1
TABLET, FILM COATED ORAL
Qty: 42 TABLET | Refills: 0 | Status: SHIPPED | OUTPATIENT
Start: 2023-04-18 | End: 2023-04-19

## 2023-04-18 ASSESSMENT — ANXIETY QUESTIONNAIRES
GAD7 TOTAL SCORE: 14
3. WORRYING TOO MUCH ABOUT DIFFERENT THINGS: NEARLY EVERY DAY
6. BECOMING EASILY ANNOYED OR IRRITABLE: NEARLY EVERY DAY
7. FEELING AFRAID AS IF SOMETHING AWFUL MIGHT HAPPEN: NOT AT ALL
1. FEELING NERVOUS, ANXIOUS, OR ON EDGE: NEARLY EVERY DAY
5. BEING SO RESTLESS THAT IT IS HARD TO SIT STILL: NOT AT ALL
2. NOT BEING ABLE TO STOP OR CONTROL WORRYING: NEARLY EVERY DAY
GAD7 TOTAL SCORE: 14
IF YOU CHECKED OFF ANY PROBLEMS ON THIS QUESTIONNAIRE, HOW DIFFICULT HAVE THESE PROBLEMS MADE IT FOR YOU TO DO YOUR WORK, TAKE CARE OF THINGS AT HOME, OR GET ALONG WITH OTHER PEOPLE: VERY DIFFICULT

## 2023-04-18 ASSESSMENT — PATIENT HEALTH QUESTIONNAIRE - PHQ9
5. POOR APPETITE OR OVEREATING: MORE THAN HALF THE DAYS
SUM OF ALL RESPONSES TO PHQ QUESTIONS 1-9: 10

## 2023-04-18 NOTE — Clinical Note
YONI - we are changing up the migraine medications - hopefully this will help with some of her symptoms.

## 2023-04-18 NOTE — LETTER
April 20, 2023      Jeanie M Manolo  [address] [place] [address]        Dear ,    We are writing to inform you of your test results.    Your ferritin level is right in the middle of the normal range.  This is a good place to be, and with your hemoglobin level, I think this is likely not causing the fatigue.  We'll keep making changes and look for other causes.  Please call the clinic at [phone] if you have any questions.         Resulted Orders   Ferritin   Result Value Ref Range    Ferritin 63 6 - 175 ng/mL   CBC with platelets   Result Value Ref Range    WBC Count 7.4 4.0 - 11.0 10e3/uL    RBC Count 4.28 3.80 - 5.20 10e6/uL    Hemoglobin 13.0 11.7 - 15.7 g/dL    Hematocrit 37.9 35.0 - 47.0 %    MCV 89 78 - 100 fL    MCH 30.4 26.5 - 33.0 pg    MCHC 34.3 31.5 - 36.5 g/dL    RDW 13.3 10.0 - 15.0 %    Platelet Count 249 150 - 450 10e3/uL       If you have any questions or concerns, please call the clinic at the number listed above.       Sincerely,      Anayeli Warner MD

## 2023-04-18 NOTE — PATIENT INSTRUCTIONS
For the Topamax:    Take 25mg 2x daily for 2 weeks, then 25mg 1x daily for 2 weeks.     Start taking Propranolol 10mg three times daily.      Let us know if you have any side effects.     I will let you know if any of the lab work comes back abnormal.      Follow up in 4-6 weeks for recheck.

## 2023-04-19 NOTE — PROGRESS NOTES
There are no exam notes on file for this visit.    ASSESSMENT AND PLAN:      Jeanie was seen today for recheck medication.    Diagnoses and all orders for this visit:    Migraine without aura and without status migrainosus, not intractable  Migraines are much improved with the Topamax but side effects are not tolerable.  We will titrate down, and start propranolol for prophylactic treatment.  -     propranolol (INDERAL) 10 MG tablet; Take 1 tablet (10 mg) by mouth 3 times daily  -     topiramate (TOPAMAX) 25 MG tablet; Take 1 tablet (25 mg) by mouth 2 times daily for 14 days, THEN 1 tablet (25 mg) daily for 14 days.    Other fatigue  Menorrhagia with regular cycle  Iron deficiency anemia due to chronic blood loss  Adjustment disorder with anxiety  She is experiencing significant fatigue.  May just be a side effect of the Topamax, but wanted to confirm that hemoglobin and ferritin levels are stable, which they are today.  We have checked thyroid levels multiple times and these were normal.  Endorses significant work stressors that are likely also contributing.  We will reassess in 1 month after we have changed off of the Topamax and onto a different migraine medication.  -     Ferritin  -     CBC with platelets        Patient Instructions   For the Topamax:    Take 25mg 2x daily for 2 weeks, then 25mg 1x daily for 2 weeks.     Start taking Propranolol 10mg three times daily.      Let us know if you have any side effects.     I will let you know if any of the lab work comes back abnormal.        Anayeli Warner MD    SUBJECTIVE  Jeanie French is a 43 year old female with past medical history significant for    Patient Active Problem List   Diagnosis     Cancer of tongue (H)     Health Care Home     Generalized anxiety disorder     Tenosynovitis of the right index finger     Wheezing     Irritable bowel syndrome (IBS)     Exercise-induced asthma     Sinusitis, chronic     Allergic rhinitis, unspecified allergic  "rhinitis type     Hypovitaminosis D     Adjustment disorder with anxiety     Menorrhagia with regular cycle     Major depressive disorder with single episode     Iron deficiency anemia due to chronic blood loss     Collagenous colitis     Eosinophilic esophagitis     Others present at the visit:  None    Presents for   Chief Complaint   Patient presents with     Recheck Medication     Med ck      Patient presents today for medication review.  She has felt increasingly sleepy, tired, and fatigued since starting the Topamax.  Also notes that her brain is in a fog.  Frequently comes home from work and is barely able to have something to eat before she falls asleep.  Is no longer able to work out.  Is having difficulty looking for new jobs.  Does not feel the current fatigue is functional.  She does share that the Topamax has decreased her migraine headaches by about 85%.  She still gets them occasionally, but they are better.  This has been a nice change.  She still using the sumatriptan occasionally.  Is currently taking 50 mg of Topamax twice daily and wants to make sure she has a right taper to come down.    She does continue to have regular periods that are somewhat lighter than previous but still heavy.  Notes tingling bilaterally in her hands.  Has been seeing the chiropractor, and they do not think this is likely coming from her neck or her spine.  Is intermittent, but bothersome and uncomfortable.    Her work has been very stressful.  Strongly feels that she needs to leave her job but does not have energy to apply for other opportunities.  She does feel like her stress is contributing but that is not the only factor at play here.      OBJECTIVE:  Vitals: /85   Pulse 77   Temp 99.2  F (37.3  C) (Oral)   Resp 24   Ht 1.765 m (5' 9.5\")   Wt 94.5 kg (208 lb 6.4 oz)   LMP 03/08/2023 (Approximate)   SpO2 97%   BMI 30.33 kg/m    BMI= Body mass index is 30.33 kg/m .  Objective:    Vitals:  Vitals are " reviewed and are within the normal range  Gen:  Alert, pleasant, no acute distress  Psych: Mood and affect are down, somewhat flat, mildly anxious.        3/1/2023     9:01 AM 3/27/2023    10:09 AM 4/18/2023     4:35 PM   PHQ   PHQ-9 Total Score 10 15 10   Q9: Thoughts of better off dead/self-harm past 2 weeks Not at all Not at all Not at all          3/1/2023     9:01 AM 3/27/2023    10:09 AM 4/18/2023     4:35 PM   JAMAR-7 SCORE   Total Score 12 19 14         Results for orders placed or performed in visit on 04/18/23   Ferritin     Status: Normal   Result Value Ref Range    Ferritin 63 6 - 175 ng/mL   CBC with platelets     Status: Normal   Result Value Ref Range    WBC Count 7.4 4.0 - 11.0 10e3/uL    RBC Count 4.28 3.80 - 5.20 10e6/uL    Hemoglobin 13.0 11.7 - 15.7 g/dL    Hematocrit 37.9 35.0 - 47.0 %    MCV 89 78 - 100 fL    MCH 30.4 26.5 - 33.0 pg    MCHC 34.3 31.5 - 36.5 g/dL    RDW 13.3 10.0 - 15.0 %    Platelet Count 249 150 - 450 10e3/uL           Patient Instructions   For the Topamax:    Take 25mg 2x daily for 2 weeks, then 25mg 1x daily for 2 weeks.     Start taking Propranolol 10mg three times daily.      Let us know if you have any side effects.     I will let you know if any of the lab work comes back abnormal.        Anayeli Warner MD

## 2023-04-19 NOTE — RESULT ENCOUNTER NOTE
Jeanie French-    Your ferritin level is right in the middle of the normal range.  This is a good place to be, and with your hemoglobin level, I think this is likely not causing the fatigue.  We'll keep making changes and look for other causes.  Please call the clinic at [phone] if you have any questions.      Anayeli Warner MD

## 2023-04-22 ENCOUNTER — HEALTH MAINTENANCE LETTER (OUTPATIENT)
Age: 43
End: 2023-04-22

## 2023-04-26 ENCOUNTER — OFFICE VISIT (OUTPATIENT)
Dept: PSYCHOLOGY | Facility: CLINIC | Age: 43
End: 2023-04-26
Payer: COMMERCIAL

## 2023-04-26 DIAGNOSIS — F41.1 GENERALIZED ANXIETY DISORDER: Primary | ICD-10-CM

## 2023-04-26 DIAGNOSIS — F33.1 MAJOR DEPRESSIVE DISORDER, RECURRENT, MODERATE (H): ICD-10-CM

## 2023-04-26 PROCEDURE — 90837 PSYTX W PT 60 MINUTES: CPT | Performed by: PSYCHOLOGIST

## 2023-04-26 NOTE — PROGRESS NOTES
Behavioral Health Progress Note    Client's Legal Name: Jeanie French    Client's Preferred Name: Jeanie  YOB: 1980  Type of Service: in-person, counseling  Length of Service:   Start time: 9:06am    End time: 10:02am   Duration: 56 minutes  Attendees: patient    Identifying Information and Presenting Problem:  Jeanie is a 43 year old female who was referred for mental health services by Primary Care Provider, Dr. Kristen Warner for help with managing emotions tied to a stressful social situation.  Our initial diagnostic assessment took place on 7/21/10 and we have worked together on and off since that time as her needs have dictated.  Based on Jeanie's current report of symptoms, she continues to meet criteria for Generalized Anxiety Disorder and Major Depressive Disorder, Recurrent, Moderate.  Jeanie's mental health concerns affect her ability to function in areas related to work, interacting and relating with others, family relationships, social relationships, intimate partner relationships, maintaining personal health and physical well-being and participating in meaningful activities causing clinically significant distress.  Jeanie denies any concerning drug/alcohol use. She denies any current safety concerns. Jeanie is not significantly impacted by any social determinants of health.  Given a recent exacerbation of Jaenie's symptoms and impact on functioning, the plan is to increase the frequency of psychotherapy from roughly once per month to twice per month for the time being.    Treatment Objective(s) Addressed in This Session:  Decrease anxiety and increase confidence in managing life stressors.  Decrease depression.    Progress on / Status of Treatment Objective(s) / Homework:  Worsening    Mental Health Screening Questionnaires:  PHQ-9:     3/1/2023     9:01 AM 3/27/2023    10:09 AM 4/18/2023     4:35 PM   PHQ   PHQ-9 Total Score 10 15 10   Q9: Thoughts of better off dead/self-harm past 2 weeks Not at  all Not at all Not at all      JAMAR-7:       3/1/2023     9:01 AM 3/27/2023    10:09 AM 4/18/2023     4:35 PM   JAMAR-7 SCORE   Total Score 12 19 14        Topics Discussed/Interventions Provided:    Updates:  It's been rough.  Mood is low related to frustrations with work and lack of success with job search.  Had interview at [employer] but this did not go well and she was not offered the position.  She continues to struggle with changes in staffing and processes at work.  It is difficult to see her work not valued or work done poorly.  Feels stuck - can't change the behavior of others at work, can't leave/find other work right now.   Provided empathy and discussed options for managing the situation (changing mindset, balancing unhappiness at work with more pleasant activities/connection outside of work, etc.).    Relationships:  Lacking meaningful/positive connection with others at this time.  Nitish is less available as son Lisandro is still dealing with unstable mental health.  Not sure if it is the right time to move in.  Feels she needs to resolve job situation prior to moving forward with relationship/move.  Gently questioned this thought process.    Discussed need for greater social connection outside of work.  Feels excluded/hurt at family gatherings.  Provided empathy and gentle encouragement to consider how she could increase meaningful connection.    Mom came over to help clean over the weekend and this was helpful.  May do better with one on one interactions with family.  Encouraged to think about reaching out to get some dates on the calendar for this.    Assessment:   The patient appeared to be active and engaged in today's session and was receptive to feedback.     Mental Status Exam:  During interaction with the provider today, Jeanie was cooperative, open, engaged and pleasant. Patient was generally alert and oriented to person, place, time, and situation. They were casually dressed, appropriately groomed  "and appeared stated age. Patient's attire was appropriate for the weather and occasion. Eye contact good. Psychomotor functioning: normal or unremarkable. Speech was normal limits tone, rate, volume; largely coherent and relevant to topic. Mood was \"not great\"; affect mood-congruent.  She was tearful at times.  Thought processes: logical, coherent and goal oriented. Thought content: no evidence of psychotic features and no evidence of suicide, homicide, or nonsuicidal self-injury related concerns. Memory appeared grossly intact without being formally evaluated. Insight: good. Judgment good. Patient exhibited good impulse control during the appointment.     Does the patient appear to be at imminent risk of harm to self/others at this time? No    The session was necessary to address anxiety and depressive symptoms that have been interfering with patient's ability to function in areas related to work, intimate partner relationships, maintaining personal health and physical well-being, day to day self care or health behaviors and attending and completing tasks. Ongoing psychotherapy is necessary to provide counseling, improve functioning with daily activities, provide psychoeducation and provide support.    DSM-5-TR Diagnosis:  Generalized Anxiety Disorder  Major Depressive Disorder, recurrent, moderate    Plan:  1. Follow up with this provider on 5/8 and 5/28.  2. Work on goals as noted in patient instructions.  3. Utilize crisis resources as needed.  4. After Visit Summary will be reviewed in Christopher Rankin, PhD, LP    NOTE: Treatment plan update due 6/23/23.  Diagnostic assessment update due 3/26/24.    "

## 2023-04-26 NOTE — PATIENT INSTRUCTIONS
Good to talk with you today Jeanie!    Today we talked about reaching out and scheduling at least one, fun social activity between now and our next visit.    Dr. Rankin has asked the  to add you to my schedule on the following days:    Monday, 5/8 at 8am  Wednesday, 5/24 at 9am.

## 2023-05-08 ENCOUNTER — OFFICE VISIT (OUTPATIENT)
Dept: PSYCHOLOGY | Facility: CLINIC | Age: 43
End: 2023-05-08
Payer: COMMERCIAL

## 2023-05-08 DIAGNOSIS — F41.1 GENERALIZED ANXIETY DISORDER: Primary | ICD-10-CM

## 2023-05-08 DIAGNOSIS — F33.1 MAJOR DEPRESSIVE DISORDER, RECURRENT, MODERATE (H): ICD-10-CM

## 2023-05-08 PROCEDURE — 90837 PSYTX W PT 60 MINUTES: CPT | Performed by: PSYCHOLOGIST

## 2023-05-08 NOTE — PATIENT INSTRUCTIONS
Good to see you today Jeanie.  See below for the plan we discussed during our visit.    Don't worry about the commute right now.  That's a future problem.  Save your energy for things you have control over now.    Set realistic expectations for yourself around how much time and energy you can put towards your job search.  This needs to be at a sustainable level.    Save your best energy for job applications on the weekend.  Make this your first task of the day on Saturday so you are reserving your best energy for this.      Try to get one additional application done at work during the week (Tuesday or Wednesday?).      Think about how to reward yourself when you expend energy on an onerous task.  Give yourself a pat on the back when this task is complete and do something nice for yourself (e.g., watching an episode of a show you enjoy, etc.).    Remember you are in survival mode right now.  Set realistic expectations for yourself and prioritize protecting yourself in your current work environment vs. Investing a lot of energy in fixing things at work.    Schedule Fela visits ASAP.

## 2023-05-08 NOTE — PROGRESS NOTES
Behavioral Health Progress Note    Client's Legal Name: Jeanie French    Client's Preferred Name: Jeanie  YOB: 1980  Type of Service: in-person, counseling  Length of Service:   Start time: 8:07am    End time: 9:00am   Duration: 53 minutes  Attendees: patient    Identifying Information and Presenting Problem:  Jeanie is a 43 year old female who was referred for mental health services by Primary Care Provider, Dr. Kristen Warner for help with managing emotions tied to a stressful social situation.  Our initial diagnostic assessment took place on 7/21/10 and we have worked together on and off since that time as her needs have dictated.  Based on Jeanie's current report of symptoms, she continues to meet criteria for Generalized Anxiety Disorder and Major Depressive Disorder, Recurrent, Moderate.  Jeanie's mental health concerns affect her ability to function in areas related to work, interacting and relating with others, family relationships, social relationships, intimate partner relationships, maintaining personal health and physical well-being and participating in meaningful activities causing clinically significant distress.  Jeanie denies any concerning drug/alcohol use. She denies any current safety concerns. Jeanie is not significantly impacted by any social determinants of health.  Given a recent exacerbation of Jeanie's symptoms and impact on functioning, the plan is to increase the frequency of psychotherapy from roughly once per month to twice per month for the time being.    Treatment Objective(s) Addressed in This Session:  Decrease anxiety and increase confidence in managing life stressors.  Decrease depression.    Progress on / Status of Treatment Objective(s) / Homework:  Stable     Mental Health Screening Questionnaires:  PHQ-9:       3/1/2023     9:01 AM 3/27/2023    10:09 AM 4/18/2023     4:35 PM   PHQ   PHQ-9 Total Score 10 15 10   Q9: Thoughts of better off dead/self-harm past 2 weeks Not at all  Not at all Not at all      JAMAR-7:       3/1/2023     9:01 AM 3/27/2023    10:09 AM 4/18/2023     4:35 PM   JAMAR-7 SCORE   Total Score 12 19 14        Topics Discussed/Interventions Provided:    Work stress:    This continues to be Jeanie's primary source of stress.  Has applied for a number of jobs since our last visit ([employer], [employer], etc.).  Can get caught up in details in a manner which increases stress/indecision.  Identified priority (new job, manageable vs ideal commute) and encouraged focus on those while letting go of other factors for the time being until she gets more information (interview? Offer?).      Feels overwhelmed by job search.  Never gets as much done as she thinks she should.  Can take hours to apply for one job.  Provided empathy and discussed realistic expectations for self and developed plan (see pt instructions).    Discussed invalidating/demoralizing work environment.  Discussed improvement mode vs survival mode.  Jeanie may benefit from the latter so she can let go of the stress of trying to improve things in current work environment when the evidence seems to indicate this is not possible.  She is receptive to this feedback.    Relationships:  This weekend was good.  Tonny had a bit more time for their relationship as Lisandro is also connecting more with friends.  Went to [employer] with Tonny and both kids (Lisandro and Anette) over he weekend and this was enjoyable.  Praised follow up on this homework from last visit.  Discussed health risks/benefits associated with connection/disconnection.  Encouraged continuing to make this a priority    Assessment:   The patient appeared to be active and engaged in today's session and was receptive to feedback.     Mental Status Exam:  During interaction with the provider today, Jeanie was cooperative, open, engaged and pleasant. Patient was generally alert and oriented to person, place, time, and situation. They  were casually dressed, appropriately groomed and appeared stated age. Patient's attire was appropriate for the weather and occasion. Eye contact good. Psychomotor functioning: normal or unremarkable. Speech was normal limits tone, rate, volume; largely coherent and relevant to topic. Mood was depressed and anxious; affect mood-congruent.  She was tearful at times.  Thought processes: logical, coherent and goal oriented. Thought content: no evidence of psychotic features and no evidence of suicide, homicide, or nonsuicidal self-injury related concerns. Memory appeared grossly intact without being formally evaluated. Insight: good. Judgment good. Patient exhibited good impulse control during the appointment.     Does the patient appear to be at imminent risk of harm to self/others at this time? No    The session was necessary to address anxiety and depressive symptoms that have been interfering with patient's ability to function in areas related to work, intimate partner relationships, maintaining personal health and physical well-being, day to day self care or health behaviors and attending and completing tasks. Ongoing psychotherapy is necessary to provide counseling, improve functioning with daily activities, provide psychoeducation and provide support.    DSM-5-TR Diagnosis:  Generalized Anxiety Disorder  Major Depressive Disorder, recurrent, moderate    Plan:  1. Follow up with this provider on 5/28.  Informed Jeanie that I will be out for a week in June and encouraged scheduling visits in June ASAP to ensure time is available for these appointments.  Jeanie stated her intention to call back to set this up shortly.  2. Work on goals as noted in patient instructions.  3. Utilize crisis resources as needed.  4. After Visit Summary will be reviewed in Christopher Rankin, PhD, LP    NOTE: Treatment plan update due 6/23/23.  Diagnostic assessment update due 3/26/24.

## 2023-05-24 ENCOUNTER — OFFICE VISIT (OUTPATIENT)
Dept: PSYCHOLOGY | Facility: CLINIC | Age: 43
End: 2023-05-24
Payer: COMMERCIAL

## 2023-05-24 DIAGNOSIS — F33.1 MAJOR DEPRESSIVE DISORDER, RECURRENT, MODERATE (H): ICD-10-CM

## 2023-05-24 DIAGNOSIS — F41.1 GENERALIZED ANXIETY DISORDER: Primary | ICD-10-CM

## 2023-05-24 PROCEDURE — 90837 PSYTX W PT 60 MINUTES: CPT | Performed by: PSYCHOLOGIST

## 2023-05-24 NOTE — PROGRESS NOTES
Behavioral Health Progress Note    Client's Legal Name: Jeanie French    Client's Preferred Name: Jeanie  YOB: 1980  Type of Service: in-person, counseling  Length of Service:   Start time: 9:05am    End time: 10:00am   Duration: 55 minutes  Attendees: patient    Identifying Information and Presenting Problem:  Jeanie is a 43 year old female who was referred for mental health services by Primary Care Provider, Dr. Kristen Warner for help with managing emotions tied to a stressful social situation.  Our initial diagnostic assessment took place on 7/21/10 and we have worked together on and off since that time as her needs have dictated.  Based on Jeanie's current report of symptoms, she continues to meet criteria for Generalized Anxiety Disorder and Major Depressive Disorder, Recurrent, Moderate.  Jeanie's mental health concerns affect her ability to function in areas related to work, interacting and relating with others, family relationships, social relationships, intimate partner relationships, maintaining personal health and physical well-being and participating in meaningful activities causing clinically significant distress.  Jeanie denies any concerning drug/alcohol use. She denies any current safety concerns. Jeanie is not significantly impacted by any social determinants of health.  Given Jeanie's symptoms and impact on functioning, the plan is to engage in psychotherapy roughly twice per month for the time being.    Treatment Objective(s) Addressed in This Session:  Decrease anxiety and increase confidence in managing life stressors.  Decrease depression.    Progress on / Status of Treatment Objective(s) / Homework:  Stable     Mental Health Screening Questionnaires:  PHQ-9:       3/1/2023     9:01 AM 3/27/2023    10:09 AM 4/18/2023     4:35 PM   PHQ   PHQ-9 Total Score 10 15 10   Q9: Thoughts of better off dead/self-harm past 2 weeks Not at all Not at all Not at all      JAMAR-7:       3/1/2023     9:01 AM  3/27/2023    10:09 AM 4/18/2023     4:35 PM   JAMAR-7 SCORE   Total Score 12 19 14        Topics Discussed/Interventions Provided:    Work stress:    Work continues to be stressful.  Highly motivated to find a new job.  Had interview recently but didn't feel it went well.  Not a good fit regardless.  Does feel that strategies we put in place at our last visit have helped her manage anxiety about job application progress.  Has another interview this Friday that she is really excited/nervous about.  Discussed strategies for preparing for this interview (take from home rather than from work/car, 5 buckets strategy, etc.).    Relationships:  Continued frustration in relationships when she feels others don't consult her in making plans (both Tonny and her siblings).  Tonny did not include her in details of planning camping trip or communicate this as clearly as she would have liked which increased her stress.  Siblings have been planning dinners with parents but not inviting her which is hurtful.  Feels they don't see her as an equal.  Had hoped this would change now that she has a partner but this has not come to pass which is disappointing.  They have now planned weekly dinners with her parents and she is afraid that this will be a constant source of stress/hurt and disappointment.  She also needed help with cat sitting and initially no one in the family would help her which was upsetting as she has often helped with pets, children, moving in the past but feels this same courtesy is not offered to her which is hurtful.    Positives:  Does really appreciate that Tonny recognizes how stressed she is and planned their camping trip to give her a break.  She appreciated that he did all the meal planning and did feel good about having some time away.  Appreciated that mom did come and spend the night on Friday to share dinner/movie with her and then stayed to watch cats on Saturday so she could go camping with Tonny.  Mom seems to  understand and have empathy for her frustrations with her siblings and makes efforts to include her.    Coping:  Discussed options for effective, assertive communication with siblings (appreciate the positive intent, be open to the idea that they may not be aware of the hurt, honestly share how you experience this, ask directly for what you would want to see going forward, accept the outcome and move forward).  Weighing pros and cons of further efforts to communicate with them on this as these efforts have not gone well in the past.  Considering pros and cons of different modalities of communication (face to face, text, email, etc.).    Discussed negativity bias in the brain and normal social development.  Provided empathy and validation for emotional distress.  Reflected on the risks of glossing over positive experiences and focusing exclusively on hurts/disappointments.  Encouraged savoring/installing the good (see above) and noticing how you feel when you let your attention rest there compared to when it rests on disappointments.  Consider not only if a thought is true, but also if it is helpful or not.      Jeanie noted that she felt slightly better if she focused on the fact that it was good for her parents to get out of the house more and see her siblings even if she couldn't be a part of it.  She could also feel good about herself for focusing on their needs rather than her own.   Encouraged savoring/installing the good when she is feeling stressed/unsupported.    Assessment:   The patient appeared to be active and engaged in today's session and was receptive to feedback.     Mental Status Exam:  During interaction with the provider today, Jeanie was cooperative, open, engaged and pleasant. Patient was generally alert and oriented to person, place, time, and situation. They were casually dressed, appropriately groomed and appeared stated age. Patient's attire was appropriate for the weather and occasion. Eye  contact good. Psychomotor functioning: normal or unremarkable. Speech was normal limits tone, rate, volume; largely coherent and relevant to topic. Mood was depressed and anxious; affect mood-congruent.  She was tearful.  Thought processes: logical, coherent and goal oriented. Thought content: no evidence of psychotic features and no evidence of suicide, homicide, or nonsuicidal self-injury related concerns. Memory appeared grossly intact without being formally evaluated. Insight: good. Judgment good. Patient exhibited good impulse control during the appointment.     Does the patient appear to be at imminent risk of harm to self/others at this time? No    The session was necessary to address anxiety and depressive symptoms that have been interfering with patient's ability to function in areas related to work, intimate partner relationships, maintaining personal health and physical well-being, day to day self care or health behaviors and attending and completing tasks. Ongoing psychotherapy is necessary to provide counseling, improve functioning with daily activities, provide psychoeducation and provide support.    DSM-5-TR Diagnosis:  Generalized Anxiety Disorder  Major Depressive Disorder, recurrent, moderate    Plan:  1. Follow up with this provider on 6/5, 6/21 and 7/11/23.  2. Work on goals as noted in patient instructions.  3. Utilize crisis resources as needed.  4. After Visit Summary will be reviewed in Christopher Rankin, PhD, LP    NOTE: Treatment plan update due 6/23/23.  Diagnostic assessment update due 3/26/24.

## 2023-05-24 NOTE — PATIENT INSTRUCTIONS
Good to talk with you today Jeanie.    Good luck with your interview on Friday.  Remember you have a lot to offer!    Don't forget to install the good!  Let your attention rest there and really savor positive moments when they happen.  Don't just gloss over them and return to focus on the problems and disappointments.  This is an important part of self care.    Just a reminder that Dr. Rankin will be out of the office from May 25 - May 30 and from 6/13 - 6/20/23.  Please reach out to Dr. Warner or other members of the care team at [facility] should any urgent needs arise while I am away.    While I don't think you will need them, I'll include some crisis resources below just in case:    Crisis Lines:    National Suicide Prevention Crisis Line:  988  [facility] Adult Crisis:  [phone]   [facility] Adult Crisis:  522/241-5652    Crisis Text Line: Text [place] to 158645. Free support at your fingertips 24/7  People who text [place] to 960210 will be connected with a counselor. Crisis Text Line is available 24 hours a day, seven days a week.    You can also consider going to the [facility] at the following address.  Walk ins are welcome:    [address] [place] [address]   [phone] (for 24-hour crisis consultation)    Monday - Friday 8:00am - 7:00pm  Saturday:  11:00am - 3:00pm  Sunday and Holidays Closed    If you feel at risk of immediate harm, go directly to the Emergency Department.

## 2023-06-05 ENCOUNTER — OFFICE VISIT (OUTPATIENT)
Dept: PSYCHOLOGY | Facility: CLINIC | Age: 43
End: 2023-06-05
Payer: COMMERCIAL

## 2023-06-05 DIAGNOSIS — F41.1 GENERALIZED ANXIETY DISORDER: Primary | ICD-10-CM

## 2023-06-05 DIAGNOSIS — F33.1 MAJOR DEPRESSIVE DISORDER, RECURRENT, MODERATE (H): ICD-10-CM

## 2023-06-05 PROCEDURE — 90834 PSYTX W PT 45 MINUTES: CPT | Performed by: PSYCHOLOGIST

## 2023-06-05 NOTE — PROGRESS NOTES
Behavioral Health Progress Note    Client's Legal Name: Jeanie French    Client's Preferred Name: Jeanie  YOB: 1980  Type of Service: in-person, counseling  Length of Service:   Start time: 8:09am    End time: 8:58am   Duration: 49 minutes  Attendees: patient    Identifying Information and Presenting Problem:  Jeanie is a 43 year old female who was referred for mental health services by Primary Care Provider, Dr. Kristen Warner for help with managing emotions tied to a stressful social situation.  Our initial diagnostic assessment took place on 7/21/10 and we have worked together on and off since that time as her needs have dictated.  Based on Jeanie's current report of symptoms, she continues to meet criteria for Generalized Anxiety Disorder and Major Depressive Disorder, Recurrent, Moderate.  Jeanie's mental health concerns affect her ability to function in areas related to work, interacting and relating with others, family relationships, social relationships, intimate partner relationships, maintaining personal health and physical well-being and participating in meaningful activities causing clinically significant distress.  Jeanie denies any concerning drug/alcohol use. She denies any current safety concerns. Jeanie is not significantly impacted by any social determinants of health.  Given Jeanie's symptoms and impact on functioning, the plan is to engage in psychotherapy roughly twice per month for the time being.    Treatment Objective(s) Addressed in This Session:  Decrease anxiety and increase confidence in managing life stressors.  Decrease depression.    Progress on / Status of Treatment Objective(s) / Homework:  Stable     Mental Health Screening Questionnaires:  PHQ-9:       3/1/2023     9:01 AM 3/27/2023    10:09 AM 4/18/2023     4:35 PM   PHQ   PHQ-9 Total Score 10 15 10   Q9: Thoughts of better off dead/self-harm past 2 weeks Not at all Not at all Not at all      JAMAR-7:       3/1/2023     9:01 AM  3/27/2023    10:09 AM 4/18/2023     4:35 PM   JAMAR-7 SCORE   Total Score 12 19 14        Topics Discussed/Interventions Provided:    Work:  Had interview recently that went well.  Very excited for possible job.  Did a lot of prep.  Feels good about her effort.  At the same time, anxious about what will happen if she doesn't get the job.  Normalized this thought process and reaction but also encouraged reflection on how helpful it is (not so much). Encouraged keeping focus forward on what is in her control (e.g., preparing for next application/interview).  Discussed role of commute in job decisions and strategies for mitigating negative experience of commute (e.g., putting map in phone so she has an estimated ETA despite traffic, listening to audio book or other enjoyable distraction to make it feel like time in the car is not a complete loss). Jeanie appeared receptive to this feedback.    Family connection:  Attended weekly dinner with family - brought Tonny Grande's dog, which was fun.  Discussed past incident in which family photos were taken without Jeanie which was hurtful.  Often feels left out/like an afterthought.  Believes this is amplified as she does not have children.  Would like to see family take more interest in Tonny/her relationship with his children.  Has spoken about this with mom who has made some effort in this area and she appreciates that but would like to see more from others.  Discussed options for additional communication with her siblings around this.    Brother (50) had heart attack on Friday.  Going home today.  Reflected on near miss as opportunity to reflect on how she wants to be in this relationship.  Focus on not only what she is getting/not getting from these relationships but what she wants to give/see herself doing.  Jeanie is uncertain about this as she has not ever been close to brother (fought a lot as children).  Not sure what relationship would be there if not for their parents.   Encouraged continued reflection on what she wants and how she wants to approach this with the underlying assumption that her family members are unlikely to change.    Relationship with partner/value of assertiveness:  Often feels like she is not an equal partner with Tonny. He thinks about what he wants but doesn't ask her opinion/preferences as often (what to do, what to eat, etc.).  Worries this doesn't hugo well for the future.  Reflected on incidents when she could have been more assertive about what she wanted or set limits which he likely would have accepted (e.g., that's fine if you want to wait to catch a photo of that train, but I'm hungry so I'm going to eat without you then.).  Discussed how assertiveness is not just a skill to be used to increase the odds that others give you what you want, but also a way of empowering yourself so you live the life you want and feel empowered to make your own choices.  Jeanie appeared receptive to this feedback and ready to work on this skill set.    Self care:  Started exercising this week - running.  Starting to loose some weight and feeling good about this.  Praised effort and listening to body. Encouraged her to continue.    Assessment:   The patient appeared to be active and engaged in today's session and was receptive to feedback.     Mental Status Exam:  During interaction with the provider today, Jeanie was cooperative, open, engaged and pleasant. Patient was generally alert and oriented to person, place, time, and situation. They were casually dressed, appropriately groomed and appeared stated age. Patient's attire was appropriate for the weather and occasion. Eye contact good. Psychomotor functioning: normal or unremarkable. Speech was normal limits tone, rate, volume; largely coherent and relevant to topic. Mood was depressed and anxious; affect mood-congruent.  She was tearful at times.  Thought processes: logical, coherent and goal oriented. Thought content: no  evidence of psychotic features and no evidence of suicide, homicide, or nonsuicidal self-injury related concerns. Memory appeared grossly intact without being formally evaluated. Insight: good. Judgment good. Patient exhibited good impulse control during the appointment.     Does the patient appear to be at imminent risk of harm to self/others at this time? No    The session was necessary to address anxiety and depressive symptoms that have been interfering with patient's ability to function in areas related to work, intimate partner relationships, maintaining personal health and physical well-being, day to day self care or health behaviors and attending and completing tasks. Ongoing psychotherapy is necessary to provide counseling, improve functioning with daily activities, provide psychoeducation and provide support.    DSM-5-TR Diagnosis:  Generalized Anxiety Disorder  Major Depressive Disorder, recurrent, moderate    Plan:  1. Follow up with this provider on 6/21 and 7/11/23.  2. Work on goals as noted in patient instructions.  3. Utilize crisis resources as needed.  4. After Visit Summary will be reviewed in Christopher Rankin, PhD,     NOTE: Treatment plan update due 6/23/23.  Diagnostic assessment update due 3/26/24.

## 2023-06-05 NOTE — PATIENT INSTRUCTIONS
Good to talk with you today Jeanie.    Keep working on those assertiveness skills!  You can do this and it will help you feel more in control of your life.      Just a reminder that Dr. Rankin will be out of the office from June 13 - June 20.  Please reach out to Dr. Warner or other members of the care team at [facility] should any urgent needs arise while I am away.    While I don't think you will need them, I'll include some crisis resources below just in case:    Crisis Lines:    National Suicide Prevention Crisis Line:  988  [facility] Adult Crisis:  [phone]   [facility] [[facility] Multilingual Crisis Line:  493.947.6696  [facility] Adult Crisis:  [phone]    Crisis Text Line: Text [place] to 197368. Free support at your fingertips 24/7  People who text [place] to 603838 will be connected with a counselor. Crisis Text Line is available 24 hours a day, seven days a week.    You can also consider going to the [facility] at the following address.  Walk ins are welcome:    [address] [place] [address]   [phone] (for 24-hour crisis consultation)    Monday - Friday 8:00am - 7:00pm  Saturday:  11:00am - 3:00pm  Sunday and Holidays Closed    If you feel at risk of immediate harm, go directly to the Emergency Department.

## 2023-06-21 ENCOUNTER — VIRTUAL VISIT (OUTPATIENT)
Dept: PSYCHOLOGY | Facility: CLINIC | Age: 43
End: 2023-06-21
Payer: COMMERCIAL

## 2023-06-21 DIAGNOSIS — F41.1 GENERALIZED ANXIETY DISORDER: Primary | ICD-10-CM

## 2023-06-21 DIAGNOSIS — F33.1 MAJOR DEPRESSIVE DISORDER, RECURRENT, MODERATE (H): ICD-10-CM

## 2023-06-21 PROCEDURE — 90837 PSYTX W PT 60 MINUTES: CPT | Mod: VID | Performed by: PSYCHOLOGIST

## 2023-06-21 NOTE — PROGRESS NOTES
Telemedicine Visit: The patient's condition can be safely assessed and treated via synchronous audio and visual telemedicine encounter.      Reason for Telemedicine Visit: Services only offered telehealth    Originating Site (Patient Location): Patient's home    Distant Location (provider location):  Off-site    Consent:  The patient/guardian has verbally consented to: the potential risks and benefits of telemedicine (video visit) versus in person care; bill my insurance or make self-payment for services provided; and responsibility for payment of non-covered services.     Mode of Communication:  Video Conference via [employer]    As the provider I attest to compliance with applicable laws and regulations related to telemedicine.      Behavioral Health Progress Note    Client's Legal Name: Jeanie French    Client's Preferred Name: Jeanie  YOB: 1980  Type of Service: video, counseling  Length of Service:   First outreach:  9:00am - sent link via text and email  Start time: 9:05am    End time: 10:05am   Duration: 60 minutes  Attendees: patient    Identifying Information and Presenting Problem:  Jeanie is a 43 year old female who was referred for mental health services by Primary Care Provider, Dr. Kristen Warner for help with managing emotions tied to a stressful social situation.  Our initial diagnostic assessment took place on 7/21/10 and we have worked together on and off since that time as her needs have dictated.  Based on Jeanie's current report of symptoms, she continues to meet criteria for Generalized Anxiety Disorder and Major Depressive Disorder, Recurrent, Moderate.  Jeanie's mental health concerns affect her ability to function in areas related to work, interacting and relating with others, family relationships, social relationships, intimate partner relationships, maintaining personal health and physical well-being and participating in meaningful activities causing clinically significant  distress.  Jeanie denies any concerning drug/alcohol use. She denies any current safety concerns. Jeanie is not significantly impacted by any social determinants of health.  Given Jeanie's symptoms and impact on functioning, the plan is to engage in psychotherapy roughly twice per month for the time being.    Treatment Objective(s) Addressed in This Session:  Decrease anxiety and increase confidence in managing life stressors.  Decrease depression.    Progress on / Status of Treatment Objective(s) / Homework:  Worsening    Mental Health Screening Questionnaires:  PHQ-9:       3/1/2023     9:01 AM 3/27/2023    10:09 AM 4/18/2023     4:35 PM   PHQ   PHQ-9 Total Score 10 15 10   Q9: Thoughts of better off dead/self-harm past 2 weeks Not at all Not at all Not at all      JAMAR-7:       3/1/2023     9:01 AM 3/27/2023    10:09 AM 4/18/2023     4:35 PM   JAMAR-7 SCORE   Total Score 12 19 14        Topics Discussed/Interventions Provided:    Work:  Discussed continued frustrations at work.  Has not heard back since job interview.  Will continue effort to find new job.    Health:  27 year old nephew had a heart attack (her brother's son - young, in good shape, in the , etc.).  This is on the heels of her 50 something year old brother having a heart attack.  Both are recovering.  Strong genetic disposition.  Encouraged talking with PCP about any additional screening which may be recommended given these recent events.  Will see Dr. Warner at the end of the month and will discuss at that time.  Will also discuss antidepressant given continued mood disturbance and anxiety.    Stopped running due to heat and poor air quality.  Provided empathy for this and reflected on the potential physical and mental health benefits of finding some type of continued physical activity.    Family relationships:  Continued conversation about the nature of her relationship with family and how she would like to engage with them.  Continues to be hurt  and disappointed by interactions (particularly with brother's family).  Recent conflict with sister in law triggered toxic thoughts:  No one in my family likes me. No one cares about me.  No one asks about me.  Left family gathering very upset.  Ruins her week/her mood.  Thinks she needs to stop attending these Sunday events.  Mom wants to review end of life plans with family this weekend and she does plan to attend.      Discussed options for managing these thoughts.  Reflected on how assumptions about how others will respond decreases her communication with them about her concerns (expects they won't care, will be invalidating, etc.).  Discussed possible value of greater communication/transparency with family members about how she experiences these interactions.     Continued to encourage values identification to guide actions related to family.  Discussed setting realistic expectations for family gatherings so she is less surprised when they continue to act in the same manner.    Relationship with partner:  Went to [employer] with Tonny and his kids on Father's day.  Also brought them to family gathering and introduced Anette to her parents but siblings didn't seem interested which was disappointing.  Had nice balanced conversation with Anette around this and was encouraged to have this same type of conversation with herself.    Trying to build in some fun activities with partner but this can be a struggle as they are often not on the same page.  Also frustrated by stress of keeping two households.  Would like his help with some tasks at home but he does not follow through on this.  History suggests he just moves at a much slower pace than she would like.  Reflected on the possible benefit of selecting one task for hi to prioritize rather than sharing lengthy, comprehensive list.    Assessment:   The patient appeared to be active and engaged in today's session and was receptive to feedback.     Mental Status  Exam:  During interaction with the provider today, Jeanie was cooperative, open, engaged and pleasant. Patient was generally alert and oriented to person, place, time, and situation. They were casually dressed, appropriately groomed and appeared stated age. Patient's attire was appropriate for the weather and occasion. Eye contact good. Psychomotor functioning: normal or unremarkable. Speech was normal limits tone, rate, volume; largely coherent and relevant to topic. Mood was depressed and anxious; affect mood-congruent.  She was tearful at times.  Thought processes: logical, coherent and goal oriented. Thought content: no evidence of psychotic features and no evidence of suicide, homicide, or nonsuicidal self-injury related concerns. Memory appeared grossly intact without being formally evaluated. Insight: good. Judgment good. Patient exhibited good impulse control during the appointment.     Does the patient appear to be at imminent risk of harm to self/others at this time? No    The session was necessary to address anxiety and depressive symptoms that have been interfering with patient's ability to function in areas related to work, intimate partner relationships, maintaining personal health and physical well-being, day to day self care or health behaviors and attending and completing tasks. Ongoing psychotherapy is necessary to provide counseling, improve functioning with daily activities, provide psychoeducation and provide support.    DSM-5-TR Diagnosis:  Generalized Anxiety Disorder  Major Depressive Disorder, recurrent, moderate    Plan:  1. Follow up with this provider on 7/11/23.  Will update treatment plan at next visit.  2. Work on goals as noted in patient instructions.  3. Utilize crisis resources as needed.  4. After Visit Summary will be reviewed in Christopher Rankin, PhD, LP    NOTE: Treatment plan update due 6/23/23.  Diagnostic assessment update due 3/26/24.

## 2023-06-21 NOTE — PATIENT INSTRUCTIONS
Good to talk with you today Jeanie.  Hang in there.    Today we talked about working on editing your internal narrative when you have disappointing interactions with family.  It is true that they often don't meet your emotional needs, but be careful about making more sweeping statements to yourself that will amplify your emotional suffering.  Edit your story with the data in front of you and think about what will be helpful.  This can take some practice but I think will be helpful to you in the end.    Think about scheduling one more visit in July if this works for your schedule.

## 2023-06-21 NOTE — Clinical Note
YONI - will see you at the end of this month to discuss her antidepressant which might need adjustment.  Also wondering if any additional cardiac screening is needed given that her brother and nephew just had heart attacks.  See my note for additional detail and let me know if you have any questions.  Thanks!  Jody

## 2023-07-11 ENCOUNTER — OFFICE VISIT (OUTPATIENT)
Dept: PSYCHOLOGY | Facility: CLINIC | Age: 43
End: 2023-07-11
Payer: COMMERCIAL

## 2023-07-11 DIAGNOSIS — F41.1 GENERALIZED ANXIETY DISORDER: ICD-10-CM

## 2023-07-11 DIAGNOSIS — F41.1 GENERALIZED ANXIETY DISORDER: Primary | ICD-10-CM

## 2023-07-11 DIAGNOSIS — F33.1 MAJOR DEPRESSIVE DISORDER, RECURRENT, MODERATE (H): ICD-10-CM

## 2023-07-11 PROCEDURE — 90834 PSYTX W PT 45 MINUTES: CPT | Performed by: PSYCHOLOGIST

## 2023-07-11 ASSESSMENT — ANXIETY QUESTIONNAIRES
1. FEELING NERVOUS, ANXIOUS, OR ON EDGE: NEARLY EVERY DAY
3. WORRYING TOO MUCH ABOUT DIFFERENT THINGS: NEARLY EVERY DAY
6. BECOMING EASILY ANNOYED OR IRRITABLE: NEARLY EVERY DAY
2. NOT BEING ABLE TO STOP OR CONTROL WORRYING: NEARLY EVERY DAY
5. BEING SO RESTLESS THAT IT IS HARD TO SIT STILL: MORE THAN HALF THE DAYS
GAD7 TOTAL SCORE: 18
7. FEELING AFRAID AS IF SOMETHING AWFUL MIGHT HAPPEN: SEVERAL DAYS
IF YOU CHECKED OFF ANY PROBLEMS ON THIS QUESTIONNAIRE, HOW DIFFICULT HAVE THESE PROBLEMS MADE IT FOR YOU TO DO YOUR WORK, TAKE CARE OF THINGS AT HOME, OR GET ALONG WITH OTHER PEOPLE: VERY DIFFICULT
GAD7 TOTAL SCORE: 18

## 2023-07-11 ASSESSMENT — PATIENT HEALTH QUESTIONNAIRE - PHQ9
SUM OF ALL RESPONSES TO PHQ QUESTIONS 1-9: 13
5. POOR APPETITE OR OVEREATING: NEARLY EVERY DAY

## 2023-07-11 NOTE — PATIENT INSTRUCTIONS
Good to talk with you today Jeanie.    See below for current treatment plan and let me know if you would like any changes:    Treatment Plan     Client's Name: Jeanie Frnech                  YOB: 1980     Today's Date: 7/11/23                                  Date Diagnostic Update Due: 3/27/24     DSM-V Diagnoses:   Generalized Anxiety Disorder  Major Depressive Disorder, recurrent, moderate     Psychosocial / Contextual Factors: Stressors with her health and in her interpersonal and work life contribute to difficulty.  Symptoms continue to fluctuate with life stressors.     Functioning:  Jeanie's mental health concerns have been continuing to affect her ability to function in her life and have been causing clinically significant distress.  Symptoms can contribute to sense of overwhelm at work and undermine ability to complete tasks at home, engage in healthy habits and generally enjoy life.        3/27/2023    10:09 AM 4/18/2023     4:35 PM 7/11/2023     8:43 AM   PHQ   PHQ-9 Total Score 15 10 13   Q9: Thoughts of better off dead/self-harm past 2 weeks Not at all Not at all Not at all         3/27/2023    10:09 AM 4/18/2023     4:35 PM 7/11/2023     8:43 AM   JAMAR-7 SCORE   Total Score 19 14 18           9/28/2022    10:09 AM 3/27/2023    10:09 AM 7/11/2023     8:43 AM   CAGE-AID Total Score   Total Score 0 0 0      PTSD-PC:  0/5    Collaboration:   Primary care physician, Dr. Anayeli Warner     Referral: none     Anticipated treatment duration: Unknown  Agreed upon meeting frequency: twice per month     Long Term Treatment Goal(s) related to diagnosis / functional impairment(s)     Goal 1: Jeanie will report decreased anxiety and increased confidence in managing life stressors.     Steps we will take to achieve your goal:                  Jeanie will continue to take medications as prescribed by Dr. Warner.    Jeanie will practice stress mitigation strategies such as deep breathing, yoga or  meditation.  Continue to be aware of life choices to increase sense of agency in your life.  Communicate your needs clearly to others.  Say No when you need to.  This is not rude, it is honest.  Reflect on whether anxious thoughts are of the helpful or less helpful variety so you don't invest too much energy in non-productive worry.  Think about voicing your worries aloud to get a bit more distance from them as this may help you to evaluate them better.  Attend psychotherapy as scheduled.     Anxiety CBT  GGOC - Learn to challenge negative thinking  [employer] - provide resources for tracking thought patterns and building skills     Progress:     Demonstrating increased awareness of role of thoughts in feeding anxiety and benefits of getting distance from thoughts/not taking ownership of things beyond her control.  Demonstrating increased cognitive flexibility in the face of unexpected challenges.     Intervention(s)  Therapist will provide support, psychoeducation and homework assignments as needed.     Goal 2: Jeanie will report decreased depression.     Steps we will take to achieve your goal:                  Notice the good.  Practice self-compassion  Be mindful of priorities and how you spend your limited time and energy.  Attend psychotherapy as scheduled.                 Progress:  Some increased depression related to recent stressors.       Intervention(s)  Therapist will provide support, psychoeducation and homework assignments as needed.     If you need additional support and care during times that your therapist or PCP are not available, here are some additional resources for you:     Crisis Lines:     [facility] Adult Crisis:  [phone]  [facility] Adult Crisis: [phone]     Crisis Text Line: Text [place] to 174417. Free support at your fingertips 24/7  People who text [place] to 685834 will be connected with a counselor. Crisis Text Line is available 24 hours a day, seven days a week.    343 -  National Crisis Line     You can also consider going to the [facility] at the following address.  Walk ins are welcome:     [address] [place] [address]   [phone] (for 24 hour crisis consultation)     Monday - Friday 8:00am - 7:00pm  Saturday:  11:00am - 3:00pm  Sunday and Holidays Closed     If you feel at risk of immediate harm, go directly to the Emergency Department.     Patient has reviewed and agreed to the above plan.     Jody Rankin, PhD, LP                                                                 July 11, 2023

## 2023-07-11 NOTE — PROGRESS NOTES
Behavioral Health Progress Note    Client's Legal Name: Jeanie French    Client's Preferred Name: Jeanie  YOB: 1980  Type of Service: in-person, counseling  Length of Service:   Start time: 8:41am - late arrival    End time: 9:32am   Duration: 51 minutes  Attendees: patient    Identifying Information and Presenting Problem:  Jeanie is a 43 year old female who was referred for mental health services by Primary Care Provider, Dr. Kristen Warner for help with managing emotions tied to a stressful social situation.  Our initial diagnostic assessment took place on 7/21/10 and we have worked together on and off since that time as her needs have dictated.  Based on Jeanie's current report of symptoms, she continues to meet criteria for Generalized Anxiety Disorder and Major Depressive Disorder, Recurrent, Moderate.  Jeanie's mental health concerns affect her ability to function in areas related to work, interacting and relating with others, family relationships, social relationships, intimate partner relationships, maintaining personal health and physical well-being and participating in meaningful activities causing clinically significant distress.  Jeanie denies any concerning drug/alcohol use. She denies any current safety concerns. Jeanie is not significantly impacted by any social determinants of health.  Given Jeanie's symptoms and impact on functioning, the plan is to engage in psychotherapy roughly twice per month for the time being.    Treatment Objective(s) Addressed in This Session:  Decrease anxiety and increase confidence in managing life stressors.  Decrease depression.    Progress on / Status of Treatment Objective(s) / Homework:  Worsening    Mental Health Screening Questionnaires:  PHQ-9:       3/27/2023    10:09 AM 4/18/2023     4:35 PM 7/11/2023     8:43 AM   PHQ   PHQ-9 Total Score 15 10 13   Q9: Thoughts of better off dead/self-harm past 2 weeks Not at all Not at all Not at all      JAMAR-7:        "3/27/2023    10:09 AM 4/18/2023     4:35 PM 7/11/2023     8:43 AM   JAMAR-7 SCORE   Total Score 19 14 18      CAGE-AID:  0/4     Topics Discussed/Interventions Provided:    Treatment plan update:  Both depressive and anxious symptoms are up slightly from last measurement in April. This appears related to unresolved work and relationship stress.  Jeanie is more despondent and hopeless today. Appears to feel trapped by her circumstances and having difficulty problem solving as there is uncertainty in both her relationship and job life.  Provided empathy but also encouraged greater awareness of her strengths and resources to make decisions/problem solve.  Offered increase in frequency of visits, but Jeanie declined at this time.  See pt instructions for updated treatment plan.    Stressors:  Worried about cat (who won't eat).  Did not like feedback from Nitish that she was coddling cat and that he would just eat eventually.  Discussed strategies for managing situation and associated anxiety (focus on what is in her control, identify next two steps in problem solving, etc.)    Frustrated by lack of clear feedback from Nitish about readiness for them to combine households.  Had a job interview in [place] which went well, but nervous about taking a new job which is not a perfect fit, longer drive than she wants, etc.  Drive wouldn't be bad from Nitish's house but not sure she is moving there.  Provided empathy for difficulty when job decision is entangled with relationship status.  Discussed options for communicating with Nitish but also encouraged seeing a non-answer (I don't know - which is what he generally says) as a likely \"not ready\" or \"no\" response.  Discussed options for moving forward on her own if she is unhappy with current living situation/job.  Could sell house and move even if not combining households with Nitish.  Could rent if she is not yet ready to buy and wants to buy more time around this.    Discouraged seeing " moving in together as the only solution to her current unhappiness with he job/living situation.  Encouraged reflection on balance between positives/negatives in relationship (veronica Robert).  Overall, feels there is more good than bad in the relationship and his non-committal nature is not a deal breaker.  Will plan to talk more directly with Nitish about this to let him know what her needs are and see if he can meet them.      Assessment:   The patient appeared to be active and engaged in today's session and was receptive to feedback.     Mental Status Exam:  During interaction with the provider today, Jeanie was cooperative, open, engaged and pleasant. Patient was generally alert and oriented to person, place, time, and situation. They were casually dressed, appropriately groomed and appeared stated age. Patient's attire was appropriate for the weather and occasion. Eye contact good. Psychomotor functioning: normal or unremarkable. Speech was normal limits tone, rate, volume; largely coherent and relevant to topic. Mood was depressed and anxious; affect mood-congruent.  She was tearful for most of the visit.  Thought processes: logical, coherent and goal oriented. Thought content: no evidence of psychotic features and no evidence of suicide, homicide, or nonsuicidal self-injury related concerns. Memory appeared grossly intact without being formally evaluated. Insight: good. Judgment good. Patient exhibited good impulse control during the appointment.     Does the patient appear to be at imminent risk of harm to self/others at this time? No    The session was necessary to address anxiety and depressive symptoms that have been interfering with patient's ability to function in areas related to work, intimate partner relationships, maintaining personal health and physical well-being, day to day self care or health behaviors and attending and completing tasks. Ongoing psychotherapy is necessary to provide counseling,  improve functioning with daily activities, provide psychoeducation and provide support.    DSM-5-TR Diagnosis:  Generalized Anxiety Disorder  Major Depressive Disorder, recurrent, moderate to severe    Plan:  1. Follow up with this provider on 7/25/23.  Also routed message to the  asking them to add her to my schedule on  Monday, 8/7 at 9am and Tuesday 8/29 at 8:30am.  Both will be 60 minute, in person visits.  2. Work on goals as noted in patient instructions.  3. Utilize crisis resources as needed.  4. After Visit Summary will be reviewed in Christopher Rankin, PhD, LP    NOTE: Treatment plan update due 1/22/24.  Diagnostic assessment update due 3/26/24.

## 2023-07-13 RX ORDER — BUSPIRONE HYDROCHLORIDE 30 MG/1
TABLET ORAL
Qty: 180 TABLET | Refills: 1 | Status: SHIPPED | OUTPATIENT
Start: 2023-07-13 | End: 2023-07-26

## 2023-07-15 ENCOUNTER — HEALTH MAINTENANCE LETTER (OUTPATIENT)
Age: 43
End: 2023-07-15

## 2023-07-25 ENCOUNTER — OFFICE VISIT (OUTPATIENT)
Dept: PSYCHOLOGY | Facility: CLINIC | Age: 43
End: 2023-07-25
Payer: COMMERCIAL

## 2023-07-25 DIAGNOSIS — F41.1 GENERALIZED ANXIETY DISORDER: Primary | ICD-10-CM

## 2023-07-25 DIAGNOSIS — F33.1 MAJOR DEPRESSIVE DISORDER, RECURRENT, MODERATE (H): ICD-10-CM

## 2023-07-25 PROCEDURE — 90837 PSYTX W PT 60 MINUTES: CPT | Performed by: PSYCHOLOGIST

## 2023-07-25 NOTE — PROGRESS NOTES
Behavioral Health Progress Note    Client's Legal Name: Jeanie French    Client's Preferred Name: Jeanie  YOB: 1980  Type of Service: in-person, counseling  Length of Service:   Start time: 8:37am - late arrival    End time: 9:32am   Duration: 55 minutes  Attendees: patient    Identifying Information and Presenting Problem:  Jeanie is a 43 year old female who was referred for mental health services by Primary Care Provider, Dr. Kristen Warner for help with managing emotions tied to a stressful social situation.  Our initial diagnostic assessment took place on 7/21/10 and we have worked together on and off since that time as her needs have dictated.  Based on Jeanie's current report of symptoms, she continues to meet criteria for Generalized Anxiety Disorder and Major Depressive Disorder, Recurrent, Moderate.  Jeanie's mental health concerns affect her ability to function in areas related to work, interacting and relating with others, family relationships, social relationships, intimate partner relationships, maintaining personal health and physical well-being and participating in meaningful activities causing clinically significant distress.  Jeanie denies any concerning drug/alcohol use. She denies any current safety concerns. Jeanie is not significantly impacted by any social determinants of health.  Given Jeanie's symptoms and impact on functioning, the plan is to engage in psychotherapy roughly twice per month for the time being.    Treatment Objective(s) Addressed in This Session:  Decrease anxiety and increase confidence in managing life stressors.  Decrease depression.    Progress on / Status of Treatment Objective(s) / Homework:  Stable     Mental Health Screening Questionnaires:  PHQ-9:       3/27/2023    10:09 AM 4/18/2023     4:35 PM 7/11/2023     8:43 AM   PHQ   PHQ-9 Total Score 15 10 13   Q9: Thoughts of better off dead/self-harm past 2 weeks Not at all Not at all Not at all      JAMAR-7:        3/27/2023    10:09 AM 4/18/2023     4:35 PM 7/11/2023     8:43 AM   JAMAR-7 SCORE   Total Score 19 14 18      CAGE-AID:  0/4     Topics Discussed/Interventions Provided:    Health:  Had COVID last week. Symptoms starting a week ago last Sunday (7/16).  Has been very tired and took off week of work (which reduced stress - see below).  Breathing was challenging but got some limited relief from use of inhalers.  Frustrated by lack of useful info on [employer] website around policies for visits at clinic post COVID.  Agreed I would pass along this feedback to leadership.  Will see Dr. Warner tomorrow.    Work:  Continues to be very unhappy at work.  Didn't get [employer] job which was disappointing.  Doesn't want to engage in proposed changes at work but also very frustrated/upset about current status.  Resentful of co-workers.  Discussed risks and benefits of engagement vs disengagement.  Discussed how lack of curiosity/compassion for self and others may amplify negative feelings at work and possible solutions (e.g., gratitude, consideration of context, etc.).  Encouraged evaluation of worry as helpful or unhelpful.  Encouraged seeing where she has power to make change (internal vs external).    Relationship:  Did have conversation with Tonny about timeline for combining households.  He was more ready for her to move in than she thought. Just waiting for her to get new job as they both agree it would be better to combine households when they are no longer working together.  Discussed pros and cons of linking these two major life events.  Feels better to focus on one major transition at a time (job search first).    Therapeutic relationship:  Discussed strained therapeutic alliance at last two visits and miscommunication/misunderstandings which may have contributed.  Encouraged Jeanie to provide feedback on this when she notices a problem so we could address it and I agreed to do the same.  Discussion appeared to resolve  concern but will continue to check in on this.    Assessment:   The patient appeared to be active and engaged in today's session and was receptive to feedback.     Mental Status Exam:  During interaction with the provider today, Jeanie was cooperative, open, and engaged. Patient was generally alert and oriented to person, place, time, and situation. They were casually dressed, appropriately groomed and appeared stated age. Patient's attire was appropriate for the weather and occasion. Eye contact good. Psychomotor functioning: normal or unremarkable. Speech was normal limits tone, rate, volume; largely coherent and relevant to topic. Mood was depressed and anxious; affect mood-congruent.  She was tearful for most of the visit.  Thought processes: logical, coherent and goal oriented. Thought content: no evidence of psychotic features and no evidence of suicide, homicide, or nonsuicidal self-injury related concerns. Memory appeared grossly intact without being formally evaluated. Insight: fair. Judgment good. Patient exhibited good impulse control during the appointment.     Does the patient appear to be at imminent risk of harm to self/others at this time? No    The session was necessary to address anxiety and depressive symptoms that have been interfering with patient's ability to function in areas related to work, intimate partner relationships, maintaining personal health and physical well-being, day to day self care or health behaviors and attending and completing tasks. Ongoing psychotherapy is necessary to provide counseling, improve functioning with daily activities, provide psychoeducation and provide support.    DSM-5-TR Diagnosis:  Generalized Anxiety Disorder  Major Depressive Disorder, recurrent, moderate to severe    Plan:  1. Follow up with this provider on Monday, 8/7 at 9am and Tuesday 8/29 at 8:30am.   2. Utilize crisis resources as needed.  3. After Visit Summary will be reviewed in  [facility] Rankin, PhD, LP    NOTE: Treatment plan update due 1/22/24.  Diagnostic assessment update due 3/26/24.

## 2023-07-26 ENCOUNTER — OFFICE VISIT (OUTPATIENT)
Dept: FAMILY MEDICINE | Facility: CLINIC | Age: 43
End: 2023-07-26
Payer: COMMERCIAL

## 2023-07-26 VITALS
HEART RATE: 70 BPM | OXYGEN SATURATION: 96 % | TEMPERATURE: 98.9 F | BODY MASS INDEX: 30.6 KG/M2 | WEIGHT: 210.2 LBS | SYSTOLIC BLOOD PRESSURE: 113 MMHG | RESPIRATION RATE: 20 BRPM | DIASTOLIC BLOOD PRESSURE: 74 MMHG

## 2023-07-26 DIAGNOSIS — N89.8 VAGINAL DRYNESS: ICD-10-CM

## 2023-07-26 DIAGNOSIS — J45.30 MILD PERSISTENT ASTHMA, UNSPECIFIED WHETHER COMPLICATED: Primary | ICD-10-CM

## 2023-07-26 DIAGNOSIS — N92.0 MENORRHAGIA WITH REGULAR CYCLE: ICD-10-CM

## 2023-07-26 DIAGNOSIS — K52.831 COLLAGENOUS COLITIS: ICD-10-CM

## 2023-07-26 DIAGNOSIS — G43.009 MIGRAINE WITHOUT AURA AND WITHOUT STATUS MIGRAINOSUS, NOT INTRACTABLE: ICD-10-CM

## 2023-07-26 DIAGNOSIS — K20.0 EOSINOPHILIC ESOPHAGITIS: ICD-10-CM

## 2023-07-26 DIAGNOSIS — F41.1 GENERALIZED ANXIETY DISORDER: ICD-10-CM

## 2023-07-26 DIAGNOSIS — R25.2 LEG CRAMPS: ICD-10-CM

## 2023-07-26 PROCEDURE — 99214 OFFICE O/P EST MOD 30 MIN: CPT | Performed by: STUDENT IN AN ORGANIZED HEALTH CARE EDUCATION/TRAINING PROGRAM

## 2023-07-26 RX ORDER — NORGESTREL AND ETHINYL ESTRADIOL 0.3-0.03MG
KIT ORAL
Qty: 84 TABLET | Refills: 4 | Status: SHIPPED | OUTPATIENT
Start: 2023-07-26 | End: 2024-08-15

## 2023-07-26 RX ORDER — PROPRANOLOL HYDROCHLORIDE 10 MG/1
10 TABLET ORAL 3 TIMES DAILY
Qty: 90 TABLET | Refills: 3 | Status: SHIPPED | OUTPATIENT
Start: 2023-07-26 | End: 2024-01-08

## 2023-07-26 RX ORDER — INHALER, ASSIST DEVICES
SPACER (EA) MISCELLANEOUS
Qty: 1 EACH | Refills: 0 | Status: SHIPPED | OUTPATIENT
Start: 2023-07-26

## 2023-07-26 RX ORDER — OMEPRAZOLE 40 MG/1
40 CAPSULE, DELAYED RELEASE ORAL DAILY
Qty: 90 CAPSULE | Refills: 3 | Status: SHIPPED | OUTPATIENT
Start: 2023-07-26 | End: 2024-08-29

## 2023-07-26 RX ORDER — BUSPIRONE HYDROCHLORIDE 30 MG/1
30 TABLET ORAL 2 TIMES DAILY
Qty: 180 TABLET | Refills: 3 | Status: SHIPPED | OUTPATIENT
Start: 2023-07-26 | End: 2024-01-10

## 2023-07-26 RX ORDER — SUMATRIPTAN 25 MG/1
25 TABLET, FILM COATED ORAL
Qty: 12 TABLET | Refills: 5 | Status: SHIPPED | OUTPATIENT
Start: 2023-07-26 | End: 2024-07-16

## 2023-07-26 NOTE — Clinical Note
Wanted you to have an update.  Jeanie is just kind of struggling.  We are going to try and find a new anti-depressant.  I did offer Dr. Carter, but she is not interested right now.  Any additional insights are appreciated!

## 2023-07-26 NOTE — PATIENT INSTRUCTIONS
"Mood:    --Look through newer options for selective serotonin reuptake inhibitor:  never taken, minimal GI side effects, helps with fatigue That WON\"T make your anxiety.    --Send a "[employer]" message with the medication and side effects.    -Tell Dr. Warner if that is a bad option.     Stomach:    --Referral to see a new GI specialists to discuss other options for treatment.    --Look for a collagenous colitis expert.      Migraines:    --Keep things the same for now.   --Keep going to the chiropracter  --We'll keep botox in the back of our mind as an option.      Breathing COVID:    -Oxygen levels are good  -Take some gentle walks to help stretch out your lungs.      Foot Cramps:    -Check magnesium, potassium, iron levels.    --If low, plan to order IV magnesium  --Drinking enough water.        "

## 2023-07-26 NOTE — PROGRESS NOTES
There are no exam notes on file for this visit.    ASSESSMENT AND PLAN:      Jeanie was seen today for review of multiple medical problems.    Diagnoses and all orders for this visit:    COVID- 19 Infection  Mild persistent asthma, unspecified whether complicated  Recent COVID-19 infection with shortness of breath.  History of asthma and she has noted wheezing.  O2 sats are good, lungs are clear, we discussed the symptoms are most likely the gradual recovery from a COVID infection, and the asthma does not seem to be complicating things.  She is in better with the spacer in the past, so this was prescribed today.  She does have albuterol inhalers at home.  -     spacer (OPTICHAMBER BARB) holding chamber; Use as needed with inhaler    Generalized anxiety disorder  Major depression, severe recurrent  We discussed multiple options for improved treatment of her depression anxiety.  She will continue with the BuSpar 30 mg twice daily, hydroxyzine 25 mg 4-5 times daily, and she I will discuss via MyChart options for an antidepressant.  Current options include Lexapro, Cymbalta, and Viibryd.  We also discussed having her see the psychiatrist here in clinic and she would like to avoid that as it was not helpful in the past.  -     busPIRone HCl (BUSPAR) 30 MG tablet; Take 1 tablet (30 mg) by mouth 2 times daily    Menorrhagia with regular cycle  Recheck hemoglobin and ferritin levels given leg cramps.  Refill birth control.  -     norgestrel-ethinyl estradiol (LOW-OGESTREL) 0.3-30 MG-MCG tablet; TAKE 1 TABLET BY MOUTH DAILY TAKE 1 PILL DAILY FOR 63 DAYS, THEN TAKE PLACEBO PILLS FOR 7 DAYS.    Migraine without aura and without status migrainosus, not intractable  Migraines are somewhat improved.  Given lower blood pressure and some occasional dizziness will not increase propranolol.  Refilled sumatriptan.  -     SUMAtriptan (IMITREX) 25 MG tablet; Take 1 tablet (25 mg) by mouth at onset of headache for migraine May repeat  "in 2 hours. Max 8 tablets/24 hours.  -     propranolol (INDERAL) 10 MG tablet; Take 1 tablet (10 mg) by mouth 3 times daily    Eosinophilic esophagitis  Continue omeprazole.  GI referral placed  -     omeprazole (PRILOSEC) 40 MG DR capsule; Take 1 capsule (40 mg) by mouth daily    Leg cramps  -     Basic metabolic panel; Future  -     Magnesium; Future  -     CBC with platelets; Future  -     Ferritin; Future    Collagenous colitis   For now she will continue on the budesonide alternating between 1 and 2 pills/day, and the loperamide 5 times per day.  She is requesting a new gastroenterologist to talk about other options outside of the budesonide.  Has lots of concerns about weight gain from this medication.  New referral was placed today.  -     Adult GI  Referral - Consult Only; Future    She will continue regular follow-up with Dr. Rankin.  Should schedule follow-up with me in about 1 month.    Patient Instructions   Mood:    --Look through newer options for selective serotonin reuptake inhibitor:  never taken, minimal GI side effects, helps with fatigue That WON\"T make your anxiety.    --Send a [employer] message with the medication and side effects.    -Tell Dr. Warner if that is a bad option.     Stomach:    --Referral to see a new GI specialists to discuss other options for treatment.    --Look for a collagenous colitis expert.      Migraines:    --Keep things the same for now.   --Keep going to the chiropracter  --We'll keep botox in the back of our mind as an option.      Breathing COVID:    -Oxygen levels are good  -Take some gentle walks to help stretch out your lungs.      Foot Cramps:    -Check magnesium, potassium, iron levels.    --If low, plan to order IV magnesium  --Drinking enough water.          Anayeli Warner MD    SUBJECTIVE  Jeanie French is a 43 year old female with past medical history significant for    Patient Active Problem List   Diagnosis    Cancer of tongue (H)    Health " Care Home    Generalized anxiety disorder    Tenosynovitis of the right index finger    Wheezing    Irritable bowel syndrome (IBS)    Exercise-induced asthma    Sinusitis, chronic    Allergic rhinitis, unspecified allergic rhinitis type    Hypovitaminosis D    Adjustment disorder with anxiety    Menorrhagia with regular cycle    Major depressive disorder with single episode    Iron deficiency anemia due to chronic blood loss    Collagenous colitis    Eosinophilic esophagitis     Others present at the visit:  None    Presents for   Chief Complaint   Patient presents with    Other     Follow-up med, got covid last week, just want to check on some wheezing.     Patient presents for follow-up of multiple issues.    She recently was infected with COVID.  Felt terrible.  Noted shortness of breath, nasal congestion, wheezing, and worsening of her asthma symptoms.  She is gradually feeling better but is still quite tired.  Has been using her inhaler but notes only short-term relief.  Is wondering if there is something else she should be taking at this point to help her symptoms.    She continues to have significant challenges with her mood and stress levels.  Her mood is not great.  Still very anxious and worried about multiple issues.  Additionally her depression has been worse.  Typically she is more on the anxious side, but has been more tired less interested in things they give her pleasure.  Some of this may be related to COVID or other medical concerns, but is making it challenging to get things done.  She continues to take the BuSpar 30 mg twice daily, and is taking hydroxyzine 25 mg now 5 times daily.  She was recently on Prozac, but does not feel like this was helpful for her.  Did not notice much of a difference when she went off the medication either.    She denies panic attacks.  No hallucinations.  No thoughts of self-harm.    She continues to worry about weight gain.  This is a big frustration for her.  Is  concerned that this is coming from the budesonide that she is taking for her collagenous colitis.  Has been alternating 1 pill and 2 pills every other day, and is taking 5 Imodium per day.  Her stools are better, and she is had no big bouts of diarrhea but its not yet firm stool.  She has been told that this does not lead to weight gain, but continues to struggle with her weight despite monitoring her diet carefully.  This is taking up much of her thoughts.    She shares that her migraines overall are improved.  She had 1 to 2 months with no migraine, and felt like the propranolol really helped.  Then she experienced some neck stiffness, and the headache returned.  She has been going to the chiropractor, which helps, but is still noticing some headaches.  She is also describing experiences of getting lightheaded.  She will feel like she might pass out.  One time this happened when she was driving and she felt woozy.  [place] like things were offkilter.  She worries that her blood pressure is too low, but in general it is in the 120s systolic.  Vitals look okay today.  Episodes are not associated with going from sitting to standing or changing activity levels.    She is also been having difficulties with foot cramps.  These happen at night.  They are very painful and get in the way of her sleep.  Does have a history of heavy periods, iron deficiency anemia, and low magnesium in the past.  Her leg cramps got better when she took magnesium however this led to loose stools and irritated her bowels.  Similarly, taking iron supplements has caused worsening abdominal symptoms as well.    OBJECTIVE:  Vitals: /74   Pulse 70   Temp 98.9  F (37.2  C) (Tympanic)   Resp 20   Wt 95.3 kg (210 lb 3.2 oz)   SpO2 96%   BMI 30.60 kg/m    BMI= Body mass index is 30.6 kg/m .  Objective:    Vitals:  Vitals are reviewed and are within the normal range.  Heart rate and blood pressure are appropriate.  Gen:  Alert, pleasant, mildly  "anxious, no respiratory distress.  HEENT: Tympanic membranes are viewed bilaterally and are normal.  No sinus tenderness.  Throat is clear, nonerythematous and without exudate, she has no anterior cervical lymphadenopathy.  Cardiac:  Regular rate and rhythm, no murmurs, rubs or gallops  Respiratory:  Lungs clear to auscultation bilaterally, good airflow, no wheezes.  Extremities:  Warm, well-perfused, pulses 2+/4, no lower extremity edema  Psych: Mood and affect are down, sad, she is anxious.  Thought process is logical.  Good insight.        4/18/2023     4:35 PM 7/11/2023     8:43 AM 7/27/2023     7:44 AM   PHQ   PHQ-9 Total Score 10 13 13   Q9: Thoughts of better off dead/self-harm past 2 weeks Not at all Not at all Not at all          4/18/2023     4:35 PM 7/11/2023     8:43 AM 7/27/2023     7:44 AM   JAMAR-7 SCORE   Total Score 14 18 13              Patient Instructions   Mood:    --Look through newer options for selective serotonin reuptake inhibitor:  never taken, minimal GI side effects, helps with fatigue That WON\"T make your anxiety.    --Send a [employer] message with the medication and side effects.    -Tell Dr. Warner if that is a bad option.     Stomach:    --Referral to see a new GI specialists to discuss other options for treatment.    --Look for a collagenous colitis expert.      Migraines:    --Keep things the same for now.   --Keep going to the chiropracter  --We'll keep botox in the back of our mind as an option.      Breathing COVID:    -Oxygen levels are good  -Take some gentle walks to help stretch out your lungs.      Foot Cramps:    -Check magnesium, potassium, iron levels.    --If low, plan to order IV magnesium  --Drinking enough water.          Anayeli Warner MD    "

## 2023-07-27 ENCOUNTER — MYC MEDICAL ADVICE (OUTPATIENT)
Dept: FAMILY MEDICINE | Facility: CLINIC | Age: 43
End: 2023-07-27
Payer: COMMERCIAL

## 2023-07-27 DIAGNOSIS — F33.2 SEVERE EPISODE OF RECURRENT MAJOR DEPRESSIVE DISORDER, WITHOUT PSYCHOTIC FEATURES (H): ICD-10-CM

## 2023-07-27 DIAGNOSIS — F41.1 GENERALIZED ANXIETY DISORDER: ICD-10-CM

## 2023-07-27 DIAGNOSIS — G43.009 MIGRAINE WITHOUT AURA AND WITHOUT STATUS MIGRAINOSUS, NOT INTRACTABLE: Primary | ICD-10-CM

## 2023-07-27 ASSESSMENT — ANXIETY QUESTIONNAIRES
2. NOT BEING ABLE TO STOP OR CONTROL WORRYING: NEARLY EVERY DAY
3. WORRYING TOO MUCH ABOUT DIFFERENT THINGS: NEARLY EVERY DAY
7. FEELING AFRAID AS IF SOMETHING AWFUL MIGHT HAPPEN: NOT AT ALL
GAD7 TOTAL SCORE: 13
6. BECOMING EASILY ANNOYED OR IRRITABLE: SEVERAL DAYS
GAD7 TOTAL SCORE: 13
1. FEELING NERVOUS, ANXIOUS, OR ON EDGE: NEARLY EVERY DAY
IF YOU CHECKED OFF ANY PROBLEMS ON THIS QUESTIONNAIRE, HOW DIFFICULT HAVE THESE PROBLEMS MADE IT FOR YOU TO DO YOUR WORK, TAKE CARE OF THINGS AT HOME, OR GET ALONG WITH OTHER PEOPLE: VERY DIFFICULT
5. BEING SO RESTLESS THAT IT IS HARD TO SIT STILL: NEARLY EVERY DAY

## 2023-07-27 ASSESSMENT — PATIENT HEALTH QUESTIONNAIRE - PHQ9
SUM OF ALL RESPONSES TO PHQ QUESTIONS 1-9: 13
5. POOR APPETITE OR OVEREATING: NOT AT ALL

## 2023-07-31 ENCOUNTER — LAB (OUTPATIENT)
Dept: LAB | Facility: CLINIC | Age: 43
End: 2023-07-31
Payer: COMMERCIAL

## 2023-07-31 ENCOUNTER — ANCILLARY PROCEDURE (OUTPATIENT)
Dept: MAMMOGRAPHY | Facility: CLINIC | Age: 43
End: 2023-07-31
Attending: STUDENT IN AN ORGANIZED HEALTH CARE EDUCATION/TRAINING PROGRAM
Payer: COMMERCIAL

## 2023-07-31 DIAGNOSIS — Z12.31 VISIT FOR SCREENING MAMMOGRAM: ICD-10-CM

## 2023-07-31 DIAGNOSIS — R25.2 LEG CRAMPS: ICD-10-CM

## 2023-07-31 LAB
ERYTHROCYTE [DISTWIDTH] IN BLOOD BY AUTOMATED COUNT: 13.2 % (ref 10–15)
HCT VFR BLD AUTO: 37.8 % (ref 35–47)
HGB BLD-MCNC: 12.9 G/DL (ref 11.7–15.7)
MCH RBC QN AUTO: 29.6 PG (ref 26.5–33)
MCHC RBC AUTO-ENTMCNC: 34.1 G/DL (ref 31.5–36.5)
MCV RBC AUTO: 87 FL (ref 78–100)
PLATELET # BLD AUTO: 312 10E3/UL (ref 150–450)
RBC # BLD AUTO: 4.36 10E6/UL (ref 3.8–5.2)
WBC # BLD AUTO: 8.7 10E3/UL (ref 4–11)

## 2023-07-31 PROCEDURE — 80048 BASIC METABOLIC PNL TOTAL CA: CPT

## 2023-07-31 PROCEDURE — 85027 COMPLETE CBC AUTOMATED: CPT

## 2023-07-31 PROCEDURE — 82728 ASSAY OF FERRITIN: CPT

## 2023-07-31 PROCEDURE — 77067 SCR MAMMO BI INCL CAD: CPT

## 2023-07-31 PROCEDURE — 83735 ASSAY OF MAGNESIUM: CPT

## 2023-07-31 PROCEDURE — 36415 COLL VENOUS BLD VENIPUNCTURE: CPT

## 2023-08-01 LAB
ANION GAP SERPL CALCULATED.3IONS-SCNC: 11 MMOL/L (ref 7–15)
BUN SERPL-MCNC: 13.9 MG/DL (ref 6–20)
CALCIUM SERPL-MCNC: 9.1 MG/DL (ref 8.6–10)
CHLORIDE SERPL-SCNC: 103 MMOL/L (ref 98–107)
CREAT SERPL-MCNC: 1.36 MG/DL (ref 0.51–0.95)
DEPRECATED HCO3 PLAS-SCNC: 24 MMOL/L (ref 22–29)
FERRITIN SERPL-MCNC: 92 NG/ML (ref 6–175)
GFR SERPL CREATININE-BSD FRML MDRD: 49 ML/MIN/1.73M2
GLUCOSE SERPL-MCNC: 89 MG/DL (ref 70–99)
MAGNESIUM SERPL-MCNC: 2 MG/DL (ref 1.7–2.3)
POTASSIUM SERPL-SCNC: 4.5 MMOL/L (ref 3.4–5.3)
SODIUM SERPL-SCNC: 138 MMOL/L (ref 136–145)

## 2023-08-02 DIAGNOSIS — R79.89 ELEVATED SERUM CREATININE: Primary | ICD-10-CM

## 2023-08-02 NOTE — RESULT ENCOUNTER NOTE
Results for orders placed or performed in visit on 07/31/23  -Ferritin:      Status: Normal       Result                                            Value                         Ref Range                       Ferritin                                          92                            6 - 175 ng/mL              -CBC with platelets:      Status: Normal       Result                                            Value                         Ref Range                       WBC Count                                         8.7                           4.0 - 11.0 10e3/uL              RBC Count                                         4.36                          3.80 - 5.20 10e6/uL             Hemoglobin                                        12.9                          11.7 - 15.7 g/dL                Hematocrit                                        37.8                          35.0 - 47.0 %                   MCV                                               87                            78 - 100 fL                     MCH                                               29.6                          26.5 - 33.0 pg                  MCHC                                              34.1                          31.5 - 36.5 g/dL                RDW                                               13.2                          10.0 - 15.0 %                   Platelet Count                                    312                           150 - 450 10e3/uL          -Magnesium:      Status: Normal       Result                                            Value                         Ref Range                       Magnesium                                         2.0                           1.7 - 2.3 mg/dL            -Basic metabolic panel:      Status: Abnormal       Result                                            Value                         Ref Range                       Sodium                                             138                           136 - 145 mmol/L                Potassium                                         4.5                           3.4 - 5.3 mmol/L                Chloride                                          103                           98 - 107 mmol/L                 Carbon Dioxide (CO2)                              24                            22 - 29 mmol/L                  Anion Gap                                         11                            7 - 15 mmol/L                   Urea Nitrogen                                     13.9                          6.0 - 20.0 mg/dL                Creatinine                                        1.36 (H)                      0.51 - 0.95 mg/dL               Calcium                                           9.1                           8.6 - 10.0 mg/dL                Glucose                                           89                            70 - 99 mg/dL                   GFR Estimate                                      49 (L)                        >60 mL/min/1.73m2          Results for orders placed or performed in visit on 07/31/23  -MA Screening Bilateral w/ Willi:      Status: None      Narrative    BILATERAL FULL FIELD DIGITAL SCREENING MAMMOGRAM WITH TOMOSYNTHESIS        Performed on: 7/31/23        Compared to: 07/26/2022, 07/12/2022, and 06/20/2022        Technique:  This study was evaluated with the assistance of Computer-Aided     Detection.  Breast Tomosynthesis was used in interpretation.        Findings: The breasts are heterogeneously dense, which may obscure small     masses.  There is no radiographic evidence of malignancy.       Impression    IMPRESSION: ACR BI-RADS Category 1: Negative        RECOMMENDED FOLLOW-UP: Annual routine screening mammogram

## 2023-08-07 ENCOUNTER — OFFICE VISIT (OUTPATIENT)
Dept: PSYCHOLOGY | Facility: CLINIC | Age: 43
End: 2023-08-07
Payer: COMMERCIAL

## 2023-08-07 DIAGNOSIS — F33.1 MAJOR DEPRESSIVE DISORDER, RECURRENT, MODERATE (H): ICD-10-CM

## 2023-08-07 DIAGNOSIS — F41.1 GENERALIZED ANXIETY DISORDER: Primary | ICD-10-CM

## 2023-08-07 PROCEDURE — 90837 PSYTX W PT 60 MINUTES: CPT | Performed by: PSYCHOLOGIST

## 2023-08-07 NOTE — PROGRESS NOTES
Behavioral Health Progress Note    Client's Legal Name: Jeanie French    Client's Preferred Name: Jeanie  YOB: 1980  Type of Service: in-person, counseling  Length of Service:   Start time: 9:00am  End time: 10:00am   Duration: 60 minutes  Attendees: patient    Identifying Information and Presenting Problem:  Jeanie is a 43 year old female who was referred for mental health services by Primary Care Provider, Dr. Kristen Warner for help with managing emotions tied to a stressful social situation.  Our initial diagnostic assessment took place on 7/21/10 and we have worked together on and off since that time as her needs have dictated.  Based on Jeanie's current report of symptoms, she continues to meet criteria for Generalized Anxiety Disorder and Major Depressive Disorder, Recurrent, Moderate.  Jeanie's mental health concerns affect her ability to function in areas related to work, interacting and relating with others, family relationships, social relationships, intimate partner relationships, maintaining personal health and physical well-being and participating in meaningful activities causing clinically significant distress.  Jeanie denies any concerning drug/alcohol use. She denies any current safety concerns. Jeanie is not significantly impacted by any social determinants of health.  Given Jeanie's symptoms and impact on functioning, the plan is to engage in psychotherapy roughly twice per month for the time being.    Treatment Objective(s) Addressed in This Session:  Decrease anxiety and increase confidence in managing life stressors.  Decrease depression.    Progress on / Status of Treatment Objective(s) / Homework:  Worsening secondary to life stressors    Mental Health Screening Questionnaires:  PHQ-9:       4/18/2023     4:35 PM 7/11/2023     8:43 AM 7/27/2023     7:44 AM   PHQ   PHQ-9 Total Score 10 13 13   Q9: Thoughts of better off dead/self-harm past 2 weeks Not at all Not at all Not at all      JAMAR-7:        2023     4:35 PM 2023     8:43 AM 2023     7:44 AM   JAMAR-7 SCORE   Total Score 14 18 13      CAGE-AID:  0/4     Topics Discussed/Interventions Provided:    Job stress:  Had stressful work meeting last Thursday.  Very angry with how management handled the situation.  New rules in place which employees must agree to or resign.  Would resign if they offered severance but thinks this is unlikely.  Ramping up job search.  Nitish has already applied for a new job and will likely be leaving soon.  Frustrated by her own job search.  Provided empathy and discussed strategies for managing negative/unhelpful thoughts (see pt instructions)    Family stress:  Dad has IPF (pulmonary fibrosis).  Has been on oxygen for a few months.  Taking steroids or some other treatment and this is not helping.  Recently told he has 6-12 months to live (on the same day as stressful work meeting).  Needing to make end of life decisions.  Qualifies for hospice.  Family has had end of life conversation with parents recently.  Mom is around 77 and dad is around 83. Doesn't want to be on a ventilator.   Brother is executor of will.  Sister is medical POA.  Jeanie is back up for both and in charge of historical family documents.  Not close to dad.  Worried mom also does not want any medical intervention despite being younger and more healthy than dad.  Sad about situation. Feels she is being cheated of several life goals (had wanted dad to walk her down the aisle at her wedding, had wanted children).  Provided empathy.  Encouraged reflection on any unresolved life tasks (questions she wants to ask, forgiveness to be offered/accepted, etc.).    Mom's sister is also facing end of life.  Has bone marrow cancer of some kind.  Two brothers already  (cardiac).  Has three sisters but she is closes to this one.   Worried for mom in the face of losing both sister and .  Encouraged connecting with mom for mutual support.       Assessment:   The patient appeared to be active and engaged in today's session and was receptive to feedback.     Mental Status Exam:  During interaction with the provider today, Jeanie was cooperative, open, and engaged. Patient was generally alert and oriented to person, place, time, and situation. They were casually dressed, appropriately groomed and appeared stated age. Patient's attire was appropriate for the weather and occasion. Eye contact good. Psychomotor functioning: normal or unremarkable. Speech was normal limits tone, rate, volume; largely coherent and relevant to topic. Mood was depressed and anxious; affect mood-congruent.  She was tearful for most of the visit.  Thought processes: logical, coherent and goal oriented. Thought content: no evidence of psychotic features and no evidence of suicide, homicide, or nonsuicidal self-injury related concerns. Memory appeared grossly intact without being formally evaluated. Insight: fair. Judgment good. Patient exhibited good impulse control during the appointment.     Does the patient appear to be at imminent risk of harm to self/others at this time? No    The session was necessary to address anxiety and depressive symptoms that have been interfering with patient's ability to function in areas related to work, intimate partner relationships, maintaining personal health and physical well-being, day to day self care or health behaviors and attending and completing tasks. Ongoing psychotherapy is necessary to provide counseling, improve functioning with daily activities, provide psychoeducation and provide support.    DSM-5-TR Diagnosis:  Generalized Anxiety Disorder  Major Depressive Disorder, recurrent, moderate to severe    Plan:  1. Follow up with this provider on Tuesday 8/29 at 8:30am. Also routed message to  asking to add her to my schedule on 9/12 and 9/26.  2. Utilize crisis resources as needed.  3. After Visit Summary will be reviewed in  Christopher Rankin, PhD, LP    NOTE: Treatment plan update due 1/22/24.  Diagnostic assessment update due 3/26/24.

## 2023-08-07 NOTE — PATIENT INSTRUCTIONS
Good to talk with you today Jeanie.    Today we talked about continuing your job search and looking for ways to change your work environment.  That is a long term goal.  In the interim, we want to work on identifying and challenging thoughts which feed anxiety, depression, resentment and anger as these are not always helpful for you.  Strategies for this include the following:    Notice how you are feeling.  If you note an uptick in anger and hopelessness take a moment to step back and identify the thoughts that go along with that emotion (e.g., I'll never get another job.  I'm going to be stuck here forever.  Our management is so bad.  I hate this job!).    Once you have identified the thoughts associated with the feeling, take a step back to evaluate them.  How true is this thought?    Any evidence that this thought it not true or not totally true?  What will happen if I keep thinking this thought?  If the thought is true or partially true, is it helpful?  If so, in what way?  If the thought is neither true, nor helpful, how else might I think about this situation?    If I reframed thought, how would I feel differently?  Can I offer myself some empathy for this difficult situation.    Remind yourself you are not alone in your suffering.  This is a shared human experience.  3.  Set an alarm on your phone to remember to check in with yourself and go through this practice.  4.  Notice how you feel after you have gone through the practice.    5.  If this feels awkward at first, know that is normal and stick with it.    Just a reminder that Dr. Rankin will be out of the office from August 14 - 22.  Please reach out to Dr. Warner or other members of the care team at [facility] should any urgent needs arise while I am away.    While I hope you won't need them, I'll include some crisis resources below just in case:    Crisis Lines:    National Suicide Prevention Crisis Line:  982  [facility] Adult Crisis:  [phone]    [facility] Adult Crisis:  612/[phone]    Crisis Text Line: Text [place] to 857850. Free support at your fingertips 24/7  People who text [place] to 827131 will be connected with a counselor. Crisis Text Line is available 24 hours a day, seven days a week.    You can also consider going to the [facility] at the following address.  Walk ins are welcome:    [address] [place] [address]   [phone] (for 24-hour crisis consultation)    Monday - Friday 8:00am - 7:00pm  Saturday:  11:00am - 3:00pm  Sunday and Holidays Closed    If you feel at risk of immediate harm, go directly to the Emergency Department.

## 2023-08-08 NOTE — TELEPHONE ENCOUNTER
Appears that Ms. French has not tried duloxetine, per her message.  Will leave for Dr. Warner to decide on duloxetine vs vilazodone.  Of note, [employer] formulary  does not have info for vilazodone coverage, but appears generics are covered similarly by her formulary in the plan documents.    Dr. Warner to address upon return.    Christy Bright MD  (covering for Dr. Warner while out of office)

## 2023-08-09 RX ORDER — DULOXETIN HYDROCHLORIDE 20 MG/1
20 CAPSULE, DELAYED RELEASE ORAL DAILY
Qty: 30 CAPSULE | Refills: 1 | Status: SHIPPED | OUTPATIENT
Start: 2023-08-09 | End: 2023-09-03

## 2023-08-11 DIAGNOSIS — R79.89 ELEVATED SERUM CREATININE: Primary | ICD-10-CM

## 2023-08-29 ENCOUNTER — OFFICE VISIT (OUTPATIENT)
Dept: PSYCHOLOGY | Facility: CLINIC | Age: 43
End: 2023-08-29
Payer: COMMERCIAL

## 2023-08-29 DIAGNOSIS — F33.1 MAJOR DEPRESSIVE DISORDER, RECURRENT, MODERATE (H): ICD-10-CM

## 2023-08-29 DIAGNOSIS — F41.1 GENERALIZED ANXIETY DISORDER: Primary | ICD-10-CM

## 2023-08-29 PROCEDURE — 90834 PSYTX W PT 45 MINUTES: CPT | Performed by: PSYCHOLOGIST
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
## 2023-09-12 ENCOUNTER — OFFICE VISIT (OUTPATIENT)
Dept: PSYCHOLOGY | Facility: CLINIC | Age: 43
End: 2023-09-12
Payer: COMMERCIAL

## 2023-09-12 DIAGNOSIS — F41.1 GENERALIZED ANXIETY DISORDER: Primary | ICD-10-CM

## 2023-09-12 PROCEDURE — 90837 PSYTX W PT 60 MINUTES: CPT | Performed by: PSYCHOLOGIST

## 2023-09-12 NOTE — PROGRESS NOTES
Behavioral Health Progress Note    Client's Legal Name: Jeanie French    Client's Preferred Name: Jeanie  YOB: 1980  Type of Service: in-person, counseling  Length of Service:   Start time: 8:35am  End time: 9:29am   Duration: 54 minutes  Attendees: patient and Dr. Roman, second year family medicine resident on rotation with Jeanie's permission.    Identifying Information and Presenting Problem:  Jeanie is a 43 year old female who was referred for mental health services by Primary Care Provider, Dr. Kristen Warner for help with managing emotions tied to a stressful social situation.  Our initial diagnostic assessment took place on 7/21/10 and we have worked together on and off since that time as her needs have dictated.  Based on Jeanie's current report of symptoms, she continues to meet criteria for Generalized Anxiety Disorder and Major Depressive Disorder, Recurrent, Moderate.  Jeanie's mental health concerns affect her ability to function in areas related to work, interacting and relating with others, family relationships, social relationships, intimate partner relationships, maintaining personal health and physical well-being and participating in meaningful activities causing clinically significant distress.  Jeanie denies any concerning drug/alcohol use. She denies any current safety concerns. Jeanie is not significantly impacted by any social determinants of health.  Given Jeanie's symptoms and impact on functioning, the plan is to engage in psychotherapy roughly twice per month for the time being.    Treatment Objective(s) Addressed in This Session:  Decrease anxiety and increase confidence in managing life stressors.  Decrease depression.    Progress on / Status of Treatment Objective(s) / Homework:  Satisfactory progress      Mental Health Screening Questionnaires:  PHQ-9:       4/18/2023     4:35 PM 7/11/2023     8:43 AM 7/27/2023     7:44 AM   PHQ   PHQ-9 Total Score 10 13 13   Q9: Thoughts of better  off dead/self-harm past 2 weeks Not at all Not at all Not at all      JAMAR-7:       4/18/2023     4:35 PM 7/11/2023     8:43 AM 7/27/2023     7:44 AM   JAMAR-7 SCORE   Total Score 14 18 13      CAGE-AID:  0/4     Topics Discussed/Interventions Provided:    Stress/Anxiety:  Feeling stressed by life events.    Talked with Nitish who was comfortable with her moving in so has started packing up and moving things to his home while still living in her own home.  Starting to be hard to be in two places and frustrated around communication with Nitish to plan final push to complete move (truck for heavy furniture, coordinating with her family for help).    Did have job interview last Thursday but otherwise not a lot of progress on job hunt.  Lack of clarity around insurance/unemployment benefits. Will try to seek clarity this week to inform plans.    Relationships:  Siblings planned anniversary party for parents on 9/30 but did not involve her in the early planning which was hurtful.  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  She has also agreed to create some centerpieces.  She is also stressed by the idea of integrating her new family (including Nitish's children) into her family of origin and frustrated that her siblings don't seem to appreciate this challenge.    Continued discussion of how to manage family relationships and options for responding to these dynamics.  Discussed potential risks/benefits of talking with her siblings about her concerns directly vs being more assertive about her interests/wants with the hope that this would lead to increased understanding and a higher chance of getting what she wants in family relationships (vs. Being more passive, hoping they will notice/give her what she is hoping for without asking).  Jeanie feels direct confrontation would likely not go well so will try being more assertive about her interests and see how  that goes.    Coping:  Reviewed CBT skills and use to evaluate thoughts/reactions.  Reviewed the idea of assessing if thoughts were helpful or unhelpful and working to release thoughts in the latter category.    Discussed creating some structure for her time (planned time for various goals) to decrease anxiety/stress.  Encouraged scheduling in time for wellness activities as well as tasks and setting realistic expectations for self.  Encouraged giving self credit for work/effort rather than focusing exclusively on what still needs to be done.    Assessment:   The patient appeared to be active and engaged in today's session and was receptive to feedback.     Mental Status Exam:  During interaction with the provider today, Jeanie was cooperative, open, and engaged. Patient was generally alert and oriented to person, place, time, and situation. They were casually dressed, appropriately groomed and appeared stated age. Patient's attire was appropriate for the weather and occasion. Eye contact good. Psychomotor functioning: normal or unremarkable. Speech was normal limits tone, rate, volume; largely coherent and relevant to topic. Mood was anxious/stressed; affect mood-congruent.  Thought processes: logical, coherent and goal oriented. Thought content: no evidence of psychotic features and no evidence of suicide, homicide, or nonsuicidal self-injury related concerns. Memory appeared grossly intact without being formally evaluated. Insight: fair. Judgment good. Patient exhibited good impulse control during the appointment.     Does the patient appear to be at imminent risk of harm to self/others at this time? No    The session was necessary to address anxiety and depressive symptoms that have been interfering with patient's ability to function in areas related to work, intimate partner relationships, maintaining personal health and physical well-being, day to day self care or health behaviors and attending and completing  tasks. Ongoing psychotherapy is necessary to provide counseling, improve functioning with daily activities, provide psychoeducation and provide support.    DSM-5-TR Diagnosis:  Generalized Anxiety Disorder  Major Depressive Disorder, recurrent, moderate to severe    Plan:  1. Follow up with this provider on 9/26.  We are holding off on scheduling visits in October until her insurance status is more clear after this month.  2. Utilize crisis resources as needed.  3. After Visit Summary will be reviewed in Christopher Rankin, PhD,     NOTE: Treatment plan update due 1/22/24.  Diagnostic assessment update due 3/26/24.

## 2023-09-18 DIAGNOSIS — F41.1 GENERALIZED ANXIETY DISORDER: ICD-10-CM

## 2023-09-18 RX ORDER — HYDROXYZINE PAMOATE 25 MG/1
CAPSULE ORAL
Qty: 180 CAPSULE | Refills: 5 | Status: SHIPPED | OUTPATIENT
Start: 2023-09-18 | End: 2023-10-25

## 2023-09-21 ENCOUNTER — TRANSFERRED RECORDS (OUTPATIENT)
Dept: HEALTH INFORMATION MANAGEMENT | Facility: CLINIC | Age: 43
End: 2023-09-21
Payer: COMMERCIAL

## 2023-09-21 LAB
ALT SERPL-CCNC: 20 IU/L (ref 0–32)
AST SERPL-CCNC: 17 IU/L (ref 0–40)
CREATININE (EXTERNAL): 1.32 MG/DL (ref 0.57–1)
GFR ESTIMATED (EXTERNAL): 51 ML/MIN/1.73M2
GLUCOSE (EXTERNAL): 90 MG/DL (ref 70–99)
POTASSIUM (EXTERNAL): 4.5 MMOL/L (ref 3.5–5.2)

## 2023-09-26 ENCOUNTER — OFFICE VISIT (OUTPATIENT)
Dept: PSYCHOLOGY | Facility: CLINIC | Age: 43
End: 2023-09-26
Payer: COMMERCIAL

## 2023-09-26 DIAGNOSIS — F33.1 MAJOR DEPRESSIVE DISORDER, RECURRENT, MODERATE (H): ICD-10-CM

## 2023-09-26 DIAGNOSIS — F41.1 GENERALIZED ANXIETY DISORDER: Primary | ICD-10-CM

## 2023-09-26 PROCEDURE — 90837 PSYTX W PT 60 MINUTES: CPT | Performed by: PSYCHOLOGIST

## 2023-09-26 NOTE — PROGRESS NOTES
Behavioral Health Progress Note    Client's Legal Name: Jeanie French    Client's Preferred Name: Jeanie  YOB: 1980  Type of Service: in-person, counseling  Length of Service:   Start time: 8:35am  End time: 9:30am   Duration: 55 minutes  Attendees: patient and Dr. Powers, second year family medicine resident on rotation with Jeanie's permission.    Identifying Information and Presenting Problem:  Jeanie is a 43 year old female who was referred for mental health services by Primary Care Provider, Dr. Kristen Warner for help with managing emotions tied to a stressful social situation.  Our initial diagnostic assessment took place on 7/21/10 and we have worked together on and off since that time as her needs have dictated.  Based on Jeanie's current report of symptoms, she continues to meet criteria for Generalized Anxiety Disorder and Major Depressive Disorder, Recurrent, Moderate.  Jeanie's mental health concerns affect her ability to function in areas related to work, interacting and relating with others, family relationships, social relationships, intimate partner relationships, maintaining personal health and physical well-being and participating in meaningful activities causing clinically significant distress.  Jeanie denies any concerning drug/alcohol use. She denies any current safety concerns. Jeanie is not significantly impacted by any social determinants of health.  Given Jeanie's symptoms and impact on functioning, the plan has been to engage in psychotherapy roughly twice per month for the time being but this plan is currently on hold given uncertainty related to patient's insurance coverage after the end of this month.  Jeanie will call back to schedule once she has clarity around coverage.      Treatment Objective(s) Addressed in This Session:  Decrease anxiety and increase confidence in managing life stressors.  Decrease depression.    Progress on / Status of Treatment Objective(s) /  Homework:  Satisfactory progress      Mental Health Screening Questionnaires:  PHQ-9:       4/18/2023     4:35 PM 7/11/2023     8:43 AM 7/27/2023     7:44 AM   PHQ   PHQ-9 Total Score 10 13 13   Q9: Thoughts of better off dead/self-harm past 2 weeks Not at all Not at all Not at all      JAMAR-7:       4/18/2023     4:35 PM 7/11/2023     8:43 AM 7/27/2023     7:44 AM   JAMAR-7 SCORE   Total Score 14 18 13      CAGE-AID:  0/4     Topics Discussed/Interventions Provided:    Transitional stress:  Since our last visit, Jeanie has moved in with Nitish and Lisandro and has been living there for about one week.  She describes this transition as chaotic and stressful.  It is challenging to find space for her belongings.  It is challenging to integrate their pets (she has two cats and Nitish has a cat and a dog).  One of Nitish's cats recently attacked her cat and also bit Jeanie.  She is keeping them separate for the time being and noticing some slow, gradual progress on this integration.    Jeanie finds it very challenging to get things done in this environment.  Not spending as much time with Nitish has she had hoped as he has started a new job and they are sleeping in separate rooms.  Is not sleeping well as she is sleeping separately in a twin bed with both cats to support their adjustment.    Has not yet obtained a new job but did have a second interview for a job in the [employer] system.  This job seems like a good fit and she is hoping for good news soon.    Experiencing some anxiety/regret about moving. Provided empathy and normalized stress of early transition period.  Encouraged being proactive in voicing needs/wants with partner.  Encouraged eliciting Lisandro's perspective as they integrate households so that he can be included and know she values his voice (even if he provides minimal input).    Positives:  Feels less stressed by driving back and forth between her home and Nitish's home.  More natural opportunities for time  together.    Family connection:  Attended family gathering as nephew is in town.  Went out to dinner and was able to introduce Nitish to nephewSedrick.  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  Experienced some resentment that family was not already aware of this history (there is a photo on the wall of her home) and gently challenged that expectation.  Provided empathy that family was not more attentive to these details or proactively inquisitive about her life, but noted that they seemed interested and engaged when she shared details of her life and this felt good.  Encouraged continued proactive sharing rather than waiting for family to elicit this history from her.    Health:  Visit with [facility] 9/21/23.  They are adjusting her medication for collagenous colitis and would like to see her back around 12/21/23.  Thought this visit went well.  Liked the new doctor.  Somewhat worried as there are some more risks associated with new med but feels worth it.  Wants to really look at/rule out pancreatic insufficiency.    Just emailed HR at old job about COBRA yesterday and waiting to hear back.  Got refills on most meds to tide her over until insurance issue can be resolved.  Encouraged being proactive on getting insurance in place to avoid lapse given need to manage several chronic health conditions.    Assessment:   The patient appeared to be active and engaged in today's session and was receptive to feedback.     Mental Status Exam:  During interaction with the provider today, Jeanie was cooperative, open, and engaged. Patient was generally alert and oriented to person, place, time, and situation. They were casually dressed, appropriately groomed and appeared stated age. Patient's attire was appropriate for the weather and occasion. Eye contact good. Psychomotor functioning: normal or unremarkable. Speech was normal limits tone, rate, volume; largely coherent and relevant to topic. Mood was  anxious/stressed; affect mood-congruent.  Thought processes: logical, coherent and goal oriented. Thought content: no evidence of psychotic features and no evidence of suicide, homicide, or nonsuicidal self-injury related concerns. Memory appeared grossly intact without being formally evaluated. Insight: fair. Judgment good. Patient exhibited good impulse control during the appointment.     Does the patient appear to be at imminent risk of harm to self/others at this time? No    The session was necessary to address anxiety and depressive symptoms that have been interfering with patient's ability to function in areas related to work, intimate partner relationships, maintaining personal health and physical well-being, day to day self care or health behaviors and attending and completing tasks. Ongoing psychotherapy is necessary to provide counseling, improve functioning with daily activities, provide psychoeducation and provide support.    DSM-5-TR Diagnosis:  Generalized Anxiety Disorder  Major Depressive Disorder, recurrent, moderate to severe    Plan:  1. We are holding off on scheduling visits in October until her insurance status is more clear after this month.  Identified the following priorities in the interim:  Put plan in place for insurance coverage ASAP.  Feel free to reach out to Dr. Rankin via phone or [employer] if urgent needs arise in the interim.  There will not be a charge for this if insurance is not in place.  Talk to Nitish and Lisandro about transition/integration of home.  Give everyone caio, but be assertive about your needs and wants.  Move queen size bed from your old house to current home so that you can get better sleep.  Plan positive time with Nitish where you are not just focused on the difficult task of integrating your home.  2. Utilize crisis resources as needed.  3. After Visit Summary will be reviewed in [facility] Rankin, PhD, [facility]    NOTE: Treatment plan update due 1/22/24.   Diagnostic assessment update due 3/26/24.

## 2023-09-27 NOTE — PATIENT INSTRUCTIONS
1.  Put plan in place for insurance coverage ASAP.   Feel free to reach out to Dr. Rankin via phone or MyChart if urgent needs arise in the interim.  There will not be a charge for this if insurance is not in place.  2.  Talk to Nitish and Lisandro about transition/integration of home.  Give everyone caio, but be assertive about your needs and wants.  3.  Move queen size bed from your old house to current home so that you can get better sleep.  4.  Plan positive time with Nitish where you are not just focused on the difficult task of integrating your home.

## 2023-09-28 ENCOUNTER — TRANSFERRED RECORDS (OUTPATIENT)
Dept: HEALTH INFORMATION MANAGEMENT | Facility: CLINIC | Age: 43
End: 2023-09-28
Payer: COMMERCIAL

## 2023-09-30 DIAGNOSIS — F33.2 SEVERE EPISODE OF RECURRENT MAJOR DEPRESSIVE DISORDER, WITHOUT PSYCHOTIC FEATURES (H): ICD-10-CM

## 2023-09-30 DIAGNOSIS — G43.009 MIGRAINE WITHOUT AURA AND WITHOUT STATUS MIGRAINOSUS, NOT INTRACTABLE: ICD-10-CM

## 2023-09-30 DIAGNOSIS — F41.1 GENERALIZED ANXIETY DISORDER: ICD-10-CM

## 2023-10-02 RX ORDER — DULOXETIN HYDROCHLORIDE 20 MG/1
20 CAPSULE, DELAYED RELEASE ORAL 2 TIMES DAILY
Qty: 60 CAPSULE | Refills: 1 | Status: SHIPPED | OUTPATIENT
Start: 2023-10-02 | End: 2023-11-09

## 2023-10-05 ENCOUNTER — TRANSFERRED RECORDS (OUTPATIENT)
Dept: HEALTH INFORMATION MANAGEMENT | Facility: CLINIC | Age: 43
End: 2023-10-05
Payer: COMMERCIAL

## 2023-10-19 ENCOUNTER — TRANSFERRED RECORDS (OUTPATIENT)
Dept: HEALTH INFORMATION MANAGEMENT | Facility: CLINIC | Age: 43
End: 2023-10-19
Payer: COMMERCIAL

## 2023-11-03 ENCOUNTER — TRANSFERRED RECORDS (OUTPATIENT)
Dept: HEALTH INFORMATION MANAGEMENT | Facility: CLINIC | Age: 43
End: 2023-11-03
Payer: COMMERCIAL

## 2023-11-09 DIAGNOSIS — G43.009 MIGRAINE WITHOUT AURA AND WITHOUT STATUS MIGRAINOSUS, NOT INTRACTABLE: ICD-10-CM

## 2023-11-09 DIAGNOSIS — F41.1 GENERALIZED ANXIETY DISORDER: ICD-10-CM

## 2023-11-09 DIAGNOSIS — F33.2 SEVERE EPISODE OF RECURRENT MAJOR DEPRESSIVE DISORDER, WITHOUT PSYCHOTIC FEATURES (H): ICD-10-CM

## 2023-11-09 RX ORDER — DULOXETIN HYDROCHLORIDE 20 MG/1
20 CAPSULE, DELAYED RELEASE ORAL 2 TIMES DAILY
Qty: 180 CAPSULE | Refills: 0 | Status: SHIPPED | OUTPATIENT
Start: 2023-11-09 | End: 2024-01-08

## 2023-11-17 ENCOUNTER — TRANSFERRED RECORDS (OUTPATIENT)
Dept: HEALTH INFORMATION MANAGEMENT | Facility: CLINIC | Age: 43
End: 2023-11-17
Payer: COMMERCIAL

## 2023-11-30 ENCOUNTER — TRANSFERRED RECORDS (OUTPATIENT)
Dept: HEALTH INFORMATION MANAGEMENT | Facility: CLINIC | Age: 43
End: 2023-11-30
Payer: COMMERCIAL

## 2023-12-15 ENCOUNTER — TRANSFERRED RECORDS (OUTPATIENT)
Dept: HEALTH INFORMATION MANAGEMENT | Facility: CLINIC | Age: 43
End: 2023-12-15
Payer: COMMERCIAL

## 2023-12-15 LAB
ALT SERPL-CCNC: 11 IU/L (ref 0–32)
AST SERPL-CCNC: 13 IU/L (ref 0–40)

## 2024-01-08 DIAGNOSIS — F41.1 GENERALIZED ANXIETY DISORDER: ICD-10-CM

## 2024-01-08 DIAGNOSIS — G43.009 MIGRAINE WITHOUT AURA AND WITHOUT STATUS MIGRAINOSUS, NOT INTRACTABLE: ICD-10-CM

## 2024-01-08 DIAGNOSIS — F33.2 SEVERE EPISODE OF RECURRENT MAJOR DEPRESSIVE DISORDER, WITHOUT PSYCHOTIC FEATURES (H): ICD-10-CM

## 2024-01-08 RX ORDER — DULOXETIN HYDROCHLORIDE 20 MG/1
20 CAPSULE, DELAYED RELEASE ORAL 2 TIMES DAILY
Qty: 180 CAPSULE | Refills: 0 | Status: SHIPPED | OUTPATIENT
Start: 2024-01-08 | End: 2024-02-06

## 2024-01-08 RX ORDER — PROPRANOLOL HYDROCHLORIDE 10 MG/1
10 TABLET ORAL 3 TIMES DAILY
Qty: 90 TABLET | Refills: 3 | Status: SHIPPED | OUTPATIENT
Start: 2024-01-08 | End: 2024-05-06

## 2024-01-10 ENCOUNTER — TRANSFERRED RECORDS (OUTPATIENT)
Dept: HEALTH INFORMATION MANAGEMENT | Facility: CLINIC | Age: 44
End: 2024-01-10
Payer: COMMERCIAL

## 2024-01-10 DIAGNOSIS — F41.1 GENERALIZED ANXIETY DISORDER: ICD-10-CM

## 2024-01-10 RX ORDER — BUSPIRONE HYDROCHLORIDE 30 MG/1
30 TABLET ORAL 2 TIMES DAILY
Qty: 180 TABLET | Refills: 3 | Status: SHIPPED | OUTPATIENT
Start: 2024-01-10 | End: 2024-02-06

## 2024-01-16 ENCOUNTER — TRANSFERRED RECORDS (OUTPATIENT)
Dept: HEALTH INFORMATION MANAGEMENT | Facility: CLINIC | Age: 44
End: 2024-01-16
Payer: COMMERCIAL

## 2024-01-19 ENCOUNTER — TRANSFERRED RECORDS (OUTPATIENT)
Dept: HEALTH INFORMATION MANAGEMENT | Facility: CLINIC | Age: 44
End: 2024-01-19
Payer: COMMERCIAL

## 2024-02-06 ENCOUNTER — OFFICE VISIT (OUTPATIENT)
Dept: FAMILY MEDICINE | Facility: CLINIC | Age: 44
End: 2024-02-06
Payer: COMMERCIAL

## 2024-02-06 VITALS
SYSTOLIC BLOOD PRESSURE: 124 MMHG | HEART RATE: 87 BPM | OXYGEN SATURATION: 97 % | BODY MASS INDEX: 30.09 KG/M2 | TEMPERATURE: 98.3 F | RESPIRATION RATE: 12 BRPM | DIASTOLIC BLOOD PRESSURE: 85 MMHG | WEIGHT: 210.2 LBS | HEIGHT: 70 IN

## 2024-02-06 DIAGNOSIS — M25.571 ARTHRALGIA OF BOTH ANKLES: ICD-10-CM

## 2024-02-06 DIAGNOSIS — M25.572 ARTHRALGIA OF BOTH ANKLES: ICD-10-CM

## 2024-02-06 DIAGNOSIS — F41.1 GENERALIZED ANXIETY DISORDER: ICD-10-CM

## 2024-02-06 DIAGNOSIS — F33.2 SEVERE EPISODE OF RECURRENT MAJOR DEPRESSIVE DISORDER, WITHOUT PSYCHOTIC FEATURES (H): ICD-10-CM

## 2024-02-06 DIAGNOSIS — R07.9 CHEST PAIN, UNSPECIFIED TYPE: ICD-10-CM

## 2024-02-06 DIAGNOSIS — N18.2 STAGE 2 CHRONIC KIDNEY DISEASE: ICD-10-CM

## 2024-02-06 DIAGNOSIS — G43.009 MIGRAINE WITHOUT AURA AND WITHOUT STATUS MIGRAINOSUS, NOT INTRACTABLE: Primary | ICD-10-CM

## 2024-02-06 DIAGNOSIS — H53.2 DOUBLE VISION: ICD-10-CM

## 2024-02-06 PROCEDURE — 99214 OFFICE O/P EST MOD 30 MIN: CPT | Performed by: STUDENT IN AN ORGANIZED HEALTH CARE EDUCATION/TRAINING PROGRAM

## 2024-02-06 RX ORDER — DULOXETIN HYDROCHLORIDE 30 MG/1
30 CAPSULE, DELAYED RELEASE ORAL 2 TIMES DAILY
Qty: 60 CAPSULE | Refills: 1 | Status: SHIPPED | OUTPATIENT
Start: 2024-02-06 | End: 2024-05-06

## 2024-02-06 RX ORDER — BUSPIRONE HYDROCHLORIDE 30 MG/1
30 TABLET ORAL 2 TIMES DAILY
Qty: 180 TABLET | Refills: 3 | Status: SHIPPED | OUTPATIENT
Start: 2024-02-06 | End: 2024-07-23

## 2024-02-06 ASSESSMENT — PATIENT HEALTH QUESTIONNAIRE - PHQ9
5. POOR APPETITE OR OVEREATING: MORE THAN HALF THE DAYS
SUM OF ALL RESPONSES TO PHQ QUESTIONS 1-9: 13

## 2024-02-06 ASSESSMENT — ANXIETY QUESTIONNAIRES
GAD7 TOTAL SCORE: 14
1. FEELING NERVOUS, ANXIOUS, OR ON EDGE: MORE THAN HALF THE DAYS
2. NOT BEING ABLE TO STOP OR CONTROL WORRYING: NEARLY EVERY DAY
GAD7 TOTAL SCORE: 14
5. BEING SO RESTLESS THAT IT IS HARD TO SIT STILL: NOT AT ALL
IF YOU CHECKED OFF ANY PROBLEMS ON THIS QUESTIONNAIRE, HOW DIFFICULT HAVE THESE PROBLEMS MADE IT FOR YOU TO DO YOUR WORK, TAKE CARE OF THINGS AT HOME, OR GET ALONG WITH OTHER PEOPLE: SOMEWHAT DIFFICULT
3. WORRYING TOO MUCH ABOUT DIFFERENT THINGS: NEARLY EVERY DAY
7. FEELING AFRAID AS IF SOMETHING AWFUL MIGHT HAPPEN: SEVERAL DAYS
6. BECOMING EASILY ANNOYED OR IRRITABLE: NEARLY EVERY DAY

## 2024-02-06 NOTE — PROGRESS NOTES
There are no exam notes on file for this visit.    ASSESSMENT AND PLAN:      Jeanie was seen today for recheck medication, results and medication reconciliation.    Diagnoses and all orders for this visit:    Migraine without aura and without status migrainosus, not intractable  Double vision  Continued struggles with severe headaches.  We have tried multiple medications in the past.  I will increase her Cymbalta to 30 mg twice daily.  Continue to use triptans with breakthrough symptoms.  We placed a neurology referral to pursue other options and treatments.  -     DULoxetine (CYMBALTA) 30 MG capsule; Take 1 capsule (30 mg) by mouth 2 times daily This is a dose increase.  Please notify patient.  -     Adult Neurology  Referral; Future    Generalized anxiety disorder  Severe episode of recurrent major depressive disorder, without psychotic features (H)  Cymbalta has been somewhat helpful for her mood.  We will increase the dose today.  Refilled her BuSpar.  Continue with therapy.  -     DULoxetine (CYMBALTA) 30 MG capsule; Take 1 capsule (30 mg) by mouth 2 times daily This is a dose increase.  Please notify patient.  -     busPIRone HCl (BUSPAR) 30 MG tablet; Take 1 tablet (30 mg) by mouth 2 times daily    Double vision  Recommended that she get in for an eye appointment as well as see the neurologist.  -     Adult Neurology  Referral; Future    Chest pain, unspecified type  She is sharing about a strong family history of coronary artery disease with significantly elevated cholesterol and LDL.  Some restratification is helpful.  Recommended a CT coronary calcium scan.  Orders placed for this.  I may have to discuss this with insurance given the atypical nature of her family history and labs.  -     CT Coronary Calcium Scan; Future    Stage 2 chronic kidney disease  Elevated creatinine on last few checks.  Encouraged fluid intake.  We will recheck labs including BMP CBC urine with micro microalbumin  and ferritin as she has had a history of restless legs and iron deficiency anemia.  -     Basic metabolic panel; Future  -     CBC with platelets; Future  -     Urine Macroscopic with reflex to Microscopic; Future  -     Albumin Random Urine Quantitative with Creat Ratio; Future  -     Ferritin; Future    Arthralgia of both ankles  Aspects of this history including her GI history of collagenous colitis concerning for autoimmune etiology.  I will check inflammatory markers today.  Could consider rheumatology referral in the future especially as were having trouble kneeling down the abdominal issues as well as multiple joint pain and other abnormal sensations.  -     CRP inflammation; Future  -     Erythrocyte sedimentation rate auto; Future  -     Rheumatoid factor; Future    Follow-up in 2 months.    Patient Instructions   Increase the Cymbalta to 30mg 2x per day to help with mood and headaches.      CT chest to check heart    Referral to see the neurologist for headaches.    Come back next week for labs.      Follow up in 2 months.          Anayeli Warner MD    SUBJECTIVE  Jeanie French is a 43 year old female with past medical history significant for    Patient Active Problem List   Diagnosis    Cancer of tongue (H)    Health Care Home    Generalized anxiety disorder    Tenosynovitis of the right index finger    Wheezing    Irritable bowel syndrome (IBS)    Exercise-induced asthma    Sinusitis, chronic    Allergic rhinitis, unspecified allergic rhinitis type    Hypovitaminosis D    Adjustment disorder with anxiety    Menorrhagia with regular cycle    Major depressive disorder with single episode    Iron deficiency anemia due to chronic blood loss    Collagenous colitis    Eosinophilic esophagitis     Others present at the visit:  None    Presents for   Chief Complaint   Patient presents with    Recheck Medication    Results     Kidney lab result    Medication Reconciliation     Med reviewed     Patient  presented today for follow-up on multiple issues.    Overall many things have been stable.  She feels like the Cymbalta has been helpful for her mood, and denies any side effects from it.  Currently taking 20 mg twice a day.    She continues to struggle to drink water frequently.  Had some elevated kidney test last time.  Did not make it in for lab follow-up.  Has not been drinking water well today and would like to defer these labs to later.  She shares that her dad has been experiencing kidney failure.  He is actually quite sick with pulmonary fibrosis and in the hospital.  Has not needed dialysis.    She continues to see her GI specialist.  They tried a couple different medications.  Azathioprine did not work.  Trial of prednisone to see if she had a response to this and this did not work either.  Had 2 cancer test that were negative.  They are planning to start antibiotic for potential bacterial overgrowth but she is working on insurance clearance for this first.  Currently she just taking budesonide.    We reviewed her headaches.  These continue to be bothersome for her.  She is having migraines frequently, and describes pain over her right eye and across her face that are concerning for cluster headaches.  Propranolol may have helped a little bit initially, but not as much as she is hope.  She is continuing to experience some double vision.  This is happened about 10 times in the last 6 months.  Also describes some dizziness, which she does not associate with times being hungry or hypoglycemia as we discussed in the past.  Had 1 episode where she ended up with numbness and tingling in a portion of her face and tongue but not all of it.  She continues to feel fatigued.  Also had a couple weeks of bilateral ankle joint stiffness.  We have tried multiple medications for her headaches, including right now using Cymbalta, we have tried propranolol, we have tried Topamax which was quite helpful but caused slowing  "cognitive function.  I have tried tricyclics without success.  Her headaches are still quite bothersome for her.  Has been doing some stretches and has done well with the chiropractor in the past as well.    Finally she would like to talk about heart disease.  She shares that her father had heart attack at about age 65.  Her brother had a heart attack this summer at age 50 and his son, her nephew had a heart attack the same week at age 27.  She shares that they all had blockage in a similar artery, and her nieces have been getting additional workup for this as they are concerned about a family pattern.  She does get weird occasional pain in her chest, but nothing that lasts for a long period of time.  Does notice increased shortness of breath with activity that has been worsening as of late.  Had the ankle stiffness but no lower extremity edema.  She previously did quite a bit of running, but has been more limited in this lately.  Reviewed previous cholesterol testing which did show elevation with total cholesterol of 272, and LDL of 188.  She does not have any other risk factors however and so ASCVD risk was low at 1.2%    OBJECTIVE:  Vitals: /85 (BP Location: Left arm, Patient Position: Sitting, Cuff Size: Adult Large)   Pulse 87   Temp 98.3  F (36.8  C) (Oral)   Resp 12   Ht 1.778 m (5' 10\")   Wt 95.3 kg (210 lb 3.2 oz)   LMP 02/05/2024 (Exact Date)   SpO2 97%   BMI 30.16 kg/m    BMI= Body mass index is 30.16 kg/m .  Objective:    Vitals:  Vitals are reviewed and are within the normal range  Gen:  Alert, pleasant, no acute distress  Cardiac:  Regular rate and rhythm, no murmurs, rubs or gallops  Respiratory:  Lungs clear to auscultation bilaterally        4/18/2023     4:35 PM 7/11/2023     8:43 AM 7/27/2023     7:44 AM   PHQ   PHQ-9 Total Score 10 13 13   Q9: Thoughts of better off dead/self-harm past 2 weeks Not at all Not at all Not at all          4/18/2023     4:35 PM 7/11/2023     8:43 AM " 7/27/2023     7:44 AM   JAMAR-7 SCORE   Total Score 14 18 13          No results found for any visits on 02/06/24.        Patient Instructions   Increase the Cymbalta to 30mg 2x per day to help with mood and headaches.      CT chest to check heart    Referral to see the neurologist for headaches.    Come back next week for labs.      Follow up in 2 months.          Anayeli Warner MD

## 2024-02-06 NOTE — PATIENT INSTRUCTIONS
Increase the Cymbalta to 30mg 2x per day to help with mood and headaches.      CT chest to check heart    Referral to see the neurologist for headaches.    Come back next week for labs.      Follow up in 2 months.

## 2024-02-14 ENCOUNTER — LAB (OUTPATIENT)
Dept: LAB | Facility: CLINIC | Age: 44
End: 2024-02-14
Payer: COMMERCIAL

## 2024-02-14 DIAGNOSIS — N18.2 STAGE 2 CHRONIC KIDNEY DISEASE: ICD-10-CM

## 2024-02-14 DIAGNOSIS — M25.572 ARTHRALGIA OF BOTH ANKLES: ICD-10-CM

## 2024-02-14 DIAGNOSIS — M25.571 ARTHRALGIA OF BOTH ANKLES: ICD-10-CM

## 2024-02-14 LAB
ALBUMIN UR-MCNC: NEGATIVE MG/DL
APPEARANCE UR: CLEAR
BILIRUB UR QL STRIP: ABNORMAL
COLOR UR AUTO: YELLOW
CREAT UR-MCNC: 323 MG/DL
ERYTHROCYTE [DISTWIDTH] IN BLOOD BY AUTOMATED COUNT: 13.9 % (ref 10–15)
ERYTHROCYTE [SEDIMENTATION RATE] IN BLOOD BY WESTERGREN METHOD: 7 MM/HR (ref 0–20)
GLUCOSE UR STRIP-MCNC: NEGATIVE MG/DL
HCT VFR BLD AUTO: 37.2 % (ref 35–47)
HGB BLD-MCNC: 12.8 G/DL (ref 11.7–15.7)
HGB UR QL STRIP: ABNORMAL
KETONES UR STRIP-MCNC: ABNORMAL MG/DL
LEUKOCYTE ESTERASE UR QL STRIP: NEGATIVE
MCH RBC QN AUTO: 31.5 PG (ref 26.5–33)
MCHC RBC AUTO-ENTMCNC: 34.4 G/DL (ref 31.5–36.5)
MCV RBC AUTO: 92 FL (ref 78–100)
MICROALBUMIN UR-MCNC: 12.4 MG/L
MICROALBUMIN/CREAT UR: 3.84 MG/G CR (ref 0–25)
NITRATE UR QL: NEGATIVE
PH UR STRIP: 6 [PH] (ref 5–8)
PLATELET # BLD AUTO: 284 10E3/UL (ref 150–450)
RBC # BLD AUTO: 4.06 10E6/UL (ref 3.8–5.2)
SP GR UR STRIP: 1.02 (ref 1–1.03)
UROBILINOGEN UR STRIP-ACNC: 1 E.U./DL
WBC # BLD AUTO: 5.9 10E3/UL (ref 4–11)

## 2024-02-14 PROCEDURE — 36415 COLL VENOUS BLD VENIPUNCTURE: CPT

## 2024-02-14 PROCEDURE — 85027 COMPLETE CBC AUTOMATED: CPT

## 2024-02-14 PROCEDURE — 81003 URINALYSIS AUTO W/O SCOPE: CPT

## 2024-02-14 PROCEDURE — 86431 RHEUMATOID FACTOR QUANT: CPT

## 2024-02-14 PROCEDURE — 82570 ASSAY OF URINE CREATININE: CPT

## 2024-02-14 PROCEDURE — 86140 C-REACTIVE PROTEIN: CPT

## 2024-02-14 PROCEDURE — 80048 BASIC METABOLIC PNL TOTAL CA: CPT

## 2024-02-14 PROCEDURE — 85652 RBC SED RATE AUTOMATED: CPT

## 2024-02-14 PROCEDURE — 82043 UR ALBUMIN QUANTITATIVE: CPT

## 2024-02-14 PROCEDURE — 82728 ASSAY OF FERRITIN: CPT

## 2024-02-15 LAB
ANION GAP SERPL CALCULATED.3IONS-SCNC: 8 MMOL/L (ref 7–15)
BUN SERPL-MCNC: 15.5 MG/DL (ref 6–20)
CALCIUM SERPL-MCNC: 9.3 MG/DL (ref 8.6–10)
CHLORIDE SERPL-SCNC: 102 MMOL/L (ref 98–107)
CREAT SERPL-MCNC: 1.31 MG/DL (ref 0.51–0.95)
CRP SERPL-MCNC: 3 MG/L
DEPRECATED HCO3 PLAS-SCNC: 29 MMOL/L (ref 22–29)
EGFRCR SERPLBLD CKD-EPI 2021: 51 ML/MIN/1.73M2
FERRITIN SERPL-MCNC: 41 NG/ML (ref 6–175)
GLUCOSE SERPL-MCNC: 80 MG/DL (ref 70–99)
POTASSIUM SERPL-SCNC: 3.9 MMOL/L (ref 3.4–5.3)
RHEUMATOID FACT SERPL-ACNC: <10 IU/ML
SODIUM SERPL-SCNC: 139 MMOL/L (ref 135–145)

## 2024-02-16 NOTE — RESULT ENCOUNTER NOTE
Sanguine message sent.    Results for orders placed or performed in visit on 02/14/24  -Basic metabolic panel:      Status: Abnormal       Result                                            Value                         Ref Range                       Sodium                                            139                           135 - 145 mmol/L                Potassium                                         3.9                           3.4 - 5.3 mmol/L                Chloride                                          102                           98 - 107 mmol/L                 Carbon Dioxide (CO2)                              29                            22 - 29 mmol/L                  Anion Gap                                         8                             7 - 15 mmol/L                   Urea Nitrogen                                     15.5                          6.0 - 20.0 mg/dL                Creatinine                                        1.31 (H)                      0.51 - 0.95 mg/dL               GFR Estimate                                      51 (L)                        >60 mL/min/1.73m2               Calcium                                           9.3                           8.6 - 10.0 mg/dL                Glucose                                           80                            70 - 99 mg/dL              -CBC with platelets:      Status: Normal       Result                                            Value                         Ref Range                       WBC Count                                         5.9                           4.0 - 11.0 10e3/uL              RBC Count                                         4.06                          3.80 - 5.20 10e6/uL             Hemoglobin                                        12.8                          11.7 - 15.7 g/dL                Hematocrit                                        37.2                          35.0 -  47.0 %                   MCV                                               92                            78 - 100 fL                     MCH                                               31.5                          26.5 - 33.0 pg                  MCHC                                              34.4                          31.5 - 36.5 g/dL                RDW                                               13.9                          10.0 - 15.0 %                   Platelet Count                                    284                           150 - 450 10e3/uL          -Urine Macroscopic with reflex to Microscopic:      Status: Abnormal       Result                                            Value                         Ref Range                       Color Urine                                       Yellow                        Colorless, Straw, Light *       Appearance Urine                                  Clear                         Clear                           Glucose Urine                                     Negative                      Negative mg/dL                  Bilirubin Urine                                   Small (A)                     Negative                        Ketones Urine                                     Trace (A)                     Negative mg/dL                  Specific Gravity Urine                            1.020                         1.005 - 1.030                   Blood Urine                                       Moderate (A)                  Negative                        pH Urine                                          6.0                           5.0 - 8.0                       Protein Albumin Urine                             Negative                      Negative mg/dL                  Urobilinogen Urine                                1.0                           0.2, 1.0 E.U./dL                Nitrite Urine                                     Negative                       Negative                        Leukocyte Esterase Urine                          Negative                      Negative                   -Albumin Random Urine Quantitative with Creat Ratio:      Status: None       Result                                            Value                         Ref Range                       Creatinine Urine mg/dL                            323.0                         mg/dL                           Albumin Urine mg/L                                12.4                          mg/L                            Albumin Urine mg/g Cr                             3.84                          0.00 - 25.00 mg/g Cr       -Ferritin:      Status: Normal       Result                                            Value                         Ref Range                       Ferritin                                          41                            6 - 175 ng/mL              -CRP inflammation:      Status: Normal       Result                                            Value                         Ref Range                       CRP Inflammation                                  3.00                          <5.00 mg/L                 -Erythrocyte sedimentation rate auto:      Status: Normal       Result                                            Value                         Ref Range                       Erythrocyte Sedimentation Rate                    7                             0 - 20 mm/hr               -Rheumatoid factor:      Status: Normal       Result                                            Value                         Ref Range                       Rheumatoid Factor                                 <10                           <14 IU/mL

## 2024-02-22 ENCOUNTER — ANCILLARY PROCEDURE (OUTPATIENT)
Dept: CT IMAGING | Facility: CLINIC | Age: 44
End: 2024-02-22
Attending: STUDENT IN AN ORGANIZED HEALTH CARE EDUCATION/TRAINING PROGRAM
Payer: COMMERCIAL

## 2024-02-22 DIAGNOSIS — R07.9 CHEST PAIN, UNSPECIFIED TYPE: ICD-10-CM

## 2024-02-22 PROCEDURE — 75571 CT HRT W/O DYE W/CA TEST: CPT | Performed by: STUDENT IN AN ORGANIZED HEALTH CARE EDUCATION/TRAINING PROGRAM

## 2024-02-22 NOTE — LETTER
February 22, 2024      Jeanie French  [address] [place] [address]        Dear ,    We are writing to inform you of your test results.    Thank you for doing the CT calcium scan.  The scan came back with a calcium score of 0.  This is the ideal result, and indicates that you are at low risk of heart disease.  They also looked for other potential issues on the CT, and everything looks normal in heart, lungs, and abdomen.  No concerns.  Please call the clinic at [phone] if you have any questions.       Resulted Orders   CT Coronary Calcium Scan    Narrative    Procedure: CT CALCIUM SCREENING   Examination Date: 2/22/2024 11:12 AM   Indication: Family history of coronary artery disease  Clinical Information: Strong family hx of early heart disease (age  27),  rule out CAD; Chest pain, unspecified type   Ordering Provider: MARY JANE ARRIAGA  Quality of the study: Good  PROCEDURE: High-resolution, ECG synchronized multi-slice computed  tomography was performed with a Siemens Somatom Sensation 64-slice  scanner without incident. Coronary calcification was analyzed using  [employer] calcium scoring software. Scan protocol was optimized to  minimize radiation exposure. The total radiation exposure was  calculated to be 72 DLP and 1.0 mSv.    IMPRESSIONS:  1.  No coronary calcifications.  2.  The total Agatston calcium score is 0 placing the patient in the  lowest percentile when compared to age and gender matched control  group.  3.  Recommend aggressive risk factor modification.  4.  Please review the separate Radiology report for incidental  noncardiac findings.     FINDINGS:    CORONARY ARTERY CALCIUM SCORES:   Total calcium score: 0  Left main coronary artery: 0  Left anterior descending coronary artery: 0  Circumflex coronary artery: 0  Right coronary artery: 0    BACKGROUND  A coronary artery calcium (CAC) score is a measurement of the amount  of calcium (hard plaque) in the walls of the  "arteries that supply the  heart muscle. Numerous studies have indicated that this test is a  reliable measure of risk for adverse cardiovascular events, such as  heart attack and stroke.    MANAGEMENT  The American Heart Association/American College of Cardiology  (AHA/ACC) 2018 Guideline on the Management of Blood Cholesterol  states: \"If CAC is zero, treatment with statin therapy may be withheld  or delayed, except in certain very high risk individuals such as  cigarette smokers, those with diabetes mellitus, and those with a  strong family history of premature atherosclerotic disease. A CAC  score of 1 to 99 favors statin therapy, especially in those 55 years  of age or older. For any patient, if the CAC score is greater than or  equal to 100 Agatston units or greater than or equal to 75th  percentile, statin therapy is indicated unless otherwise deferred by  the outcome of clinician-patient risk discussion.\"    The Society of Cardiovascular CT (SCCT) CAC guideline recommends the  following:  CAC score 0: statin generally not recommended  CAC score 1-99: moderate intensity statin generally recommended  CAC score 100-299: moderate to high intensity statin + aspirin 81 mg  CAC score >300: high intensity statin + aspirin 81 mg    GENERAL RECOMMENDATIONS  Adoption and maintenance of a healthy lifestyle is recommended for all  people. This should include regular, appropriate exercise and  observance of a proper diet, to ensure balanced nutrition and weight  control. Tobacco use should be avoided. Cholesterol has been linked to  coronary atherosclerosis, and we strongly encourage adhering to the  recommendations of the 2018 ACC/AHA guidelines on the Management of  Blood Cholesterol. For primary prevention, these include calculation  of 10-year ASCVD risk and initiation of statin therapy based on  estimated ASCVD risk and LDL cholesterol. The RETANA risk score, which  combines traditional risk factors and CAC, is " available online on the  MO website  (https://www.mo-nhlbi.org/MESACHDRisk/MesaRiskScore/RiskScore.aspx).  (Hodan JEFFREY, et al. J Am Naheed Cardiol. 2015 Oct 13;66(15):1643-53.)    However note that these are general recommendations only, and as with  all such matters, the personal physician should be consulted regarding  recommendations appropriate for the individual. If further guidance is  needed, you can schedule an appointment with one of our Preventive  Cardiologists  ([email or web address]).    References:  1. 2018 AHA/ACC/AACVPR/AAPA/ABC/ACPM/ADA/AGS/APhA/ASPC/NLA/PCNA  Guideline on the Management of Blood Cholesterol  2. CAC-DRS: Coronary Artery Calcium Data and Reporting System. An  expert consensus document of the Society of Cardiovascular Computed  Tomography (SCCT)  3. Chang VILLANUEVA et al. J Am Naheed Cardiol. 2022 May 17;79(19):8081-0669;  associated calculator at https://www.cac-tools.com (used for  calculation of calcium score percentiles in patients ages 30-45)    TYSHAWN HENDERSON MD         SYSTEM ID:  [id number]       If you have any questions or concerns, please call the clinic at the number listed above.       Sincerely,      Anayeli Warner MD

## 2024-02-22 NOTE — RESULT ENCOUNTER NOTE
Jeanie French-    Thank you for doing the CT calcium scan.  The scan came back with a calcium score of 0.  This is the ideal result, and indicates that you are at low risk of heart disease.  They also looked for other potential issues on the CT, and everything looks normal in heart, lungs, and abdomen.  No concerns.  Please call the clinic at [phone] if you have any questions.      Anayeli Warner MD    Please send results to patient.

## 2024-05-05 DIAGNOSIS — G43.009 MIGRAINE WITHOUT AURA AND WITHOUT STATUS MIGRAINOSUS, NOT INTRACTABLE: ICD-10-CM

## 2024-05-05 DIAGNOSIS — F33.2 SEVERE EPISODE OF RECURRENT MAJOR DEPRESSIVE DISORDER, WITHOUT PSYCHOTIC FEATURES (H): ICD-10-CM

## 2024-05-05 DIAGNOSIS — F41.1 GENERALIZED ANXIETY DISORDER: ICD-10-CM

## 2024-05-06 RX ORDER — DULOXETIN HYDROCHLORIDE 30 MG/1
30 CAPSULE, DELAYED RELEASE ORAL 2 TIMES DAILY
Qty: 60 CAPSULE | Refills: 3 | Status: SHIPPED | OUTPATIENT
Start: 2024-05-06

## 2024-05-06 RX ORDER — PROPRANOLOL HYDROCHLORIDE 10 MG/1
10 TABLET ORAL 3 TIMES DAILY
Qty: 90 TABLET | Refills: 0 | Status: SHIPPED | OUTPATIENT
Start: 2024-05-06 | End: 2024-06-14

## 2024-05-06 NOTE — TELEPHONE ENCOUNTER
Requested Renewals     Name from pharmacy: DULoxetine HCl Oral Capsule Delayed Release Particles 30 MG         Will file in chart as: DULoxetine (CYMBALTA) 30 MG capsule    Sig: TAKE 1 CAPSULE (30 MG) BY MOUTH 2 TIMES DAILY    Disp: 60 capsule    Refills: 0    Start: 5/5/2024    Class: E-Prescribe    Non-formulary For: Migraine without aura and without status migrainosus, not intractable; Generalized anxiety disorder; Severe episode of recurrent major depressive disorder, without psychotic features (H)    Last ordered: 3 months ago (2/6/2024) by Anayeli Warner MD    Last refill: 3/24/2024    Rx #: [id number]    Serotonin-Norepinephrine Reuptake Inhibitors  Melegb2105/05/2024 06:22 PM   Protocol Details PHQ-9 score of less than 5 in past 6 months    JAMAR-7 score of less than 5 in past 6 months.    Blood pressure under 140/90 in past 12 months    Medication is active on med list    Patient is age 18 or older    No active pregnancy on record    No positive pregnancy test in past 12 months    Recent (6 mo) or future (30 days) visit within the authorizing provider's specialty           BP Readings from Last 3 Encounters:   02/06/24 124/85   07/26/23 113/74   04/18/23 136/85       Name from pharmacy: Propranolol HCl Oral Tablet 10 MG          Will file in chart as: propranolol (INDERAL) 10 MG tablet    Sig: TAKE 1 TABLET BY MOUTH THREE TIMES DAILY    Disp: 90 tablet    Refills: 0    Start: 5/5/2024    Class: E-Prescribe    Non-formulary For: Migraine without aura and without status migrainosus, not intractable    Last ordered: 3 months ago (1/8/2024) by Anayeli Warner MD    Last refill: 2/15/2024    Rx #: [id number]    Beta-Blockers Protocol Xciqtz0505/05/2024 06:22 PM   Protocol Details Blood pressure under 140/90 in past 12 months    Patient is age 6 or older    Medication is active on med list    Medication indicated for associated diagnosis    Recent (12 mo) or future (90 days) visit within the authorizing provider's  specialty      To be filled at: [id number] - [place] - [address]     BP Readings from Last 3 Encounters:   02/06/24 124/85   07/26/23 113/74   04/18/23 136/85     LINN Cantor

## 2024-06-12 DIAGNOSIS — G43.009 MIGRAINE WITHOUT AURA AND WITHOUT STATUS MIGRAINOSUS, NOT INTRACTABLE: ICD-10-CM

## 2024-06-14 RX ORDER — PROPRANOLOL HYDROCHLORIDE 10 MG/1
10 TABLET ORAL 3 TIMES DAILY
Qty: 90 TABLET | Refills: 0 | Status: SHIPPED | OUTPATIENT
Start: 2024-06-14 | End: 2024-07-16

## 2024-06-14 NOTE — TELEPHONE ENCOUNTER
Name from pharmacy: Propranolol HCl Oral Tablet 10 MG          Will file in chart as: propranolol (INDERAL) 10 MG tablet    Sig: TAKE 1 TABLET BY MOUTH THREE TIMES DAILY    Disp: 90 tablet    Refills: 0    Start: 6/12/2024    Class: E-Prescribe    Non-formulary For: Migraine without aura and without status migrainosus, not intractable    Last ordered: 1 month ago (5/6/2024) by Anayeli Warner MD    Last refill: 5/6/2024    Rx #: [id number]    Beta-Blockers Protocol Fafctu5406/12/2024 11:11 PM   Protocol Details Blood pressure under 140/90 in past 12 months    Patient is age 6 or older    Medication is active on med list    Medication indicated for associated diagnosis    Recent (12 mo) or future (90 days) visit within the authorizing provider's specialty        BP Readings from Last 3 Encounters:   02/06/24 124/85   07/26/23 113/74   04/18/23 136/85     Prescription approved per [facility] Refill Protocol.  Donnie RN, BSN

## 2024-07-02 ENCOUNTER — TELEPHONE (OUTPATIENT)
Dept: FAMILY MEDICINE | Facility: CLINIC | Age: 44
End: 2024-07-02
Payer: COMMERCIAL

## 2024-07-02 NOTE — TELEPHONE ENCOUNTER
Patient Quality Outreach    Patient is due for the following:   Asthma  -  Asthma follow-up visit    Next Steps:   Schedule a office visit for Asthma    Type of outreach:    Phone, spoke to patient/parent. Scheduled Asthma followup on 08/23/24 @ 430 pm    Next Steps:  Reach out within 90 days via Phone.    Max number of attempts reached: Yes. Will try again in 90 days if patient still on fail list.    Questions for provider review:    None           Omid Jackson  Chart routed to Care Team.

## 2024-07-16 ENCOUNTER — OFFICE VISIT (OUTPATIENT)
Dept: NEUROLOGY | Facility: CLINIC | Age: 44
End: 2024-07-16
Attending: STUDENT IN AN ORGANIZED HEALTH CARE EDUCATION/TRAINING PROGRAM
Payer: COMMERCIAL

## 2024-07-16 VITALS
WEIGHT: 215 LBS | HEART RATE: 73 BPM | SYSTOLIC BLOOD PRESSURE: 122 MMHG | DIASTOLIC BLOOD PRESSURE: 79 MMHG | BODY MASS INDEX: 30.85 KG/M2

## 2024-07-16 DIAGNOSIS — G43.009 MIGRAINE WITHOUT AURA AND WITHOUT STATUS MIGRAINOSUS, NOT INTRACTABLE: ICD-10-CM

## 2024-07-16 DIAGNOSIS — H53.2 DOUBLE VISION: ICD-10-CM

## 2024-07-16 PROCEDURE — 99204 OFFICE O/P NEW MOD 45 MIN: CPT | Performed by: INTERNAL MEDICINE

## 2024-07-16 RX ORDER — PROPRANOLOL HCL 60 MG
60 CAPSULE, EXTENDED RELEASE 24HR ORAL DAILY
Qty: 30 CAPSULE | Refills: 6 | Status: SHIPPED | OUTPATIENT
Start: 2024-07-16

## 2024-07-16 RX ORDER — RIZATRIPTAN BENZOATE 5 MG/1
5 TABLET ORAL
Qty: 9 TABLET | Refills: 5 | Status: SHIPPED | OUTPATIENT
Start: 2024-07-16

## 2024-07-16 RX ORDER — LOPERAMIDE HYDROCHLORIDE 2 MG/1
TABLET ORAL
COMMUNITY
Start: 2022-12-02

## 2024-07-16 NOTE — PROGRESS NOTES
"[facility] Neurology Consultation    Jeanie French MRN# [id number]   Age: 44 year old YOB: 1980     Requesting physician: Anayeli Jimenez     Reason for Consultation: headaches           Assessment and Plan:   Assessment:  Jeanie French is a 44 year old female who presents today for evaluation of headaches. Headaches are likely primarily migraine type with occasional tension type headache. Given new onset of migraines in the last few years we will do imaging to evaluate for secondary causes. We will also do vessel imaging given reports of episodic vertical double vision. Patient is currently taking propranolol 10 mg TID, which we will increase to 60 mg daily. If this is not helpful in the next month, we can increase the dosage, or switch to an alternative medication (likely CGRP antagonist). Maxalt was also prescribed as a prn migraine medication     Plan:  - Increase propranolol to 60 mg daily  - MRI brain, MRA head/neck  - Maxalt 5 mg prn  - MyChart update in 1 month    Follow up in Neurology clinic in 6 months or earlier as needed should new concerns arise.    Bala Andrew MD   of Neurology  [facility]  -------------------------------------------------------------------------------------------------------------------------------------      History of Presenting Symptoms:   Jeanie French is a 44 year old female who presents today for evaluation of headaches.     \"Migraines\" first started about 2-3 years ago. Prior to that she had tension headaches for many years.     Usually she has about 2 migraines a week, but in the last 2 weeks has just had 2. She has a tension headache about once every 2 weeks.     Migraines are usually unilateral (either side). She gets light/sound sensitivity. She has had nausea a few times, but no vomiting. She denies any visual aura with her migraines currently. She has in the past had jaggedly lines with headaches. " Headaches are more often around her cycle. She takes birth control pills (3 months birth control and 1 week placebo). She gets more headaches when she is on the 1 week placebo. Neck stiffness, poor sleep, and poor hydration can lead to exacerbation of headaches. She doesn't drink much caffeine. She commonly gets migraines in the middle of the night. Falling asleep can help with headaches.     She takes propranolol 10 mg TID and Imitrex as needed. She hasn't noticed benefit with propranolol. Imitrex is helpful most of the time. She was on Topamax previously. She was on amitriptyline for IBS (not sure if it helped for migraines). She had side effects with venlafaxine. She takes tylenol as needed. She can't take NSAIDs.     Tension headaches more creep up from the neck and the pain is not as intense. She gets tension headaches which can turn into migraines.     Patient intermittently notices vertical double vision. On several occasions it has lasted a full minute. Other times she blinks a few times and it's gone. This happens every few weeks.       Past Medical History:     Patient Active Problem List   Diagnosis    Cancer of tongue (H)    Generalized anxiety disorder    Tenosynovitis of the right index finger    Wheezing    Irritable bowel syndrome (IBS)    Exercise-induced asthma    Sinusitis, chronic    Allergic rhinitis, unspecified allergic rhinitis type    Hypovitaminosis D    Adjustment disorder with anxiety    Menorrhagia with regular cycle    Major depressive disorder with single episode    Iron deficiency anemia due to chronic blood loss    Collagenous colitis    Eosinophilic esophagitis     Past Medical History:   Diagnosis Date    Asthma     Depression, anxiety     Noninfectious ileitis     IBS    Oral cancer (H)     Oral cancer         Past Surgical History:     Past Surgical History:   Procedure Laterality Date    ENT SURGERY      PE tubes    EXCISION TONGUE  3/13/2012    Procedure:EXCISION TONGUE; Wide  Local Excision Right Lateral Tongue  ; Surgeon:DE MERRITT; Location:[facility] OR        Social History:     Social History     Tobacco Use    Smoking status: Never    Smokeless tobacco: Never   Substance Use Topics    Alcohol use: No    Drug use: No        Family History:     Family History   Problem Relation Age of Onset    Diabetes Maternal Grandmother     Diabetes Maternal Grandfather     Diabetes Other     Cancer Mother         Chronic leukemia     Coronary Artery Disease Father     Anesthesia Reaction No family hx of     Colon Polyps No family hx of     Crohn's Disease No family hx of     Ulcerative Colitis No family hx of     Colon Cancer No family hx of     Hypertension No family hx of     Hyperlipidemia No family hx of     Cerebrovascular Disease No family hx of     Breast Cancer No family hx of     Prostate Cancer No family hx of     Other Cancer No family hx of     Depression No family hx of     Anxiety Disorder No family hx of     Mental Illness No family hx of     Substance Abuse No family hx of     Asthma No family hx of     Osteoporosis No family hx of     Genetic Disorder No family hx of     Thyroid Disease No family hx of     Obesity No family hx of     Unknown/Adopted No family hx of         Medications:     Current Outpatient Medications   Medication Sig Dispense Refill    albuterol (PROAIR HFA/PROVENTIL HFA/VENTOLIN HFA) 108 (90 Base) MCG/ACT inhaler Inhale 2 puffs into the lungs every 6 hours as needed for shortness of breath / dyspnea or wheezing 1 Inhaler 1    budesonide (ENTOCORT EC) 3 MG EC capsule       busPIRone HCl (BUSPAR) 30 MG tablet Take 1 tablet (30 mg) by mouth 2 times daily 180 tablet 3    DULoxetine (CYMBALTA) 30 MG capsule TAKE 1 CAPSULE (30 MG) BY MOUTH 2 TIMES DAILY 60 capsule 3    hydrOXYzine (VISTARIL) 25 MG capsule TAKE 1 CAPSULE 3 TIMES DAILY AND 2-4 CAPSULES DAILY AT NIGHT. 540 capsule 3    norgestrel-ethinyl estradiol (LOW-OGESTREL) 0.3-30 MG-MCG tablet TAKE 1 TABLET BY  MOUTH DAILY TAKE 1 PILL DAILY FOR 63 DAYS, THEN TAKE PLACEBO PILLS FOR 7 DAYS. 84 tablet 4    omeprazole (PRILOSEC) 40 MG DR capsule Take 1 capsule (40 mg) by mouth daily 90 capsule 3    propranolol (INDERAL) 10 MG tablet TAKE 1 TABLET BY MOUTH THREE TIMES DAILY 90 tablet 0    sodium chloride (OCEAN) 0.65 % nasal spray Use 1 spray in each nostril 4x per day as needed. 15 mL 1    spacer ([employer] BARB) holding chamber Use as needed with inhaler 1 each 0    SUMAtriptan (IMITREX) 25 MG tablet Take 1 tablet (25 mg) by mouth at onset of headache for migraine May repeat in 2 hours. Max 8 tablets/24 hours. 12 tablet 5     No current facility-administered medications for this visit.        Allergies:     Allergies   Allergen Reactions    Nkda [No Known Drug Allergy]         Review of Systems:   As above     Physical Exam:   Vitals: /79 (BP Location: Right arm, Patient Position: Sitting, Cuff Size: Adult Regular)   Pulse 73   Wt 97.5 kg (215 lb)   BMI 30.85 kg/m     General: Seated comfortably in no acute distress.  HEENT: Optic discs sharp on funduscopic exam.   Lungs: breathing comfortably  Neurologic:     Mental Status: Fully alert, attentive. Language normal, speech clear and fluent, no paraphasic errors.      Cranial Nerves: Visual fields intact. PERRL. EOMI with normal smooth pursuit. Facial sensation intact/symmetric. Facial movements symmetric. Hearing not formally tested but intact to conversation. Palate elevation symmetric, uvula midline. No dysarthria. Shoulder shrug strong bilaterally. Tongue protrusion midline.     Motor: No significant tremors or other abnormal movements observed. Muscle tone normal throughout. Strength 5/5 throughout upper and lower extremities.     Deep Tendon Reflexes: 2+/symmetric throughout upper and lower extremities with exception of possible 2-3+ in the patella. No clonus.      Sensory: Intact/symmetric to light touch throughout upper and lower extremities. Negative  Romberg.      Coordination: Finger-nose-finger and heel-shin intact without dysmetria.      Gait: Normal, steady casual gait.          Data: Pertinent prior to visit   Imaging:  CT head 2015  IMPRESSION:   1. Normal postcontrast head CT.     Procedures:  None    Laboratory:  None

## 2024-07-16 NOTE — LETTER
"7/16/2024      Jeanie French  [address] [place] [address]      Dear Colleague,    Thank you for referring your patient, Jeanie French, to the [facility]UR[facility] [place]. Please see a copy of my visit note below.    [facility] Neurology Consultation    Jeanie French MRN# [id number]   Age: 44 year old YOB: 1980     Requesting physician: Anayeli Jimenez     Reason for Consultation: headaches           Assessment and Plan:   Assessment:  Jeanie French is a 44 year old female who presents today for evaluation of headaches. Headaches are likely primarily migraine type with occasional tension type headache. Given new onset of migraines in the last few years we will do imaging to evaluate for secondary causes. We will also do vessel imaging given reports of episodic vertical double vision. Patient is currently taking propranolol 10 mg TID, which we will increase to 60 mg daily. If this is not helpful in the next month, we can increase the dosage, or switch to an alternative medication (likely CGRP antagonist). Maxalt was also prescribed as a prn migraine medication     Plan:  - Increase propranolol to 60 mg daily  - MRI brain, MRA head/neck  - Maxalt 5 mg prn  - MyChart update in 1 month    Follow up in Neurology clinic in 6 months or earlier as needed should new concerns arise.    Bala Andrew MD   of Neurology  [facility]  -------------------------------------------------------------------------------------------------------------------------------------      History of Presenting Symptoms:   Jeanie French is a 44 year old female who presents today for evaluation of headaches.     \"Migraines\" first started about 2-3 years ago. Prior to that she had tension headaches for many years.     Usually she has about 2 migraines a week, but in the last 2 weeks has just had 2. She has a tension headache about once every 2 weeks. "     Migraines are usually unilateral (either side). She gets light/sound sensitivity. She has had nausea a few times, but no vomiting. She denies any visual aura with her migraines currently. She has in the past had jaggedly lines with headaches. Headaches are more often around her cycle. She takes birth control pills (3 months birth control and 1 week placebo). She gets more headaches when she is on the 1 week placebo. Neck stiffness, poor sleep, and poor hydration can lead to exacerbation of headaches. She doesn't drink much caffeine. She commonly gets migraines in the middle of the night. Falling asleep can help with headaches.     She takes propranolol 10 mg TID and Imitrex as needed. She hasn't noticed benefit with propranolol. Imitrex is helpful most of the time. She was on Topamax previously. She was on amitriptyline for IBS (not sure if it helped for migraines). She had side effects with venlafaxine. She takes tylenol as needed. She can't take NSAIDs.     Tension headaches more creep up from the neck and the pain is not as intense. She gets tension headaches which can turn into migraines.     Patient intermittently notices vertical double vision. On several occasions it has lasted a full minute. Other times she blinks a few times and it's gone. This happens every few weeks.       Past Medical History:     Patient Active Problem List   Diagnosis     Cancer of tongue (H)     Generalized anxiety disorder     Tenosynovitis of the right index finger     Wheezing     Irritable bowel syndrome (IBS)     Exercise-induced asthma     Sinusitis, chronic     Allergic rhinitis, unspecified allergic rhinitis type     Hypovitaminosis D     Adjustment disorder with anxiety     Menorrhagia with regular cycle     Major depressive disorder with single episode     Iron deficiency anemia due to chronic blood loss     Collagenous colitis     Eosinophilic esophagitis     Past Medical History:   Diagnosis Date     Asthma       Depression, anxiety      Noninfectious ileitis     IBS     Oral cancer (H)     Oral cancer         Past Surgical History:     Past Surgical History:   Procedure Laterality Date     ENT SURGERY      PE tubes     EXCISION TONGUE  3/13/2012    Procedure:EXCISION TONGUE; Wide Local Excision Right Lateral Tongue  ; Surgeon:DE MERRITT; Location:[facility] OR        Social History:     Social History     Tobacco Use     Smoking status: Never     Smokeless tobacco: Never   Substance Use Topics     Alcohol use: No     Drug use: No        Family History:     Family History   Problem Relation Age of Onset     Diabetes Maternal Grandmother      Diabetes Maternal Grandfather      Diabetes Other      Cancer Mother         Chronic leukemia      Coronary Artery Disease Father      Anesthesia Reaction No family hx of      Colon Polyps No family hx of      Crohn's Disease No family hx of      Ulcerative Colitis No family hx of      Colon Cancer No family hx of      Hypertension No family hx of      Hyperlipidemia No family hx of      Cerebrovascular Disease No family hx of      Breast Cancer No family hx of      Prostate Cancer No family hx of      Other Cancer No family hx of      Depression No family hx of      Anxiety Disorder No family hx of      Mental Illness No family hx of      Substance Abuse No family hx of      Asthma No family hx of      Osteoporosis No family hx of      Genetic Disorder No family hx of      Thyroid Disease No family hx of      Obesity No family hx of      Unknown/Adopted No family hx of         Medications:     Current Outpatient Medications   Medication Sig Dispense Refill     albuterol (PROAIR HFA/PROVENTIL HFA/VENTOLIN HFA) 108 (90 Base) MCG/ACT inhaler Inhale 2 puffs into the lungs every 6 hours as needed for shortness of breath / dyspnea or wheezing 1 Inhaler 1     budesonide (ENTOCORT EC) 3 MG EC capsule        busPIRone HCl (BUSPAR) 30 MG tablet Take 1 tablet (30 mg) by mouth 2 times daily 180  tablet 3     DULoxetine (CYMBALTA) 30 MG capsule TAKE 1 CAPSULE (30 MG) BY MOUTH 2 TIMES DAILY 60 capsule 3     hydrOXYzine (VISTARIL) 25 MG capsule TAKE 1 CAPSULE 3 TIMES DAILY AND 2-4 CAPSULES DAILY AT NIGHT. 540 capsule 3     norgestrel-ethinyl estradiol (LOW-OGESTREL) 0.3-30 MG-MCG tablet TAKE 1 TABLET BY MOUTH DAILY TAKE 1 PILL DAILY FOR 63 DAYS, THEN TAKE PLACEBO PILLS FOR 7 DAYS. 84 tablet 4     omeprazole (PRILOSEC) 40 MG DR capsule Take 1 capsule (40 mg) by mouth daily 90 capsule 3     propranolol (INDERAL) 10 MG tablet TAKE 1 TABLET BY MOUTH THREE TIMES DAILY 90 tablet 0     sodium chloride (OCEAN) 0.65 % nasal spray Use 1 spray in each nostril 4x per day as needed. 15 mL 1     spacer (OPTICHAMBER BARB) holding chamber Use as needed with inhaler 1 each 0     SUMAtriptan (IMITREX) 25 MG tablet Take 1 tablet (25 mg) by mouth at onset of headache for migraine May repeat in 2 hours. Max 8 tablets/24 hours. 12 tablet 5     No current facility-administered medications for this visit.        Allergies:     Allergies   Allergen Reactions     Nkda [No Known Drug Allergy]         Review of Systems:   As above     Physical Exam:   Vitals: /79 (BP Location: Right arm, Patient Position: Sitting, Cuff Size: Adult Regular)   Pulse 73   Wt 97.5 kg (215 lb)   BMI 30.85 kg/m     General: Seated comfortably in no acute distress.  HEENT: Optic discs sharp on funduscopic exam.   Lungs: breathing comfortably  Neurologic:     Mental Status: Fully alert, attentive. Language normal, speech clear and fluent, no paraphasic errors.      Cranial Nerves: Visual fields intact. PERRL. EOMI with normal smooth pursuit. Facial sensation intact/symmetric. Facial movements symmetric. Hearing not formally tested but intact to conversation. Palate elevation symmetric, uvula midline. No dysarthria. Shoulder shrug strong bilaterally. Tongue protrusion midline.     Motor: No significant tremors or other abnormal movements observed.  Muscle tone normal throughout. Strength 5/5 throughout upper and lower extremities.     Deep Tendon Reflexes: 2+/symmetric throughout upper and lower extremities with exception of possible 2-3+ in the patella. No clonus.      Sensory: Intact/symmetric to light touch throughout upper and lower extremities. Negative Romberg.      Coordination: Finger-nose-finger and heel-shin intact without dysmetria.      Gait: Normal, steady casual gait.          Data: Pertinent prior to visit   Imaging:  CT head 2015  IMPRESSION:   1. Normal postcontrast head CT.     Procedures:  None    Laboratory:  None      Again, thank you for allowing me to participate in the care of your patient.        Sincerely,        Bala Andrew MD

## 2024-07-23 ENCOUNTER — OFFICE VISIT (OUTPATIENT)
Dept: FAMILY MEDICINE | Facility: CLINIC | Age: 44
End: 2024-07-23
Payer: COMMERCIAL

## 2024-07-23 VITALS
BODY MASS INDEX: 31.01 KG/M2 | HEIGHT: 70 IN | DIASTOLIC BLOOD PRESSURE: 75 MMHG | TEMPERATURE: 97.9 F | OXYGEN SATURATION: 97 % | HEART RATE: 76 BPM | WEIGHT: 216.6 LBS | RESPIRATION RATE: 20 BRPM | SYSTOLIC BLOOD PRESSURE: 109 MMHG

## 2024-07-23 DIAGNOSIS — N92.0 MENORRHAGIA WITH REGULAR CYCLE: ICD-10-CM

## 2024-07-23 DIAGNOSIS — F41.1 GENERALIZED ANXIETY DISORDER: ICD-10-CM

## 2024-07-23 DIAGNOSIS — G43.009 MIGRAINE WITHOUT AURA AND WITHOUT STATUS MIGRAINOSUS, NOT INTRACTABLE: ICD-10-CM

## 2024-07-23 DIAGNOSIS — F33.2 SEVERE EPISODE OF RECURRENT MAJOR DEPRESSIVE DISORDER, WITHOUT PSYCHOTIC FEATURES (H): ICD-10-CM

## 2024-07-23 DIAGNOSIS — N18.2 STAGE 2 CHRONIC KIDNEY DISEASE: Primary | ICD-10-CM

## 2024-07-23 DIAGNOSIS — R63.5 WEIGHT GAIN: ICD-10-CM

## 2024-07-23 DIAGNOSIS — R53.83 OTHER FATIGUE: ICD-10-CM

## 2024-07-23 LAB
ALBUMIN UR-MCNC: ABNORMAL MG/DL
ANION GAP SERPL CALCULATED.3IONS-SCNC: 7 MMOL/L (ref 3–14)
APPEARANCE UR: CLEAR
BACTERIA #/AREA URNS HPF: ABNORMAL /HPF
BILIRUB UR QL STRIP: ABNORMAL
BUN SERPL-MCNC: 16 MG/DL (ref 7–30)
CALCIUM SERPL-MCNC: 9.9 MG/DL (ref 8.5–10.1)
CHLORIDE BLD-SCNC: 104 MMOL/L (ref 94–109)
CO2 SERPL-SCNC: 28 MMOL/L (ref 20–32)
COLOR UR AUTO: YELLOW
CREAT SERPL-MCNC: 1.3 MG/DL (ref 0.52–1.04)
EGFRCR SERPLBLD CKD-EPI 2021: 51 ML/MIN/1.73M2
GLUCOSE BLD-MCNC: 110 MG/DL (ref 70–99)
GLUCOSE UR STRIP-MCNC: NEGATIVE MG/DL
HGB UR QL STRIP: ABNORMAL
KETONES UR STRIP-MCNC: ABNORMAL MG/DL
LEUKOCYTE ESTERASE UR QL STRIP: NEGATIVE
MUCOUS THREADS #/AREA URNS LPF: PRESENT /LPF
NITRATE UR QL: NEGATIVE
PH UR STRIP: 5.5 [PH] (ref 5–8)
POTASSIUM BLD-SCNC: 4.6 MMOL/L (ref 3.4–5.3)
RBC #/AREA URNS AUTO: ABNORMAL /HPF
SODIUM SERPL-SCNC: 139 MMOL/L (ref 135–145)
SP GR UR STRIP: >=1.03 (ref 1–1.03)
SQUAMOUS #/AREA URNS AUTO: ABNORMAL /LPF
UROBILINOGEN UR STRIP-ACNC: 0.2 E.U./DL
WBC #/AREA URNS AUTO: ABNORMAL /HPF

## 2024-07-23 PROCEDURE — 36415 COLL VENOUS BLD VENIPUNCTURE: CPT | Performed by: STUDENT IN AN ORGANIZED HEALTH CARE EDUCATION/TRAINING PROGRAM

## 2024-07-23 PROCEDURE — G2211 COMPLEX E/M VISIT ADD ON: HCPCS | Performed by: STUDENT IN AN ORGANIZED HEALTH CARE EDUCATION/TRAINING PROGRAM

## 2024-07-23 PROCEDURE — 99214 OFFICE O/P EST MOD 30 MIN: CPT | Performed by: STUDENT IN AN ORGANIZED HEALTH CARE EDUCATION/TRAINING PROGRAM

## 2024-07-23 PROCEDURE — 81001 URINALYSIS AUTO W/SCOPE: CPT | Performed by: STUDENT IN AN ORGANIZED HEALTH CARE EDUCATION/TRAINING PROGRAM

## 2024-07-23 PROCEDURE — 80048 BASIC METABOLIC PNL TOTAL CA: CPT | Performed by: STUDENT IN AN ORGANIZED HEALTH CARE EDUCATION/TRAINING PROGRAM

## 2024-07-23 RX ORDER — BUSPIRONE HYDROCHLORIDE 15 MG/1
15 TABLET ORAL 2 TIMES DAILY
Qty: 30 TABLET | Refills: 1 | Status: SHIPPED | OUTPATIENT
Start: 2024-07-23 | End: 2024-09-16

## 2024-07-23 ASSESSMENT — ANXIETY QUESTIONNAIRES
7. FEELING AFRAID AS IF SOMETHING AWFUL MIGHT HAPPEN: NOT AT ALL
7. FEELING AFRAID AS IF SOMETHING AWFUL MIGHT HAPPEN: NOT AT ALL
5. BEING SO RESTLESS THAT IT IS HARD TO SIT STILL: NOT AT ALL
8. IF YOU CHECKED OFF ANY PROBLEMS, HOW DIFFICULT HAVE THESE MADE IT FOR YOU TO DO YOUR WORK, TAKE CARE OF THINGS AT HOME, OR GET ALONG WITH OTHER PEOPLE?: NOT DIFFICULT AT ALL
2. NOT BEING ABLE TO STOP OR CONTROL WORRYING: NOT AT ALL
3. WORRYING TOO MUCH ABOUT DIFFERENT THINGS: SEVERAL DAYS
GAD7 TOTAL SCORE: 2
IF YOU CHECKED OFF ANY PROBLEMS ON THIS QUESTIONNAIRE, HOW DIFFICULT HAVE THESE PROBLEMS MADE IT FOR YOU TO DO YOUR WORK, TAKE CARE OF THINGS AT HOME, OR GET ALONG WITH OTHER PEOPLE: NOT DIFFICULT AT ALL
6. BECOMING EASILY ANNOYED OR IRRITABLE: SEVERAL DAYS
4. TROUBLE RELAXING: NOT AT ALL
GAD7 TOTAL SCORE: 2
GAD7 TOTAL SCORE: 2
1. FEELING NERVOUS, ANXIOUS, OR ON EDGE: NOT AT ALL

## 2024-07-23 ASSESSMENT — ASTHMA QUESTIONNAIRES
QUESTION_1 LAST FOUR WEEKS HOW MUCH OF THE TIME DID YOUR ASTHMA KEEP YOU FROM GETTING AS MUCH DONE AT WORK, SCHOOL OR AT HOME: A LITTLE OF THE TIME
ACT_TOTALSCORE: 20
QUESTION_5 LAST FOUR WEEKS HOW WOULD YOU RATE YOUR ASTHMA CONTROL: SOMEWHAT CONTROLLED
QUESTION_3 LAST FOUR WEEKS HOW OFTEN DID YOUR ASTHMA SYMPTOMS (WHEEZING, COUGHING, SHORTNESS OF BREATH, CHEST TIGHTNESS OR PAIN) WAKE YOU UP AT NIGHT OR EARLIER THAN USUAL IN THE MORNING: NOT AT ALL
ACT_TOTALSCORE: 20
QUESTION_2 LAST FOUR WEEKS HOW OFTEN HAVE YOU HAD SHORTNESS OF BREATH: ONCE OR TWICE A WEEK
QUESTION_4 LAST FOUR WEEKS HOW OFTEN HAVE YOU USED YOUR RESCUE INHALER OR NEBULIZER MEDICATION (SUCH AS ALBUTEROL): ONCE A WEEK OR LESS

## 2024-07-23 ASSESSMENT — PATIENT HEALTH QUESTIONNAIRE - PHQ9
SUM OF ALL RESPONSES TO PHQ QUESTIONS 1-9: 9
SUM OF ALL RESPONSES TO PHQ QUESTIONS 1-9: 9
10. IF YOU CHECKED OFF ANY PROBLEMS, HOW DIFFICULT HAVE THESE PROBLEMS MADE IT FOR YOU TO DO YOUR WORK, TAKE CARE OF THINGS AT HOME, OR GET ALONG WITH OTHER PEOPLE: SOMEWHAT DIFFICULT

## 2024-07-23 NOTE — PATIENT INSTRUCTIONS
Referral to see the kidney specialist  --Hold on any lisinopril for now    Decrease the hydroxyzine to as needed  Decrease the Buspar to 15mg twice a daily for 1 month then can decrease again if you feel like it is helping.    Next step would be to go down to 7.5mg.      Consider decreasing/stopping the birth control and the cymbalta in the future to help with energy, weight and bloating.

## 2024-07-23 NOTE — PROGRESS NOTES
There are no exam notes on file for this visit.    ASSESSMENT AND PLAN:      Diagnoses and all orders for this visit:    Stage 2 chronic kidney disease  We reviewed options for care with her mild elevated creatinine and stage II chronic kidney disease.  Given the microscopic hematuria, and lack of clear etiology for her worsening renal function, will send her to the nephrologist for further evaluation.  Does have a history of autoimmune disorders so this may be contributing.  -     Basic metabolic panel  -     UA Macroscopic with reflex to Microscopic and Culture  -     UA Microscopic with Reflex to Culture  -     Adult Nephrology  Referral; Future    Generalized anxiety disorder  Severe episode of recurrent major depressive disorder, without psychotic features (H)  Mood has been improved.  She is like to decrease medications.  Is worried that these are contributing to her fatigue and low libido.  Will decrease BuSpar from 30 mg twice daily to 15 mg twice daily and then can decrease again to 7.5 mg twice daily in 1 month.  We talked about also decreasing her hydroxyzine to as needed.  Will keep the Cymbalta the same at this time and slowly titrate down on the other medications first.  -     busPIRone HCl (BUSPAR) 15 MG tablet; Take 1 tablet (15 mg) by mouth 2 times daily    Migraine without aura and without status migrainosus, not intractable  Feeling good about her visit with the neurologist and the increase in propranolol dosing.  I will watch for the MRI results when they come in.    Menorrhagia with regular cycle  May be going through early menopause with some symptoms of hot flashes and decreased heaviness to her menses.  However has been able to avoid anemia and feel better with using oral contraception.  Will hold off making change for now, but she like to stop this if that is an option given potential for bloating and weight gain.    Weight gain  Other fatigue  Continues to have weight gain and  fatigue.  I think decreasing medications will be helpful with this and she is in agreement.  Will work her way down on numerous medications now that her anxiety has improved.      She will send me a MyChart or telephone update in about 1 month and we will plan to check in again in the fall.      Patient Instructions   Referral to see the kidney specialist  --Hold on any lisinopril for now    Decrease the hydroxyzine to as needed  Decrease the Buspar to 15mg twice a daily for 1 month then can decrease again if you feel like it is helping.    Next step would be to go down to 7.5mg.      Consider decreasing/stopping the birth control and the cymbalta in the future to help with energy, weight and bloating.      Anayeli Warner MD    SUBJECTIVE  Jeanie French is a 44 year old female with past medical history significant for    Patient Active Problem List   Diagnosis    Cancer of tongue (H)    Generalized anxiety disorder    Tenosynovitis of the right index finger    Wheezing    Irritable bowel syndrome (IBS)    Exercise-induced asthma    Sinusitis, chronic    Allergic rhinitis, unspecified allergic rhinitis type    Hypovitaminosis D    Adjustment disorder with anxiety    Menorrhagia with regular cycle    Major depressive disorder with single episode    Iron deficiency anemia due to chronic blood loss    Collagenous colitis    Eosinophilic esophagitis     Others present at the visit:  Olivia Rodrigez, MS3    Presents for follow up of multiple issues.    Patient presents today for follow-up of multiple issues.    She is here for recheck of her renal function.  Was noted to have 2 creatinines that were mildly elevated at 1.3.  No history of hypertension, diabetes, but does share that she has a positive family history of chronic kidney disease and renal failure in her father.  At last visit her urinalysis showed evidence of blood, but she was on her menses.  Here to recheck this today.  She has been drinking fluid  "well.  Denies any back pain dysuria, or difficulty emptying.    She shares that she recently saw the neurologist for further headache workup.  They are going to do an MRI to check in on her double vision, and started her on a once daily higher dose of propranolol and switch from sumatriptan to rizatriptan.  Has been doing well and has not needed to use the rizatriptan yet.  Notes her blood pressure is lower today than it typically is which she thinks is due to the medication.    She shares that her anxiety has been improved.  Much of this is because she has a new job, which she started about 3 weeks ago, as a .  Liking work thus far, and feels good to be out of her old job.  Things have stabilized somewhat with her relationship, she is moved, and father passed away this past April.  Has been to multiple weddings and it has been a busy season, but she is handling things well.  Continues however to feel significant fatigue.  Has trouble doing anything when she gets home from work.  Cannot run and exercise like she has in the past because of the fatigue.    She is also noticed that her menses are getting lighter.  She continues to take birth control but wonders if she is starting to go through some early menopause.  Partner has a vasectomy so she does not need the birth control for contraception.  Is worried if she stopped it however that she did redevelop the heavy periods.  Does notice some hot flashes as well.    She like to come down off of some of her medications.  Currently taking hydroxyzine, Cymbalta, and BuSpar for her anxiety.    OBJECTIVE:  Vitals: /75 (BP Location: Left arm, Patient Position: Sitting, Cuff Size: Adult Regular)   Pulse 76   Temp 97.9  F (36.6  C) (Oral)   Resp 20   Ht 1.778 m (5' 10\")   Wt 98.2 kg (216 lb 9.6 oz)   SpO2 97%   BMI 31.08 kg/m    BMI= Body mass index is 31.08 kg/m .  Objective:    Vitals:  Vitals are reviewed and are within the normal range, blood pressure " is lower than previous but within the normal range.  Gen:  Alert, pleasant, no acute distress, moderate pallor  Cardiac:  Regular rate and rhythm, no murmurs, rubs or gallops  Respiratory:  Lungs clear to auscultation bilaterally  Psych: Mood and affect are normal, speech is normal rate, good insight.        7/27/2023     7:44 AM 2/6/2024     3:29 PM 7/23/2024    11:02 AM   PHQ   PHQ-9 Total Score 13 13 9   Q9: Thoughts of better off dead/self-harm past 2 weeks Not at all Not at all Not at all          7/27/2023     7:44 AM 2/6/2024     3:29 PM 7/23/2024    11:02 AM   JAMAR-7 SCORE   Total Score   2 (minimal anxiety)   Total Score 13 14 2     PHQ-9 and JAMAR-7 scores have improved significantly.    Results for orders placed or performed in visit on 07/23/24   Basic metabolic panel     Status: Abnormal   Result Value Ref Range    Sodium 139 135 - 145 mmol/L    Potassium 4.6 3.4 - 5.3 mmol/L    Chloride 104 94 - 109 mmol/L    Carbon Dioxide (CO2) 28 20 - 32 mmol/L    Anion Gap 7 3 - 14 mmol/L    Urea Nitrogen 16 7 - 30 mg/dL    Creatinine 1.30 (H) 0.52 - 1.04 mg/dL    GFR Estimate 51 (L) >60 mL/min/1.73m2    Calcium 9.9 8.5 - 10.1 mg/dL    Glucose 110 (H) 70 - 99 mg/dL   UA Macroscopic with reflex to Microscopic and Culture     Status: Abnormal    Specimen: Urine, NOS   Result Value Ref Range    Color Urine Yellow Colorless, Straw, Light Yellow, Yellow    Appearance Urine Clear Clear    Glucose Urine Negative Negative mg/dL    Bilirubin Urine Small (A) Negative    Ketones Urine Trace (A) Negative mg/dL    Specific Gravity Urine >=1.030 1.005 - 1.030    Blood Urine Moderate (A) Negative    pH Urine 5.5 5.0 - 8.0    Protein Albumin Urine Trace (A) Negative mg/dL    Urobilinogen Urine 0.2 0.2, 1.0 E.U./dL    Nitrite Urine Negative Negative    Leukocyte Esterase Urine Negative Negative   UA Microscopic with Reflex to Culture     Status: Abnormal   Result Value Ref Range    Bacteria Urine Moderate (A) None Seen /HPF    RBC  Urine 0-2 0-2 /HPF /HPF    WBC Urine 0-5 0-5 /HPF /HPF    Squamous Epithelials Urine Moderate (A) None Seen /LPF    Mucus Urine Present (A) None Seen /LPF    Narrative    Urine Culture not indicated     We reviewed her lab results together.  This shows stable renal function with a creatinine of 1.3, with a UA showing moderate blood.      Patient Instructions   Referral to see the kidney specialist  --Hold on any lisinopril for now    Decrease the hydroxyzine to as needed  Decrease the Buspar to 15mg twice a daily for 1 month then can decrease again if you feel like it is helping.    Next step would be to go down to 7.5mg.      Consider decreasing/stopping the birth control and the cymbalta in the future to help with energy, weight and bloating.      Anayeli Warner MD    Answers submitted by the patient for this visit:  Patient Health Questionnaire (Submitted on 7/23/2024)  If you checked off any problems, how difficult have these problems made it for you to do your work, take care of things at home, or get along with other people?: Somewhat difficult  PHQ9 TOTAL SCORE: 9  JAMAR-7 (Submitted on 7/23/2024)  JAMAR 7 TOTAL SCORE: 2

## 2024-07-23 NOTE — RESULT ENCOUNTER NOTE
Jeanie French-    It was nice to see you in clinic.  Here's a copy of your results for your records.  Interestingly, it looks like your urine under the microscope does not show any blood.  Still, I think seeing the kidney specialist makes the most sense.  Please call the clinic at [phone] if you have any questions.      Anayeli Warner MD    Please send results to patient.

## 2024-07-23 NOTE — LETTER
July 23, 2024      Jeanie French  [address] [place] [address]        Dear ,    We are writing to inform you of your test results.    It was nice to see you in clinic.  Here's a copy of your results for your records.  Interestingly, it looks like your urine under the microscope does not show any blood.  Still, I think seeing the kidney specialist makes the most sense.  Please call the clinic at [phone] if you have any questions.     Resulted Orders   Basic metabolic panel   Result Value Ref Range    Sodium 139 135 - 145 mmol/L    Potassium 4.6 3.4 - 5.3 mmol/L    Chloride 104 94 - 109 mmol/L    Carbon Dioxide (CO2) 28 20 - 32 mmol/L    Anion Gap 7 3 - 14 mmol/L    Urea Nitrogen 16 7 - 30 mg/dL    Creatinine 1.30 (H) 0.52 - 1.04 mg/dL    GFR Estimate 51 (L) >60 mL/min/1.73m2    Calcium 9.9 8.5 - 10.1 mg/dL    Glucose 110 (H) 70 - 99 mg/dL   UA Macroscopic with reflex to Microscopic and Culture   Result Value Ref Range    Color Urine Yellow Colorless, Straw, Light Yellow, Yellow    Appearance Urine Clear Clear    Glucose Urine Negative Negative mg/dL    Bilirubin Urine Small (A) Negative    Ketones Urine Trace (A) Negative mg/dL    Specific Gravity Urine >=1.030 1.005 - 1.030    Blood Urine Moderate (A) Negative    pH Urine 5.5 5.0 - 8.0    Protein Albumin Urine Trace (A) Negative mg/dL    Urobilinogen Urine 0.2 0.2, 1.0 E.U./dL    Nitrite Urine Negative Negative    Leukocyte Esterase Urine Negative Negative   UA Microscopic with Reflex to Culture   Result Value Ref Range    Bacteria Urine Moderate (A) None Seen /HPF    RBC Urine 0-2 0-2 /HPF /HPF    WBC Urine 0-5 0-5 /HPF /HPF    Squamous Epithelials Urine Moderate (A) None Seen /LPF    Mucus Urine Present (A) None Seen /LPF    Narrative    Urine Culture not indicated       If you have any questions or concerns, please call the clinic at the number listed above.       Sincerely,      Anayeli Warner MD

## 2024-08-14 DIAGNOSIS — N92.0 MENORRHAGIA WITH REGULAR CYCLE: ICD-10-CM

## 2024-08-14 NOTE — TELEPHONE ENCOUNTER
Name from pharmacy: Turqoz Oral Tablet 0.3-30 MG-MCG          Will file in chart as: TURQOZ 0.3-30 MG-MCG tablet    Sig: TAKE 1 TABLET BY MOUTH DAILY FOR 63 DAYS, THEN TAKE PLACEBO PILLS FOR 7 DAYS    Disp: 84 tablet    Refills: 0    Start: 8/14/2024    Class: E-Prescribe    Non-formulary For: Menorrhagia with regular cycle    Last ordered: 1 year ago (7/26/2023) by Anayeli Warner MD    Last refill: 5/24/2024    Rx #: [id number]    Contraceptives Protocol [id number]/14/2024 01:40 PM   Protocol Details Medication indicated for associated diagnosis        LINN Fields, BSN

## 2024-08-15 RX ORDER — NORGESTREL AND ETHINYL ESTRADIOL 0.3-0.03MG
KIT ORAL
Qty: 84 TABLET | Refills: 3 | Status: SHIPPED | OUTPATIENT
Start: 2024-08-15

## 2024-08-17 ENCOUNTER — ANCILLARY PROCEDURE (OUTPATIENT)
Dept: MRI IMAGING | Facility: CLINIC | Age: 44
End: 2024-08-17
Attending: INTERNAL MEDICINE
Payer: COMMERCIAL

## 2024-08-17 DIAGNOSIS — G43.009 MIGRAINE WITHOUT AURA AND WITHOUT STATUS MIGRAINOSUS, NOT INTRACTABLE: ICD-10-CM

## 2024-08-17 DIAGNOSIS — H53.2 DOUBLE VISION: ICD-10-CM

## 2024-08-17 PROCEDURE — 70549 MR ANGIOGRAPH NECK W/O&W/DYE: CPT | Performed by: RADIOLOGY

## 2024-08-17 PROCEDURE — A9585 GADOBUTROL INJECTION: HCPCS | Mod: JZ | Performed by: RADIOLOGY

## 2024-08-17 PROCEDURE — 70551 MRI BRAIN STEM W/O DYE: CPT | Performed by: RADIOLOGY

## 2024-08-17 RX ORDER — GADOBUTROL 604.72 MG/ML
10 INJECTION INTRAVENOUS ONCE
Status: COMPLETED | OUTPATIENT
Start: 2024-08-17 | End: 2024-08-17

## 2024-08-17 RX ADMIN — GADOBUTROL 10 ML: 604.72 INJECTION INTRAVENOUS at 11:53

## 2024-08-25 ENCOUNTER — MYC MEDICAL ADVICE (OUTPATIENT)
Dept: FAMILY MEDICINE | Facility: CLINIC | Age: 44
End: 2024-08-25
Payer: COMMERCIAL

## 2024-08-25 ENCOUNTER — MYC MEDICAL ADVICE (OUTPATIENT)
Dept: NEUROLOGY | Facility: CLINIC | Age: 44
End: 2024-08-25
Payer: COMMERCIAL

## 2024-08-25 DIAGNOSIS — G43.009 MIGRAINE WITHOUT AURA AND WITHOUT STATUS MIGRAINOSUS, NOT INTRACTABLE: Primary | ICD-10-CM

## 2024-08-25 DIAGNOSIS — N18.2 STAGE 2 CHRONIC KIDNEY DISEASE: Primary | ICD-10-CM

## 2024-08-26 RX ORDER — LISINOPRIL 5 MG/1
5 TABLET ORAL DAILY
Qty: 90 TABLET | Refills: 1 | Status: SHIPPED | OUTPATIENT
Start: 2024-08-26

## 2024-08-27 DIAGNOSIS — K20.0 EOSINOPHILIC ESOPHAGITIS: ICD-10-CM

## 2024-08-29 RX ORDER — OMEPRAZOLE 40 MG/1
40 CAPSULE, DELAYED RELEASE ORAL DAILY
Qty: 90 CAPSULE | Refills: 0 | Status: SHIPPED | OUTPATIENT
Start: 2024-08-29

## 2024-08-29 NOTE — TELEPHONE ENCOUNTER
Name from pharmacy: Omeprazole Oral Capsule Delayed Release 40 MG          Will file in chart as: omeprazole (PRILOSEC) 40 MG DR capsule    Sig: TAKE 1 CAPSULE BY MOUTH ONE TIME DAILY    Disp: 90 capsule    Refills: 0    Start: 8/27/2024    Class: E-Prescribe    For: Eosinophilic esophagitis    Last ordered: 1 year ago (7/26/2023) by Anayeli Warner MD    Last refill: 2/15/2024    Rx #: [id number]    PPI Protocol [id number]/27/2024 01:08 PM   Protocol Details Medication is active on med list    Medication indicated for associated diagnosis    Recent (12 mo) or future (90 days) visit within the authorizing provider's specialty    Patient is age 18 or older    No active pregnacy on record    No positive pregnancy test in past 12 months        Prescription approved per [facility] Refill Protocol.  Donnie RN, BSN

## 2024-09-05 ENCOUNTER — TRANSFERRED RECORDS (OUTPATIENT)
Dept: HEALTH INFORMATION MANAGEMENT | Facility: CLINIC | Age: 44
End: 2024-09-05
Payer: COMMERCIAL

## 2024-09-07 ENCOUNTER — HEALTH MAINTENANCE LETTER (OUTPATIENT)
Age: 44
End: 2024-09-07

## 2024-09-14 DIAGNOSIS — F41.1 GENERALIZED ANXIETY DISORDER: ICD-10-CM

## 2024-09-16 ENCOUNTER — MYC MEDICAL ADVICE (OUTPATIENT)
Dept: FAMILY MEDICINE | Facility: CLINIC | Age: 44
End: 2024-09-16
Payer: COMMERCIAL

## 2024-09-16 DIAGNOSIS — F41.1 GENERALIZED ANXIETY DISORDER: ICD-10-CM

## 2024-09-16 RX ORDER — BUSPIRONE HYDROCHLORIDE 15 MG/1
15 TABLET ORAL 2 TIMES DAILY
Qty: 30 TABLET | Refills: 0 | Status: SHIPPED | OUTPATIENT
Start: 2024-09-16 | End: 2024-09-17

## 2024-09-16 NOTE — TELEPHONE ENCOUNTER
Name from pharmacy: busPIRone HCl Oral Tablet 15 MG          Will file in chart as: busPIRone (BUSPAR) 15 MG tablet    Sig: Take 1 tablet (15 mg) by mouth 2 times daily    Disp: 30 tablet    Refills: 0    Start: 9/14/2024    Class: E-Prescribe    Non-formulary For: Generalized anxiety disorder    Last ordered: 1 month ago (7/23/2024) by Anayeli Warner MD    Last refill: 8/14/2024    Rx #: [id number]    Atypical Antidepressants Protocol [id number]/14/2024 01:27 PM   Protocol Details Medication active on med list    JAMAR-7 score of less than 5 in past 6 months.    Recent (12 mo) or future (90 days) visit within the authorizing provider's specialty    Medication indicated for associated diagnosis    Patient is age 18 or older    No active pregnancy on record    No positive pregnancy test in past 12 mos            7/27/2023     7:44 AM 2/6/2024     3:29 PM 7/23/2024    11:02 AM   JAMAR-7 SCORE   Total Score   2 (minimal anxiety)   Total Score 13 14 2      Prescription approved per [facility] Refill Protocol.  Donnie RN, BSN

## 2024-09-17 RX ORDER — BUSPIRONE HYDROCHLORIDE 5 MG/1
TABLET ORAL
Qty: 84 TABLET | Refills: 0 | Status: SHIPPED | OUTPATIENT
Start: 2024-09-17 | End: 2024-10-15

## 2024-09-23 ENCOUNTER — LAB (OUTPATIENT)
Dept: LAB | Facility: CLINIC | Age: 44
End: 2024-09-23
Payer: COMMERCIAL

## 2024-09-23 DIAGNOSIS — N18.2 STAGE 2 CHRONIC KIDNEY DISEASE: ICD-10-CM

## 2024-09-23 LAB
ANION GAP SERPL CALCULATED.3IONS-SCNC: 7 MMOL/L (ref 7–15)
BUN SERPL-MCNC: 15.7 MG/DL (ref 6–20)
CALCIUM SERPL-MCNC: 9.3 MG/DL (ref 8.8–10.4)
CHLORIDE SERPL-SCNC: 104 MMOL/L (ref 98–107)
CREAT SERPL-MCNC: 1.05 MG/DL (ref 0.51–0.95)
EGFRCR SERPLBLD CKD-EPI 2021: 66 ML/MIN/1.73M2
GLUCOSE SERPL-MCNC: 112 MG/DL (ref 70–99)
HCO3 SERPL-SCNC: 26 MMOL/L (ref 22–29)
POTASSIUM SERPL-SCNC: 4.2 MMOL/L (ref 3.4–5.3)
SODIUM SERPL-SCNC: 137 MMOL/L (ref 135–145)

## 2024-09-23 PROCEDURE — 36415 COLL VENOUS BLD VENIPUNCTURE: CPT

## 2024-09-23 PROCEDURE — 80048 BASIC METABOLIC PNL TOTAL CA: CPT

## 2024-09-23 NOTE — LETTER
September 24, 2024      Jeanie French  [address] [place] [address]        Dear ,    We are writing to inform you of your test results.    Your kidney test came back looking better!  The creatinine level is near the normal range.  That is great news, especially with starting the lisinopril.  Please let us know if you are feeling dizzy or lightheaded, but let's stay the course for now.  Please call the clinic at [phone] if you have any questions.       Resulted Orders   Basic metabolic panel   Result Value Ref Range    Sodium 137 135 - 145 mmol/L    Potassium 4.2 3.4 - 5.3 mmol/L    Chloride 104 98 - 107 mmol/L    Carbon Dioxide (CO2) 26 22 - 29 mmol/L    Anion Gap 7 7 - 15 mmol/L    Urea Nitrogen 15.7 6.0 - 20.0 mg/dL    Creatinine 1.05 (H) 0.51 - 0.95 mg/dL    GFR Estimate 66 >60 mL/min/1.73m2      Comment:      eGFR calculated using 2021 CKD-EPI equation.    Calcium 9.3 8.8 - 10.4 mg/dL      Comment:      Reference intervals for this test were updated on 7/16/2024 to reflect our healthy population more accurately. There may be differences in the flagging of prior results with similar values performed with this method. Those prior results can be interpreted in the context of the updated reference intervals.    Glucose 112 (H) 70 - 99 mg/dL       If you have any questions or concerns, please call the clinic at the number listed above.       Sincerely,      Anayeli Warner MD

## 2024-09-24 NOTE — RESULT ENCOUNTER NOTE
Jeanie French-    Your kidney test came back looking better!  The creatinine level is near the normal range.  That is great news, especially with starting the lisinopril.  Please let us know if you are feeling dizzy or lightheaded, but let's stay the course for now.  Please call the clinic at [phone] if you have any questions.      Anayeli Warner MD    Please send results to patient.

## 2024-10-13 DIAGNOSIS — G43.009 MIGRAINE WITHOUT AURA AND WITHOUT STATUS MIGRAINOSUS, NOT INTRACTABLE: ICD-10-CM

## 2024-10-13 DIAGNOSIS — F41.1 GENERALIZED ANXIETY DISORDER: ICD-10-CM

## 2024-10-13 DIAGNOSIS — F33.2 SEVERE EPISODE OF RECURRENT MAJOR DEPRESSIVE DISORDER, WITHOUT PSYCHOTIC FEATURES (H): ICD-10-CM

## 2024-10-14 RX ORDER — DULOXETIN HYDROCHLORIDE 30 MG/1
30 CAPSULE, DELAYED RELEASE ORAL 2 TIMES DAILY
Qty: 60 CAPSULE | Refills: 3 | Status: SHIPPED | OUTPATIENT
Start: 2024-10-14

## 2024-10-14 NOTE — TELEPHONE ENCOUNTER
Name from pharmacy: DULoxetine HCl Oral Capsule Delayed Release Particles 30 MG         Will file in chart as: DULoxetine (CYMBALTA) 30 MG capsule    Sig: TAKE 1 CAPSULE (30 MG) BY MOUTH 2 TIMES DAILY    Disp: 60 capsule    Refills: 0    Start: 10/13/2024    Class: E-Prescribe    Non-formulary For: Migraine without aura and without status migrainosus, not intractable; Generalized anxiety disorder; Severe episode of recurrent major depressive disorder, without psychotic features (H)    Last ordered: 5 months ago (5/6/2024) by Anayeli Warner MD    Last refill: 9/14/2024    Rx #: [id number]    Serotonin-Norepinephrine Reuptake Inhibitors  Zuzwba70/13/2024 01:44 PM   Protocol Details PHQ-9 score of less than 5 in past 6 months            Etienne Lee RN, MSN

## 2024-10-29 ENCOUNTER — OFFICE VISIT (OUTPATIENT)
Dept: FAMILY MEDICINE | Facility: CLINIC | Age: 44
End: 2024-10-29
Payer: COMMERCIAL

## 2024-10-29 VITALS
OXYGEN SATURATION: 98 % | RESPIRATION RATE: 20 BRPM | SYSTOLIC BLOOD PRESSURE: 113 MMHG | TEMPERATURE: 97.8 F | HEIGHT: 70 IN | BODY MASS INDEX: 31.78 KG/M2 | HEART RATE: 93 BPM | DIASTOLIC BLOOD PRESSURE: 79 MMHG | WEIGHT: 222 LBS

## 2024-10-29 DIAGNOSIS — J45.990 EXERCISE-INDUCED ASTHMA: ICD-10-CM

## 2024-10-29 DIAGNOSIS — R63.5 WEIGHT GAIN: ICD-10-CM

## 2024-10-29 DIAGNOSIS — F41.1 GENERALIZED ANXIETY DISORDER: ICD-10-CM

## 2024-10-29 DIAGNOSIS — R53.83 OTHER FATIGUE: Primary | ICD-10-CM

## 2024-10-29 DIAGNOSIS — R35.0 INCREASED FREQUENCY OF URINATION: ICD-10-CM

## 2024-10-29 LAB
ALBUMIN MFR UR ELPH: 14.4 MG/DL
ALBUMIN UR-MCNC: ABNORMAL MG/DL
APPEARANCE UR: CLEAR
BACTERIA #/AREA URNS HPF: ABNORMAL /HPF
BILIRUB UR QL STRIP: NEGATIVE
COLOR UR AUTO: YELLOW
CREAT UR-MCNC: 231 MG/DL
EST. AVERAGE GLUCOSE BLD GHB EST-MCNC: 105 MG/DL
GLUCOSE UR STRIP-MCNC: NEGATIVE MG/DL
HBA1C MFR BLD: 5.3 % (ref 0–5.6)
HGB UR QL STRIP: ABNORMAL
KETONES UR STRIP-MCNC: NEGATIVE MG/DL
LEUKOCYTE ESTERASE UR QL STRIP: NEGATIVE
NITRATE UR QL: NEGATIVE
PH UR STRIP: 5.5 [PH] (ref 5–8)
PROT/CREAT 24H UR: 0.06 MG/MG CR (ref 0–0.2)
RBC #/AREA URNS AUTO: ABNORMAL /HPF
SP GR UR STRIP: 1.02 (ref 1–1.03)
SQUAMOUS #/AREA URNS AUTO: ABNORMAL /LPF
UROBILINOGEN UR STRIP-ACNC: 0.2 E.U./DL
WBC #/AREA URNS AUTO: ABNORMAL /HPF

## 2024-10-29 PROCEDURE — 99214 OFFICE O/P EST MOD 30 MIN: CPT | Mod: 25 | Performed by: STUDENT IN AN ORGANIZED HEALTH CARE EDUCATION/TRAINING PROGRAM

## 2024-10-29 PROCEDURE — 84443 ASSAY THYROID STIM HORMONE: CPT | Performed by: STUDENT IN AN ORGANIZED HEALTH CARE EDUCATION/TRAINING PROGRAM

## 2024-10-29 PROCEDURE — 90471 IMMUNIZATION ADMIN: CPT | Performed by: STUDENT IN AN ORGANIZED HEALTH CARE EDUCATION/TRAINING PROGRAM

## 2024-10-29 PROCEDURE — 81001 URINALYSIS AUTO W/SCOPE: CPT | Performed by: STUDENT IN AN ORGANIZED HEALTH CARE EDUCATION/TRAINING PROGRAM

## 2024-10-29 PROCEDURE — 82627 DEHYDROEPIANDROSTERONE: CPT | Performed by: STUDENT IN AN ORGANIZED HEALTH CARE EDUCATION/TRAINING PROGRAM

## 2024-10-29 PROCEDURE — 83036 HEMOGLOBIN GLYCOSYLATED A1C: CPT | Performed by: STUDENT IN AN ORGANIZED HEALTH CARE EDUCATION/TRAINING PROGRAM

## 2024-10-29 PROCEDURE — 90656 IIV3 VACC NO PRSV 0.5 ML IM: CPT | Performed by: STUDENT IN AN ORGANIZED HEALTH CARE EDUCATION/TRAINING PROGRAM

## 2024-10-29 PROCEDURE — 84156 ASSAY OF PROTEIN URINE: CPT | Performed by: STUDENT IN AN ORGANIZED HEALTH CARE EDUCATION/TRAINING PROGRAM

## 2024-10-29 PROCEDURE — 83001 ASSAY OF GONADOTROPIN (FSH): CPT | Performed by: STUDENT IN AN ORGANIZED HEALTH CARE EDUCATION/TRAINING PROGRAM

## 2024-10-29 PROCEDURE — 36415 COLL VENOUS BLD VENIPUNCTURE: CPT | Performed by: STUDENT IN AN ORGANIZED HEALTH CARE EDUCATION/TRAINING PROGRAM

## 2024-10-29 RX ORDER — HYDROXYZINE HYDROCHLORIDE 10 MG/1
10 TABLET, FILM COATED ORAL 3 TIMES DAILY PRN
Qty: 90 TABLET | Refills: 3 | Status: SHIPPED | OUTPATIENT
Start: 2024-10-29

## 2024-10-29 RX ORDER — BUDESONIDE AND FORMOTEROL FUMARATE DIHYDRATE 80; 4.5 UG/1; UG/1
AEROSOL RESPIRATORY (INHALATION)
Qty: 20.4 G | Refills: 11 | Status: SHIPPED | OUTPATIENT
Start: 2024-10-29

## 2024-10-29 RX ORDER — BUSPIRONE HYDROCHLORIDE 5 MG/1
TABLET ORAL
Qty: 84 TABLET | Refills: 0 | Status: SHIPPED | OUTPATIENT
Start: 2024-10-29 | End: 2024-11-25

## 2024-10-29 ASSESSMENT — ANXIETY QUESTIONNAIRES
1. FEELING NERVOUS, ANXIOUS, OR ON EDGE: SEVERAL DAYS
4. TROUBLE RELAXING: NOT AT ALL
5. BEING SO RESTLESS THAT IT IS HARD TO SIT STILL: NOT AT ALL
6. BECOMING EASILY ANNOYED OR IRRITABLE: MORE THAN HALF THE DAYS
7. FEELING AFRAID AS IF SOMETHING AWFUL MIGHT HAPPEN: NOT AT ALL
3. WORRYING TOO MUCH ABOUT DIFFERENT THINGS: SEVERAL DAYS
GAD7 TOTAL SCORE: 5
2. NOT BEING ABLE TO STOP OR CONTROL WORRYING: SEVERAL DAYS
7. FEELING AFRAID AS IF SOMETHING AWFUL MIGHT HAPPEN: NOT AT ALL
8. IF YOU CHECKED OFF ANY PROBLEMS, HOW DIFFICULT HAVE THESE MADE IT FOR YOU TO DO YOUR WORK, TAKE CARE OF THINGS AT HOME, OR GET ALONG WITH OTHER PEOPLE?: SOMEWHAT DIFFICULT
IF YOU CHECKED OFF ANY PROBLEMS ON THIS QUESTIONNAIRE, HOW DIFFICULT HAVE THESE PROBLEMS MADE IT FOR YOU TO DO YOUR WORK, TAKE CARE OF THINGS AT HOME, OR GET ALONG WITH OTHER PEOPLE: SOMEWHAT DIFFICULT

## 2024-10-29 ASSESSMENT — PATIENT HEALTH QUESTIONNAIRE - PHQ9
SUM OF ALL RESPONSES TO PHQ QUESTIONS 1-9: 8
SUM OF ALL RESPONSES TO PHQ QUESTIONS 1-9: 8
10. IF YOU CHECKED OFF ANY PROBLEMS, HOW DIFFICULT HAVE THESE PROBLEMS MADE IT FOR YOU TO DO YOUR WORK, TAKE CARE OF THINGS AT HOME, OR GET ALONG WITH OTHER PEOPLE: SOMEWHAT DIFFICULT

## 2024-10-29 ASSESSMENT — ASTHMA QUESTIONNAIRES
ACT_TOTALSCORE: 19
ACT_TOTALSCORE: 19
QUESTION_2 LAST FOUR WEEKS HOW OFTEN HAVE YOU HAD SHORTNESS OF BREATH: ONCE OR TWICE A WEEK
QUESTION_3 LAST FOUR WEEKS HOW OFTEN DID YOUR ASTHMA SYMPTOMS (WHEEZING, COUGHING, SHORTNESS OF BREATH, CHEST TIGHTNESS OR PAIN) WAKE YOU UP AT NIGHT OR EARLIER THAN USUAL IN THE MORNING: ONCE A WEEK
QUESTION_4 LAST FOUR WEEKS HOW OFTEN HAVE YOU USED YOUR RESCUE INHALER OR NEBULIZER MEDICATION (SUCH AS ALBUTEROL): NOT AT ALL
QUESTION_1 LAST FOUR WEEKS HOW MUCH OF THE TIME DID YOUR ASTHMA KEEP YOU FROM GETTING AS MUCH DONE AT WORK, SCHOOL OR AT HOME: A LITTLE OF THE TIME
QUESTION_5 LAST FOUR WEEKS HOW WOULD YOU RATE YOUR ASTHMA CONTROL: SOMEWHAT CONTROLLED

## 2024-10-29 NOTE — LETTER
November 11, 2024      Jeanie French  [address] [place] [address]        Dear ,    We are writing to inform you of your test results.    Here's a copy of your old results - and in regards to your new round of blood work, it looks like the LH testing is quite low as well.  We are still waiting for a couple of more tests, and then I will reach out to the endocrinology team.  Often the next step is to take a look at your pituitary gland, which is in your brain.  This is an MRI test.  I'll have our team set this up as needed.  Thanks for coming back in and getting this lab work done     Resulted Orders   Protein  random urine   Result Value Ref Range    Total Protein Urine mg/dL 14.4   mg/dL      Comment:      The reference ranges have not been established in urine protein. The results should be integrated into the clinical context for interpretation.    Total Protein Urine mg/mg Creat 0.06 0.00 - 0.20 mg/mg Cr    Creatinine Urine mg/dL 231.0 mg/dL      Comment:      The reference ranges have not been established in urine creatinine. The results should be integrated into the clinical context for interpretation.   Urine Macroscopic with reflex to Microscopic   Result Value Ref Range    Color Urine Yellow Colorless, Straw, Light Yellow, Yellow    Appearance Urine Clear Clear    Glucose Urine Negative Negative mg/dL    Bilirubin Urine Negative Negative    Ketones Urine Negative Negative mg/dL    Specific Gravity Urine 1.025 1.005 - 1.030    Blood Urine Moderate (A) Negative    pH Urine 5.5 5.0 - 8.0    Protein Albumin Urine Trace (A) Negative mg/dL    Urobilinogen Urine 0.2 0.2, 1.0 E.U./dL    Nitrite Urine Negative Negative    Leukocyte Esterase Urine Negative Negative   TSH with free T4 reflex   Result Value Ref Range    TSH 2.16 0.30 - 4.20 uIU/mL   DHEA sulfate   Result Value Ref Range    DHEA Sulfate <15 (L) 35 - 430 ug/dL   Follicle stimulating hormone   Result Value Ref Range    FSH 0.4 mIU/mL       Comment:      19 years and older:   Follicular phase: 3.5-12.5 mIU/mL   Ovulation phase: 4.7-21.5 mIU/mL   Luteal phase: 1.7-7.7 mIU/mL   Postmenopause: 25.8-134.8 mIU/mL      Hemoglobin A1c   Result Value Ref Range    Estimated Average Glucose 105 <117 mg/dL    Hemoglobin A1C 5.3 0.0 - 5.6 %      Comment:      Normal <5.7%   Prediabetes 5.7-6.4%    Diabetes 6.5% or higher     Note: Adopted from ADA consensus guidelines.   Urine Microscopic Exam   Result Value Ref Range    Bacteria Urine Few (A) None Seen /HPF    RBC Urine 2-5 (A) 0-2 /HPF /HPF    WBC Urine None Seen 0-5 /HPF /HPF    Squamous Epithelials Urine Few (A) None Seen /LPF       If you have any questions or concerns, please call the clinic at the number listed above.       Sincerely,      Anayeli Warner MD

## 2024-10-29 NOTE — PATIENT INSTRUCTIONS
Do blood and urine today.     Hydroxyzine 10mg 3x daily (decreased from 25mg)    Switch inhaler from Albuterol to Symbicort  Start taking your allergy medication at night time.     There are options to help with overactive bladder that we can try at night time pending urine results.     We will keep looking for causes of your weight loss.      Follow up in 2 months.

## 2024-10-29 NOTE — TELEPHONE ENCOUNTER
Name from pharmacy: busPIRone HCl Oral Tablet 5 MG         Will file in chart as: busPIRone (BUSPAR) 5 MG tablet    Sig: Take 2 tablets (10 mg) by mouth 2 times daily for 14 days, THEN 1 tablet (5 mg) 2 times daily for 14 days.    Disp: 84 tablet    Refills: 0    Start: 10/29/2024    Class: E-Prescribe    Non-formulary For: Generalized anxiety disorder    Last ordered: 1 month ago (9/17/2024) by Anayeli Warner MD    Last refill: 9/17/2024    Rx #: [id number]    Atypical Antidepressants Protocol Lpxbaj94/29/2024 09:49 AM   Protocol Details Medication active on med list    JAMAR-7 score of less than 5 in past 6 months.    Recent (12 mo) or future (90 days) visit within the authorizing provider's specialty    Medication indicated for associated diagnosis    Patient is age 18 or older    No active pregnancy on record    No positive pregnancy test in past 12 mos          Prescription approved per [employer] Refill Protocol.    Etienne Lee, RN, MSN

## 2024-10-29 NOTE — PROGRESS NOTES
There are no exam notes on file for this visit.    ASSESSMENT AND PLAN:      Jeanie was seen today for medication follow-up.    Diagnoses and all orders for this visit:    Increased frequency of urination  Other fatigue  Weight gain  Continues to experience weight gain as well as hot flashes, significant fatigue.  Is experiencing more allergies and getting up twice a day at night to urinate.  Exam is relatively normal.  Will look for potential causes of weight gain including hormones, FSH, DHEA, TSH, A1c, and look for potential etiologies for her increased urination.  We could try a medication for overactive bladder and see if this improves nighttime symptoms.  -     Follicle stimulating hormone; Future  -     TSH with free T4 reflex; Future  -     Urine Macroscopic with reflex to Microscopic; Future  -     Protein  random urine; Future  -     Hemoglobin A1c; Future  -     Follicle stimulating hormone; Future  -     DHEA sulfate; Future  -     TSH with free T4 reflex; Future    Exercise-induced asthma  Seasonal Allergies  Switch from albuterol to Symbicort.  Decrease her hydroxyzine to 10 mg 3 times daily.  Put her over-the-counter allergy medication at night instead of in the morning, and consider Singulair in the future to help with symptoms.  -     budesonide-formoterol (SYMBICORT) 80-4.5 MCG/ACT Inhaler; Inhale 2 puffs once daily plus 1-2 puffs as needed. May use up to 12 puffs per day.  -     INFLUENZA VACCINE,SPLIT VIRUS,TRIVALENT,PF(FLUZONE)    Generalized anxiety disorder  Has been okay.  Will decrease hydroxyzine to see if this improves fatigue symptoms.  -     hydrOXYzine HCl (ATARAX) 10 MG tablet; Take 1 tablet (10 mg) by mouth 3 times daily as needed for itching.    Continue to look for potential etiologies of weight gain with her.      Patient Instructions   Do blood and urine today.     Hydroxyzine 10mg 3x daily (decreased from 25mg)    Switch inhaler from Albuterol to Symbicort  Start taking your  allergy medication at night time.     There are options to help with overactive bladder that we can try at night time pending urine results.     We will keep looking for causes of your weight loss.      Follow up in 2 months.      Anayeli Warner MD    SUBJECTIVE  Jeanie French is a 44 year old female with past medical history significant for    Patient Active Problem List   Diagnosis    Cancer of tongue (H)    Generalized anxiety disorder    Tenosynovitis of the right index finger    Wheezing    Irritable bowel syndrome (IBS)    Exercise-induced asthma    Sinusitis, chronic    Allergic rhinitis, unspecified allergic rhinitis type    Hypovitaminosis D    Adjustment disorder with anxiety    Menorrhagia with regular cycle    Major depressive disorder with single episode    Iron deficiency anemia due to chronic blood loss    Collagenous colitis    Eosinophilic esophagitis     Others present at the visit:  None    Presents for   Chief Complaint   Patient presents with    Medication Follow-up     Patient presents today to follow-up on medications.    She continues to experience weight gain.  Has been watching her diet, is concerned that this could be caused by her hormones.  She notes feeling hot, flushed, and gushing sweat with even minimal amounts of exertion.  She has gained 30 pounds in the past 2 years, and 65 pounds in the last 4.  Continues to take oral contraceptives.  Show that her sister had a similar issue when she started approaching menopause.  She was not a candidate for hormone therapy because of history of clots but has been taking another medication which has been helpful for this.    Weight gain seem to align with starting the budesonide, and she is working closely with her GI team to decrease that dose.  Has been taking antibiotic to help with bacterial overgrowth and this has been helping.  Is using loperamid e and decreasing the budesonide.    Continues to feel very exhausted.  Has not been  "sleeping well.  Gets up 2 times at night to go to the bathroom.  She has done a number of things to improve her sleep including tossing her cats out of the room.  She sometimes has foot cramps but otherwise does not have pain.  Feels dehydrated all the time.  Has been trying to increase her water intake to help her kidneys.  Drinks minimal caffeine, teas, coffees, [place], and only an occasional alcoholic drink.  She is tired and has to take naps during the day.  Gets home from work and has no energy to do anything.    She has noticed that she gets out of breath more easily.  Even with minimal exertion.  She is waking up coughing at night.  Using her albuterol more often.  Notes increased allergies and is taking over-the-counter allergy medication.  Notes that she is allergic to dust and to a dog that lives with her part-time, that may be contributing.    Has overall been okay.  Anxiety has been tolerable.  She is taking hydroxyzine 3 times a day at 25 mg.    Continues to see the neurologist about her migraines and is started Emgality and this is improving.    Is taking the lisinopril for her kidneys and blood pressure has been stable.  No dizziness.    OBJECTIVE:  Vitals: /79 (BP Location: Left arm, Patient Position: Sitting, Cuff Size: Adult Large)   Pulse 93   Temp 97.8  F (36.6  C) (Oral)   Resp 20   Ht 1.778 m (5' 10\")   Wt 100.7 kg (222 lb)   LMP  (LMP Unknown)   SpO2 98%   BMI 31.85 kg/m    BMI= Body mass index is 31.85 kg/m .  Objective:    Vitals:  Vitals are reviewed and are within the normal range  Gen:  Alert, pleasant, no acute distress  HEENT: Mild fullness and fluid but no erythema or bulging in the right ear.  Left ear is clear.  No significant sinus tenderness.  No pre or postauricular lymphadenopathy.  Mild tonsillar lymphadenopathy no anterior posterior cervical lymphadenopathy throat is clear nonerythematous no significant postnasal drip.  Cardiac:  Regular rate and rhythm, no " murmurs, rubs or gallops  Respiratory:  Lungs clear to auscultation bilaterally, crackles or wheezes  Extremities: No swelling in upper or lower extremities.        2/6/2024     3:29 PM 7/23/2024    11:02 AM 10/29/2024     3:32 PM   PHQ   PHQ-9 Total Score 13 9 8    Q9: Thoughts of better off dead/self-harm past 2 weeks Not at all Not at all  Not at all        Patient-reported          2/6/2024     3:29 PM 7/23/2024    11:02 AM 10/29/2024     3:33 PM   JAMAR-7 SCORE   Total Score  2 (minimal anxiety) 5 (mild anxiety)   Total Score 14 2 5        Patient-reported          Patient Instructions   Do blood and urine today.     Hydroxyzine 10mg 3x daily (decreased from 25mg)    Switch inhaler from Albuterol to Symbicort  Start taking your allergy medication at night time.     There are options to help with overactive bladder that we can try at night time pending urine results.     We will keep looking for causes of your weight loss.      Follow up in 2 months.      Anayeli Warner MD    Answers submitted by the patient for this visit:  Patient Health Questionnaire (Submitted on 10/29/2024)  If you checked off any problems, how difficult have these problems made it for you to do your work, take care of things at home, or get along with other people?: Somewhat difficult  PHQ9 TOTAL SCORE: 8  Patient Health Questionnaire (G7) (Submitted on 10/29/2024)  JAMAR 7 TOTAL SCORE: 5

## 2024-10-30 LAB
DHEA-S SERPL-MCNC: <15 UG/DL (ref 35–430)
FSH SERPL IRP2-ACNC: 0.4 MIU/ML
TSH SERPL DL<=0.005 MIU/L-ACNC: 2.16 UIU/ML (ref 0.3–4.2)

## 2024-11-01 ENCOUNTER — MYC MEDICAL ADVICE (OUTPATIENT)
Dept: FAMILY MEDICINE | Facility: CLINIC | Age: 44
End: 2024-11-01
Payer: COMMERCIAL

## 2024-11-01 DIAGNOSIS — R79.89 ABNORMAL FSH LEVEL: Primary | ICD-10-CM

## 2024-11-06 ENCOUNTER — TELEPHONE (OUTPATIENT)
Dept: FAMILY MEDICINE | Facility: CLINIC | Age: 44
End: 2024-11-06
Payer: COMMERCIAL

## 2024-11-06 NOTE — TELEPHONE ENCOUNTER
Test Results        Who ordered the test:  reina    Type of test: Lab    Date of test:  10/29/2024    Where was the test performed:  [facility]    What are your questions/concerns?:  patient has the results but would like a little information from Dr Warner.  She is wondering what she should be doing about the results.  Should she be seeing a specialist? Is this something Dr Warner is going to treat?    Could we send this information to you in [employer] [facility] or would you prefer to receive a phone call?:   Patient would prefer a phone call   Okay to leave a detailed message?: Yes at Cell number on file:    Telephone Information:   Mobile [phone]       Does patient or caller know when to expect a call? Yes, Nurses will return call within 2-3 business hours.       Kp Clifford on 11/6/2024 at 3:46 PM

## 2024-11-07 ENCOUNTER — LAB (OUTPATIENT)
Dept: LAB | Facility: CLINIC | Age: 44
End: 2024-11-07
Payer: COMMERCIAL

## 2024-11-07 DIAGNOSIS — R79.89 ABNORMAL FSH LEVEL: ICD-10-CM

## 2024-11-07 PROCEDURE — 84146 ASSAY OF PROLACTIN: CPT

## 2024-11-07 PROCEDURE — 82670 ASSAY OF TOTAL ESTRADIOL: CPT

## 2024-11-07 PROCEDURE — 36415 COLL VENOUS BLD VENIPUNCTURE: CPT

## 2024-11-07 PROCEDURE — 82024 ASSAY OF ACTH: CPT

## 2024-11-07 PROCEDURE — 83002 ASSAY OF GONADOTROPIN (LH): CPT

## 2024-11-07 PROCEDURE — 82533 TOTAL CORTISOL: CPT

## 2024-11-08 LAB
CORTIS SERPL-MCNC: 5.3 UG/DL
ESTRADIOL SERPL-MCNC: 90 PG/ML
LH SERPL-ACNC: <0.3 MIU/ML
PROLACTIN SERPL 3RD IS-MCNC: 16 NG/ML (ref 5–23)

## 2024-11-08 NOTE — TELEPHONE ENCOUNTER
Responded to patient via [employer].  Future orders placed. No further action needed.     Anayeli Warner MD    Routed to LINN Clifton

## 2024-11-09 NOTE — RESULT ENCOUNTER NOTE
Chad Ware-    Here's a copy of your old results - and in regards to your new round of blood work, it looks like the LH testing is quite low as well.  We are still waiting for a couple of more tests, and then I will reach out to the endocrinology team.  Often the next step is to take a look at your pituitary gland, which is in your brain.  This is an MRI test.  I'll have our team set this up as needed.  Thanks for coming back in and getting this lab work done.      Dr. Warner

## 2024-11-10 PROCEDURE — 82533 TOTAL CORTISOL: CPT | Mod: 90

## 2024-11-10 PROCEDURE — 99000 SPECIMEN HANDLING OFFICE-LAB: CPT

## 2024-11-11 LAB — ACTH PLAS-MCNC: <10 PG/ML

## 2024-11-14 LAB — CORTIS SAL-MCNC: <0.025 UG/DL

## 2024-11-15 NOTE — RESULT ENCOUNTER NOTE
Discussed results with patient over the phone.  Continues to feel significant fatigue - worse lately. Some tachycardia and sensation of stronger/harder beats.  Discussed imaging - she had MRI with neurologist in August - was normal, but no comments on pituitary, so I will reach out to radiology about if they are able to visualize it, and/or what additional tests to order and place E-consult to Endocrinology.      Anayeli Warner MD

## 2024-11-18 ENCOUNTER — E-CONSULT (OUTPATIENT)
Dept: ENDOCRINOLOGY | Facility: CLINIC | Age: 44
End: 2024-11-18

## 2024-11-18 ENCOUNTER — TELEPHONE (OUTPATIENT)
Dept: FAMILY MEDICINE | Facility: CLINIC | Age: 44
End: 2024-11-18
Payer: COMMERCIAL

## 2024-11-18 DIAGNOSIS — R79.89 ABNORMAL CORTISOL LEVEL: Primary | ICD-10-CM

## 2024-11-18 DIAGNOSIS — R79.89 ABNORMAL FSH LEVEL: ICD-10-CM

## 2024-11-18 DIAGNOSIS — R79.89 ABNORMAL LH LEVEL: ICD-10-CM

## 2024-11-18 NOTE — TELEPHONE ENCOUNTER
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  ?Next steps in evaluation and treatment?     44-year-old female with a history of asthma, irregular menses, anemia, collagenous colitis, eosinophilic esophagitis, currently on chronic oral budesonide, with strong family history of migraines and thyroid problems who presents for evaluation of worsening fatigue, hot flashes, and polyuria and polydipsia, most notable at nighttime.  Additionally she has noticed worsening in her migraine symptoms over the last 6-9 months, and has been following with a neurologist for this.    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    MRI of the pituitary is currently pending.    My questions include,     1) What would be recommended for further testing, including potentially ACTH stim test?  2) Timing and need to start glucocorticoid replacement therapy?  3) What else needs to be done to prepare her to see the endocrinologist?     The most current assessment of this problem can be found in the note dated 10/29/2024  Relevant scanned information in media: (enter date and description): NA  Relevant care everywhere information: (enter date and description): NA    DECISION SUPPORTS/PROMPTS/GUIDANCE:   Please include in your clinical question should they apply:   - Size of mass, date and type of study - MRI pituitary pending    OPTIONAL LABS/TESTS/IMAGES: (will populate if already in system)   - BMI  Estimated body mass index is 31.85 kg/m  as calculated from the following:    Height as of 10/29/24: 1.778 m (5' 10\").    Weight as of 10/29/24: 100.7 kg (222 lb).    - 24 hour urine cortisol  No results found for: \"FRCORT\"   - 24 hour urine creatinine  No results found for: \"CRD11\"   - Prolactin  Prolactin       Date                     " "Value               Ref Range           Status                11/07/2024               16                  5 - 23 ng/mL        Final            ----------   - IGF-1  No results found for: \"IGF\"   - TSH  TSH       Date                     Value               Ref Range           Status                10/29/2024               2.16                0.30 - 4.20 uI*     Final                 06/06/2022               2.74                0.30 - 5.00 uI*     Final            ----------   - Free T4  No results found for: \"T4\"   - Serum testosterone  No results found for: \"TESTOSTERONE\"   - LH  Luteinizing Hormone       Date                     Value               Ref Range           Status                11/07/2024               <0.3                mIU/mL              Final              Comment:    FEMALE:  Age  0 - 6 mo:  <0.1-8.2 mIU/mL  6 mo - 11 years: <0.1-1.3 mIU/mL   11 - 14 years: <0.1-10 mIU/mL   14 - 19 years: 0.4-25 mIU/mL     19 years and older:   Follicular Phase: 2.4-12.6 mIU/mL  Ovulation Phase: 14.0-95.6 mIU/mL  Luteal Phase: 1.0-11.4  mIU/mL  Postmenopausal: 7.7-58.5 mIU/mL    ----------   - FSH  FSH       Date                     Value               Ref Range           Status                10/29/2024               0.4                 mIU/mL              Final              Comment:    19 years and older:   Follicular phase: 3.5-12.5 mIU/mL   Ovulation phase: 4.7-21.5 mIU/mL   Luteal phase: 1.7-7.7 mIU/mL   Postmenopause: 25.8-134.8 mIU/mL          10/20/2021               4.2                 mIU/mL              Final              Comment:    Females:  Prepubertal: 0-10 mIU/mL  Follicular: 3-20 mIU/mL  Luteal: 0-12 mIU/mL  Ovulatory: 9-26 mIU/mL  Postmenopausal:  mIU/mL  ----------   - Serum potassium  Potassium       Date                     Value               Ref Range           Status                09/23/2024               4.2                 3.4 - 5.3 mmol*     Final                 07/23/2024       " "        4.6                 3.4 - 5.3 mmol*     Final                 07/24/2020               3.8                 3.5 - 5.0 mmol*     Final            ----------   - Serum calcium  Calcium       Date                     Value               Ref Range           Status                09/23/2024               9.3                 8.8 - 10.4 mg/*     Final              Comment:    Reference intervals for this test were updated on 7/16/2024 to reflect our healthy population more accurately. There may be differences in the flagging of prior results with similar values performed with this method. Those prior results can be interpreted in the context of the updated reference intervals.       07/24/2020               9.2                 8.5 - 10.5 mg/*     Final            ----------   - Serum phosphorus  No results found for: \"PHOS\"   - Serum creatinine  Creatinine       Date                     Value               Ref Range           Status                09/23/2024               1.05 (H)            0.51 - 0.95 mg*     Final                 07/24/2020               1.01                0.60 - 1.10 mg*     Final            ----------   - Random serum cortisol level x 3 (trend)  No results found for: \"FRCORT\"   - Random plasma renin  No results found for: \"LIZZ\"   - Random plasma aldosterone  No results found for: \"ALDOSTERONE\", \"ALDOS\"   - Random plasma metanephrines  No results found for: \"METANP\"   - Urine catecholamines  No results found for: \"CR14\", \"CRD14\"   - Urine metanephrines  No results found for: \"METAN\"   - PTH  No results found for: \"PTHI\"   - Vitamin D 25  Vitamin D, Total (25-Hydroxy)       Date                     Value               Ref Range           Status                05/25/2022               52                  20 - 75 ug/L        Final            ----------   - Vitamin D 1,25  No results found for: \"IQT625\"   - SPEP  No results found for: \"PROTELECT\"   - ACTH  Adrenal Corticotropin       Date          "            Value               Ref Range           Status                11/07/2024               <10                 <47 pg/mL           Final            ----------    Anayeli Warner MD 11/18/2024 4:14 PM

## 2024-11-18 NOTE — TELEPHONE ENCOUNTER
Spoke to imaging team including senior fellow through [facility] radiology.  They do not think the images done in August would give thin enough slices of the pituitary to be able to rule out a microadenoma, and they are recommending that new images be completed.    Anayeli Warner MD

## 2024-11-19 NOTE — PROGRESS NOTES
11/18/2024     E-Consult has been accepted.    Interprofessional consultation requested by:  Anayeli Warner MD      Clinical Question/Purpose: MY CLINICAL QUESTION IS: See telephone note dated 11/18:    1) What would be recommended for further testing, including potentially ACTH stim test?   2) Timing and need to start glucocorticoid replacement therapy?   3) What else needs to be done to prepare her to see the endocrinologist?     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    Current Outpatient Medications   Medication Sig Dispense Refill    budesonide (ENTOCORT EC) 3 MG EC capsule       budesonide-formoterol (SYMBICORT) 80-4.5 MCG/ACT Inhaler Inhale 2 puffs once daily plus 1-2 puffs as needed. May use up to 12 puffs per day. 20.4 g 11    busPIRone (BUSPAR) 5 MG tablet Take 2 tablets (10 mg) by mouth 2 times daily for 14 days, THEN 1 tablet (5 mg) 2 times daily for 14 days. 84 tablet 0    DULoxetine (CYMBALTA) 30 MG capsule TAKE 1 CAPSULE (30 MG) BY MOUTH 2 TIMES DAILY 60 capsule 3    galcanezumab-gnlm (EMGALITY) 120 MG/ML injection Inject 1 mL (120 mg) subcutaneously every 28 days. Do not start before September 24, 2024. 1 mL 11    hydrOXYzine HCl (ATARAX) 10 MG tablet Take 1 tablet (10 mg) by mouth 3 times daily as needed for itching. 90 tablet 3    lisinopril (ZESTRIL) 5 MG tablet Take 1  tablet (5 mg) by mouth daily. 90 tablet 1    loperamide (IMODIUM A-D) 2 MG tablet Trying to decrease      norgestrel-ethinyl estradiol (TURQOZ) 0.3-30 MG-MCG tablet TAKE 1 TABLET BY MOUTH DAILY FOR 63 DAYS, THEN TAKE PLACEBO PILLS FOR 7 DAYS 84 tablet 3    omeprazole (PRILOSEC) 40 MG DR capsule TAKE 1 CAPSULE BY MOUTH ONE TIME DAILY 90 capsule 0    rizatriptan (MAXALT) 5 MG tablet Take 1 tablet (5 mg) by mouth at onset of headache for migraine May repeat in 2 hours. Max 6 tablets/24 hours. Limit to 9 tablets per month. 9 tablet 5    sodium chloride (OCEAN) 0.65 % nasal spray Use 1 spray in each nostril 4x per day as needed. 15 mL 1    spacer ([employer]) holding chamber Use as needed with inhaler 1 each 0     Impression:   On corticosteroid entocort ???  and history of inhaled steroid might be responsible for suppression of the HPA with low ACTH and PRATIK, cortisol .   On estrogen containing OCP - this could be expected to cause low LH and FSH.  I  wouldn't measure/interpret any hormones along the gonadal axis in the presence of OCP.     Recommendations:   Explore what her steroid exposures have been and exactly when in relationship to the labs.  Is she on entocort? Is she using topical steroid?    Labs before 8 AM: cortisol, ACTH, free T4, urine osm  ideally 24 hour after last dose of symbicort and entocort.    If cortisol is > 10, and other labs normal, then no further work up  Let me know if labs are abnormal.     The recommendations provided in this E-Consult are based on a review of clinical data pertinent to the clinical question presented, without a review of the patient's complete medical record or, the benefit of a comprehensive in-person or virtual patient evaluation. This consultation should not replace the clinical judgement and evaluation of the provider ordering this E-Consult. Any new clinical issues, or changes in patient status since the filing of this E-Consult will need to be taken into  account when assessing these recommendations. Please contact me if you have further questions.    My total time spent reviewing clinical information and formulating assessment was 15 minutes.        Katie Acevedo MD

## 2024-11-26 ENCOUNTER — MYC MEDICAL ADVICE (OUTPATIENT)
Dept: FAMILY MEDICINE | Facility: CLINIC | Age: 44
End: 2024-11-26
Payer: COMMERCIAL

## 2024-12-02 DIAGNOSIS — R53.83 OTHER FATIGUE: Primary | ICD-10-CM

## 2024-12-03 ENCOUNTER — LAB (OUTPATIENT)
Dept: LAB | Facility: CLINIC | Age: 44
End: 2024-12-03
Payer: COMMERCIAL

## 2024-12-03 DIAGNOSIS — R53.83 OTHER FATIGUE: ICD-10-CM

## 2024-12-03 LAB
CORTIS SERPL-MCNC: 5.4 UG/DL
OSMOLALITY UR: 873 MMOL/KG (ref 100–1200)
T4 FREE SERPL-MCNC: 1.05 NG/DL (ref 0.9–1.7)

## 2024-12-03 PROCEDURE — 84439 ASSAY OF FREE THYROXINE: CPT

## 2024-12-03 PROCEDURE — 83935 ASSAY OF URINE OSMOLALITY: CPT

## 2024-12-03 PROCEDURE — 82024 ASSAY OF ACTH: CPT

## 2024-12-03 PROCEDURE — 36415 COLL VENOUS BLD VENIPUNCTURE: CPT

## 2024-12-03 PROCEDURE — 82533 TOTAL CORTISOL: CPT

## 2024-12-03 NOTE — LETTER
December 6, 2024      Jeanie French  [address] [place] [address]        Dear ,    We are writing to inform you of your test results.    Your cortisol and ACTH levels are still low - but slightly better than last time.  I'm going to reach out to the endocrinologist again, and see if they are recommending further testing.  I'll get back to you after I hear back.  Your urine and thyroid tests are all normal.     Resulted Orders   Cortisol   Result Value Ref Range    Cortisol 5.4   ug/dL      Comment:      6 months and older:  6 to 10 AM Cortisol Reference Range:  4-22 ug/dL   4 to 8 PM Cortisol Reference Range:  3-17 ug/dL   Adrenal corticotropin   Result Value Ref Range    Adrenal Corticotropin 12 <47 pg/mL   T4 free   Result Value Ref Range    Free T4 1.05 0.90 - 1.70 ng/dL   Osmolality urine   Result Value Ref Range    Osmolality Urine 873 100 - 1,200 mmol/kg    Narrative    Reference Ranges depend on patient's hydration status and renal function.   Neonates:  mmol/kg   2 years and older, random specimens: 100-1200 mmol/kg; Greater than 850 mmol/kg after 12 hour fluid restriction  Urine/serum osmolality ratio: 2 years and older: 1.0-3.0; 3.0-4.7 after 12 hour fluid restriction       If you have any questions or concerns, please call the clinic at the number listed above.       Sincerely,      Anayeli Warner MD

## 2024-12-04 LAB — ACTH PLAS-MCNC: 12 PG/ML

## 2024-12-05 NOTE — RESULT ENCOUNTER NOTE
Hi Jeanie-     Your cortisol and ACTH levels are still low - but slightly better than last time.  I'm going to reach out to the endocrinologist again, and see if they are recommending further testing.  I'll get back to you after I hear back.  Your urine and thyroid tests are all normal.     Anayeli Warner MD

## 2024-12-20 ENCOUNTER — HOSPITAL ENCOUNTER (OUTPATIENT)
Dept: MRI IMAGING | Facility: CLINIC | Age: 44
Discharge: HOME | End: 2024-12-20
Attending: STUDENT IN AN ORGANIZED HEALTH CARE EDUCATION/TRAINING PROGRAM | Admitting: STUDENT IN AN ORGANIZED HEALTH CARE EDUCATION/TRAINING PROGRAM
Payer: COMMERCIAL

## 2024-12-20 DIAGNOSIS — R79.89 ABNORMAL CORTISOL LEVEL: ICD-10-CM

## 2024-12-20 DIAGNOSIS — R79.89 ABNORMAL FSH LEVEL: ICD-10-CM

## 2024-12-20 DIAGNOSIS — R79.89 ABNORMAL LH LEVEL: ICD-10-CM

## 2024-12-20 PROCEDURE — 70553 MRI BRAIN STEM W/O & W/DYE: CPT | Mod: 26 | Performed by: RADIOLOGY

## 2024-12-20 PROCEDURE — 255N000002 HC RX 255 OP 636: Performed by: STUDENT IN AN ORGANIZED HEALTH CARE EDUCATION/TRAINING PROGRAM

## 2024-12-20 PROCEDURE — 70553 MRI BRAIN STEM W/O & W/DYE: CPT

## 2024-12-20 PROCEDURE — A9585 GADOBUTROL INJECTION: HCPCS | Performed by: STUDENT IN AN ORGANIZED HEALTH CARE EDUCATION/TRAINING PROGRAM

## 2024-12-20 RX ORDER — GADOBUTROL 604.72 MG/ML
10 INJECTION INTRAVENOUS ONCE
Status: COMPLETED | OUTPATIENT
Start: 2024-12-20 | End: 2024-12-20

## 2024-12-20 RX ADMIN — GADOBUTROL 10 ML: 604.72 INJECTION INTRAVENOUS at 08:15

## 2024-12-20 NOTE — LETTER
December 23, 2024      Jeanie French  [address] [place] [address]        Dear ,    We are writing to inform you of your test results.    Thank you for doing the MRI and all of the blood work.  Things have come back looking normal.  I'm again going to reach out to endocrinology, but it looks like you do not have pituitary or adrenal gland concerns current, which is good.  It still doesn't explain your fatigue symptoms.  I wish I had more answers for you.   But I'm glad this all came out looking okay.  Please call the clinic at [phone] if you have any questions.     Resulted Orders   MR Brain w/o & w Contrast    Narrative    EXAM: MR BRAIN W/O & W CONTRAST  12/20/2024 8:54 AM     HISTORY:  worsening headaches, low FSH, LH, cortisol, rule out  pituitary adenoma or other pituitary pathology.; Abnormal cortisol  level; Abnormal FSH level; Abnormal LH level       COMPARISON:  MRI 8/17/2024    TECHNIQUE: Axial diffusion and FLAIR images of the whole brain  obtained without intravenous contrast. Sagittal T1 and T2-weighted,  coronal T2-weighted, coronal T1-weighted images with focus on the  sella were obtained without intravenous contrast. Post intravenous  contrast using gadolinium coronal and sagittal T1-weighted images were  obtained focused on the sella. Dynamic postcontrast coronal  T1-weighted images were also obtained.    CONTRAST: 10mL Gadavist.    FINDINGS:  No mass is demonstrated within the sella. The pituitary stalk is  midline. Normal T1 hyperintense neurohypophysis. The optic chiasm is  intact and nondisplaced. Normal major cavernous carotid vascular  flow-voids. Fairly homogenous signal involving the posterior pituitary  gland as seen on series 3 image 9 and dynamic series 12 images 17 -27  is favored to be secondary to focal vascularity.    There is no mass effect, midline shift, or intracranial hemorrhage.  The ventricles are proportionate to the cerebral sulci. Diffusion  and  susceptibility weighted images are negative for acute/focal  abnormality. Major intracranial vascular structures are within normal  limits.    No suspicious abnormality of the skull marrow signal. Mild paranasal  sinus mucosal thickening. Mastoid air cells are clear. The orbits are  normal.      Impression    IMPRESSION:  1. No evidence of mass within the sella. No intracranial enhancing  mass lesion.  2. No other acute intracranial pathology.     I have personally reviewed the examination and initial interpretation  and I agree with the findings.    BALJIT WARREN MD         SYSTEM ID:  [id number]       If you have any questions or concerns, please call the clinic at the number listed above.       Sincerely,      Anayeli Warner MD

## 2024-12-21 NOTE — RESULT ENCOUNTER NOTE
Jeanie French-    Thank you for doing the MRI and all of the blood work.  Things have come back looking normal.  I'm again going to reach out to endocrinology, but it looks like you do not have pituitary or adrenal gland concerns current, which is good.  It still doesn't explain your fatigue symptoms.  I wish I had more answers for you.   But I'm glad this all came out looking okay.  Please call the clinic at [phone] if you have any questions.      Anayeli Warner MD

## 2024-12-23 ENCOUNTER — MYC MEDICAL ADVICE (OUTPATIENT)
Dept: FAMILY MEDICINE | Facility: CLINIC | Age: 44
End: 2024-12-23
Payer: COMMERCIAL

## 2024-12-23 DIAGNOSIS — R79.89 LOW SERUM CORTISOL LEVEL: Primary | ICD-10-CM

## 2024-12-23 DIAGNOSIS — N92.0 MENORRHAGIA WITH REGULAR CYCLE: ICD-10-CM

## 2024-12-23 DIAGNOSIS — K20.0 EOSINOPHILIC ESOPHAGITIS: ICD-10-CM

## 2024-12-23 DIAGNOSIS — D50.0 IRON DEFICIENCY ANEMIA DUE TO CHRONIC BLOOD LOSS: ICD-10-CM

## 2024-12-26 DIAGNOSIS — R79.89 ABNORMAL CORTISOL LEVEL: Primary | ICD-10-CM

## 2024-12-26 NOTE — PROGRESS NOTES
Results from patient's lab and imaging are as follows:    Lab on 12/03/2024   Component Date Value Ref Range Status    Cortisol 12/03/2024 5.4    ug/dL Final    6 months and older:  6 to 10 AM Cortisol Reference Range:  4-22 ug/dL   4 to 8 PM Cortisol Reference Range:  3-17 ug/dL    Adrenal Corticotropin 12/03/2024 12  <47 pg/mL Final    Free T4 12/03/2024 1.05  0.90 - 1.70 ng/dL Final    Osmolality Urine 12/03/2024 873  100 - 1,200 mmol/kg Final     She did not take the budensonide that AM, but did use her Symbicort.    MRI shows the following:    FINDINGS:  No mass is demonstrated within the sella. The pituitary stalk is  midline. Normal T1 hyperintense neurohypophysis. The optic chiasm is  intact and nondisplaced. Normal major cavernous carotid vascular  flow-voids. Fairly homogenous signal involving the posterior pituitary  gland as seen on series 3 image 9 and dynamic series 12 images 17 -27  is favored to be secondary to focal vascularity.     There is no mass effect, midline shift, or intracranial hemorrhage.  The ventricles are proportionate to the cerebral sulci. Diffusion and  susceptibility weighted images are negative for acute/focal  abnormality. Major intracranial vascular structures are within normal  limits.     No suspicious abnormality of the skull marrow signal. Mild paranasal  sinus mucosal thickening. Mastoid air cells are clear. The orbits are  normal.                                                                      IMPRESSION:  1. No evidence of mass within the sella. No intracranial enhancing  mass lesion.  2. No other acute intracranial pathology.      I have personally reviewed the examination and initial interpretation  and I agree with the findings.    Would appreciate Endocrinology input at this is NOT adrenal insufficiency, and that no further work up is needed (aka, do I need her to come back and repeat cortisol/ACTH testing when also holding symbicort - thank you!)  E-Consult  placed.     Anayeli Warner MD

## 2025-01-02 DIAGNOSIS — K20.0 EOSINOPHILIC ESOPHAGITIS: ICD-10-CM

## 2025-01-02 RX ORDER — OMEPRAZOLE 40 MG/1
40 CAPSULE, DELAYED RELEASE ORAL DAILY
Qty: 90 CAPSULE | Refills: 0 | Status: SHIPPED | OUTPATIENT
Start: 2025-01-02

## 2025-01-02 NOTE — TELEPHONE ENCOUNTER
Name from pharmacy: Omeprazole Oral Capsule Delayed Release 40 MG          Will file in chart as: omeprazole (PRILOSEC) 40 MG DR capsule    Sig: TAKE 1 CAPSULE BY MOUTH ONE TIME DAILY    Disp: 90 capsule    Refills: 0    Start: 1/2/2025    Class: E-Prescribe    For: Eosinophilic esophagitis    Last ordered: 4 months ago (8/29/2024) by Anayeli Warner MD    Last refill: 8/29/2024    Rx #: [id number]    PPI Protocol Fvqtka0101/02/2025 03:28 PM   Protocol Details Medication is active on med list    Medication indicated for associated diagnosis    Recent (12 mo) or future (90 days) visit within the authorizing provider's specialty    Patient is age 18 or older    No active pregnacy on record    No positive pregnancy test in past 12 months          Prescription approved per [facility] Refill Protocol.    Etienne Lee, RN, MSN    
Harjinder Freeman(Attending)

## 2025-01-07 PROBLEM — R79.89 LOW SERUM CORTISOL LEVEL: Status: ACTIVE | Noted: 2025-01-07

## 2025-01-07 RX ORDER — METHYLPREDNISOLONE SODIUM SUCCINATE 40 MG/ML
40 INJECTION INTRAMUSCULAR; INTRAVENOUS
Start: 2025-01-14

## 2025-01-07 RX ORDER — EPINEPHRINE 1 MG/ML
0.3 INJECTION, SOLUTION, CONCENTRATE INTRAVENOUS EVERY 5 MIN PRN
OUTPATIENT
Start: 2025-01-14

## 2025-01-07 RX ORDER — DIPHENHYDRAMINE HYDROCHLORIDE 50 MG/ML
50 INJECTION INTRAMUSCULAR; INTRAVENOUS
Start: 2025-01-14

## 2025-01-07 RX ORDER — MEPERIDINE HYDROCHLORIDE 25 MG/ML
25 INJECTION INTRAMUSCULAR; INTRAVENOUS; SUBCUTANEOUS
OUTPATIENT
Start: 2025-01-14

## 2025-01-07 RX ORDER — ALBUTEROL SULFATE 90 UG/1
1-2 INHALANT RESPIRATORY (INHALATION)
Start: 2025-01-14

## 2025-01-07 RX ORDER — ALBUTEROL SULFATE 0.83 MG/ML
2.5 SOLUTION RESPIRATORY (INHALATION)
OUTPATIENT
Start: 2025-01-14

## 2025-01-07 RX ORDER — COSYNTROPIN 0.25 MG/ML
250 INJECTION, POWDER, FOR SOLUTION INTRAMUSCULAR; INTRAVENOUS ONCE
Start: 2025-01-14 | End: 2025-01-14

## 2025-01-07 RX ORDER — DIPHENHYDRAMINE HYDROCHLORIDE 50 MG/ML
25 INJECTION INTRAMUSCULAR; INTRAVENOUS
Start: 2025-01-14

## 2025-01-13 ENCOUNTER — DOCUMENTATION ONLY (OUTPATIENT)
Dept: LAB | Facility: CLINIC | Age: 45
End: 2025-01-13
Payer: COMMERCIAL

## 2025-01-13 DIAGNOSIS — N18.2 STAGE 2 CHRONIC KIDNEY DISEASE: Primary | ICD-10-CM

## 2025-01-13 DIAGNOSIS — R31.29 MICROSCOPIC HEMATURIA: ICD-10-CM

## 2025-01-13 NOTE — PROGRESS NOTES
Patient has an upcomming lab visit without orders. Please place orders as needed.    Patient requesting: Per Encounter Provider    Appointment date: 1/27/2024

## 2025-01-27 ENCOUNTER — LAB (OUTPATIENT)
Dept: LAB | Facility: CLINIC | Age: 45
End: 2025-01-27
Payer: COMMERCIAL

## 2025-01-27 DIAGNOSIS — R31.29 MICROSCOPIC HEMATURIA: ICD-10-CM

## 2025-01-27 DIAGNOSIS — N18.2 STAGE 2 CHRONIC KIDNEY DISEASE: ICD-10-CM

## 2025-01-27 LAB
ALBUMIN MFR UR ELPH: 9.7 MG/DL
ALBUMIN SERPL BCG-MCNC: 4.1 G/DL (ref 3.5–5.2)
ALBUMIN UR-MCNC: NEGATIVE MG/DL
ANION GAP SERPL CALCULATED.3IONS-SCNC: 13 MMOL/L (ref 7–15)
APPEARANCE UR: CLEAR
BACTERIA #/AREA URNS HPF: ABNORMAL /HPF
BILIRUB UR QL STRIP: NEGATIVE
BUN SERPL-MCNC: 16.6 MG/DL (ref 6–20)
CALCIUM SERPL-MCNC: 9.6 MG/DL (ref 8.8–10.4)
CHLORIDE SERPL-SCNC: 104 MMOL/L (ref 98–107)
COLOR UR AUTO: YELLOW
CREAT SERPL-MCNC: 1.15 MG/DL (ref 0.51–0.95)
CREAT UR-MCNC: 212 MG/DL
CREAT UR-MCNC: 212 MG/DL
EGFRCR SERPLBLD CKD-EPI 2021: 59 ML/MIN/1.73M2
ERYTHROCYTE [DISTWIDTH] IN BLOOD BY AUTOMATED COUNT: 13.9 % (ref 10–15)
GLUCOSE SERPL-MCNC: 103 MG/DL (ref 70–99)
GLUCOSE UR STRIP-MCNC: NEGATIVE MG/DL
HCO3 SERPL-SCNC: 21 MMOL/L (ref 22–29)
HCT VFR BLD AUTO: 39.8 % (ref 35–47)
HGB BLD-MCNC: 13.3 G/DL (ref 11.7–15.7)
HGB UR QL STRIP: ABNORMAL
KETONES UR STRIP-MCNC: NEGATIVE MG/DL
LEUKOCYTE ESTERASE UR QL STRIP: NEGATIVE
MCH RBC QN AUTO: 30 PG (ref 26.5–33)
MCHC RBC AUTO-ENTMCNC: 33.4 G/DL (ref 31.5–36.5)
MCV RBC AUTO: 90 FL (ref 78–100)
MICROALBUMIN UR-MCNC: <12 MG/L
MICROALBUMIN/CREAT UR: NORMAL MG/G{CREAT}
NITRATE UR QL: NEGATIVE
PH UR STRIP: 6 [PH] (ref 5–7)
PHOSPHATE SERPL-MCNC: 3.5 MG/DL (ref 2.5–4.5)
PLATELET # BLD AUTO: 334 10E3/UL (ref 150–450)
POTASSIUM SERPL-SCNC: 3.9 MMOL/L (ref 3.4–5.3)
PROT/CREAT 24H UR: 0.05 MG/MG CR (ref 0–0.2)
RBC # BLD AUTO: 4.43 10E6/UL (ref 3.8–5.2)
RBC #/AREA URNS AUTO: ABNORMAL /HPF
SODIUM SERPL-SCNC: 138 MMOL/L (ref 135–145)
SP GR UR STRIP: 1.02 (ref 1–1.03)
UROBILINOGEN UR STRIP-ACNC: 0.2 E.U./DL
WBC # BLD AUTO: 6.7 10E3/UL (ref 4–11)
WBC #/AREA URNS AUTO: ABNORMAL /HPF

## 2025-01-27 PROCEDURE — 81001 URINALYSIS AUTO W/SCOPE: CPT

## 2025-01-27 PROCEDURE — 85027 COMPLETE CBC AUTOMATED: CPT

## 2025-01-27 PROCEDURE — 82570 ASSAY OF URINE CREATININE: CPT

## 2025-01-27 PROCEDURE — 36415 COLL VENOUS BLD VENIPUNCTURE: CPT

## 2025-01-27 PROCEDURE — 84156 ASSAY OF PROTEIN URINE: CPT

## 2025-01-27 PROCEDURE — 82043 UR ALBUMIN QUANTITATIVE: CPT

## 2025-01-27 PROCEDURE — 80069 RENAL FUNCTION PANEL: CPT

## 2025-01-30 ENCOUNTER — TRANSFERRED RECORDS (OUTPATIENT)
Dept: HEALTH INFORMATION MANAGEMENT | Facility: CLINIC | Age: 45
End: 2025-01-30
Payer: COMMERCIAL

## 2025-01-30 ENCOUNTER — TELEPHONE (OUTPATIENT)
Dept: PULMONOLOGY | Facility: CLINIC | Age: 45
End: 2025-01-30
Payer: COMMERCIAL

## 2025-01-30 DIAGNOSIS — R06.2 WHEEZING: Primary | ICD-10-CM

## 2025-01-30 DIAGNOSIS — R06.02 SOB (SHORTNESS OF BREATH): ICD-10-CM

## 2025-01-30 NOTE — TELEPHONE ENCOUNTER
[facility] Call Center    Phone Message    May a detailed message be left on voicemail: yes     Reason for Call: Appointment Intake    Referring Provider Name: N/A (self-referral)  Diagnosis and/or Symptoms: Wheezing/shortness of breath    Pt is scheduled to establish care 2/28/25 with Deepika Travis NP, needs orders placed for PFT's (pended).    Action Taken: Other: Pulm    Travel Screening: Not Applicable

## 2025-02-04 ENCOUNTER — VIRTUAL VISIT (OUTPATIENT)
Dept: NEPHROLOGY | Facility: CLINIC | Age: 45
End: 2025-02-04
Attending: STUDENT IN AN ORGANIZED HEALTH CARE EDUCATION/TRAINING PROGRAM
Payer: COMMERCIAL

## 2025-02-04 DIAGNOSIS — R31.21 ASYMPTOMATIC MICROSCOPIC HEMATURIA: ICD-10-CM

## 2025-02-04 DIAGNOSIS — N18.2 STAGE 2 CHRONIC KIDNEY DISEASE: Primary | ICD-10-CM

## 2025-02-04 NOTE — PATIENT INSTRUCTIONS
It was a pleasure taking care of you today.  I've included a brief summary of our discussion and care plan from today's visit below.      My recommendations are summarized as follows:  - Autoimmune laboratory workup as discussed.  I will let you know when I get the results.  - Please check with your GI doctor to see if you can stop omeprazole or if you can use an H2 blocker such as Pepcid instead.  - Recheck your serum creatinine in 3-month  - Return to Nephrology Clinic in 3-6 months to review your progress.     Who do I call with any questions after my visit?  Please be in touch if there are any further questions that arise following today's visit.  There are multiple ways to contact your nephrology care team.      During business hours, you may reach your Nephrology RN Care Coordinator, Norma Sanchez at [phone]. For urgent/emergent questions after business hours, you may reach the on-call Nephrology Fellow by contacting the [facility]  at [phone]. You can always send a secure message through [employer].  [employer] messages are answered by your nurse or doctor typically within 24 hours.  Please allow extra time on weekends and holidays.      How do I schedule appointments?  To schedule or reschedule an appointment, please call [phone]. To schedule imaging please call [phone]. To schedule your lab appointment at [facility] 1st floor lab call [phone].    How will I get the results of any tests ordered?    You will receive all of your results.  If you have signed up for [employer], any tests ordered at your visit will be available to you after your physician reviews them.  Typically this takes 1-2 weeks.  If there are urgent results that require a change in your care plan, your physician or nurse will call you to discuss the next steps.      What is [employer]?  [employer] is a secure way for you to access all of your healthcare records from the [facility].  It  is a web based computer program, so you can sign on to it from any location.  It also allows you to send secure messages to your care team.  I recommend signing up for [employer] access if you have not already done so and are comfortable with using a computer.      Sincerely,    Siva Ramos MD  [facility]  Division of Nephrology and Hypertension

## 2025-02-04 NOTE — PROGRESS NOTES
Nephrology Outpatient Consult Note (02/04/2025)     Requesting Provider  Anayeli Warner MD  [address] [place] [address]    Chief Complaint/Reason for Visit  Elevated serum creatinine. Hematuria.     History of Present Illness  This is a 44 year old female who was referred to our clinic for further workup and management of an elevated serum creatinine along with microscopic hematuria.  Her past medical history is notable for asthma depression GERD, eosinophilic esophagitis, and collagenous colitis.  She is on omeprazole, budesonide for these conditions.    With regards to her kidney function, her serum creatinine was 1.04 mg/dL in 2012.  She had no measurements up until 2022 when her serum creatinine was elevated at 1.1 mg/dL.  Over the last 2 years, serum creatinine measurements have been higher running around 1.3 mg/dL.    Her most recent laboratory evaluation was last month.  Serum creatinine was 1.15 mg/dL with an estimated GFR 59 mL/min.  Electrolytes were within a normal range.  Serum albumin is normal.  Urine albumin to current ratio is negative for albuminuria.  CBC is normal.  Urinalysis is notable for microscopic hematuria.  However, there is no proteinuria, pyuria.  She had a CT scan of her abdomen and pelvis in 2022.  The kidneys were notable for a left renal cyst.  However, there were no kidney stones or hydronephrosis.    Overall, the patient is doing well today.  She denies acute health issues or concerns.  She does not take NSAIDs.  She denies fevers or chills.  She has no skin rash or joint pain.  She does have occasional right upper quadrant pain.  She denies gross hematuria, recurrent urinary tract infections.  She denies dysuria.  She has no lower extremity edema.    The following portions of the patient's history were reviewed and updated as appropriate: allergies, current medications, past family history, past medical history, past social history, past surgical history, and  problem list.    Subjective   Review of Systems  A comprehensive review of systems was performed. Pertinent positives and negatives are described above.    Past Medical/Surgical History  Patient  has a past medical history of Asthma, Depression, anxiety, GERD (gastroesophageal reflux disease), Iron deficiency anemia (2021), Noninfectious ileitis, and Oral cancer (H).    She has no past medical history of Arthritis, Congestive heart failure, unspecified, COPD (chronic obstructive pulmonary disease) (H), Coronary artery disease, CVA (cerebral infarction), Diabetes (H), History of blood transfusion, Hypertension, or Thyroid disease.  Patient  has a past surgical history that includes ENT surgery and Excision tongue (3/13/2012).    Social History  Patient  reports that she has never smoked. She has never used smokeless tobacco. She reports current alcohol use. She reports that she does not use drugs.    Family History  family history includes Cancer in her mother; Coronary Artery Disease in her father; Diabetes in her maternal grandfather, maternal grandmother, and another family member.     Physical Examination  not currently breastfeeding. There is no height or weight on file to calculate BMI.  Not performed as this is a virtual visit.    Objective   Pertinent Laboratory and Imaging Results  Most Recent Serum Chemistries  Lab Results   Component Value Date    WBC 6.7 01/27/2025    HGB 13.3 01/27/2025    HCT 39.8 01/27/2025     01/27/2025     01/27/2025    POTASSIUM 3.9 01/27/2025    CHLORIDE 104 01/27/2025    CO2 21 (L) 01/27/2025    BUN 16.6 01/27/2025    CR 1.15 (H) 01/27/2025     (H) 01/27/2025    SED 7 02/14/2024    AST 17 05/25/2022    ALT 14 05/25/2022    ALKPHOS 70 05/25/2022    BILIDIRECT 0.1 06/09/2011    INR 1.0 08/28/2012     Most Recent Urinalysis  Lab Results   Component Value Date    COLOR Yellow 01/27/2025    APPEARANCE Clear 01/27/2025    URINEGLC Negative 01/27/2025    URINEBILI  Negative 01/27/2025    URINEKETONE Negative 01/27/2025    SG 1.025 01/27/2025    URINEPH 6.0 01/27/2025    PROTEIN Negative 01/27/2025    UROBILINOGEN 0.2 01/27/2025    NITRITE Negative 01/27/2025    LEUKEST Negative 01/27/2025     Current Outpatient Medications   Medication Sig Dispense Refill    budesonide (ENTOCORT EC) 3 MG EC capsule       busPIRone (BUSPAR) 5 MG tablet Take 2 tablets (10 mg) by mouth 2 times daily for 14 days, THEN 1 tablet (5 mg) 2 times daily for 14 days. 84 tablet 0    DULoxetine (CYMBALTA) 30 MG capsule TAKE 1 CAPSULE (30 MG) BY MOUTH 2 TIMES DAILY 60 capsule 3    galcanezumab-gnlm (EMGALITY) 120 MG/ML injection Inject 1 mL (120 mg) subcutaneously every 28 days. Do not start before September 24, 2024. 1 mL 11    hydrOXYzine HCl (ATARAX) 10 MG tablet Take 1 tablet (10 mg) by mouth 3 times daily as needed for itching. 90 tablet 3    lisinopril (ZESTRIL) 5 MG tablet Take 1 tablet (5 mg) by mouth daily. 90 tablet 1    loperamide (IMODIUM A-D) 2 MG tablet Trying to decrease      norgestrel-ethinyl estradiol (TURQOZ) 0.3-30 MG-MCG tablet TAKE 1 TABLET BY MOUTH DAILY FOR 63 DAYS, THEN TAKE PLACEBO PILLS FOR 7 DAYS 84 tablet 3    omeprazole (PRILOSEC) 40 MG DR capsule TAKE 1 CAPSULE BY MOUTH ONE TIME DAILY 90 capsule 0    rizatriptan (MAXALT) 5 MG tablet Take 1 tablet (5 mg) by mouth at onset of headache for migraine May repeat in 2 hours. Max 6 tablets/24 hours. Limit to 9 tablets per month. 9 tablet 5    sodium chloride (OCEAN) 0.65 % nasal spray Use 1 spray in each nostril 4x per day as needed. 15 mL 1    spacer (OPTICHAMBER BARB) holding chamber Use as needed with inhaler 1 each 0     No current facility-administered medications for this visit.        Assessment & Plan   # Elevated serum creatinine with microscopic hematuria  # Collagenous colitis on budesonide  # Eosinophilic esophagitis on omeprazole  # Wheezing    The patient has had a slightly elevated serum creatinine for the last 2  years.  She also had microscopic hematuria on 2 occasions.  The last urinalysis was done towards the end of her period.  The urinalysis is negative for proteinuria, or pyuria.    Given her history of colitis and esophagitis, it is possible that she may have an autoimmune process affecting the kidneys.  1 possible diagnoses would be IgA nephropathy. In addition, the presence of respiratory symptoms along with an increased serum creatinine raise the question of whether she may have sarcoidosis.    With regards to workup, I think it is reasonable to obtain a serological evaluation of glomerulonephritis.  Based on the results, a kidney biopsy may be indicated.  I discussed with the patient the risk and benefits.  If the autoimmune workup is negative and given the recent improvement in his serum creatinine and lack of proteinuria then monitoring her with labs every 3 months would be reasonable.  If her serum creatinine starts to go up again, then we should proceed with the biopsy.    Also indicated to the patient that omeprazole could cause interstitial nephritis especially if taken for a long period of time.  She has been on the medication for few years.  I instructed her to check with her GI doctor and to see if she could come off the medication or use an H2 blocker instead.    My recommendations are the following:  - Autoimmune laboratory workup as discussed.  I will let you know when I get the results.  - Please check with your GI doctor to see if you can stop omeprazole or if you can use an H2 blocker such as Pepcid instead.  - Recheck your serum creatinine in 3-month  - Return to Nephrology Clinic in 3-6 months to review your progress.     Total time was of this encounter on the date of service was 50 minutes. All questions were answered to the patient's satisfaction.  Chart documentation was completed, in part, with [employer] voice-recognition software. Even though reviewed, some grammatical, spelling, and word  errors may remain.    Telemedicine Visit Addendum:  Consultation provided at the request of above provider for advice regarding the diagnosis and treatment of patient's condition. The patient's condition can be safely assessed via telemedicine. Patient has agreed to receiving services via telemedicine technology. Date of service is 02/04/25. Time Service Began: 7:56 AM. Time Service Ended: 8:27 AM. Originating Location (pt. Location): Home. Distant Location (provider location):  [facility].Reason that telemedicine is appropriate: Patient unable to travel. Mode of transmission: Secure real time interactive audio and visual telecommunication system  [employer]      Orders placed today:  Orders Placed This Encounter   Procedures    ANCA IgG by IFA with Reflex to Titer    Anti Nuclear Daly IgG by IFA with Reflex    Hepatitis B Surface Antibody    Hepatitis B surface antigen    Hepatitis C antibody    Complement C3    Complement C4    DNA double stranded antibodies    Angiotensin converting enzyme    Renal panel    UA with Microscopic     Siva Ramos MD  Division of Nephrology and Hypertension  [employer]   Vocera Web Console ([employer]

## 2025-02-04 NOTE — NURSING NOTE
Current patient location: Patient declined to provide     Is the patient currently in the state of [place]? YES    Visit mode: VIDEO    If the visit is dropped, the patient can be reconnected by:VIDEO VISIT: Text to cell phone:   Telephone Information:   Mobile [phone]       Will anyone else be joining the visit? NO  (If patient encounters technical issues they should call [phone])    Are changes needed to the allergy or medication list? No    Are refills needed on medications prescribed by this physician? NO    Rooming Documentation:  Questionnaire(s) completed    Reason for visit: Consult    Lisa CASTELLANOSF

## 2025-02-05 ENCOUNTER — OFFICE VISIT (OUTPATIENT)
Dept: PULMONOLOGY | Facility: CLINIC | Age: 45
End: 2025-02-05
Payer: COMMERCIAL

## 2025-02-05 VITALS
SYSTOLIC BLOOD PRESSURE: 120 MMHG | WEIGHT: 221 LBS | HEIGHT: 70 IN | DIASTOLIC BLOOD PRESSURE: 66 MMHG | HEART RATE: 87 BPM | BODY MASS INDEX: 31.64 KG/M2 | OXYGEN SATURATION: 99 %

## 2025-02-05 DIAGNOSIS — R06.02 SOB (SHORTNESS OF BREATH): ICD-10-CM

## 2025-02-05 DIAGNOSIS — R06.2 WHEEZING: ICD-10-CM

## 2025-02-05 DIAGNOSIS — J45.40 MODERATE PERSISTENT ASTHMA WITHOUT COMPLICATION: Primary | ICD-10-CM

## 2025-02-05 LAB
DLCOCOR-%PRED-PRE: 82 %
DLCOCOR-PRE: 20 ML/MIN/MMHG
DLCOUNC-%PRED-PRE: 82 %
DLCOUNC-PRE: 19.94 ML/MIN/MMHG
DLCOUNC-PRED: 24.15 ML/MIN/MMHG
ERV-%PRED-PRE: 51 %
ERV-PRE: 0.74 L
ERV-PRED: 1.44 L
EXPTIME-PRE: 5.02 SEC
FEF2575-%PRED-PRE: 141 %
FEF2575-PRE: 4.36 L/SEC
FEF2575-PRED: 3.09 L/SEC
FEFMAX-%PRED-PRE: 102 %
FEFMAX-PRE: 8 L/SEC
FEFMAX-PRED: 7.8 L/SEC
FEV1-%PRED-PRE: 116 %
FEV1-PRE: 3.74 L
FEV1FEV6-PRE: 85 %
FEV1FEV6-PRED: 83 %
FEV1FVC-PRE: 85 %
FEV1FVC-PRED: 81 %
FEV1SVC-PRE: 84 %
FEV1SVC-PRED: 79 %
FIFMAX-PRE: 5.25 L/SEC
FRCPLETH-%PRED-PRE: 65 %
FRCPLETH-PRE: 2.21 L
FRCPLETH-PRED: 3.36 L
FVC-%PRED-PRE: 110 %
FVC-PRE: 4.38 L
FVC-PRED: 3.98 L
IC-%PRED-PRE: 128 %
IC-PRE: 3.7 L
IC-PRED: 2.88 L
RVPLETH-%PRED-PRE: 74 %
RVPLETH-PRE: 1.47 L
RVPLETH-PRED: 1.97 L
TLCPLETH-%PRED-PRE: 99 %
TLCPLETH-PRE: 5.91 L
TLCPLETH-PRED: 5.94 L
VA-%PRED-PRE: 90 %
VA-PRE: 5.33 L
VC-%PRED-PRE: 109 %
VC-PRE: 4.44 L
VC-PRED: 4.06 L

## 2025-02-05 RX ORDER — RIFAXIMIN 550 MG/1
TABLET ORAL
COMMUNITY
Start: 2024-07-29

## 2025-02-05 RX ORDER — LOPERAMIDE HYDROCHLORIDE 2 MG/1
CAPSULE ORAL
COMMUNITY
Start: 2023-01-01

## 2025-02-05 RX ORDER — ALBUTEROL SULFATE 0.83 MG/ML
2.5 SOLUTION RESPIRATORY (INHALATION) EVERY 6 HOURS PRN
Qty: 90 ML | Refills: 4 | Status: SHIPPED | OUTPATIENT
Start: 2025-02-05

## 2025-02-05 RX ORDER — ALBUTEROL SULFATE 90 UG/1
2 INHALANT RESPIRATORY (INHALATION) EVERY 6 HOURS PRN
COMMUNITY

## 2025-02-05 RX ORDER — CETIRIZINE HYDROCHLORIDE 10 MG/1
10 TABLET ORAL DAILY
COMMUNITY

## 2025-02-05 RX ORDER — NORGESTREL AND ETHINYL ESTRADIOL 0.3-0.03MG
KIT ORAL
COMMUNITY
Start: 2024-05-24

## 2025-02-05 NOTE — PROGRESS NOTES
CCx:Wheezing/SOB    HPI:Ms. French is a 44 year old lady with a past medical history significant for asthma, eosinophilic esophagitis, collagenous colitis, depression, anxiety, iron deficiency anemia, IBS and squamous cell cancer of the tongue s/p wide local excision who presents to clinic for the aforementioned chief complaint.  Of note, she was recently referred to the nephrology clinic at [facility] for evaluation of a gradually increasing creatinine and microscopic hematuria. Given her history of eosinophilic colitis and esophagitis have some concern for IgA nephropathy versus sarcoidosis given respiratory and renal abnormalities for which a comprehensive autoimmune battery has been ordered but not yet collected.  With she was switched from albuterol to Symbicort last fall regards to her presumed exercise-induced asthma which interestingly began to get worse as her chronic budesonide (that she was on for collagenous colitis) was initiated on a taper.  She has noted that she has had exercise-induced asthma since her teens and when she consumes beverages that contain sulfites this causes her to wheeze.  Of note there is some days when she finds that her coughing and wheezing is worse since she was initiated on Symbicort and states that she has felt the need to use her albuterol a few times as well.  She was evaluated by endocrinology last week we have recommended discontinuing all inhaled steroids which she did last week and notes that she is now down to 2 mg of budesonide a day.  Of note, she has been on 3 mg of budesonide a day since 2021 (equivalent of 20 mg of prednisone a day).  She has previously been on rifaximin in addition to budesonide for presumed bacterial overgrowth and stopped this last fall due to insurance issues but has had no recurrence of symptoms since then.  She is presently on 1 mg budesonide a day and has been going down for the last 3 months.  She does endorse some seasonal allergies.  She  endorses nighttime symptoms and states that she is up coughing 3-4 times every night.    ROS:  Pertinent positives alluded to in the HPI. Remainder of 10 point ROS is negative.     PMH:  Asthma  Depression  Anxiety  Iron deficiency anemia  IBS  Squamous cell cancer of the tongue    PSH:  Pressure equalizing tubes to both ears  Wide local excision of the right lateral tongue    Allergies:  Reviewed.     Family HX:  Mother: Leukemia, breast cancer, thyroid disease  (teacher and public )  Father: . Kidney disease, IPF (never smoker), TBI, CAD  Paternal Uncle: IPF  Sister:Childhood epilepsy  Brother:CAD, MI at 50y  Nephew: MI at 26y  Maternal Grandmother:Leukemia, thyroid disease  Maternal Grandfather:Lung cancer, CVA    Social Hx:  Marital status: Partner  Number of children: 0  Resides in a house, built in the s, no concern for mold.  Occupational history:    service: No  Pets: 3 cats, 1 part time dog  Smoking history: 0 pack year history  Alcohol use: Couple times a month   Recreational drug use: No  Hobbies: Crafting, reading, watching movies  Recent Travel: [place] in October    Current Meds:  Current Outpatient Medications   Medication Sig Dispense Refill    budesonide (ENTOCORT EC) 3 MG EC capsule       busPIRone (BUSPAR) 5 MG tablet Take 2 tablets (10 mg) by mouth 2 times daily for 14 days, THEN 1 tablet (5 mg) 2 times daily for 14 days. 84 tablet 0    DULoxetine (CYMBALTA) 30 MG capsule TAKE 1 CAPSULE (30 MG) BY MOUTH 2 TIMES DAILY 60 capsule 3    galcanezumab-gnlm (EMGALITY) 120 MG/ML injection Inject 1 mL (120 mg) subcutaneously every 28 days. Do not start before 2024. 1 mL 11    hydrOXYzine HCl (ATARAX) 10 MG tablet Take 1 tablet (10 mg) by mouth 3 times daily as needed for itching. 90 tablet 3    lisinopril (ZESTRIL) 5 MG tablet Take 1 tablet (5 mg) by mouth daily. 90 tablet 1    loperamide (IMODIUM A-D) 2 MG tablet Trying to  "decrease      norgestrel-ethinyl estradiol (TURQOZ) 0.3-30 MG-MCG tablet TAKE 1 TABLET BY MOUTH DAILY FOR 63 DAYS, THEN TAKE PLACEBO PILLS FOR 7 DAYS 84 tablet 3    omeprazole (PRILOSEC) 40 MG DR capsule TAKE 1 CAPSULE BY MOUTH ONE TIME DAILY 90 capsule 0    rizatriptan (MAXALT) 5 MG tablet Take 1 tablet (5 mg) by mouth at onset of headache for migraine May repeat in 2 hours. Max 6 tablets/24 hours. Limit to 9 tablets per month. 9 tablet 5    sodium chloride (OCEAN) 0.65 % nasal spray Use 1 spray in each nostril 4x per day as needed. 15 mL 1    spacer (OPTICHAMBER BARB) holding chamber Use as needed with inhaler 1 each 0     Labs:  Reviewed.     Imaging studies:  CXR 2/20/20:  No acute pulmonary disease. Unremarkable exam.     PFT's 2/5/25:  FEV1/FVC is 85% and is normal.  FEV1 is 3.74L (116%)  (Z Score 1.31) predicted and is normal.  FVC is 4.38L (110%)  (Z Score 0.81) predicted and normal.  TLC is 5.91L (99%) (Z Score -0.05) predicted and is normal.  RV is 1.47L (74%) (Z Score -1.42) predicted and is normal.  DLCO is 20ml/min/hg (82%)  (Z Score -1.28) predicted and is normal when it is corrected for hemoglobin.  Flow volume loops indicate no abnormalities.    Impression:  Full Pulmonary Function Test is normal.  PFTs are consistent with no obstructive disease.  There  is no  hyperinflation.  There  is no  air-trapping.  Diffusion capacity when corrected for hemoglobin is normal.     The ATS criteria for acceptability and reproducibility was met.    Physical Exam:  /66 (BP Location: Left arm, Patient Position: Sitting, Cuff Size: Adult Regular)   Pulse 87   Ht 1.778 m (5' 10\")   Wt 100.2 kg (221 lb)   SpO2 99%   BMI 31.71 kg/m    GEN: NAD  HEENT: PERRL, No JVD  LUNGS: CTA BL  CVS: S1 & S2 no added sounds  ABD: No HSM, BS audible  EXT: No edema, no rashes  NEURO: AO*3 CN2-12 intact    Assessment and Plan:Jeanie French is a 44 year old with a past medical history significant for asthma, eosinophilic " esophagitis, collagenous colitis, depression, anxiety, iron deficiency anemia, IBS and squamous cell cancer of the tongue s/p wide local excision who presents to clinic today for establishment of care for asthma. Her PFT's and prior imaging from 2020 are noted to be unremarkable and her current constellation of symptoms could certainly be consistent with eosinophilic asthma (although her total eosinophil levels were noted to be normal which is not unexpected given long-term steroid use), intrinsic neutrophilic asthma, certainly cough variant asthma especially given pre-existing hiatal hernia.  For the present we would recommend;    Asthma: As noted above.  Symptoms have worsened as her systemic steroid doses have been reduced and she currently endorses significant nighttime symptoms with coughing in addition to wheezing and chest tightness during the day.  Given her known history of eosinophilic esophagitis I am very concerned that she likely has eosinophilic driven asthma that we have not been able to capture on serum testing given that she has been on systemic steroids for many years now.  I did discuss the possibility of some autoimmune process driving both of these especially given iron deficiency anemia and prior concern for bacterial overgrowth which could be driving pernicious anemia.  We will initiate her on a LAMA namely Spiriva Respimat 2 puffs once a day.  I have explained to her that if her symptoms continue to persist that we would have to reinitiate an inhaled steroid.  I have given her prescription for an albuterol nebulizer and given her nebulizer machine to use before going to bed to see if this alleviates some of her coughing overnight.  Elevate the head of her bed at night and has been instructed to eat at least 2 hours prior to lying down.  Eosinophilic and collagenous colitis now with worsening creatinine and hematuria: Has been on oral steroids for many years for this and this is certainly the  most obvious culprit for her weight gain and likely adrenal insufficiency.  I suspect that the chronic steroid use has also kept her asthma symptoms and check.  I encouraged her to talk to her gastroenterologist and endocrinologist as the is likely some unifying diagnosis to all of the symptoms.  She has been evaluated by the nephrology service and is due to undergo an autoimmune battery for concerns of IgA nephropathy versus sarcoid (which I suspect may be less likely but most certainly need to be ruled out).  HCM: Encouraged her to increase level of physical activity given her weight gain which is most certainly related to chronic steroid use.  Follow-up: 1 month with myself.      Naya Abdul MD  Pulmonary and Critical Care  [facility]    The longitudinal plan of care for the diagnosis(es)/condition(s) as documented were addressed during this visit. Due to the added complexity in care, I will continue to support Jeanie in the subsequent management and with ongoing continuity of care.

## 2025-02-05 NOTE — PATIENT INSTRUCTIONS
Please use your Spiriva once a day every day whether or not you are short of breath, this is not a rescue inhaler so do not use this if you are acutely short of breath.  Please use your albuterol nebulizer prior to lying down.  Please do not lie down for at least 2 hours after your meals.  Please elevate the head end of your bed when you sleep, you can do this by placing a thick book under the head post.  Document your daily oral intake.  Start exercising regularly.

## 2025-02-05 NOTE — PROGRESS NOTES
Patient instructed in use of nebulizer machine from [facility].  Patient states good understanding of how to use the nebulizer machine.  Nebulizer machine given to patient from [facility].  All paperwork signed and copy scanned to chart. Phone numbers given to patient to call if any questions.      Patient instructed in use of Spiriva Respimat inhaler.  Patient states good understanding of how to use the Respimat device.  Phone numbers given to call if any questions.

## 2025-02-07 ENCOUNTER — ANCILLARY PROCEDURE (OUTPATIENT)
Dept: GENERAL RADIOLOGY | Facility: CLINIC | Age: 45
End: 2025-02-07
Attending: INTERNAL MEDICINE
Payer: COMMERCIAL

## 2025-02-07 DIAGNOSIS — J45.40 MODERATE PERSISTENT ASTHMA WITHOUT COMPLICATION: ICD-10-CM

## 2025-02-07 PROCEDURE — 71046 X-RAY EXAM CHEST 2 VIEWS: CPT | Mod: TC | Performed by: RADIOLOGY

## 2025-02-11 ENCOUNTER — LAB (OUTPATIENT)
Dept: LAB | Facility: CLINIC | Age: 45
End: 2025-02-11
Payer: COMMERCIAL

## 2025-02-11 DIAGNOSIS — N18.2 STAGE 2 CHRONIC KIDNEY DISEASE: ICD-10-CM

## 2025-02-11 DIAGNOSIS — R31.21 ASYMPTOMATIC MICROSCOPIC HEMATURIA: ICD-10-CM

## 2025-02-11 PROCEDURE — 86225 DNA ANTIBODY NATIVE: CPT

## 2025-02-11 PROCEDURE — 86706 HEP B SURFACE ANTIBODY: CPT

## 2025-02-11 PROCEDURE — 86036 ANCA SCREEN EACH ANTIBODY: CPT

## 2025-02-11 PROCEDURE — 86039 ANTINUCLEAR ANTIBODIES (ANA): CPT

## 2025-02-11 PROCEDURE — 86803 HEPATITIS C AB TEST: CPT

## 2025-02-11 PROCEDURE — 86038 ANTINUCLEAR ANTIBODIES: CPT

## 2025-02-11 PROCEDURE — 82164 ANGIOTENSIN I ENZYME TEST: CPT | Mod: 90

## 2025-02-11 PROCEDURE — 86160 COMPLEMENT ANTIGEN: CPT

## 2025-02-11 PROCEDURE — 36415 COLL VENOUS BLD VENIPUNCTURE: CPT

## 2025-02-11 PROCEDURE — 99000 SPECIMEN HANDLING OFFICE-LAB: CPT

## 2025-02-11 PROCEDURE — 87340 HEPATITIS B SURFACE AG IA: CPT

## 2025-02-12 LAB
ANA PAT SER IF-IMP: ABNORMAL
ANA SER QL IF: POSITIVE
ANA TITR SER IF: ABNORMAL {TITER}
ANCA AB PATTERN SER IF-IMP: NORMAL
C-ANCA TITR SER IF: NORMAL {TITER}
C3 SERPL-MCNC: 140 MG/DL (ref 81–157)
C4 SERPL-MCNC: 23 MG/DL (ref 13–39)
DSDNA AB SER-ACNC: 0.9 IU/ML
HBV SURFACE AB SERPL IA-ACNC: 580 M[IU]/ML
HBV SURFACE AB SERPL IA-ACNC: REACTIVE M[IU]/ML
HBV SURFACE AG SERPL QL IA: NONREACTIVE
HCV AB SERPL QL IA: NONREACTIVE

## 2025-02-22 DIAGNOSIS — R31.21 ASYMPTOMATIC MICROSCOPIC HEMATURIA: Primary | ICD-10-CM

## 2025-02-22 DIAGNOSIS — R79.89 ELEVATED SERUM CREATININE: ICD-10-CM

## 2025-02-22 NOTE — PROGRESS NOTES
I called the patient today, and I spoke to her via the phone.  I updated her with her test results.  She voiced out concerns with regards to her abnormal kidney function, and respiratory symptoms that she has been experiencing.    I discussed her case with Dr. Abdul who saw her in pulmonary clinic.  At this point, I do not feel that the patient has a pulmonary renal syndrome.  However, a kidney biopsy is indicated given her elevated creatinine and hematuria.    I updated the patient with that information.  Again, I offered her the choice of doing the kidney biopsy now versus closely monitor her kidney function and proceeding with the biopsy if her kidney function worsens.  She opted for the first plan.  She understands the risk and benefits of the biopsy.  I will place the order for the kidney biopsy.

## 2025-02-23 DIAGNOSIS — G43.009 MIGRAINE WITHOUT AURA AND WITHOUT STATUS MIGRAINOSUS, NOT INTRACTABLE: ICD-10-CM

## 2025-02-23 DIAGNOSIS — F33.2 SEVERE EPISODE OF RECURRENT MAJOR DEPRESSIVE DISORDER, WITHOUT PSYCHOTIC FEATURES (H): ICD-10-CM

## 2025-02-23 DIAGNOSIS — F41.1 GENERALIZED ANXIETY DISORDER: ICD-10-CM

## 2025-02-24 ENCOUNTER — MYC MEDICAL ADVICE (OUTPATIENT)
Dept: FAMILY MEDICINE | Facility: CLINIC | Age: 45
End: 2025-02-24
Payer: COMMERCIAL

## 2025-02-24 DIAGNOSIS — J45.30 MILD PERSISTENT ASTHMA, UNSPECIFIED WHETHER COMPLICATED: Primary | ICD-10-CM

## 2025-02-24 RX ORDER — DULOXETIN HYDROCHLORIDE 30 MG/1
30 CAPSULE, DELAYED RELEASE ORAL 2 TIMES DAILY
Qty: 60 CAPSULE | Refills: 2 | Status: SHIPPED | OUTPATIENT
Start: 2025-02-24

## 2025-02-24 RX ORDER — ALBUTEROL SULFATE 90 UG/1
2 INHALANT RESPIRATORY (INHALATION) EVERY 6 HOURS PRN
Qty: 17 G | Refills: 2 | Status: SHIPPED | OUTPATIENT
Start: 2025-02-24

## 2025-02-24 NOTE — TELEPHONE ENCOUNTER
Name from pharmacy: DULoxetine HCl Oral Capsule Delayed Release Particles 30 MG          Will file in chart as: DULoxetine (CYMBALTA) 30 MG capsule    Sig: TAKE 1 CAPSULE (30 MG) BY MOUTH 2 TIMES DAILY    Disp: 60 capsule    Refills: 0    Start: 2/23/2025    Class: E-Prescribe    For: Migraine without aura and without status migrainosus, not intractable; Generalized anxiety disorder; Severe episode of recurrent major depressive disorder, without psychotic features (H)    Last ordered: 4 months ago (10/14/2024) by Anayeli Warner MD    Last refill: 2/22/2025    Rx #: [id number]    Serotonin-Norepinephrine Reuptake Inhibitors  [id number]/23/2025 12:06 PM   Protocol Details PHQ-9 score of less than 5 in past 6 months        LINN Fields, BSN

## 2025-03-02 DIAGNOSIS — N18.2 STAGE 2 CHRONIC KIDNEY DISEASE: ICD-10-CM

## 2025-03-03 ENCOUNTER — TELEPHONE (OUTPATIENT)
Dept: NEUROLOGY | Facility: CLINIC | Age: 45
End: 2025-03-03
Payer: COMMERCIAL

## 2025-03-03 RX ORDER — LISINOPRIL 5 MG/1
5 TABLET ORAL DAILY
Qty: 90 TABLET | Refills: 0 | Status: SHIPPED | OUTPATIENT
Start: 2025-03-03

## 2025-03-03 NOTE — TELEPHONE ENCOUNTER
Refill request recvd for Rizatriptan. Patient cancelled her Follow-up appt with Dr. Andrew. I called patient and she stated she will get refills from he PCP.

## 2025-03-03 NOTE — TELEPHONE ENCOUNTER
Name from pharmacy: Lisinopril Oral Tablet 5 MG          Will file in chart as: lisinopril (ZESTRIL) 5 MG tablet    Sig: Take 1 tablet (5 mg) by mouth daily.    Disp: 90 tablet    Refills: 0    Start: 3/2/2025    Class: E-Prescribe    Non-formulary For: Stage 2 chronic kidney disease    Last ordered: 6 months ago (8/26/2024) by Anayeli Warner MD    Last refill: 11/24/2024    Rx #: [id number]    ACE Inhibitors (Including Combos) Protocol [id number]/02/2025 11:15 AM   Protocol Details Most recent blood pressure under 140/90 in past 12 months- Clinicial or Patient Reported    Medication is active on med list and the sig matches. RN to manually verify dose and sig if red X/fail.    Medication indicated for associated diagnosis    Recent (12 mo) or future (90 days) visit within the authorizing provider's specialty    Most recent GFR on file in the past 12 months >30    Patient is age 18 or older    No active pregnancy on record    Normal serum potassium on file in past 12 months    No positive pregnancy test within past 12 months        BP Readings from Last 3 Encounters:   02/05/25 120/66   10/29/24 113/79   07/23/24 109/75     GFR Estimate   Date Value Ref Range Status   01/27/2025 59 (L) >60 mL/min/1.73m2 Final     Comment:     eGFR calculated using 2021 CKD-EPI equation.   07/24/2020 60 (L) >60 mL/min/1.73m2 Final     Potassium   Date Value Ref Range Status   01/27/2025 3.9 3.4 - 5.3 mmol/L Final   07/23/2024 4.6 3.4 - 5.3 mmol/L Final   07/24/2020 3.8 3.5 - 5.0 mmol/L Final     Prescription approved per [facility] Refill Protocol.  LINN Fields, BSN

## 2025-03-05 ENCOUNTER — VIRTUAL VISIT (OUTPATIENT)
Dept: PULMONOLOGY | Facility: CLINIC | Age: 45
End: 2025-03-05
Attending: INTERNAL MEDICINE
Payer: COMMERCIAL

## 2025-03-05 DIAGNOSIS — J45.40 MODERATE PERSISTENT ASTHMA WITHOUT COMPLICATION: Primary | ICD-10-CM

## 2025-03-05 RX ORDER — TIOTROPIUM BROMIDE INHALATION SPRAY 1.56 UG/1
2 SPRAY, METERED RESPIRATORY (INHALATION) DAILY
Qty: 4 G | Refills: 4 | Status: SHIPPED | OUTPATIENT
Start: 2025-03-05

## 2025-03-05 RX ORDER — BUDESONIDE AND FORMOTEROL FUMARATE DIHYDRATE 80; 4.5 UG/1; UG/1
2 AEROSOL RESPIRATORY (INHALATION) 2 TIMES DAILY
Qty: 10.2 G | Refills: 11 | Status: SHIPPED | OUTPATIENT
Start: 2025-03-05

## 2025-03-05 ASSESSMENT — ASTHMA QUESTIONNAIRES
QUESTION_5 LAST FOUR WEEKS HOW WOULD YOU RATE YOUR ASTHMA CONTROL: SOMEWHAT CONTROLLED
QUESTION_4 LAST FOUR WEEKS HOW OFTEN HAVE YOU USED YOUR RESCUE INHALER OR NEBULIZER MEDICATION (SUCH AS ALBUTEROL): TWO OR THREE TIMES PER WEEK
QUESTION_2 LAST FOUR WEEKS HOW OFTEN HAVE YOU HAD SHORTNESS OF BREATH: ONCE A DAY
ACT_TOTALSCORE: 13
ACT_TOTALSCORE: 13
QUESTION_1 LAST FOUR WEEKS HOW MUCH OF THE TIME DID YOUR ASTHMA KEEP YOU FROM GETTING AS MUCH DONE AT WORK, SCHOOL OR AT HOME: SOME OF THE TIME
QUESTION_3 LAST FOUR WEEKS HOW OFTEN DID YOUR ASTHMA SYMPTOMS (WHEEZING, COUGHING, SHORTNESS OF BREATH, CHEST TIGHTNESS OR PAIN) WAKE YOU UP AT NIGHT OR EARLIER THAN USUAL IN THE MORNING: TWO OR THREE NIGHTS A WEEK

## 2025-03-05 NOTE — PROGRESS NOTES
Virtual Visit Details (snowstorm prevented in person visit)  CCx:Followup Asthma    HPI:Ms. French is a 44 year old lady with a past medical history significant for asthma, eosinophilic esophagitis, collagenous colitis, depression, anxiety, iron deficiency anemia, IBS and squamous cell cancer of the tongue s/p wide local excision who presents to clinic for the aforementioned chief complaint.    She reports that her breathing is not perfect, but the nebulizer and Spiriva inhaler have helped. Raising the head of her bed has made a significant difference, suggesting a possible reflux problem. She experiences shortness of breath daily, particularly when going up and down stairs at work. Her asthma interrupts her work, although she can still perform her duties. She has been on Spiriva, which has caused severe dry mouth, and she has been hesitant to restart inhaled steroids. She has a history of side effects from various medications, including antidepressants and IBS drugs. Her asthma is not well-controlled on Spiriva alone.  Jeanie is scheduled for a renal biopsy next Thursday due to concerns about her creatinine levels. There is a suspicion of a potential autoimmune condition, as she has at least three possibly related autoimmune issues. She has discussed the possibility of seeing a rheumatologist with her primary doctor, pending the results of the kidney biopsy.    Pertinent Medical History:  With she was switched from albuterol to Symbicort last fall regards to her presumed exercise-induced asthma which interestingly began to get worse as her chronic budesonide (that she was on for collagenous colitis) was initiated on a taper.  She has noted that she has had exercise-induced asthma since her teens and when she consumes beverages that contain sulfites this causes her to wheeze.  Of note there is some days when she finds that her coughing and wheezing is worse since she was initiated on Symbicort and states that she  has felt the need to use her albuterol a few times as well.  She was evaluated by endocrinology last week we have recommended discontinuing all inhaled steroids which she did last week and notes that she is now down to 2 mg of budesonide a day.  Of note, she has been on 3 mg of budesonide a day since  (equivalent of 20 mg of prednisone a day).  She has previously been on rifaximin in addition to budesonide for presumed bacterial overgrowth and stopped this last  due to insurance issues but has had no recurrence of symptoms since then.  She is presently on 1 mg budesonide a day and has been going down for the last 3 months.  She does endorse some seasonal allergies.  She endorses nighttime symptoms and states that she is up coughing 3-4 times every night.          2024    11:04 AM 10/29/2024     3:35 PM 3/5/2025    12:46 PM   ACT Total Scores   ACT TOTAL SCORE (Goal Greater than or Equal to 20) 20 19  13    In the past 12 months, how many times did you visit the emergency room for your asthma without being admitted to the hospital? 0 0 0    In the past 12 months, how many times were you hospitalized overnight because of your asthma? 0 0 0        Patient-reported    Proxy-reported       ROS:  Pertinent positives alluded to in the HPI. Remainder of 10 point ROS is negative.     PMH (Unchanged from previous visit):  Asthma  Depression  Anxiety  Iron deficiency anemia  IBS  Squamous cell cancer of the tongue    PSH  (Unchanged from previous visit):  Pressure equalizing tubes to both ears  Wide local excision of the right lateral tongue    Allergies:  Reviewed.     Family HX  (Unchanged from previous visit):  Mother: Leukemia, breast cancer, thyroid disease  (teacher and public )  Father: . Kidney disease, IPF (never smoker), TBI, CAD  Paternal Uncle: IPF  Sister:Childhood epilepsy  Brother:CAD, MI at 50y  Nephew: MI at 26y  Maternal Grandmother:Leukemia, thyroid disease  Maternal  Grandfather:Lung cancer, CVA    Social Hx  (Unchanged from previous visit):  Marital status: Partner  Number of children: 0  Resides in a house, built in the 1960's, no concern for mold.  Occupational history:    service: No  Pets: 3 cats, 1 part time dog  Smoking history: 0 pack year history  Alcohol use: Couple times a month   Recreational drug use: No  Hobbies: Crafting, reading, watching movies  Recent Travel: [place] in October    Current Meds:  Current Outpatient Medications   Medication Sig Dispense Refill    albuterol (PROAIR HFA/PROVENTIL HFA/VENTOLIN HFA) 108 (90 Base) MCG/ACT inhaler Inhale 2 puffs into the lungs every 6 hours as needed for shortness of breath, wheezing or cough. 17 g 2    albuterol (PROVENTIL) (2.5 MG/3ML) 0.083% neb solution Take 1 vial (2.5 mg) by nebulization every 6 hours as needed for shortness of breath, wheezing or cough. 90 mL 4    budesonide (ENTOCORT EC) 3 MG EC capsule       cetirizine (ZYRTEC) 10 MG tablet Take 10 mg by mouth daily.      DULoxetine (CYMBALTA) 30 MG capsule TAKE 1 CAPSULE (30 MG) BY MOUTH 2 TIMES DAILY 60 capsule 2    galcanezumab-gnlm (EMGALITY) 120 MG/ML injection Inject 1 mL (120 mg) subcutaneously every 28 days. Do not start before September 24, 2024. 1 mL 11    hydrOXYzine HCl (ATARAX) 10 MG tablet Take 1 tablet (10 mg) by mouth 3 times daily as needed for itching. 90 tablet 3    lisinopril (ZESTRIL) 5 MG tablet Take 1 tablet (5 mg) by mouth daily. 90 tablet 0    loperamide (IMODIUM A-D) 2 MG tablet Trying to decrease      loperamide (IMODIUM) 2 MG capsule       norgestrel-ethinyl estradiol (TURQOZ) 0.3-30 MG-MCG tablet TAKE 1 TABLET BY MOUTH DAILY FOR 63 DAYS, THEN TAKE PLACEBO PILLS FOR 7 DAYS 84 tablet 3    omeprazole (PRILOSEC) 40 MG DR capsule TAKE 1 CAPSULE BY MOUTH ONE TIME DAILY 90 capsule 0    rizatriptan (MAXALT) 5 MG tablet Take 1 tablet (5 mg) by mouth at onset of headache for migraine May repeat in 2  hours. Max 6 tablets/24 hours. Limit to 9 tablets per month. 9 tablet 5    sodium chloride (OCEAN) 0.65 % nasal spray Use 1 spray in each nostril 4x per day as needed. 15 mL 1    spacer (OPTICHAMBER BARB) holding chamber Use as needed with inhaler 1 each 0    tiotropium (SPIRIVA RESPIMAT) 2.5 MCG/ACT inhaler Inhale 2 puffs into the lungs daily. 4 g 11    TURQOZ 0.3-30 MG-MCG tablet       XIFAXAN 550 MG TABS tablet       busPIRone (BUSPAR) 5 MG tablet Take 2 tablets (10 mg) by mouth 2 times daily for 14 days, THEN 1 tablet (5 mg) 2 times daily for 14 days. 84 tablet 0     Labs:  Reviewed.     Imaging studies:  CXR 2/20/20:  No acute pulmonary disease. Unremarkable exam.     PFT's 2/5/25:  FEV1/FVC is 85% and is normal.  FEV1 is 3.74L (116%)  (Z Score 1.31) predicted and is normal.  FVC is 4.38L (110%)  (Z Score 0.81) predicted and normal.  TLC is 5.91L (99%) (Z Score -0.05) predicted and is normal.  RV is 1.47L (74%) (Z Score -1.42) predicted and is normal.  DLCO is 20ml/min/hg (82%)  (Z Score -1.28) predicted and is normal when it is corrected for hemoglobin.  Flow volume loops indicate no abnormalities.    Impression:  Full Pulmonary Function Test is normal.  PFTs are consistent with no obstructive disease.  There  is no  hyperinflation.  There  is no  air-trapping.  Diffusion capacity when corrected for hemoglobin is normal.     The ATS criteria for acceptability and reproducibility was met.      Assessment and Plan:Jeanie French is a 44 year old with a past medical history significant for asthma, eosinophilic esophagitis, collagenous colitis, depression, anxiety, iron deficiency anemia, IBS and squamous cell cancer of the tongue s/p wide local excision who presents to clinic today for establishment of care for asthma. Her PFT's and prior imaging from 2020 are noted to be unremarkable and her current constellation of symptoms could certainly be consistent with eosinophilic asthma (although her total  eosinophil levels were noted to be normal which is not unexpected given long-term steroid use), intrinsic neutrophilic asthma, certainly cough variant asthma especially given pre-existing hiatal hernia.  For the present we would recommend;    Asthma: As noted above.  Symptoms have worsened as her systemic steroid doses have been reduced and she currently endorses significant nighttime symptoms with coughing in addition to wheezing and chest tightness during the day.  Given her known history of eosinophilic esophagitis I am very concerned that she likely has eosinophilic driven asthma that we have not been able to capture on serum testing given that she has been on systemic steroids for many years now.  I did discuss the possibility of some autoimmune process driving both of these especially given iron deficiency anemia and prior concern for bacterial overgrowth which could be driving pernicious anemia.  Continue Spiriva Respimat 2 puffs once a day, have decreased the dose to 1.25mcg given severity of dry mouth.  Resume Symbicort 80/4.5 mcg BID. Reminded her to rinse her mouth after using this.  I have given her prescription for an albuterol nebulizer and given her nebulizer machine to use before going to bed to see if this alleviates some of her coughing overnight.  Continue to elevate the head of her bed at night and has been instructed to eat at least 2 hours prior to lying down.  Eosinophilic and collagenous colitis now with worsening creatinine and hematuria: Has been on oral steroids for many years for this and this is certainly the most obvious culprit for her weight gain and likely adrenal insufficiency.  I suspect that the chronic steroid use has also kept her asthma symptoms in check.  Agree that once her renal biopsy is complete, it may be worthwhile seeing rheumatology.  HCM: Encouraged her to increase level of physical activity given her weight gain which is most certainly related to chronic steroid  use.  Follow-up: 6 weeks with myself.      Naya Abdul MD  Pulmonary and Critical Care  [facility]    The longitudinal plan of care for the diagnosis(es)/condition(s) as documented were addressed during this visit. Due to the added complexity in care, I will continue to support Jeanie in the subsequent management and with ongoing continuity of care.      Type of service:  Video Visit     Originating Location (pt. Location): Home    Distant Location (provider location):  On-site  Platform used for Video Visit: Alvarado    The longitudinal plan of care for the diagnosis(es)/condition(s) as documented were addressed during this visit. Due to the added complexity in care, I will continue to support Jeanie in the subsequent management and with ongoing continuity of care.    28 minutes.

## 2025-03-13 ENCOUNTER — APPOINTMENT (OUTPATIENT)
Dept: MEDSURG UNIT | Facility: CLINIC | Age: 45
End: 2025-03-13
Attending: STUDENT IN AN ORGANIZED HEALTH CARE EDUCATION/TRAINING PROGRAM
Payer: COMMERCIAL

## 2025-03-13 ENCOUNTER — HOSPITAL ENCOUNTER (OUTPATIENT)
Facility: CLINIC | Age: 45
Discharge: HOME | End: 2025-03-13
Attending: STUDENT IN AN ORGANIZED HEALTH CARE EDUCATION/TRAINING PROGRAM | Admitting: STUDENT IN AN ORGANIZED HEALTH CARE EDUCATION/TRAINING PROGRAM
Payer: COMMERCIAL

## 2025-03-13 ENCOUNTER — APPOINTMENT (OUTPATIENT)
Dept: INTERVENTIONAL RADIOLOGY/VASCULAR | Facility: CLINIC | Age: 45
End: 2025-03-13
Attending: INTERNAL MEDICINE
Payer: COMMERCIAL

## 2025-03-13 VITALS
HEIGHT: 70 IN | OXYGEN SATURATION: 94 % | RESPIRATION RATE: 20 BRPM | SYSTOLIC BLOOD PRESSURE: 112 MMHG | BODY MASS INDEX: 30.92 KG/M2 | TEMPERATURE: 98.3 F | WEIGHT: 216 LBS | DIASTOLIC BLOOD PRESSURE: 73 MMHG | HEART RATE: 82 BPM

## 2025-03-13 DIAGNOSIS — R79.89 ELEVATED SERUM CREATININE: ICD-10-CM

## 2025-03-13 DIAGNOSIS — R31.21 ASYMPTOMATIC MICROSCOPIC HEMATURIA: ICD-10-CM

## 2025-03-13 LAB
ERYTHROCYTE [DISTWIDTH] IN BLOOD BY AUTOMATED COUNT: 13.5 % (ref 10–15)
HCT VFR BLD AUTO: 34.7 % (ref 35–47)
HGB BLD-MCNC: 11.6 G/DL (ref 11.7–15.7)
INR PPP: 0.97 (ref 0.85–1.15)
MCH RBC QN AUTO: 29.1 PG (ref 26.5–33)
MCHC RBC AUTO-ENTMCNC: 33.4 G/DL (ref 31.5–36.5)
MCV RBC AUTO: 87 FL (ref 78–100)
PLATELET # BLD AUTO: 276 10E3/UL (ref 150–450)
RBC # BLD AUTO: 3.99 10E6/UL (ref 3.8–5.2)
WBC # BLD AUTO: 5.4 10E3/UL (ref 4–11)

## 2025-03-13 PROCEDURE — 99152 MOD SED SAME PHYS/QHP 5/>YRS: CPT | Performed by: PHYSICIAN ASSISTANT

## 2025-03-13 PROCEDURE — 88313 SPECIAL STAINS GROUP 2: CPT | Mod: TC | Performed by: INTERNAL MEDICINE

## 2025-03-13 PROCEDURE — 85041 AUTOMATED RBC COUNT: CPT | Performed by: NURSE PRACTITIONER

## 2025-03-13 PROCEDURE — 99152 MOD SED SAME PHYS/QHP 5/>YRS: CPT

## 2025-03-13 PROCEDURE — 88305 TISSUE EXAM BY PATHOLOGIST: CPT | Mod: 26 | Performed by: PATHOLOGY

## 2025-03-13 PROCEDURE — 76942 ECHO GUIDE FOR BIOPSY: CPT | Mod: 26 | Performed by: PHYSICIAN ASSISTANT

## 2025-03-13 PROCEDURE — 88350 IMFLUOR EA ADDL 1ANTB STN PX: CPT | Mod: 26 | Performed by: PATHOLOGY

## 2025-03-13 PROCEDURE — C1889 IMPLANT/INSERT DEVICE, NOC: HCPCS

## 2025-03-13 PROCEDURE — 88348 ELECTRON MICROSCOPY DX: CPT | Mod: 26 | Performed by: PATHOLOGY

## 2025-03-13 PROCEDURE — 50200 RENAL BIOPSY PERQ: CPT | Mod: RT | Performed by: PHYSICIAN ASSISTANT

## 2025-03-13 PROCEDURE — 88313 SPECIAL STAINS GROUP 2: CPT | Mod: 26 | Performed by: PATHOLOGY

## 2025-03-13 PROCEDURE — 999N000142 HC STATISTIC PROCEDURE PREP ONLY

## 2025-03-13 PROCEDURE — 36415 COLL VENOUS BLD VENIPUNCTURE: CPT | Performed by: NURSE PRACTITIONER

## 2025-03-13 PROCEDURE — 85014 HEMATOCRIT: CPT | Performed by: NURSE PRACTITIONER

## 2025-03-13 PROCEDURE — 250N000009 HC RX 250: Performed by: NURSE PRACTITIONER

## 2025-03-13 PROCEDURE — 250N000011 HC RX IP 250 OP 636: Performed by: STUDENT IN AN ORGANIZED HEALTH CARE EDUCATION/TRAINING PROGRAM

## 2025-03-13 PROCEDURE — 85610 PROTHROMBIN TIME: CPT | Performed by: NURSE PRACTITIONER

## 2025-03-13 PROCEDURE — 88346 IMFLUOR 1ST 1ANTB STAIN PX: CPT | Mod: 26 | Performed by: PATHOLOGY

## 2025-03-13 PROCEDURE — 999N000133 HC STATISTIC PP CARE STAGE 2

## 2025-03-13 PROCEDURE — 88346 IMFLUOR 1ST 1ANTB STAIN PX: CPT | Mod: TC | Performed by: INTERNAL MEDICINE

## 2025-03-13 RX ORDER — FENTANYL CITRATE 50 UG/ML
25-50 INJECTION, SOLUTION INTRAMUSCULAR; INTRAVENOUS EVERY 5 MIN PRN
Status: DISCONTINUED | OUTPATIENT
Start: 2025-03-13 | End: 2025-03-13 | Stop reason: HOSPADM

## 2025-03-13 RX ORDER — NALOXONE HYDROCHLORIDE 0.4 MG/ML
0.2 INJECTION, SOLUTION INTRAMUSCULAR; INTRAVENOUS; SUBCUTANEOUS
Status: DISCONTINUED | OUTPATIENT
Start: 2025-03-13 | End: 2025-03-13 | Stop reason: HOSPADM

## 2025-03-13 RX ORDER — FLUMAZENIL 0.1 MG/ML
0.2 INJECTION, SOLUTION INTRAVENOUS
Status: DISCONTINUED | OUTPATIENT
Start: 2025-03-13 | End: 2025-03-13 | Stop reason: HOSPADM

## 2025-03-13 RX ORDER — NALOXONE HYDROCHLORIDE 0.4 MG/ML
0.4 INJECTION, SOLUTION INTRAMUSCULAR; INTRAVENOUS; SUBCUTANEOUS
Status: DISCONTINUED | OUTPATIENT
Start: 2025-03-13 | End: 2025-03-13 | Stop reason: HOSPADM

## 2025-03-13 RX ORDER — LIDOCAINE 40 MG/G
CREAM TOPICAL
Status: DISCONTINUED | OUTPATIENT
Start: 2025-03-13 | End: 2025-03-13 | Stop reason: HOSPADM

## 2025-03-13 RX ADMIN — MIDAZOLAM 1 MG: 1 INJECTION INTRAMUSCULAR; INTRAVENOUS at 11:56

## 2025-03-13 RX ADMIN — FENTANYL CITRATE 50 MCG: 50 INJECTION, SOLUTION INTRAMUSCULAR; INTRAVENOUS at 11:45

## 2025-03-13 RX ADMIN — FENTANYL CITRATE 50 MCG: 50 INJECTION, SOLUTION INTRAMUSCULAR; INTRAVENOUS at 11:56

## 2025-03-13 RX ADMIN — LIDOCAINE HYDROCHLORIDE 1 ML: 10 INJECTION, SOLUTION EPIDURAL; INFILTRATION; INTRACAUDAL; PERINEURAL at 11:45

## 2025-03-13 RX ADMIN — MIDAZOLAM 1 MG: 1 INJECTION INTRAMUSCULAR; INTRAVENOUS at 11:45

## 2025-03-13 ASSESSMENT — ACTIVITIES OF DAILY LIVING (ADL)
ADLS_ACUITY_SCORE: 43
ADLS_ACUITY_SCORE: 41
ADLS_ACUITY_SCORE: 43

## 2025-03-13 NOTE — PRE-PROCEDURE
GENERAL PRE-PROCEDURE:   Procedure:  Right Renal Biopsy of the native kidney  Date/Time:  3/13/2025 11:27 AM    Verbal consent obtained?: Yes    Written consent obtained?: Yes    Risks and benefits: Risks, benefits and alternatives were discussed    Consent given by:  Patient  Patient states understanding of procedure being performed: Yes    Patient's understanding of procedure matches consent: Yes    Procedure consent matches procedure scheduled: Yes    Expected level of sedation:  Minimal  Appropriately NPO:  Yes  ASA Class:  2  Mallampati  :  Grade 1- soft palate, uvula, tonsillar pillars, and posterior pharyngeal wall visible  Lungs:  Lungs clear with good breath sounds bilaterally  Heart:  Normal heart sounds and rate  History & Physical reviewed:  History and physical reviewed and no updates needed  Statement of review:  I have reviewed the lab findings, diagnostic data, medications, and the plan for sedation

## 2025-03-13 NOTE — IR NOTE
Patient Name: Jeaniejackson French  Medical Record Number: [id number]  Today's Date: 3/13/2025    Procedure: Native Kidney Biopsy  Proceduralist: Shreyas Sorenson PA-C  Pathology present: Yes    Procedure Start: 1156  Procedure end: 1206  Sedation medications administered: 2 mg Versed and 100 mcg Fentanyl     2 hour bedrest 1210 - 1410    Report given to: 2A, RN    Other Notes: Pt arrived to IR room 6 from . Consent reviewed. Pt denies any questions or concerns regarding procedure. Pt positioned prone and monitored per protocol. Pt tolerated procedure without any noted complications. Pt transferred back to .

## 2025-03-13 NOTE — PROCEDURES
[facility]    Procedure: IR Procedure Note    Date/Time: 3/13/2025 12:12 PM    Performed by: Shreyas Sorenson PA-C  Authorized by: Shreyas Sorenson PA-C  IR Fellow Physician:    Pre Procedure Diagnosis: abnormal kidney function  Post Procedure Diagnosis: pulmonary hypertension    UNIVERSAL PROTOCOL   Site Marked: NA  Prior Images Obtained and Reviewed:  Yes  Required items: Required blood products, implants, devices and special equipment available    Patient identity confirmed:  Arm band, provided demographic data, hospital-assigned identification number and verbally with patient  Patient was reevaluated immediately before administering moderate or deep sedation or anesthesia  Confirmation Checklist:  Correct equipment/implants were available, procedure was appropriate and matched the consent or emergent situation, relevant allergies and patient's identity using two indicators  Time out: Immediately prior to the procedure a time out was called    Universal Protocol: the Joint Commission Universal Protocol was followed    Preparation: Patient was prepped and draped in usual sterile fashion       ANESTHESIA    Anesthesia:  Local infiltration  Local Anesthetic:  Lidocaine 1% without epinephrine  Anesthetic Total (mL):  8      SEDATION  Patient Sedated: Yes    Sedation Type:  Moderate (conscious) sedation  Sedation:  Fentanyl and midazolam  Vital signs: Vital signs monitored during sedation    See dictated procedure note for full details.  Findings: Moderate sedation for native kidney biopsy - 2 mg of midazolam and 100 mcg of fentanyl given over 10 minutes.     Specimens: core needle biopsy specimens sent for pathological analysis    Procedural Complications: None    Condition: Stable    Plan: 2 hours bedrest      PROCEDURE  Describe Procedure: Completed ultrasound-guided native right kidney biopsy. 3 passes yielded 3 cores handed to renal pathology to confirm  adequacy. Gelfoam in tract. No immediate complication.      Patient Tolerance:  Patient tolerated the procedure well with no immediate complications  Length of time physician/provider present for 1:1 monitoring during sedation:  0-22 min

## 2025-03-13 NOTE — DISCHARGE INSTRUCTIONS
[facility]    Interventional Radiology  Patient Instructions Following RIGHT RANDOM KIDNEY Biopsy    AFTER YOU GO HOME  If you were given sedation, for the first 24 hours: we recommend that an adult stay with you, DO NOT drive or operate machinery at home or at work, DO NOT smoke, DO NOT make any important or legal decisions.  DO NOT drink alcoholic beverages the day of your procedure  Drink plenty of fluids   Resume your regular diet, unless otherwise instructed by your Primary Physician  Relax and take it easy for 48 hours  DO NOT do any strenuous exercise or lifting (> 10 lbs) for at least 7 days following your procedure  Keep the dressing dry and in place for 24 hours. Replace with Band aid for 2 days.  Never leave a wet dressing in place.  Do not take a shower for at least 12 hours following your procedure. No tub bath, hot tub, or swimming for 5 days.  There should be minimum drainage from the biopsy site    CALL THE PHYSICIAN IF:  You start bleeding from the procedure site.  If you do start to bleed from that site, lie down flat and hold pressure on the site for a minimum of 10 minutes.  Your physician will tell you if you need to return to the hospital  You develop nausea or vomiting  You have excessive swelling, redness, or tenderness at the site  You have drainage that looks like it is infected.  You experience severe pain  You develop hives or a rash or unexplained itching  You develop shortness of breath  You develop a temperature of 101 degrees F or greater  You develop bloody clots or red urine after you are discharged  You develop chest pain or cough up blood, lightheadedness or fainting    [facility] INTERVENTIONAL RADIOLOGY DEPARTMENT  Procedure Physician: CYRUS MÁRQUEZ PA-C                                    Date of procedure: March 13, 2025  Telephone Numbers: [phone]      Monday-Friday 7:30 am to 4:00 pm  [phone] After 4:00 pm Monday-Friday, Weekends & Holidays.     Ask for  the Interventional Radiologist on call.  Someone is on call 24 hrs/day  [facility] toll free number: [phone] Monday-Friday 8:00 am to 4:30 pm  [facility] Emergency Dept: [phone]

## 2025-03-13 NOTE — PROGRESS NOTES
Pt returned from IR s/p right kidney biopsy. Pt alert and oriented. VSS. Sats >92% on RA. Pt denies any pain. Right mid back site WNL. No bleeding or hematoma. Primapore in place. Bedrest for 2 hours. Continue to monitor pt status and notify MD with any changes or concerns.

## 2025-03-13 NOTE — PROGRESS NOTES
Pt up walking after bedrest, steady; Site remains dry and intact; IV discontinued, catheter intact. Tolerated PO, food and drink. Voided; urine appears clear pale yellow, no blood. Discharge teaching done, stated understanding, copy to pt.   1425--escorted to vehicle/family per wheelchair; pt reports has all belongings.

## 2025-03-13 NOTE — PROGRESS NOTES
Prep and teaching complete for kidney biopsy; pt awake and alert, reports chronic low back pain. Has ride post procedure, family at bedside. Awaiting consent and lab results.

## 2025-03-30 DIAGNOSIS — K20.0 EOSINOPHILIC ESOPHAGITIS: ICD-10-CM

## 2025-03-31 RX ORDER — OMEPRAZOLE 40 MG/1
40 CAPSULE, DELAYED RELEASE ORAL DAILY
Qty: 90 CAPSULE | Refills: 0 | Status: SHIPPED | OUTPATIENT
Start: 2025-03-31

## 2025-03-31 NOTE — TELEPHONE ENCOUNTER
Name from pharmacy: Omeprazole Oral Capsule Delayed Release 40 MG          Will file in chart as: omeprazole (PRILOSEC) 40 MG DR capsule    Sig: TAKE 1 CAPSULE BY MOUTH ONE TIME DAILY    Disp: 90 capsule    Refills: 0    Start: 3/30/2025    Class: E-Prescribe    For: Eosinophilic esophagitis    Last ordered: 2 months ago (1/2/2025) by Anayeli Warner MD    Last refill: 1/2/2025    Rx #: [id number]    PPI Protocol [id number]/30/2025 12:06 PM   Protocol Details Medication is active on med list and the sig matches. RN to manually verify dose and sig if red X/fail.    Medication indicated for associated diagnosis    Recent (12 mo) or future (90 days) visit within the authorizing provider's specialty    Patient is age 18 or older    No active pregnacy on record    No positive pregnancy test in past 12 months        Prescription approved per [facility] Refill Protocol.    Etienne Lee, RN, MSN

## 2025-04-23 ENCOUNTER — TELEPHONE (OUTPATIENT)
Dept: DERMATOLOGY | Facility: CLINIC | Age: 45
End: 2025-04-23
Payer: COMMERCIAL

## 2025-04-23 NOTE — TELEPHONE ENCOUNTER
[facility] Call Center    Phone Message    May a detailed message be left on voicemail: yes     Reason for Call: Other: Pt is calling to see if she could be seen sooner for a concerning mole, please call back thanks!     Action Taken: Other: EC DERM    Travel Screening: Not Applicable     Date of Service:

## 2025-04-24 ENCOUNTER — OFFICE VISIT (OUTPATIENT)
Dept: DERMATOLOGY | Facility: CLINIC | Age: 45
End: 2025-04-24
Payer: COMMERCIAL

## 2025-04-24 DIAGNOSIS — L82.0 INFLAMED SEBORRHEIC KERATOSIS: Primary | ICD-10-CM

## 2025-04-24 NOTE — LETTER
4/24/2025      Jeanie French  [address] [place] [address]      Dear Colleague,    Thank you for referring your patient, Jeanie French, to the [facility] [facility]. Please see a copy of my visit note below.    [facility] Dermatology Note  Encounter Date: Apr 24, 2025  Office Visit     Reviewed patients past medical history and pertinent chart review prior to patients visit today.     Dermatology Problem List:  # Inflamed seborrheic keratosis  - cryotherapy 4/24/2025    ____________________________________________    Assessment & Plan:     # Inflamed seborrheic keratoses x1  The benign nature of the skin lesion(s) was discussed with the patient.  Due to the inflamed and irritated nature of the lesions I recommend cryotherapy and the patient was agreeable.      Cryotherapy procedure note, location(s): left posterior shoulder x1 After verbal consent and discussion of risks and benefits including, but not limited to, dyspigmentation/scar, blister, and pain, the lesion(s) was(were) treated with 1-2 mm freeze border for 1-2 cycles with liquid nitrogen. Post cryotherapy instructions were provided.    Follow up as needed.     All risks, benefits and alternatives were discussed with patient.  Patient is in agreement and understands the assessment and plan.  All questions were answered.  Ivana Riojas PA-C  [facility] Dermatology  _______________________________________    CC: No chief complaint on file.    HPI:  Ms. Jeanie French is a(n) 45 year old female who presents today as a new patient for a lesion on the back.  The lesion has been present for years.  In recent months it has become itchy and has blood when scratched.  Patient is otherwise feeling well, without additional skin concerns.      Physical Exam:  SKIN: Focused examination of left posterior shoulder was performed.  - The left posterior shoulder demonstrates waxy papule(s) with an erythematous base,  consistent with inflamed seborrheic keratoses.     - No other lesions of concern on areas examined.     Medications:  Current Outpatient Medications   Medication Sig Dispense Refill     albuterol (PROAIR HFA/PROVENTIL HFA/VENTOLIN HFA) 108 (90 Base) MCG/ACT inhaler Inhale 2 puffs into the lungs every 6 hours as needed for shortness of breath, wheezing or cough. 17 g 2     albuterol (PROVENTIL) (2.5 MG/3ML) 0.083% neb solution Take 1 vial (2.5 mg) by nebulization every 6 hours as needed for shortness of breath, wheezing or cough. 90 mL 4     budesonide (ENTOCORT EC) 3 MG EC capsule        budesonide-formoterol (SYMBICORT/BREYNA) 80-4.5 MCG/ACT Inhaler Inhale 2 puffs into the lungs 2 times daily. 10.2 g 11     cetirizine (ZYRTEC) 10 MG tablet Take 10 mg by mouth daily.       DULoxetine (CYMBALTA) 30 MG capsule TAKE 1 CAPSULE (30 MG) BY MOUTH 2 TIMES DAILY 60 capsule 2     galcanezumab-gnlm (EMGALITY) 120 MG/ML injection Inject 1 mL (120 mg) subcutaneously every 28 days. Do not start before September 24, 2024. 1 mL 11     hydrOXYzine HCl (ATARAX) 10 MG tablet Take 1 tablet (10 mg) by mouth 3 times daily as needed for itching. 90 tablet 3     lisinopril (ZESTRIL) 5 MG tablet Take 1 tablet (5 mg) by mouth daily. 90 tablet 0     loperamide (IMODIUM A-D) 2 MG tablet Trying to decrease       norgestrel-ethinyl estradiol (TURQOZ) 0.3-30 MG-MCG tablet TAKE 1 TABLET BY MOUTH DAILY FOR 63 DAYS, THEN TAKE PLACEBO PILLS FOR 7 DAYS 84 tablet 3     omeprazole (PRILOSEC) 40 MG DR capsule TAKE 1 CAPSULE BY MOUTH ONE TIME DAILY 90 capsule 0     rizatriptan (MAXALT) 5 MG tablet Take 1 tablet (5 mg) by mouth at onset of headache for migraine May repeat in 2 hours. Max 6 tablets/24 hours. Limit to 9 tablets per month. 9 tablet 5     sodium chloride (OCEAN) 0.65 % nasal spray Use 1 spray in each nostril 4x per day as needed. 15 mL 1     spacer (OPTICHAMBER BARB) holding chamber Use as needed with inhaler 1 each 0     tiotropium  (SPIRIVA RESPIMAT) 1.25 MCG/ACT inhaler Inhale 2 puffs into the lungs daily. 4 g 4     No current facility-administered medications for this visit.      Past Medical History:   Patient Active Problem List   Diagnosis     Cancer of tongue (H)     Generalized anxiety disorder     Tenosynovitis of the right index finger     Wheezing     Irritable bowel syndrome (IBS)     Exercise-induced asthma     Sinusitis, chronic     Allergic rhinitis, unspecified allergic rhinitis type     Hypovitaminosis D     Adjustment disorder with anxiety     Menorrhagia with regular cycle     Major depressive disorder with single episode     Iron deficiency anemia due to chronic blood loss     Collagenous colitis     Eosinophilic esophagitis     Low serum cortisol level     Past Medical History:   Diagnosis Date     Asthma      Depression, anxiety      GERD (gastroesophageal reflux disease)      Iron deficiency anemia 2021    due to blood loss     Noninfectious ileitis     IBS     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       Again, thank you for allowing me to participate in the care of your patient.        Sincerely,        Ivana Riojas PA-C    Electronically signed

## 2025-04-24 NOTE — PROGRESS NOTES
[facility] Dermatology Note  Encounter Date: Apr 24, 2025  Office Visit     Reviewed patients past medical history and pertinent chart review prior to patients visit today.     Dermatology Problem List:  # Inflamed seborrheic keratosis  - cryotherapy 4/24/2025    ____________________________________________    Assessment & Plan:     # Inflamed seborrheic keratoses x1  The benign nature of the skin lesion(s) was discussed with the patient.  Due to the inflamed and irritated nature of the lesions I recommend cryotherapy and the patient was agreeable.      Cryotherapy procedure note, location(s): left posterior shoulder x1 After verbal consent and discussion of risks and benefits including, but not limited to, dyspigmentation/scar, blister, and pain, the lesion(s) was(were) treated with 1-2 mm freeze border for 1-2 cycles with liquid nitrogen. Post cryotherapy instructions were provided.    Follow up as needed.     All risks, benefits and alternatives were discussed with patient.  Patient is in agreement and understands the assessment and plan.  All questions were answered.  Ivana Riojas PA-C  [facility] Dermatology  _______________________________________    CC: No chief complaint on file.    HPI:  Ms. Jeanie French is a(n) 45 year old female who presents today as a new patient for a lesion on the back.  The lesion has been present for years.  In recent months it has become itchy and has blood when scratched.  Patient is otherwise feeling well, without additional skin concerns.      Physical Exam:  SKIN: Focused examination of left posterior shoulder was performed.  - The left posterior shoulder demonstrates waxy papule(s) with an erythematous base, consistent with inflamed seborrheic keratoses.     - No other lesions of concern on areas examined.     Medications:  Current Outpatient Medications   Medication Sig Dispense Refill    albuterol (PROAIR HFA/PROVENTIL HFA/VENTOLIN HFA) 108  (90 Base) MCG/ACT inhaler Inhale 2 puffs into the lungs every 6 hours as needed for shortness of breath, wheezing or cough. 17 g 2    albuterol (PROVENTIL) (2.5 MG/3ML) 0.083% neb solution Take 1 vial (2.5 mg) by nebulization every 6 hours as needed for shortness of breath, wheezing or cough. 90 mL 4    budesonide (ENTOCORT EC) 3 MG EC capsule       budesonide-formoterol (SYMBICORT/BREYNA) 80-4.5 MCG/ACT Inhaler Inhale 2 puffs into the lungs 2 times daily. 10.2 g 11    cetirizine (ZYRTEC) 10 MG tablet Take 10 mg by mouth daily.      DULoxetine (CYMBALTA) 30 MG capsule TAKE 1 CAPSULE (30 MG) BY MOUTH 2 TIMES DAILY 60 capsule 2    galcanezumab-gnlm (EMGALITY) 120 MG/ML injection Inject 1 mL (120 mg) subcutaneously every 28 days. Do not start before September 24, 2024. 1 mL 11    hydrOXYzine HCl (ATARAX) 10 MG tablet Take 1 tablet (10 mg) by mouth 3 times daily as needed for itching. 90 tablet 3    lisinopril (ZESTRIL) 5 MG tablet Take 1 tablet (5 mg) by mouth daily. 90 tablet 0    loperamide (IMODIUM A-D) 2 MG tablet Trying to decrease      norgestrel-ethinyl estradiol (TURQOZ) 0.3-30 MG-MCG tablet TAKE 1 TABLET BY MOUTH DAILY FOR 63 DAYS, THEN TAKE PLACEBO PILLS FOR 7 DAYS 84 tablet 3    omeprazole (PRILOSEC) 40 MG DR capsule TAKE 1 CAPSULE BY MOUTH ONE TIME DAILY 90 capsule 0    rizatriptan (MAXALT) 5 MG tablet Take 1 tablet (5 mg) by mouth at onset of headache for migraine May repeat in 2 hours. Max 6 tablets/24 hours. Limit to 9 tablets per month. 9 tablet 5    sodium chloride (OCEAN) 0.65 % nasal spray Use 1 spray in each nostril 4x per day as needed. 15 mL 1    spacer (GAMEVILHAMBER BARB) holding chamber Use as needed with inhaler 1 each 0    tiotropium (SPIRIVA RESPIMAT) 1.25 MCG/ACT inhaler Inhale 2 puffs into the lungs daily. 4 g 4     No current facility-administered medications for this visit.      Past Medical History:   Patient Active Problem List   Diagnosis    Cancer of tongue (H)    Generalized  anxiety disorder    Tenosynovitis of the right index finger    Wheezing    Irritable bowel syndrome (IBS)    Exercise-induced asthma    Sinusitis, chronic    Allergic rhinitis, unspecified allergic rhinitis type    Hypovitaminosis D    Adjustment disorder with anxiety    Menorrhagia with regular cycle    Major depressive disorder with single episode    Iron deficiency anemia due to chronic blood loss    Collagenous colitis    Eosinophilic esophagitis    Low serum cortisol level     Past Medical History:   Diagnosis Date    Asthma     Depression, anxiety     GERD (gastroesophageal reflux disease)     Iron deficiency anemia 2021    due to blood loss    Noninfectious ileitis     IBS    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

## 2025-04-24 NOTE — TELEPHONE ENCOUNTER
S/w pt who states she has a mole on the back of left shoulder since birth.  Over the winter has noticed the mole became more itchy and in the last month thinks has gotten taller.  Scheduled spot only check with Ivana Riojas on Thursday April 24th at 1:45 pm at  Derm clinic.  Pt understands this is a spot only check.    Maribel SINGER RN  [facility] Dermatology [place]  [phone]

## 2025-04-28 ENCOUNTER — MEDICAL CORRESPONDENCE (OUTPATIENT)
Dept: HEALTH INFORMATION MANAGEMENT | Facility: CLINIC | Age: 45
End: 2025-04-28
Payer: COMMERCIAL

## 2025-04-28 ENCOUNTER — TRANSFERRED RECORDS (OUTPATIENT)
Dept: HEALTH INFORMATION MANAGEMENT | Facility: CLINIC | Age: 45
End: 2025-04-28
Payer: COMMERCIAL

## 2025-04-29 ENCOUNTER — TRANSCRIBE ORDERS (OUTPATIENT)
Dept: OTHER | Age: 45
End: 2025-04-29

## 2025-04-29 DIAGNOSIS — K20.0 EOSINOPHILIC ESOPHAGITIS: Primary | ICD-10-CM

## 2025-04-29 DIAGNOSIS — R19.7 DIARRHEA, UNSPECIFIED TYPE: ICD-10-CM

## 2025-04-29 DIAGNOSIS — R12 HEARTBURN: ICD-10-CM

## 2025-05-01 ENCOUNTER — LAB (OUTPATIENT)
Dept: LAB | Facility: CLINIC | Age: 45
End: 2025-05-01
Payer: COMMERCIAL

## 2025-05-01 ENCOUNTER — TRANSCRIBE ORDERS (OUTPATIENT)
Dept: OTHER | Age: 45
End: 2025-05-01

## 2025-05-01 ENCOUNTER — PATIENT OUTREACH (OUTPATIENT)
Dept: ONCOLOGY | Facility: CLINIC | Age: 45
End: 2025-05-01

## 2025-05-01 DIAGNOSIS — R31.21 ASYMPTOMATIC MICROSCOPIC HEMATURIA: ICD-10-CM

## 2025-05-01 DIAGNOSIS — K44.9 HIATAL HERNIA: Primary | ICD-10-CM

## 2025-05-01 DIAGNOSIS — K20.0 EOSINOPHILIC ESOPHAGITIS: Primary | ICD-10-CM

## 2025-05-01 DIAGNOSIS — N18.2 STAGE 2 CHRONIC KIDNEY DISEASE: ICD-10-CM

## 2025-05-01 LAB
ALBUMIN SERPL BCG-MCNC: 3.9 G/DL (ref 3.5–5.2)
ALBUMIN UR-MCNC: NEGATIVE MG/DL
ANION GAP SERPL CALCULATED.3IONS-SCNC: 10 MMOL/L (ref 7–15)
APPEARANCE UR: CLEAR
BACTERIA #/AREA URNS HPF: ABNORMAL /HPF
BILIRUB UR QL STRIP: NEGATIVE
BUN SERPL-MCNC: 14.2 MG/DL (ref 6–20)
CALCIUM SERPL-MCNC: 9.1 MG/DL (ref 8.8–10.4)
CHLORIDE SERPL-SCNC: 105 MMOL/L (ref 98–107)
COLOR UR AUTO: YELLOW
CREAT SERPL-MCNC: 1.27 MG/DL (ref 0.51–0.95)
EGFRCR SERPLBLD CKD-EPI 2021: 52 ML/MIN/1.73M2
GLUCOSE SERPL-MCNC: 94 MG/DL (ref 70–99)
GLUCOSE UR STRIP-MCNC: NEGATIVE MG/DL
HCO3 SERPL-SCNC: 23 MMOL/L (ref 22–29)
HGB UR QL STRIP: ABNORMAL
HYALINE CASTS #/AREA URNS LPF: ABNORMAL /LPF
KETONES UR STRIP-MCNC: NEGATIVE MG/DL
LEUKOCYTE ESTERASE UR QL STRIP: NEGATIVE
NITRATE UR QL: NEGATIVE
PH UR STRIP: 5.5 [PH] (ref 5–7)
PHOSPHATE SERPL-MCNC: 3.7 MG/DL (ref 2.5–4.5)
POTASSIUM SERPL-SCNC: 4.1 MMOL/L (ref 3.4–5.3)
RBC #/AREA URNS AUTO: ABNORMAL /HPF
SODIUM SERPL-SCNC: 138 MMOL/L (ref 135–145)
SP GR UR STRIP: 1.02 (ref 1–1.03)
UROBILINOGEN UR STRIP-ACNC: 0.2 E.U./DL
WBC #/AREA URNS AUTO: ABNORMAL /HPF

## 2025-05-01 NOTE — PROGRESS NOTES
New Patient Oncology Nurse Navigator Note     Referring provider: RAKAN Barbosa CNP    Referring Clinic/Organization: Other - [facility]  Referred to: Thoracic Surgery  Requested provider (if applicable): Dr. Prieto  Referral Received: 05/01/25       Evaluation for :   Diagnosis   K20.0 (ICD-10-CM) - Eosinophilic esophagitis        Faxed referral received from Kristy Wilkinson NP at [facility] // Referral to michael Ferguson   Phone [phone]   Fax [phone]   Clinical History (per Nurse review of records provided):      12/09/2021 Upper GI Endoscopy (bookmarked)     04/29/2025 Esophagram (bookmarked) scheduled tomorrow, 5/2.     04/29/2025 OV note from [facility] (scanned media) discusses referral to Dr. Prieto    Clinical Assessment / Barriers to Care (Per Nurse):  Never smoker  Records Location: [facility]   Records Needed:   Additional testing needed prior to consult: Esophagram (scheduled 5/2)  Referral updates and Plan:         ANIA AlarconN, RN, OCN  [facility] Oncology Nurse Navigator  [phone] / [phone]

## 2025-05-02 ENCOUNTER — TRANSFERRED RECORDS (OUTPATIENT)
Dept: ADMINISTRATIVE | Facility: CLINIC | Age: 45
End: 2025-05-02

## 2025-05-02 ENCOUNTER — HOSPITAL ENCOUNTER (OUTPATIENT)
Dept: GENERAL RADIOLOGY | Facility: CLINIC | Age: 45
Discharge: HOME | End: 2025-05-02
Attending: NURSE PRACTITIONER | Admitting: NURSE PRACTITIONER
Payer: COMMERCIAL

## 2025-05-02 DIAGNOSIS — R12 HEARTBURN: ICD-10-CM

## 2025-05-02 DIAGNOSIS — K20.0 EOSINOPHILIC ESOPHAGITIS: ICD-10-CM

## 2025-05-02 DIAGNOSIS — R19.7 DIARRHEA, UNSPECIFIED TYPE: ICD-10-CM

## 2025-05-02 PROCEDURE — 250N000013 HC RX MED GY IP 250 OP 250 PS 637: Performed by: NURSE PRACTITIONER

## 2025-05-02 PROCEDURE — 74221 X-RAY XM ESOPHAGUS 2CNTRST: CPT

## 2025-05-02 RX ADMIN — ANTACID/ANTIFLATULENT 4 G: 380; 550; 10; 10 GRANULE, EFFERVESCENT ORAL at 15:21

## 2025-05-05 DIAGNOSIS — N18.2 STAGE 2 CHRONIC KIDNEY DISEASE: Primary | ICD-10-CM

## 2025-05-05 NOTE — PROGRESS NOTES
EXAM: XR ESOPHAGRAM DOUBLE CONTRAST  LOCATION: [facility]  DATE: 5/2/2025     INDICATION:  Eosinophilic esophagitis, Diarrhea, unspecified type, Heartburn. Coughing at night. Previous history of treated squamous cell carcinoma of the tongue.  COMPARISON: CT chest 03/23/2020  TECHNIQUE: Routine.     RADIATION DOSE: Total Air Kerma 10.1 mGy     FINDINGS:   PHARYNGOGRAM: Normal inversion of the epiglottis. No laryngeal penetration or aspiration. No cervical esophageal stricture.     ESOPHAGUS: Mildly diminished primary peristalsis with a few tertiary wave contractions. This results in delayed clearance of liquid esophageal bolus in the recumbent position. Eccentric mucosal web at the distal esophagus adjacent to the EG junction. 13   mm barium tablet passed easily.     Moderate sliding-type hiatal hernia and gastroesophageal reflux to the level the upper thoracic observed in the recumbent position.                                                                      IMPRESSION:   1.  Mild esophageal dysmotility.  2.  Moderate sliding-type hiatal hernia.  3.  Gastro-oesophageal reflux to the level the upper thoracic esophagus.  4.  Eccentric mucosal web distal esophagus adjacent to the EG junction. 13 mm barium tablet passes easily.        CT Chest needed.

## 2025-05-06 NOTE — PROCEDURES
[facility]   [address] [place] [address], [address], [place] [id number]     Patient Name: Jeanie French  Gender:  Female  Exam Date: 05/02/2025 Visit Number:  [id number]  Age: 45 Years YOB: 1980  Attending MD: Mikayla Cartagena MD Medical Record#:  [id number]  -----------------------------------------------------------------------------------------------------------------------------   Procedure:    Upper GI Endoscopy   Indications:    Reflux   Diarrhea, rule out celiac sprue   H/o EoE  Provider:        Mikayla Cartagena MD   Referring MD: Referral Self   Primary MD:      Anayeli Warner MD  Medications:   Admitting Medication:   0.9% Normal Saline at TKO   Intra Procedure Medications:   Patient received monitored anesthesia care.     Complications: No immediate complications  ___________________________________________________________________________________________  Procedure:   An examination of the heart and lungs was performed within acceptable limits.  . The patient was therefore deemed a reasonable candidate for sedation.   The risks and benefits were explained to the patient, who appeared to understand. After obtaining informed consent, the scope was passed under direct vision. Throughout the procedure the patient's blood pressure, pulse and oxygen saturations were monitored.  The scope was introduced through the mouth and advanced to the second portion of duodenum.         Findings:   Esophagus:    Normal esophagus.   The z-line is 35 centimeters from the incisors.  Top of the gastric folds is 35 centimeters from the incisors.  *Esophagus Comments:  Mid and distal esophagus biopsies obtained to evaluate for EoE.  No endoscopic evidence of EoE  Stomach:    H. Pylori biopsies taken.   The diaphragm hiatus is at 37 centimeters from the incisors.     Small Hiatal Hernia. Normal mucosa.  *Stomach Comments:  Small fundic gland polyps  Duodenum:    Normal duodenum.    Celiac  Sprue biopsies taken.  Celiac Sprue biopsies taken.  Impression:   Heartburn  Hiatal hernia  Fundic gland polyps of stomach, benign  Pathology Results:  A: DUODENUM, BIOPSY:           1. Normal duodenal mucosa           2. Negative for celiac disease and other enteropathy      B: STOMACH, BIOPSY:           1. Normal gastric antral and body mucosae           2. Negative for Helicobacter      C: ESOPHAGUS, DISTAL, BIOPSY:           1. Normal squamous mucosa           2. Negative for reflux changes and eosinophils           3. Negative for columnar mucosa      D: ESOPHAGUS, MID, BIOPSY:           1. Normal squamous mucosa           2. Negative for reflux changes and eosinophils           3. Negative for columnar mucosa           4. See comment      COMMENTS  C,D. The patient's history of eosinophilic esophagitis is noted, see biopsy history below.    Peak eosinophil counts per high power field:   - 05/02/2025:  0 (on budesonide and omeprazole 40 mg BID, current biopsy)  - 12/10/2021: 10 (on 20 mg qD PPI)  - 11/10/2021: 50 (no treatment, index biopsy)      MICROSCOPIC  A: Performed   B: Performed   C: Performed   D: Performed     Electronically signed by: Polo Solomon MD    Interpreted at [facility], [address] [place] [id number]    Orders    Instruction(s)/Education:  Instruction/Education Timeframe Assessment   Hiatal Hernia  R12         _Electronically signed by:___________________  Mikayla Cartagena MD                 05/02/2025    cc: Anayeli Warner MD

## 2025-05-08 ENCOUNTER — VIRTUAL VISIT (OUTPATIENT)
Dept: NEPHROLOGY | Facility: CLINIC | Age: 45
End: 2025-05-08
Attending: INTERNAL MEDICINE
Payer: COMMERCIAL

## 2025-05-08 DIAGNOSIS — N18.31 CKD STAGE 3A, GFR 45-59 ML/MIN (H): Primary | ICD-10-CM

## 2025-05-08 NOTE — PROGRESS NOTES
Nephrology Outpatient Visit Note (05/08/2025)    Chief Complain/Reason for Visit  Elevated serum creatinine. Hematuria.         History of Present Illness  This is a 45 year old female who returns for a follow up appointment. Please see my initial note dated 2/4/2025 for details. Briefly, she was referred to our clinic with creatinine along with microscopic hematuria.  Her past medical history is notable for asthma depression GERD, eosinophilic esophagitis, and collagenous colitis.  She is on omeprazole, budesonide for these conditions.      Given history of autoimmune disease, along with microscopic hematuria, and elevated serum creatinine, I arrange for kidney biopsy that was performed in March 2025.  The biopsy was negative for IgA nephropathy or active glomerulonephritis.  It did show evidence of thin basement membrane disease along with some mild chronicity.    Overall, she is doing okay today.  She does have a fairly severe hiatal hernia and she plans to undergo surgery in the near future.  Her blood pressure is normal.  She denies lower extremity edema.  She denies dysuria, microscopic hematuria.  Labs are reviewed.  Notable for a creatinine of 1.2 mg/dL which seems to be her baseline.  Urinalysis shows blood in the urine but no RBCs.  No proteinuria.    Kidney biopsy 3/13/2025  Glomerular basement membranes with harmonic mean thickness of 233 nm, suggestive of thin basement membrane disease (see comment)  - staining for collagen IV alpha 5 show normal tissue distributions     No evidence of immune complex-mediated disease, paraprotein deposition disease, primary podocyte disease, and acute interstitial nephritis     Mild chronic changes of the parenchyma, including:   - focal global glomerulosclerosis (7% of glomeruli)   - focal tubular atrophy and interstitial fibrosis (5-10% the cortex)   - moderate arterial sclerosis and mild arteriolar hyalinosis    The following portions of the patient's history  were reviewed and updated as appropriate: allergies, current medications, past family history, past medical history, past social history, past surgical history, and problem list.    Subjective   Review of Systems  A comprehensive review of systems was performed. Pertinent positives and negatives are described above.    Social and Family History  Patient reports that she has never smoked. She has never used smokeless tobacco. She reports current alcohol use. She reports that she does not use drugs.  family history includes Cancer in her mother; Coronary Artery Disease in her father; Diabetes in her maternal grandfather, maternal grandmother, and another family member.     Examination  not currently breastfeeding. There is no height or weight on file to calculate BMI.    Objective   Pertinent Laboratory and Imaging Results  Most Recent Serum Chemistries  Lab Results   Component Value Date    WBC 5.4 03/13/2025    HGB 11.6 (L) 03/13/2025    HCT 34.7 (L) 03/13/2025     03/13/2025     05/01/2025    POTASSIUM 4.1 05/01/2025    CHLORIDE 105 05/01/2025    CO2 23 05/01/2025    BUN 14.2 05/01/2025    CR 1.27 (H) 05/01/2025    GLC 94 05/01/2025    SED 7 02/14/2024    AST 17 05/25/2022    ALT 14 05/25/2022    ALKPHOS 70 05/25/2022    BILIDIRECT 0.1 06/09/2011    INR 0.97 03/13/2025     Most Recent Urinalysis  Lab Results   Component Value Date    COLOR Yellow 05/01/2025    APPEARANCE Clear 05/01/2025    URINEGLC Negative 05/01/2025    URINEBILI Negative 05/01/2025    URINEKETONE Negative 05/01/2025    SG 1.020 05/01/2025    URINEPH 5.5 05/01/2025    PROTEIN Negative 05/01/2025    UROBILINOGEN 0.2 05/01/2025    NITRITE Negative 05/01/2025    LEUKEST Negative 05/01/2025     Current Outpatient Medications   Medication Sig Dispense Refill    albuterol (PROAIR HFA/PROVENTIL HFA/VENTOLIN HFA) 108 (90 Base) MCG/ACT inhaler Inhale 2 puffs into the lungs every 6 hours as needed for shortness of breath, wheezing or cough. 17  g 2    albuterol (PROVENTIL) (2.5 MG/3ML) 0.083% neb solution Take 1 vial (2.5 mg) by nebulization every 6 hours as needed for shortness of breath, wheezing or cough. 90 mL 4    budesonide (ENTOCORT EC) 3 MG EC capsule       budesonide-formoterol (SYMBICORT/BREYNA) 80-4.5 MCG/ACT Inhaler Inhale 2 puffs into the lungs 2 times daily. 10.2 g 11    cetirizine (ZYRTEC) 10 MG tablet Take 10 mg by mouth daily.      DULoxetine (CYMBALTA) 30 MG capsule TAKE 1 CAPSULE (30 MG) BY MOUTH 2 TIMES DAILY 60 capsule 2    galcanezumab-gnlm (EMGALITY) 120 MG/ML injection Inject 1 mL (120 mg) subcutaneously every 28 days. Do not start before September 24, 2024. 1 mL 11    hydrOXYzine HCl (ATARAX) 10 MG tablet Take 1 tablet (10 mg) by mouth 3 times daily as needed for itching. 90 tablet 3    lisinopril (ZESTRIL) 5 MG tablet Take 1 tablet (5 mg) by mouth daily. 90 tablet 0    loperamide (IMODIUM A-D) 2 MG tablet Trying to decrease      norgestrel-ethinyl estradiol (TURQOZ) 0.3-30 MG-MCG tablet TAKE 1 TABLET BY MOUTH DAILY FOR 63 DAYS, THEN TAKE PLACEBO PILLS FOR 7 DAYS 84 tablet 3    omeprazole (PRILOSEC) 40 MG DR capsule TAKE 1 CAPSULE BY MOUTH ONE TIME DAILY 90 capsule 0    rizatriptan (MAXALT) 5 MG tablet Take 1 tablet (5 mg) by mouth at onset of headache for migraine May repeat in 2 hours. Max 6 tablets/24 hours. Limit to 9 tablets per month. 9 tablet 5    sodium chloride (OCEAN) 0.65 % nasal spray Use 1 spray in each nostril 4x per day as needed. 15 mL 1    spacer (OPTICHAMBER BARB) holding chamber Use as needed with inhaler 1 each 0    tiotropium (SPIRIVA RESPIMAT) 1.25 MCG/ACT inhaler Inhale 2 puffs into the lungs daily. 4 g 4     No current facility-administered medications for this visit.        Assessment & Plan   # Elevated serum creatinine with microscopic hematuria  # Collagenous colitis on budesonide  # Eosinophilic esophagitis on omeprazole  # Wheezing     The patient has had a slightly elevated serum creatinine for the  last 2 years. Given history of autoimmune disease, along with microscopic hematuria, and elevated serum creatinine, I arrange for kidney biopsy that was performed in March 2025.  The biopsy was negative for IgA nephropathy or active glomerulonephritis.  It did show evidence of thin basement membrane disease along with some mild chronicity.    Her kidney function remains fairly stable.  Given the persistence of moderate blood, I think it is reasonable to obtain a CK level to rule out rhabdomyolysis or muscle injury.  I will also check a Cystatin C estimated GFR.    Otherwise, I instructed the patient to continue her current regimen and plan to see her back in 6 months for follow-up appointment.     My recommendations are the following:  - Check a CK level  - Return to Nephrology Clinic in 3-6 months to review your progress.     Total time was of this encounter on the date of service was 30 minutes. All questions were answered to the patient's satisfaction.  Chart documentation was completed, in part, with [employer] voice-recognition software. Even though reviewed, some grammatical, spelling, and word errors may remain.    Telemedicine Visit Addendum:  Consultation provided at the request of above provider for advice regarding the diagnosis and treatment of patient's condition. The patient's condition can be safely assessed via telemedicine. Patient has agreed to receiving services via telemedicine technology. Date of service is 05/08/25. Time Service Began: 4:14 PM. Time Service Ended: 5:00 PM. Originating Location (pt. Location): Home. Distant Location (provider location):  [facility].Reason that telemedicine is appropriate: Patient unable to travel. Mode of transmission: Secure real time interactive audio and visual telecommunication system  Shimon      Orders placed today:  Orders Placed This Encounter   Procedures    Hemoglobin and hematocrit    Iron and iron binding capacity    Ferritin    Renal panel    Parathyroid  Hormone Intact    Vitamin D Deficiency    UA with Microscopic reflex to Culture    Albumin Random Urine Quantitative with Creat Ratio    Cystatin C with GFR    CK total     Siva Ramos MD  Division of Nephrology and Hypertension  [employer] ([employer])   Vocera Web Console (Kiip)

## 2025-05-08 NOTE — LETTER
5/8/2025       RE: Jeanie French  [address] [place] [address]     Dear Colleague,    Thank you for referring your patient, Jeanie French, to the [facility] [place] [facility]. Please see a copy of my visit note below.        Nephrology Outpatient Visit Note (05/08/2025)    Chief Complain/Reason for Visit  Elevated serum creatinine. Hematuria.         History of Present Illness  This is a 45 year old female who returns for a follow up appointment. Please see my initial note dated 2/4/2025 for details. Briefly, she was referred to our clinic with creatinine along with microscopic hematuria.  Her past medical history is notable for asthma depression GERD, eosinophilic esophagitis, and collagenous colitis.  She is on omeprazole, budesonide for these conditions.      Given history of autoimmune disease, along with microscopic hematuria, and elevated serum creatinine, I arrange for kidney biopsy that was performed in March 2025.  The biopsy was negative for IgA nephropathy or active glomerulonephritis.  It did show evidence of thin basement membrane disease along with some mild chronicity.    Overall, she is doing okay today.  She does have a fairly severe hiatal hernia and she plans to undergo surgery in the near future.  Her blood pressure is normal.  She denies lower extremity edema.  She denies dysuria, microscopic hematuria.  Labs are reviewed.  Notable for a creatinine of 1.2 mg/dL which seems to be her baseline.  Urinalysis shows blood in the urine but no RBCs.  No proteinuria.    Kidney biopsy 3/13/2025  Glomerular basement membranes with harmonic mean thickness of 233 nm, suggestive of thin basement membrane disease (see comment)  - staining for collagen IV alpha 5 show normal tissue distributions     No evidence of immune complex-mediated disease, paraprotein deposition disease, primary podocyte disease, and acute  interstitial nephritis     Mild chronic changes of the parenchyma, including:   - focal global glomerulosclerosis (7% of glomeruli)   - focal tubular atrophy and interstitial fibrosis (5-10% the cortex)   - moderate arterial sclerosis and mild arteriolar hyalinosis    The following portions of the patient's history were reviewed and updated as appropriate: allergies, current medications, past family history, past medical history, past social history, past surgical history, and problem list.    Subjective  Review of Systems  A comprehensive review of systems was performed. Pertinent positives and negatives are described above.    Social and Family History  Patient reports that she has never smoked. She has never used smokeless tobacco. She reports current alcohol use. She reports that she does not use drugs.  family history includes Cancer in her mother; Coronary Artery Disease in her father; Diabetes in her maternal grandfather, maternal grandmother, and another family member.     Examination  not currently breastfeeding. There is no height or weight on file to calculate BMI.    Objective  Pertinent Laboratory and Imaging Results  Most Recent Serum Chemistries  Lab Results   Component Value Date    WBC 5.4 03/13/2025    HGB 11.6 (L) 03/13/2025    HCT 34.7 (L) 03/13/2025     03/13/2025     05/01/2025    POTASSIUM 4.1 05/01/2025    CHLORIDE 105 05/01/2025    CO2 23 05/01/2025    BUN 14.2 05/01/2025    CR 1.27 (H) 05/01/2025    GLC 94 05/01/2025    SED 7 02/14/2024    AST 17 05/25/2022    ALT 14 05/25/2022    ALKPHOS 70 05/25/2022    BILIDIRECT 0.1 06/09/2011    INR 0.97 03/13/2025     Most Recent Urinalysis  Lab Results   Component Value Date    COLOR Yellow 05/01/2025    APPEARANCE Clear 05/01/2025    URINEGLC Negative 05/01/2025    URINEBILI Negative 05/01/2025    URINEKETONE Negative 05/01/2025    SG 1.020 05/01/2025    URINEPH 5.5 05/01/2025    PROTEIN Negative 05/01/2025    UROBILINOGEN 0.2  05/01/2025    NITRITE Negative 05/01/2025    LEUKEST Negative 05/01/2025     Current Outpatient Medications   Medication Sig Dispense Refill     albuterol (PROAIR HFA/PROVENTIL HFA/VENTOLIN HFA) 108 (90 Base) MCG/ACT inhaler Inhale 2 puffs into the lungs every 6 hours as needed for shortness of breath, wheezing or cough. 17 g 2     albuterol (PROVENTIL) (2.5 MG/3ML) 0.083% neb solution Take 1 vial (2.5 mg) by nebulization every 6 hours as needed for shortness of breath, wheezing or cough. 90 mL 4     budesonide (ENTOCORT EC) 3 MG EC capsule        budesonide-formoterol (SYMBICORT/BREYNA) 80-4.5 MCG/ACT Inhaler Inhale 2 puffs into the lungs 2 times daily. 10.2 g 11     cetirizine (ZYRTEC) 10 MG tablet Take 10 mg by mouth daily.       DULoxetine (CYMBALTA) 30 MG capsule TAKE 1 CAPSULE (30 MG) BY MOUTH 2 TIMES DAILY 60 capsule 2     galcanezumab-gnlm (EMGALITY) 120 MG/ML injection Inject 1 mL (120 mg) subcutaneously every 28 days. Do not start before September 24, 2024. 1 mL 11     hydrOXYzine HCl (ATARAX) 10 MG tablet Take 1 tablet (10 mg) by mouth 3 times daily as needed for itching. 90 tablet 3     lisinopril (ZESTRIL) 5 MG tablet Take 1 tablet (5 mg) by mouth daily. 90 tablet 0     loperamide (IMODIUM A-D) 2 MG tablet Trying to decrease       norgestrel-ethinyl estradiol (TURQOZ) 0.3-30 MG-MCG tablet TAKE 1 TABLET BY MOUTH DAILY FOR 63 DAYS, THEN TAKE PLACEBO PILLS FOR 7 DAYS 84 tablet 3     omeprazole (PRILOSEC) 40 MG DR capsule TAKE 1 CAPSULE BY MOUTH ONE TIME DAILY 90 capsule 0     rizatriptan (MAXALT) 5 MG tablet Take 1 tablet (5 mg) by mouth at onset of headache for migraine May repeat in 2 hours. Max 6 tablets/24 hours. Limit to 9 tablets per month. 9 tablet 5     sodium chloride (OCEAN) 0.65 % nasal spray Use 1 spray in each nostril 4x per day as needed. 15 mL 1     spacer (OPTICHAMBER BARB) holding chamber Use as needed with inhaler 1 each 0     tiotropium (SPIRIVA RESPIMAT) 1.25 MCG/ACT inhaler Inhale 2  puffs into the lungs daily. 4 g 4     No current facility-administered medications for this visit.        Assessment & Plan  # Elevated serum creatinine with microscopic hematuria  # Collagenous colitis on budesonide  # Eosinophilic esophagitis on omeprazole  # Wheezing     The patient has had a slightly elevated serum creatinine for the last 2 years. Given history of autoimmune disease, along with microscopic hematuria, and elevated serum creatinine, I arrange for kidney biopsy that was performed in March 2025.  The biopsy was negative for IgA nephropathy or active glomerulonephritis.  It did show evidence of thin basement membrane disease along with some mild chronicity.    Her kidney function remains fairly stable.  Given the persistence of moderate blood, I think it is reasonable to obtain a CK level to rule out rhabdomyolysis or muscle injury.  I will also check a Cystatin C estimated GFR.    Otherwise, I instructed the patient to continue her current regimen and plan to see her back in 6 months for follow-up appointment.     My recommendations are the following:  - Check a CK level  - Return to Nephrology Clinic in 3-6 months to review your progress.     Total time was of this encounter on the date of service was 30 minutes. All questions were answered to the patient's satisfaction.  Chart documentation was completed, in part, with [employer] voice-recognition software. Even though reviewed, some grammatical, spelling, and word errors may remain.    Telemedicine Visit Addendum:  Consultation provided at the request of above provider for advice regarding the diagnosis and treatment of patient's condition. The patient's condition can be safely assessed via telemedicine. Patient has agreed to receiving services via telemedicine technology. Date of service is 05/08/25. Time Service Began: 4:14 PM. Time Service Ended: 5:00 PM. Originating Location (pt. Location): Home. Distant Location (provider location):  [facility].Reason  that telemedicine is appropriate: Patient unable to travel. Mode of transmission: Secure real time interactive audio and visual telecommunication system  [employer]      Orders placed today:  Orders Placed This Encounter   Procedures     Hemoglobin and hematocrit     Iron and iron binding capacity     Ferritin     Renal panel     Parathyroid Hormone Intact     Vitamin D Deficiency     UA with Microscopic reflex to Culture     Albumin Random Urine Quantitative with Creat Ratio     Cystatin C with GFR     CK total     Siva Ramos MD  Division of Nephrology and Hypertension  [employer]   Voc[employer] Web Console ([employer]       Again, thank you for allowing me to participate in the care of your patient.      Sincerely,    Siva Ramos MD

## 2025-05-08 NOTE — PATIENT INSTRUCTIONS
It was a pleasure taking care of you today.  I've included a brief summary of our discussion and care plan from today's visit below.  Please review this information with your primary care provider.    My recommendations are summarized as follows:  - Check a CK level  - Return to Nephrology Clinic in 3-6 months to review your progress.     Who do I call with any questions after my visit?  Please be in touch if there are any further questions that arise following today's visit.  There are multiple ways to contact your nephrology care team.      You can always send a secure message through [employer] or call [phone]. [employer] messages are answered by your nurse or doctor typically within 24-48 hours. Please allow extra time on weekends and holidays. For urgent/emergent questions after business hours, you may reach the on-call Nephrology Fellow by contacting the [facility]  at [phone].    How do I schedule appointments?  During business hours, you may reach your Nephrology Care Team or schedule or reschedule an appointment or lab at [phone]. If you need to schedule imaging, please call [phone].      How will I get the results of any tests ordered?    You will receive all of your results.  If you have signed up for [employer], any tests ordered at your visit will be available to you once resulted on MyChart. Typically the physician reviews them and may or may not make further recommendations. If there are urgent results that require a change in your care plan, your physician or nurse will call you to discuss the next steps. If you are not on MyChart, a letter may be generated and mailed to you with your results.    Sincerely,    Siva Ramos MD  [facility]  Division of Nephrology and Hypertension

## 2025-05-08 NOTE — NURSING NOTE
Current patient location: [address] [place] [address] [address] [place] [address]    Is the patient currently in the state of [place]? YES    Visit mode: VIDEO    If the visit is dropped, the patient can be reconnected by:VIDEO VISIT: Text to cell phone:   Telephone Information:   Mobile [phone]       Will anyone else be joining the visit? NO  (If patient encounters technical issues they should call [phone]    Are changes needed to the allergy or medication list? No    Are refills needed on medications prescribed by this physician? NO    Rooming Documentation:  Questionnaire(s) completed    Reason for visit: RECHECK    Marv MOORE

## 2025-05-13 ENCOUNTER — MYC MEDICAL ADVICE (OUTPATIENT)
Dept: FAMILY MEDICINE | Facility: CLINIC | Age: 45
End: 2025-05-13
Payer: COMMERCIAL

## 2025-05-13 DIAGNOSIS — G43.009 MIGRAINE WITHOUT AURA AND WITHOUT STATUS MIGRAINOSUS, NOT INTRACTABLE: ICD-10-CM

## 2025-05-13 RX ORDER — RIZATRIPTAN BENZOATE 5 MG/1
5 TABLET ORAL
Qty: 9 TABLET | Refills: 5 | Status: SHIPPED | OUTPATIENT
Start: 2025-05-13

## 2025-05-13 NOTE — PROGRESS NOTES
THORACIC SURGERY - NEW PATIENT OFFICE VISIT      Dear GALEN Wilkinson    I saw Jeanie French at your request in consultation for the evaluation and treatment of Eosinophilic esophagitis, GERD and a Hiatal hernia.     HPI  Jeanie reports that after she eats she feels very full and tight. She has a stinging sensation in her throat but not really a burning. She has not noticed any vocal changes. She does have a cough and wheeze most nights. She wakes up an average of 3 times in the middle of the night because she is coughing so hard. At least once a night she coughs so hard she vomits. When she vomits most of the time there is no food but sometimes there is. She has to sit up for about 20 minutes waiting for the coughing to resolve. She also has hiccups from time to time that are really difficult to get rid of. She denies any trouble with foods getting stuck. When she was first diagnosed with EoE she did have dysphagia but this has been managed well since she started omeprazole.  Symptoms in order of severity:  Nocturnal coughing/vomiting  Feeling full/tight  Stinging throat  Previsit Tests   CT chest 05/15/2025: small hiatal hernia    Esophagram 05/02/2025:  1.  Mild esophageal dysmotility.  2.  Moderate sliding-type hiatal hernia.  3.  Gastro-oesophageal reflux to the level the upper thoracic esophagus.  4.  Eccentric mucosal web distal esophagus adjacent to the EG junction. 13 mm barium tablet passes easily.  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        PSH  Reviewed, as below    Past Surgical History:   Procedure Laterality Date    ENT SURGERY      PE tubes    EXCISION TONGUE  3/13/2012    Procedure:EXCISION TONGUE;  Wide Local Excision Right Lateral Tongue  ; Surgeon:DE MERRITT; Location:[facility] OR    IR RENAL BIOPSY RIGHT  3/13/2025     Social History     Socioeconomic History    Marital status: Single     Spouse name: Not on file    Number of children: Not on file    Years of education: Not on file    Highest education level: Not on file   Occupational History    Not on file   Tobacco Use    Smoking status: Never     Passive exposure: Never    Smokeless tobacco: Never   Vaping Use    Vaping status: Never Used   Substance and Sexual Activity    Alcohol use: Yes     Comment: infrequently    Drug use: No    Sexual activity: Not on file   Other Topics Concern    Not on file   Social History Narrative    Not on file     Social Drivers of Health     Financial Resource Strain: Not on file   Food Insecurity: Not on file   Transportation Needs: Not on file   Physical Activity: Not on file   Stress: Not on file   Social Connections: Not on file   Interpersonal Safety: Unknown (3/13/2025)    Interpersonal Safety     Do you feel physically and emotionally safe where you currently live?: Patient unable to answer     Within the past 12 months, have you been hit, slapped, kicked or otherwise physically hurt by someone?: Patient unable to answer     Within the past 12 months, have you been humiliated or emotionally abused in other ways by your partner or ex-partner?: Patient unable to answer   Housing Stability: Not on file        Code Status: Full Code    Health Care Proxy: Did not discuss    IMPRESSION   Jeanie is a 45 year old female with a hiatal hernia and eosinophilic esophagitis.     We reviewed laparoscopic hiatal hernia repair +/- Nissen fundoplication. We do need to complete work up with a manometry and EGD Bravo pH study. I will order those tests and once I have the results I will review them with our team to determine the best treatment option for her. If the recommendation is for surgery then she would need to come in and meet  with one of the Thoracic Surgeons in person to discuss in more detail.    PLAN  I spent 45 min on the date of the encounter in chart review, patient visit, review of tests, documentation and/or discussion with other providers about the issues documented above. I reviewed the plan as follows:    Manometry and EGD Bravo pH    I appreciate the opportunity to participate in the care of your patient and will keep you updated.      Sincerely,    RAKAN Mcclellan CNS

## 2025-05-15 ENCOUNTER — ANCILLARY PROCEDURE (OUTPATIENT)
Dept: CT IMAGING | Facility: CLINIC | Age: 45
End: 2025-05-15
Attending: CLINICAL NURSE SPECIALIST
Payer: COMMERCIAL

## 2025-05-15 DIAGNOSIS — K44.9 HIATAL HERNIA: ICD-10-CM

## 2025-05-15 PROCEDURE — 71250 CT THORAX DX C-: CPT | Mod: GC | Performed by: RADIOLOGY

## 2025-05-19 ENCOUNTER — VIRTUAL VISIT (OUTPATIENT)
Dept: SURGERY | Facility: CLINIC | Age: 45
End: 2025-05-19
Attending: NURSE PRACTITIONER
Payer: COMMERCIAL

## 2025-05-19 VITALS — HEIGHT: 70 IN | BODY MASS INDEX: 30.06 KG/M2 | WEIGHT: 210 LBS

## 2025-05-19 DIAGNOSIS — K44.9 HIATAL HERNIA: ICD-10-CM

## 2025-05-19 DIAGNOSIS — K20.0 EOSINOPHILIC ESOPHAGITIS: Primary | ICD-10-CM

## 2025-05-19 PROCEDURE — 98002 SYNCH AUDIO-VIDEO NEW MOD 45: CPT | Performed by: CLINICAL NURSE SPECIALIST

## 2025-05-19 PROCEDURE — 1126F AMNT PAIN NOTED NONE PRSNT: CPT | Mod: 95 | Performed by: CLINICAL NURSE SPECIALIST

## 2025-05-19 ASSESSMENT — PAIN SCALES - GENERAL: PAINLEVEL_OUTOF10: NO PAIN (0)

## 2025-05-19 NOTE — NURSING NOTE
Current patient location: [address] [place] [address] [address] [place] [address]    Is the patient currently in the state of [place]? YES    Visit mode: VIDEO    If the visit is dropped, the patient can be reconnected by:VIDEO VISIT: Text to cell phone:   Telephone Information:   Mobile [phone]       Will anyone else be joining the visit? NO  (If patient encounters technical issues they should call [phone]    Are changes needed to the allergy or medication list? No    Are refills needed on medications prescribed by this physician? NO    Rooming Documentation:  Questionnaire(s) completed    Reason for visit: Consult    Octavia CASTELLANOSF

## 2025-05-19 NOTE — LETTER
5/19/2025      Jeanie French  [address] [place] [address]      Dear Colleague,    Thank you for referring your patient, Jeanie French, to the [facility]. Please see a copy of my visit note below.    THORACIC SURGERY - NEW PATIENT OFFICE VISIT      Dear GALEN Wilkinson    I saw Jeanie French at your request in consultation for the evaluation and treatment of Eosinophilic esophagitis, GERD and a Hiatal hernia.     HPI  Jeanie reports that after she eats she feels very full and tight. She has a stinging sensation in her throat but not really a burning. She has not noticed any vocal changes. She does have a cough and wheeze most nights. She wakes up an average of 3 times in the middle of the night because she is coughing so hard. At least once a night she coughs so hard she vomits. When she vomits most of the time there is no food but sometimes there is. She has to sit up for about 20 minutes waiting for the coughing to resolve. She also has hiccups from time to time that are really difficult to get rid of. She denies any trouble with foods getting stuck. When she was first diagnosed with EoE she did have dysphagia but this has been managed well since she started omeprazole.  Symptoms in order of severity:  Nocturnal coughing/vomiting  Feeling full/tight  Stinging throat  Previsit Tests   CT chest 05/15/2025: small hiatal hernia    Esophagram 05/02/2025:  1.  Mild esophageal dysmotility.  2.  Moderate sliding-type hiatal hernia.  3.  Gastro-oesophageal reflux to the level the upper thoracic esophagus.  4.  Eccentric mucosal web distal esophagus adjacent to the EG junction. 13 mm barium tablet passes easily.  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        PSH  Reviewed, as below    Past Surgical History:   Procedure Laterality Date     ENT SURGERY      PE tubes     EXCISION TONGUE  3/13/2012    Procedure:EXCISION TONGUE; Wide Local Excision Right Lateral Tongue  ; Surgeon:DE MERRITT; Location:[facility] OR     IR RENAL BIOPSY RIGHT  3/13/2025     Social History     Socioeconomic History     Marital status: Single     Spouse name: Not on file     Number of children: Not on file     Years of education: Not on file     Highest education level: Not on file   Occupational History     Not on file   Tobacco Use     Smoking status: Never     Passive exposure: Never     Smokeless tobacco: Never   Vaping Use     Vaping status: Never Used   Substance and Sexual Activity     Alcohol use: Yes     Comment: infrequently     Drug use: No     Sexual activity: Not on file   Other Topics Concern     Not on file   Social History Narrative     Not on file     Social Drivers of Health     Financial Resource Strain: Not on file   Food Insecurity: Not on file   Transportation Needs: Not on file   Physical Activity: Not on file   Stress: Not on file   Social Connections: Not on file   Interpersonal Safety: Unknown (3/13/2025)    Interpersonal Safety      Do you feel physically and emotionally safe where you currently live?: Patient unable to answer      Within the past 12 months, have you been hit, slapped, kicked or otherwise physically hurt by someone?: Patient unable to answer      Within the past 12 months, have you been humiliated or emotionally abused in other ways by your partner or ex-partner?: Patient unable to answer   Housing Stability: Not on file        Code Status: Full Code    Health Care Proxy: Did not discuss    IMPRESSION   Jeanie is a 45 year old female with a hiatal hernia and eosinophilic esophagitis.     We reviewed laparoscopic hiatal hernia repair +/- Nissen fundoplication. We do  need to complete work up with a manometry and EGD Bravo pH study. I will order those tests and once I have the results I will review them with our team to determine the best treatment option for her. If the recommendation is for surgery then she would need to come in and meet with one of the Thoracic Surgeons in person to discuss in more detail.    PLAN  I spent 45 min on the date of the encounter in chart review, patient visit, review of tests, documentation and/or discussion with other providers about the issues documented above. I reviewed the plan as follows:    Manometry and EGD Bravo pH    I appreciate the opportunity to participate in the care of your patient and will keep you updated.      Sincerely,    RAKAN Mcclellan       Again, thank you for allowing me to participate in the care of your patient.        Sincerely,        RAKAN Mcclellan    Electronically signed

## 2025-05-27 ENCOUNTER — TELEPHONE (OUTPATIENT)
Dept: GASTROENTEROLOGY | Facility: CLINIC | Age: 45
End: 2025-05-27
Payer: COMMERCIAL

## 2025-05-27 ENCOUNTER — TRANSFERRED RECORDS (OUTPATIENT)
Dept: ADMINISTRATIVE | Facility: CLINIC | Age: 45
End: 2025-05-27
Payer: COMMERCIAL

## 2025-05-27 NOTE — TELEPHONE ENCOUNTER
"Non-Invasive GI Procedure Scheduling Questionnaire    Scheduling Reminders:  Add-on within 1 week require clinic approval (Manometry & HBT)  Manometry requires an in-person interrupter (not needed for HBT)      Have you had any respiratory illness or flu-like symptoms in the last 10 days?  No    Are you active on MyChart?   Yes    What insurance is in the chart?  Other:  [employer]    Ordering/Referring Provider: Swapna Hartman APRN CNS in [facility]   (If ordering provider performs procedure, schedule with ordering provider unless otherwise instructed. )    (Females) Are you currently pregnant? No - Okay to continue scheduling.    Have you ever had or are you awaiting a heart or lung transplant? No - Schedule next available.    Do you need assistance transferring? No - Continue scheduling.    Do you take acid reflux medication or proton-pump inhibitors (PPI)? Yes - Advise patients to discontinue acid suppression medications 2-4 weeks prior to the exam/procedure. ( Scheduled for more than 14 days out.)    Patient Reminders:  Please inform patients to review instructions sent via [employer] or letter two weeks before procedure.  The Manometry exam may take up to 90 minutes.  The Breath Test exam may take up to 4 hours.    6.16.25   9:00/8:45  AM   Non-Invasive  Manometry    Endoscopy Scheduling Screen    Caller: patient    BMI: Estimated body mass index is 30.13 kg/m  as calculated from the following:    Height as of 5/19/25: 1.778 m (5' 10\").    Weight as of 5/19/25: 95.3 kg (210 lb).     Sedation Ordered  MAC/deep sedation.   BMI<= 45 45 < BMI <= 48 48 < BMI < = 50  BMI > 50   No Restrictions No [facility]  No [facility]  [facility] with exceptions Hospital Only OR Only       Do you have a history of malignant hyperthermia?  No     Have you been diagnosed or told you have pulmonary hypertension?   No    Do you have an LVAD?  No    Have you been told you have moderate to severe sleep apnea?  No.    Have you been " "told you have COPD, asthma, or any other lung disease?  Yes     What breathing problems do you have?  Asthma - Exercise Induced    Do you use home oxygen?  No    Have your breathing problems required an ED visit or hospitalization in the last year?  No.    Has your doctor ordered any cardiac tests like echo, angiogram, stress test, ablation, or EKG, that you have not completed yet?  No    Do you  have a history of any heart conditions?  No     Have you ever had or are you waiting for an organ transplant?  No. Continue scheduling, no site restrictions.    Have you had a stroke or transient ischemic attack (TIA aka \"mini stroke\") in the last 2 years?   No.    Have you been diagnosed with or been told you have cirrhosis of the liver?   No.    Are you currently on dialysis?   No    BMI: Estimated body mass index is 30.13 kg/m  as calculated from the following:    Height as of 5/19/25: 1.778 m (5' 10\").    Weight as of 5/19/25: 95.3 kg (210 lb).     Is patients BMI > 40 and scheduling location [facility]?  No    Do you take an injectable or oral medication for weight loss or diabetes (excluding insulin)?  No    Do you take the medication Naltrexone?  No    Do you take blood thinners?  No       Prep   Are you currently on dialysis or do you have chronic kidney disease?  Yes ([facility] Prep)  Thin Basement Membrane    Do you have a diagnosis of diabetes?  No    Do you have a diagnosis of cystic fibrosis (CF)?  No    On a regular basis do you go 3 -5 days between bowel movements?  No    BMI > 40?  No    Preferred Pharmacy:    [id number] [place], [place] - [address] [address]  [address] [address]  Phone: [phone] Fax: [phone]      Final Scheduling Details     Procedure scheduled  Upper endoscopy with BRAVO capsule placement  Manometry    Patient Reminders:   You will receive a call from a Nurse to review instructions and health history.  This assessment must be completed prior to your procedure.  Failure to " complete the Nurse assessment may result in the procedure being cancelled.      On the day of your procedure, please designate an adult(s) who can drive you home stay with you for the next 24 hours. The medicines used in the exam will make you sleepy. You will not be able to drive.      You cannot take public transportation, ride share services, or non-medical taxi service without a responsible caregiver.  Medical transport services are allowed with the requirement that a responsible caregiver will receive you at your destination.  We require that drivers and caregivers are confirmed prior to your procedure.

## 2025-05-28 NOTE — PROGRESS NOTES
2025        Jeanie French   [address] [place] [address]      Jeanie French,  :  1980    I am writing to let you know the results of the tests that were done the other day.   Thank you for allowing [facility] the opportunity to take part in your healthcare.  At [facility] we strive to provide each patient with the finest gastroenterology care available.  We hope your experience was pleasant and informative.    Your gastric emptying study was normal.    I hope you are feeling well.        Thank you.    Electronically signed by:  Danna BELTRAN 2025 02:03 PM  Document generated by:  Danna BELTRAN  2025  If your provider ordered multiple tests; the results may not become available at the same time.  If multiple test results are received within 14 days of one another, you may receive a duplicate.  cc:  Anayeli Warner MD

## 2025-05-30 DIAGNOSIS — F41.1 GENERALIZED ANXIETY DISORDER: ICD-10-CM

## 2025-05-30 DIAGNOSIS — F33.2 SEVERE EPISODE OF RECURRENT MAJOR DEPRESSIVE DISORDER, WITHOUT PSYCHOTIC FEATURES (H): ICD-10-CM

## 2025-05-30 DIAGNOSIS — G43.009 MIGRAINE WITHOUT AURA AND WITHOUT STATUS MIGRAINOSUS, NOT INTRACTABLE: ICD-10-CM

## 2025-05-30 DIAGNOSIS — N18.2 STAGE 2 CHRONIC KIDNEY DISEASE: ICD-10-CM

## 2025-06-01 RX ORDER — LISINOPRIL 5 MG/1
5 TABLET ORAL DAILY
Qty: 90 TABLET | Refills: 3 | Status: SHIPPED | OUTPATIENT
Start: 2025-06-01

## 2025-06-01 RX ORDER — DULOXETIN HYDROCHLORIDE 30 MG/1
30 CAPSULE, DELAYED RELEASE ORAL 2 TIMES DAILY
Qty: 180 CAPSULE | Refills: 1 | Status: SHIPPED | OUTPATIENT
Start: 2025-06-01

## 2025-06-04 ENCOUNTER — MYC MEDICAL ADVICE (OUTPATIENT)
Dept: PULMONOLOGY | Facility: CLINIC | Age: 45
End: 2025-06-04
Payer: COMMERCIAL

## 2025-06-09 ENCOUNTER — TELEPHONE (OUTPATIENT)
Dept: GASTROENTEROLOGY | Facility: CLINIC | Age: 45
End: 2025-06-09
Payer: COMMERCIAL

## 2025-06-09 NOTE — TELEPHONE ENCOUNTER
Called patient to review information below for upcoming manometry test. Spoke directly with patient, no questions at this time. Duplicate information sent via [employer] message.    Manometry Test  Check in at the [facility] [address] [place] [id number]   Check in on the 3rd floor.    What is a manometry test?  This test shows the strength and function of your swallowing muscles. It also tells us the exact location of the lower muscle in your esophagus (food pipe) and stomach.    Manometry can help find the cause of acid reflux, heartburn, trouble swallowing and some types of chest pain. Or, it is done before surgery or other tests.    The test takes 45 to 60 minutes. We will:   Moisten the inside of your nose with gel.   Put a small tube in your nose, down your esophagus and into your stomach.   You will be given a series of saline (salty water). Tiny sensors on the tube will record the movements of your esophagus as you swallow.   Remove the tube.    Getting ready   Do not eat or drink 4 hours before the test. (It is okay to take medicines with a small amount of water.)     Unless your provider says it is okay, do not take these medicines for 4 hours before your test(s):  - pain medicine  - sedatives or tranquilizers  - antispasmodics  - promotility medicines  - anti-depressants     You may drive yourself to and from the test(s).    Test results  - You will receive your results in 4-8 weeks by phone, letter or [employer]. All follow up recommendations are the responsibility of your ordering provider.     For questions or appointments, call:  [facility]   GI Clinic  [phone]

## 2025-06-16 ENCOUNTER — OFFICE VISIT (OUTPATIENT)
Dept: GASTROENTEROLOGY | Facility: CLINIC | Age: 45
End: 2025-06-16
Payer: COMMERCIAL

## 2025-06-16 VITALS — SYSTOLIC BLOOD PRESSURE: 116 MMHG | DIASTOLIC BLOOD PRESSURE: 76 MMHG | OXYGEN SATURATION: 94 % | HEART RATE: 81 BPM

## 2025-06-16 DIAGNOSIS — K21.9 GASTROESOPHAGEAL REFLUX DISEASE, UNSPECIFIED WHETHER ESOPHAGITIS PRESENT: ICD-10-CM

## 2025-06-16 DIAGNOSIS — K44.9 HIATAL HERNIA: Primary | ICD-10-CM

## 2025-06-16 PROCEDURE — 3074F SYST BP LT 130 MM HG: CPT | Performed by: PHYSICIAN ASSISTANT

## 2025-06-16 PROCEDURE — 91010 ESOPHAGUS MOTILITY STUDY: CPT | Performed by: PHYSICIAN ASSISTANT

## 2025-06-16 PROCEDURE — 91037 ESOPH IMPED FUNCTION TEST: CPT | Performed by: PHYSICIAN ASSISTANT

## 2025-06-16 PROCEDURE — 1126F AMNT PAIN NOTED NONE PRSNT: CPT | Performed by: PHYSICIAN ASSISTANT

## 2025-06-16 PROCEDURE — 99207 PR NO CHARGE NURSE ONLY: CPT | Performed by: PHYSICIAN ASSISTANT

## 2025-06-16 PROCEDURE — 3078F DIAST BP <80 MM HG: CPT | Performed by: PHYSICIAN ASSISTANT

## 2025-06-16 ASSESSMENT — PAIN SCALES - GENERAL: PAINLEVEL_OUTOF10: NO PAIN (0)

## 2025-06-16 NOTE — PROGRESS NOTES
Non-Invasive GI Procedure Visit    Jeanie French is a 45 year old female with history of Data Unavailable.   Patient stated reason for procedure: Hiatal Hernia, coughing, vomiting, pre-surgical work-up      Pre-Procedure Assessment  Patient presents to clinic today for Esophageal Manometry Study    Referring Provider: RAKAN Avila  Patient has undergone previous endoscopy.    Does patient report taking a PPI (omeprazole, pantoprazole, rabeprazole, lansoprazole, esomeprazole, dexlansoprazole)? Yes. Is patient taking a PPI twice daily? No Omeprazole 40mg BID  Does patient report taking a H2 blocker (ranitidine, or famotidine)? No  Does patient report taking opioids? No  Patient reported that last food and/or drink was last consumed 12 hours ago.  Esophageal Questionnaire(s) Completed: Yes - Esophageal Questionnaire(s)    BEDQ Questionnaire      6/16/2025    12:06 AM   BEDQ Questionnaire: How Often Have You Had the Following?   Trouble eating solid food (meat, bread, vegetables) 3   Trouble eating soft foods (yogurt, jello, pudding) 0   Trouble swallowing liquids 0   Pain while swallowing 3   Coughing or choking while swallowing foods or liquids 0   Total Score: 6        Patient-reported         6/16/2025    12:06 AM   BEDQ Questionnaire: Discomfort/Pain Ratings   Eating solid food (meat, bread, vegetables) 3   Eating soft foods (yogurt, jello, pudding) 0   Drinking liquid 0   Total Score: 3        Patient-reported       Eckardt Questionnaire      6/16/2025    12:07 AM   Eckardt Questionnaire   Dysphagia 1   Regurgitation 2   Retrosternal Pain 1   Weight Loss (kg) 2   Total Score:  6        Patient-reported       Promis 10 Questionnaire      6/16/2025    12:09 AM   PROMIS 10 FLOWSHEET DATA   In general, would you say your health is: 3   In general, would you say your quality of life is: 3   In general, how would you rate your physical health? 2   In general, how would you rate your mental health,  including your mood and your ability to think? 4   In general, how would you rate your satisfaction with your social activities and relationships? 2   In general, please rate how well you carry out your usual social activities and roles. (This includes activities at home, at work and in your community, and responsibilities as a parent, child, spouse, employee, friend, etc.) 4   To what extent are you able to carry out your everyday physical activities such as walking, climbing stairs, carrying groceries, or moving a chair? 4   In the past 7 days, how often have you been bothered by emotional problems such as feeling anxious, depressed, or irritable? 2   In the past 7 days, how would you rate your fatigue on average? 3   In the past 7 days, how would you rate your pain on average, where 0 means no pain, and 10 means worst imaginable pain? 3   Mental health question re-calculation - no clinical value 4    Physical health question re-calculation - no clinical value 3    Pain question re-calculation - no clinical value 4    Global Mental Health Score 13    Global Physical Health Score 13    PROMIS TOTAL - SUBSCORES 26        Patient-reported   .    Patient Hx  Patient's history, medications and allergies were reviewed.     Height: Data Unavailable   Weight: 0 lbs 0 oz    Patient Active Problem List    Diagnosis Date Noted    Low serum cortisol level 01/07/2025     Priority: Medium    Collagenous colitis 06/16/2021     Priority: Medium    Eosinophilic esophagitis 06/16/2021     Priority: Medium     Follows with Dr. Jenaro LEE.   EGD 11/2021 for follow up of eosinophilic esophagitis was normal.  Biopsies pending.        Iron deficiency anemia due to chronic blood loss 05/21/2021     Priority: Medium    Menorrhagia with regular cycle 10/09/2020     Priority: Medium    Adjustment disorder with anxiety 10/20/2016     Priority: Medium    Hypovitaminosis D 09/23/2016     Priority: Medium    Allergic rhinitis, unspecified  allergic rhinitis type 03/30/2016     Priority: Medium     Pollen, cat/dog hair and dander (had skin prick testing 3/26/16)      Exercise-induced asthma 05/29/2015     Priority: Medium    Sinusitis, chronic 05/29/2015     Priority: Medium     Followed by ENT.  Visit 12/30/15.  CT showed 2-3+ deviated septum, moderate bilateral maxillary inflammation.  Has had recurrent sinus infections treated with antibiotics.  Pt to consider procedure to open partially blocked sinuses.  Also recommend she have allergy testing.  6 month f/u for repeat CT.        Irritable bowel syndrome (IBS) 02/13/2013     Priority: Medium     Negative GI workup in past. Patient trying psyllium and considering elimination diet.  4/30/2021:  Consult with Dr. Eason [facility].  Hx of diarrhea while living abroad in [place] and [place].  Will check stool culture and O and P, EGD and colonoscopy, TSH and celiac panel.        Generalized anxiety disorder 11/21/2012     Priority: Medium     Buspirone 30 mg BID. Sees Dr. Rankin.      Tenosynovitis of the right index finger 11/21/2012     Priority: Medium    Wheezing 11/21/2012     Priority: Medium    Cancer of tongue (H) 04/24/2012     Priority: Medium     3/12 - no recurrence. Sees head and neck surgeon      Major depressive disorder with single episode 11/17/2006     Priority: Medium      Prior to Admission medications    Medication Sig Start Date End Date Taking? Authorizing Provider   albuterol (PROAIR HFA/PROVENTIL HFA/VENTOLIN HFA) 108 (90 Base) MCG/ACT inhaler Inhale 2 puffs into the lungs every 6 hours as needed for shortness of breath, wheezing or cough.  Patient not taking: Reported on 5/19/2025 2/24/25   Anayeli Warner MD   budesonide (ENTOCORT EC) 3 MG EC capsule  5/20/21   Reported, Patient   cetirizine (ZYRTEC) 10 MG tablet Take 10 mg by mouth daily.    Reported, Patient   DULoxetine (CYMBALTA) 30 MG capsule Take 1 capsule (30 mg) by mouth 2 times daily. 6/1/25   Anayeli Warner MD    galcanezumab-gnlm (EMGALITY) 120 MG/ML injection Inject 1 mL (120 mg) subcutaneously every 28 days. Do not start before September 24, 2024. 9/24/24   Bala Andrew MD   hydrOXYzine HCl (ATARAX) 10 MG tablet Take 1 tablet (10 mg) by mouth 3 times daily as needed for itching. 10/29/24   Anayeli Warner MD   lisinopril (ZESTRIL) 5 MG tablet Take 1 tablet (5 mg) by mouth daily. 6/1/25   Anayeli Warner MD   loperamide (IMODIUM A-D) 2 MG tablet Trying to decrease 12/2/22   Reported, Patient   omeprazole (PRILOSEC) 40 MG DR capsule TAKE 1 CAPSULE BY MOUTH ONE TIME DAILY 3/31/25   Anayeli Warner MD   rizatriptan (MAXALT) 5 MG tablet Take 1 tablet (5 mg) by mouth at onset of headache for migraine. May repeat in 2 hours. Max 6 tablets/24 hours. Limit to 9 tablets per month. 5/13/25   Anayeli Warner MD   sodium chloride (OCEAN) 0.65 % nasal spray Use 1 spray in each nostril 4x per day as needed.  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     Past Surgical History:   Procedure Laterality Date    ENT SURGERY      PE tubes    EXCISION TONGUE  3/13/2012    Procedure:EXCISION TONGUE; Wide Local Excision Right Lateral Tongue  ; Surgeon:DE MERRITT; Location:[facility] OR    IR RENAL BIOPSY RIGHT  3/13/2025     Family History   Problem Relation Age of Onset    Diabetes Maternal Grandmother     Diabetes Maternal  Grandfather     Diabetes Other     Cancer Mother         Chronic leukemia     Coronary Artery Disease Father     Anesthesia Reaction No family hx of     Colon Polyps No family hx of     Crohn's Disease No family hx of     Ulcerative Colitis No family hx of     Colon Cancer No family hx of     Hypertension No family hx of     Hyperlipidemia No family hx of     Cerebrovascular Disease No family hx of     Breast Cancer No family hx of     Prostate Cancer No family hx of     Other Cancer No family hx of     Depression No family hx of     Anxiety Disorder No family hx of     Mental Illness No family hx of     Substance Abuse No family hx of     Asthma No family hx of     Osteoporosis No family hx of     Genetic Disorder No family hx of     Thyroid Disease No family hx of     Obesity No family hx of     Unknown/Adopted No family hx of      Social History     Tobacco Use    Smoking status: Never     Passive exposure: Never    Smokeless tobacco: Never   Substance Use Topics    Alcohol use: Yes     Comment: infrequently        Pre-Procedure Education & Consent  Procedure education was provided to: Patient  Teaching method: Explanation  Barriers to learning: No Barrier    Patient indicated understanding of pre-procedure instruction and appropriate consent was obtained and documented.    ____________________________________________________________________    Post-Procedure Documentation: Esophageal Manometry    Manometry catheter was placed via right nare to 50.5 cm and normal saline swallows given per protocol. Manometry catheter was removed at the end of test.    Discharge instructions given to patient.    Notification of pending test results sent to provider for interpretation. Please reference scanned document for final interpretation of results. Patient will follow up with referring provider for test results.    Roselyn Arthur RN on 6/16/2025 at 9:08 AM

## 2025-06-16 NOTE — PATIENT INSTRUCTIONS
Esophogeal Manometry Study  1. Resume regular diet.  2. You may have a bloody nose or sore throat after the procedure.  3. If you have questions call [phone] from 7:00am-5:00pm.  For afterhours questions call GI doctor on call at [phone].

## 2025-06-25 RX ORDER — NALOXONE HYDROCHLORIDE 0.4 MG/ML
0.4 INJECTION, SOLUTION INTRAMUSCULAR; INTRAVENOUS; SUBCUTANEOUS
Status: CANCELLED | OUTPATIENT
Start: 2025-06-25

## 2025-06-25 RX ORDER — FLUMAZENIL 0.1 MG/ML
0.2 INJECTION, SOLUTION INTRAVENOUS
Status: CANCELLED | OUTPATIENT
Start: 2025-06-25 | End: 2025-06-26

## 2025-06-25 RX ORDER — NALOXONE HYDROCHLORIDE 0.4 MG/ML
0.2 INJECTION, SOLUTION INTRAMUSCULAR; INTRAVENOUS; SUBCUTANEOUS
Status: CANCELLED | OUTPATIENT
Start: 2025-06-25

## 2025-06-25 RX ORDER — ONDANSETRON 4 MG/1
4 TABLET, ORALLY DISINTEGRATING ORAL EVERY 6 HOURS PRN
Status: CANCELLED | OUTPATIENT
Start: 2025-06-25

## 2025-06-25 RX ORDER — ONDANSETRON 2 MG/ML
4 INJECTION INTRAMUSCULAR; INTRAVENOUS EVERY 6 HOURS PRN
Status: CANCELLED | OUTPATIENT
Start: 2025-06-25

## 2025-06-25 RX ORDER — PROCHLORPERAZINE MALEATE 10 MG
10 TABLET ORAL EVERY 6 HOURS PRN
Status: CANCELLED | OUTPATIENT
Start: 2025-06-25

## 2025-06-26 ENCOUNTER — ANESTHESIA EVENT (OUTPATIENT)
Dept: GASTROENTEROLOGY | Facility: CLINIC | Age: 45
End: 2025-06-26
Payer: COMMERCIAL

## 2025-06-26 ENCOUNTER — ANESTHESIA (OUTPATIENT)
Dept: GASTROENTEROLOGY | Facility: CLINIC | Age: 45
End: 2025-06-26
Payer: COMMERCIAL

## 2025-06-26 ENCOUNTER — HOSPITAL ENCOUNTER (OUTPATIENT)
Facility: CLINIC | Age: 45
Discharge: HOME | End: 2025-06-26
Attending: STUDENT IN AN ORGANIZED HEALTH CARE EDUCATION/TRAINING PROGRAM | Admitting: STUDENT IN AN ORGANIZED HEALTH CARE EDUCATION/TRAINING PROGRAM
Payer: COMMERCIAL

## 2025-06-26 VITALS
OXYGEN SATURATION: 97 % | DIASTOLIC BLOOD PRESSURE: 83 MMHG | BODY MASS INDEX: 29.41 KG/M2 | SYSTOLIC BLOOD PRESSURE: 110 MMHG | RESPIRATION RATE: 18 BRPM | WEIGHT: 205 LBS | TEMPERATURE: 97.9 F | HEART RATE: 70 BPM

## 2025-06-26 LAB — UPPER GI ENDOSCOPY: NORMAL

## 2025-06-26 PROCEDURE — 91035 G-ESOPH REFLX TST W/ELECTROD: CPT | Performed by: STUDENT IN AN ORGANIZED HEALTH CARE EDUCATION/TRAINING PROGRAM

## 2025-06-26 PROCEDURE — 43235 EGD DIAGNOSTIC BRUSH WASH: CPT | Performed by: STUDENT IN AN ORGANIZED HEALTH CARE EDUCATION/TRAINING PROGRAM

## 2025-06-26 PROCEDURE — 250N000009 HC RX 250

## 2025-06-26 PROCEDURE — 370N000017 HC ANESTHESIA TECHNICAL FEE, PER MIN: Performed by: STUDENT IN AN ORGANIZED HEALTH CARE EDUCATION/TRAINING PROGRAM

## 2025-06-26 PROCEDURE — 258N000003 HC RX IP 258 OP 636

## 2025-06-26 PROCEDURE — 250N000011 HC RX IP 250 OP 636

## 2025-06-26 RX ORDER — ONDANSETRON 2 MG/ML
4 INJECTION INTRAMUSCULAR; INTRAVENOUS EVERY 30 MIN PRN
Status: CANCELLED | OUTPATIENT
Start: 2025-06-26

## 2025-06-26 RX ORDER — NALOXONE HYDROCHLORIDE 0.4 MG/ML
0.1 INJECTION, SOLUTION INTRAMUSCULAR; INTRAVENOUS; SUBCUTANEOUS
Status: CANCELLED | OUTPATIENT
Start: 2025-06-26

## 2025-06-26 RX ORDER — ONDANSETRON 4 MG/1
4 TABLET, ORALLY DISINTEGRATING ORAL EVERY 30 MIN PRN
Status: CANCELLED | OUTPATIENT
Start: 2025-06-26

## 2025-06-26 RX ORDER — SODIUM CHLORIDE, SODIUM LACTATE, POTASSIUM CHLORIDE, CALCIUM CHLORIDE 600; 310; 30; 20 MG/100ML; MG/100ML; MG/100ML; MG/100ML
INJECTION, SOLUTION INTRAVENOUS CONTINUOUS
Status: DISCONTINUED | OUTPATIENT
Start: 2025-06-26 | End: 2025-06-26 | Stop reason: HOSPADM

## 2025-06-26 RX ORDER — ONDANSETRON 2 MG/ML
4 INJECTION INTRAMUSCULAR; INTRAVENOUS
Status: DISCONTINUED | OUTPATIENT
Start: 2025-06-26 | End: 2025-06-26 | Stop reason: HOSPADM

## 2025-06-26 RX ORDER — SODIUM CHLORIDE, SODIUM LACTATE, POTASSIUM CHLORIDE, CALCIUM CHLORIDE 600; 310; 30; 20 MG/100ML; MG/100ML; MG/100ML; MG/100ML
INJECTION, SOLUTION INTRAVENOUS CONTINUOUS PRN
Status: DISCONTINUED | OUTPATIENT
Start: 2025-06-26 | End: 2025-06-26

## 2025-06-26 RX ORDER — LIDOCAINE 40 MG/G
CREAM TOPICAL
Status: DISCONTINUED | OUTPATIENT
Start: 2025-06-26 | End: 2025-06-26 | Stop reason: HOSPADM

## 2025-06-26 RX ORDER — HYDROMORPHONE HCL IN WATER/PF 6 MG/30 ML
0.2 PATIENT CONTROLLED ANALGESIA SYRINGE INTRAVENOUS EVERY 5 MIN PRN
Refills: 0 | Status: CANCELLED | OUTPATIENT
Start: 2025-06-26

## 2025-06-26 RX ORDER — SODIUM CHLORIDE, SODIUM LACTATE, POTASSIUM CHLORIDE, CALCIUM CHLORIDE 600; 310; 30; 20 MG/100ML; MG/100ML; MG/100ML; MG/100ML
INJECTION, SOLUTION INTRAVENOUS CONTINUOUS
Status: CANCELLED | OUTPATIENT
Start: 2025-06-26

## 2025-06-26 RX ORDER — LIDOCAINE HYDROCHLORIDE 20 MG/ML
INJECTION, SOLUTION INFILTRATION; PERINEURAL PRN
Status: DISCONTINUED | OUTPATIENT
Start: 2025-06-26 | End: 2025-06-26

## 2025-06-26 RX ORDER — FENTANYL CITRATE 50 UG/ML
50 INJECTION, SOLUTION INTRAMUSCULAR; INTRAVENOUS EVERY 5 MIN PRN
Refills: 0 | Status: CANCELLED | OUTPATIENT
Start: 2025-06-26

## 2025-06-26 RX ORDER — DEXAMETHASONE SODIUM PHOSPHATE 4 MG/ML
4 INJECTION, SOLUTION INTRA-ARTICULAR; INTRALESIONAL; INTRAMUSCULAR; INTRAVENOUS; SOFT TISSUE
Status: CANCELLED | OUTPATIENT
Start: 2025-06-26

## 2025-06-26 RX ORDER — HYDROMORPHONE HCL IN WATER/PF 6 MG/30 ML
0.4 PATIENT CONTROLLED ANALGESIA SYRINGE INTRAVENOUS EVERY 5 MIN PRN
Refills: 0 | Status: CANCELLED | OUTPATIENT
Start: 2025-06-26

## 2025-06-26 RX ORDER — OXYCODONE HYDROCHLORIDE 5 MG/1
5 TABLET ORAL
Refills: 0 | Status: CANCELLED | OUTPATIENT
Start: 2025-06-26

## 2025-06-26 RX ORDER — FENTANYL CITRATE 50 UG/ML
25 INJECTION, SOLUTION INTRAMUSCULAR; INTRAVENOUS EVERY 5 MIN PRN
Refills: 0 | Status: CANCELLED | OUTPATIENT
Start: 2025-06-26

## 2025-06-26 RX ORDER — PROPOFOL 10 MG/ML
INJECTION, EMULSION INTRAVENOUS PRN
Status: DISCONTINUED | OUTPATIENT
Start: 2025-06-26 | End: 2025-06-26

## 2025-06-26 RX ORDER — OXYCODONE HYDROCHLORIDE 10 MG/1
10 TABLET ORAL
Refills: 0 | Status: CANCELLED | OUTPATIENT
Start: 2025-06-26

## 2025-06-26 RX ORDER — PROPOFOL 10 MG/ML
INJECTION, EMULSION INTRAVENOUS CONTINUOUS PRN
Status: DISCONTINUED | OUTPATIENT
Start: 2025-06-26 | End: 2025-06-26

## 2025-06-26 RX ADMIN — PROPOFOL 20 MG: 10 INJECTION, EMULSION INTRAVENOUS at 12:48

## 2025-06-26 RX ADMIN — LIDOCAINE HYDROCHLORIDE 100 MG: 20 INJECTION, SOLUTION INFILTRATION; PERINEURAL at 12:46

## 2025-06-26 RX ADMIN — PROPOFOL 100 MG: 10 INJECTION, EMULSION INTRAVENOUS at 12:46

## 2025-06-26 RX ADMIN — PROPOFOL 30 MG: 10 INJECTION, EMULSION INTRAVENOUS at 12:50

## 2025-06-26 RX ADMIN — PROPOFOL 120 MCG/KG/MIN: 10 INJECTION, EMULSION INTRAVENOUS at 12:46

## 2025-06-26 RX ADMIN — SODIUM CHLORIDE, SODIUM LACTATE, POTASSIUM CHLORIDE, AND CALCIUM CHLORIDE: .6; .31; .03; .02 INJECTION, SOLUTION INTRAVENOUS at 12:44

## 2025-06-26 RX ADMIN — PROPOFOL 30 MG: 10 INJECTION, EMULSION INTRAVENOUS at 12:52

## 2025-06-26 ASSESSMENT — ACTIVITIES OF DAILY LIVING (ADL)
ADLS_ACUITY_SCORE: 43

## 2025-06-26 NOTE — ANESTHESIA POSTPROCEDURE EVALUATION
Patient: Jeanie French    Procedure: Procedure(s):  Esophagoscopy, Gastroscopy, Duodenoscopy, Gastroesophageal Reflux Test with Mucosal PH Electrode       Anesthesia Type:  MAC    Note:  Disposition: Outpatient   Postop Pain Control: Uneventful            Sign Out: Well controlled pain   PONV: No   Neuro/Psych: Uneventful            Sign Out: Acceptable/Baseline neuro status   Airway/Respiratory: Uneventful            Sign Out: Acceptable/Baseline resp. status   CV/Hemodynamics: Uneventful            Sign Out: Acceptable CV status; No obvious hypovolemia; No obvious fluid overload   Other NRE: NONE   DID A NON-ROUTINE EVENT OCCUR? No       Last vitals:  Vitals Value Taken Time   /66 06/26/25 13:10   Temp     Pulse     Resp     SpO2 100 % 06/26/25 13:12   Vitals shown include unfiled device data.    Electronically Signed By: Sohail Carolina MD  June 26, 2025  1:13 PM

## 2025-06-26 NOTE — ANESTHESIA PREPROCEDURE EVALUATION
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      Past Surgical History:   Procedure Laterality Date    ENT SURGERY      PE tubes    EXCISION TONGUE  3/13/2012    Procedure:EXCISION TONGUE; Wide Local Excision Right Lateral Tongue  ; Surgeon:DE MERRITT; Location:[facility] OR    IR RENAL BIOPSY RIGHT  3/13/2025      Allergies   Allergen Reactions    Nkda [No Known Drug Allergy]       Social History     Tobacco Use    Smoking status: Never     Passive exposure: Never    Smokeless tobacco: Never   Substance Use Topics    Alcohol use: Yes     Comment: infrequently      Wt Readings from Last 1 Encounters:   25 93 kg (205 lb)        Anesthesia Evaluation   Pt has had prior anesthetic. Type: General.        ROS/MED HX  ENT/Pulmonary:     (+)                     Intermittent, asthma                  Neurologic:       Cardiovascular:       METS/Exercise Tolerance:     Hematologic:       Musculoskeletal:       GI/Hepatic:     (+) GERD,                   Renal/Genitourinary:     (+) renal disease,  Pt does not require dialysis,           Endo:       Psychiatric/Substance Use:       Infectious Disease:       Malignancy:       Other:              Physical Exam  Airway  Mallampati: I  Neck ROM: limited    Cardiovascular - normal exam   Dental   (+) Minor Abnormalities - some fillings, tiny chips      Pulmonary - normal exam      Neurological   She appears awake.    Other Findings       OUTSIDE LABS:  CBC:   Lab Results   Component Value Date    WBC 5.4 2025    WBC 6.7 2025     HGB 11.6 (L) 03/13/2025    HGB 13.3 01/27/2025    HCT 34.7 (L) 03/13/2025    HCT 39.8 01/27/2025     03/13/2025     01/27/2025     BMP:   Lab Results   Component Value Date     05/01/2025     01/27/2025    POTASSIUM 4.1 05/01/2025    POTASSIUM 3.9 01/27/2025    CHLORIDE 105 05/01/2025    CHLORIDE 104 01/27/2025    CO2 23 05/01/2025    CO2 21 (L) 01/27/2025    BUN 14.2 05/01/2025    BUN 16.6 01/27/2025    CR 1.27 (H) 05/01/2025    CR 1.15 (H) 01/27/2025    GLC 94 05/01/2025     (H) 01/27/2025     COAGS:   Lab Results   Component Value Date    INR 0.97 03/13/2025     POC:   Lab Results   Component Value Date    HCG Negative 03/13/2012     HEPATIC:   Lab Results   Component Value Date    ALBUMIN 3.9 05/01/2025    PROTTOTAL 7.4 05/25/2022    ALT 14 05/25/2022    AST 17 05/25/2022    ALKPHOS 70 05/25/2022    BILITOTAL 0.5 05/25/2022    BILIDIRECT 0.1 06/09/2011     OTHER:   Lab Results   Component Value Date    A1C 5.3 10/29/2024    TREMAINE 9.1 05/01/2025    PHOS 3.7 05/01/2025    MAG 2.0 07/31/2023    TSH 2.16 10/29/2024    T4 1.05 12/03/2024    SED 7 02/14/2024       Anesthesia Plan    ASA Status:  3      NPO Status: NPO Appropriate   Anesthesia Type: MAC.  Airway: natural airway.  Induction: intravenous.  Maintenance: TIVA.   Techniques and Equipment:       - Monitoring Plan: standard ASA monitoring     Consents    Anesthesia Plan(s) and associated risks, benefits, and realistic alternatives discussed. Questions answered and patient/representative(s) expressed understanding.     - Discussed: anesthesiologist     - Discussed with:  Patient          Blood Consent:      - Discussed with: patient.     - Consented: consented to blood products     Postoperative Care         Comments:                   Sohail Carolina MD    I have reviewed the pertinent notes and labs in the chart from the past 30 days and (re)examined the patient.  Any updates or changes from those notes are reflected in  this note.    Clinically Significant Risk Factors Present on Admission                   # Hypertension: Home medication list includes antihypertensive(s)               # Asthma: noted on problem list

## 2025-06-26 NOTE — H&P
Jeanie French  [id number]  female  45 year old      Reason for procedure/surgery: EGD with Bravo    Patient Active Problem List   Diagnosis    Cancer of tongue (H)    Generalized anxiety disorder    Tenosynovitis of the right index finger    Wheezing    Irritable bowel syndrome (IBS)    Exercise-induced asthma    Sinusitis, chronic    Allergic rhinitis, unspecified allergic rhinitis type    Hypovitaminosis D    Adjustment disorder with anxiety    Menorrhagia with regular cycle    Major depressive disorder with single episode    Iron deficiency anemia due to chronic blood loss    Collagenous colitis    Eosinophilic esophagitis    Low serum cortisol level       Past Surgical History:    Past Surgical History:   Procedure Laterality Date    ENT SURGERY      PE tubes    EXCISION TONGUE  3/13/2012    Procedure:EXCISION TONGUE; Wide Local Excision Right Lateral Tongue  ; Surgeon:DE MERRITT; Location:[facility]    IR RENAL BIOPSY RIGHT  3/13/2025       Past Medical History:   Past Medical History:   Diagnosis Date    Asthma     Depression, anxiety     GERD (gastroesophageal reflux disease)     Iron deficiency anemia 2021    due to blood loss    Noninfectious ileitis     IBS    Oral cancer (H)     squamous cell Ca tongue border       Social History:   Social History     Tobacco Use    Smoking status: Never     Passive exposure: Never    Smokeless tobacco: Never   Substance Use Topics    Alcohol use: Yes     Comment: infrequently       Family History:   Family History   Problem Relation Age of Onset    Diabetes Maternal Grandmother     Diabetes Maternal Grandfather     Diabetes Other     Cancer Mother         Chronic leukemia     Coronary Artery Disease Father     Anesthesia Reaction No family hx of     Colon Polyps No family hx of     Crohn's Disease No family hx of     Ulcerative Colitis No family hx of     Colon Cancer No family hx of     Hypertension No family hx of     Hyperlipidemia No family hx of      Cerebrovascular Disease No family hx of     Breast Cancer No family hx of     Prostate Cancer No family hx of     Other Cancer No family hx of     Depression No family hx of     Anxiety Disorder No family hx of     Mental Illness No family hx of     Substance Abuse No family hx of     Asthma No family hx of     Osteoporosis No family hx of     Genetic Disorder No family hx of     Thyroid Disease No family hx of     Obesity No family hx of     Unknown/Adopted No family hx of        Allergies:   Allergies   Allergen Reactions    Nkda [No Known Drug Allergy]        Active Medications:   No current outpatient medications on file.       Systemic Review:   CONSTITUTIONAL: NEGATIVE for fever, chills, change in weight  ENT/MOUTH: NEGATIVE for ear, mouth and throat problems  RESP: NEGATIVE for significant cough or SOB  CV: NEGATIVE for chest pain, palpitations or peripheral edema    Physical Examination:   Vital Signs: /86   Pulse 70   Temp 97.9  F (36.6  C) (Oral)   Resp 18   Wt 93 kg (205 lb)   SpO2 99%   BMI 29.41 kg/m    GENERAL: healthy, alert and no distress  NECK: no adenopathy, no asymmetry, masses, or scars  RESP: lungs clear to auscultation - no rales, rhonchi or wheezes  CV: regular rate and rhythm, normal S1 S2, no S3 or S4, no murmur, click or rub, no peripheral edema and peripheral pulses strong  ABDOMEN: soft, nontender, no hepatosplenomegaly, no masses and bowel sounds normal  MS: no gross musculoskeletal defects noted, no edema    Plan: Appropriate to proceed as scheduled.      Antonio Johnson MD  6/26/2025    PCP:  Anayeli Warner

## 2025-06-26 NOTE — ANESTHESIA CARE TRANSFER NOTE
Patient: Jeanie French    Procedure: Procedure(s):  Esophagoscopy, Gastroscopy, Duodenoscopy, Gastroesophageal Reflux Test with Mucosal PH Electrode       Diagnosis: Eosinophilic esophagitis [K20.0]  Hiatal hernia [K44.9]  Diagnosis Additional Information: No value filed.    Anesthesia Type:   MAC     Note:    Oropharynx: oropharynx clear of all foreign objects  Level of Consciousness: awake and drowsy  Oxygen Supplementation: face mask  Level of Supplemental Oxygen (L/min / FiO2): 6  Independent Airway: airway patency satisfactory and stable  Dentition: dentition unchanged  Vital Signs Stable: post-procedure vital signs reviewed and stable  Report to RN Given: handoff report given  Patient transferred to: Phase II    Handoff Report: Identifed the Patient, Identified the Reponsible Provider, Reviewed the pertinent medical history, Discussed the surgical course, Reviewed Intra-OP anesthesia mangement and issues during anesthesia, Set expectations for post-procedure period and Allowed opportunity for questions and acknowledgement of understanding  Vitals:  Vitals Value Taken Time   /70 06/26/25 13:00   Temp     Pulse     Resp     SpO2 100 % 06/26/25 13:01   Vitals shown include unfiled device data.    Electronically Signed By: RAKAN Blair CRNA  June 26, 2025  1:02 PM

## 2025-07-01 ENCOUNTER — PATIENT OUTREACH (OUTPATIENT)
Dept: CARE COORDINATION | Facility: CLINIC | Age: 45
End: 2025-07-01
Payer: COMMERCIAL

## 2025-07-07 ENCOUNTER — DOCUMENTATION ONLY (OUTPATIENT)
Dept: GASTROENTEROLOGY | Facility: CLINIC | Age: 45
End: 2025-07-07
Payer: COMMERCIAL

## 2025-07-07 DIAGNOSIS — K21.9 GASTROESOPHAGEAL REFLUX DISEASE WITHOUT ESOPHAGITIS: Primary | ICD-10-CM

## 2025-07-07 NOTE — PROGRESS NOTES
Bravo monitor and diary returned.  Data uploaded and sent to reading provider for interpretation.  Reading Provider:  Fady

## 2025-07-14 ENCOUNTER — TELEPHONE (OUTPATIENT)
Dept: FAMILY MEDICINE | Facility: CLINIC | Age: 45
End: 2025-07-14
Payer: COMMERCIAL

## 2025-07-14 NOTE — TELEPHONE ENCOUNTER
Patient Quality Outreach    Patient is due for the following:   Asthma  -  Asthma follow-up visit    Action(s) Taken:   Schedule a office visit for Asthma    Type of outreach:    Phone, spoke to patient/parent. Talked to patient.  Patient want to wait after her lung surgery. Will call us back.    Questions for provider review:    None         Omid Jackson  Chart routed to None.         Pt transported to CT at this time via wheelchair.

## 2025-07-15 ENCOUNTER — PATIENT OUTREACH (OUTPATIENT)
Dept: CARE COORDINATION | Facility: CLINIC | Age: 45
End: 2025-07-15
Payer: COMMERCIAL

## 2025-08-05 ENCOUNTER — ONCOLOGY VISIT (OUTPATIENT)
Dept: SURGERY | Facility: CLINIC | Age: 45
End: 2025-08-05
Attending: THORACIC SURGERY (CARDIOTHORACIC VASCULAR SURGERY)
Payer: COMMERCIAL

## 2025-08-05 ENCOUNTER — PREP FOR PROCEDURE (OUTPATIENT)
Dept: SURGERY | Facility: CLINIC | Age: 45
End: 2025-08-05
Payer: COMMERCIAL

## 2025-08-05 VITALS
HEART RATE: 87 BPM | WEIGHT: 213.9 LBS | BODY MASS INDEX: 30.69 KG/M2 | RESPIRATION RATE: 16 BRPM | DIASTOLIC BLOOD PRESSURE: 85 MMHG | TEMPERATURE: 98 F | OXYGEN SATURATION: 97 % | SYSTOLIC BLOOD PRESSURE: 131 MMHG

## 2025-08-05 DIAGNOSIS — K44.9 HIATAL HERNIA WITH GASTROESOPHAGEAL REFLUX: Primary | ICD-10-CM

## 2025-08-05 DIAGNOSIS — K21.9 HIATAL HERNIA WITH GASTROESOPHAGEAL REFLUX: Primary | ICD-10-CM

## 2025-08-05 PROCEDURE — 99213 OFFICE O/P EST LOW 20 MIN: CPT | Performed by: THORACIC SURGERY (CARDIOTHORACIC VASCULAR SURGERY)

## 2025-08-05 PROCEDURE — 99204 OFFICE O/P NEW MOD 45 MIN: CPT | Performed by: THORACIC SURGERY (CARDIOTHORACIC VASCULAR SURGERY)

## 2025-08-05 RX ORDER — ENOXAPARIN SODIUM 100 MG/ML
40 INJECTION SUBCUTANEOUS
OUTPATIENT
Start: 2025-08-05

## 2025-08-05 ASSESSMENT — PAIN SCALES - GENERAL: PAINLEVEL_OUTOF10: NO PAIN (0)

## 2025-08-12 ENCOUNTER — TELEPHONE (OUTPATIENT)
Dept: ONCOLOGY | Facility: CLINIC | Age: 45
End: 2025-08-12
Payer: COMMERCIAL

## 2025-08-20 DIAGNOSIS — N92.0 MENORRHAGIA WITH REGULAR CYCLE: ICD-10-CM

## 2025-08-20 RX ORDER — NORGESTREL AND ETHINYL ESTRADIOL 0.3-0.03MG
KIT ORAL
Qty: 84 TABLET | Refills: 0 | Status: SHIPPED | OUTPATIENT
Start: 2025-08-20

## 2025-08-26 ENCOUNTER — PREP FOR PROCEDURE (OUTPATIENT)
Dept: ONCOLOGY | Facility: CLINIC | Age: 45
End: 2025-08-26
Payer: COMMERCIAL

## 2025-09-24 ENCOUNTER — PRE VISIT (OUTPATIENT)
Dept: SURGERY | Facility: CLINIC | Age: 45
End: 2025-09-24

## (undated) RX ORDER — FENTANYL CITRATE 50 UG/ML
INJECTION, SOLUTION INTRAMUSCULAR; INTRAVENOUS
Status: DISPENSED
Start: 2025-03-13

## (undated) RX ORDER — LIDOCAINE HYDROCHLORIDE 20 MG/ML
JELLY TOPICAL
Status: DISPENSED
Start: 2025-06-16

## (undated) RX ORDER — SIMETHICONE 40MG/0.6ML
SUSPENSION, DROPS(FINAL DOSAGE FORM)(ML) ORAL
Status: DISPENSED
Start: 2025-06-16

## (undated) RX ORDER — LIDOCAINE HYDROCHLORIDE 10 MG/ML
INJECTION, SOLUTION EPIDURAL; INFILTRATION; INTRACAUDAL; PERINEURAL
Status: DISPENSED
Start: 2025-03-13

## (undated) RX ORDER — WATER 10 ML/10ML
INJECTION INTRAMUSCULAR; INTRAVENOUS; SUBCUTANEOUS
Status: DISPENSED
Start: 2022-07-14